# Patient Record
Sex: FEMALE | Race: WHITE | NOT HISPANIC OR LATINO | Employment: OTHER | URBAN - METROPOLITAN AREA
[De-identification: names, ages, dates, MRNs, and addresses within clinical notes are randomized per-mention and may not be internally consistent; named-entity substitution may affect disease eponyms.]

---

## 2017-07-17 ENCOUNTER — NURSE TRIAGE (OUTPATIENT)
Dept: ADMINISTRATIVE | Facility: CLINIC | Age: 67
End: 2017-07-17

## 2017-07-18 ENCOUNTER — OFFICE VISIT (OUTPATIENT)
Dept: INTERNAL MEDICINE | Facility: CLINIC | Age: 67
End: 2017-07-18
Payer: MEDICARE

## 2017-07-18 ENCOUNTER — LAB VISIT (OUTPATIENT)
Dept: LAB | Facility: HOSPITAL | Age: 67
End: 2017-07-18
Payer: MEDICARE

## 2017-07-18 VITALS
HEART RATE: 88 BPM | BODY MASS INDEX: 19.2 KG/M2 | SYSTOLIC BLOOD PRESSURE: 142 MMHG | OXYGEN SATURATION: 96 % | WEIGHT: 119.5 LBS | TEMPERATURE: 98 F | HEIGHT: 66 IN | DIASTOLIC BLOOD PRESSURE: 78 MMHG

## 2017-07-18 DIAGNOSIS — I10 ESSENTIAL HYPERTENSION: ICD-10-CM

## 2017-07-18 DIAGNOSIS — H61.23 BILATERAL IMPACTED CERUMEN: ICD-10-CM

## 2017-07-18 DIAGNOSIS — R42 VERTIGO: ICD-10-CM

## 2017-07-18 DIAGNOSIS — R42 VERTIGO: Primary | ICD-10-CM

## 2017-07-18 LAB
ALBUMIN SERPL BCP-MCNC: 4.3 G/DL
ALP SERPL-CCNC: 97 U/L
ALT SERPL W/O P-5'-P-CCNC: 17 U/L
ANION GAP SERPL CALC-SCNC: 7 MMOL/L
AST SERPL-CCNC: 23 U/L
BASOPHILS # BLD AUTO: 0.04 K/UL
BASOPHILS NFR BLD: 0.6 %
BILIRUB SERPL-MCNC: 0.4 MG/DL
BUN SERPL-MCNC: 13 MG/DL
CALCIUM SERPL-MCNC: 10.1 MG/DL
CHLORIDE SERPL-SCNC: 107 MMOL/L
CO2 SERPL-SCNC: 28 MMOL/L
CREAT SERPL-MCNC: 0.9 MG/DL
DIFFERENTIAL METHOD: NORMAL
EOSINOPHIL # BLD AUTO: 0 K/UL
EOSINOPHIL NFR BLD: 0.6 %
ERYTHROCYTE [DISTWIDTH] IN BLOOD BY AUTOMATED COUNT: 12.6 %
EST. GFR  (AFRICAN AMERICAN): >60 ML/MIN/1.73 M^2
EST. GFR  (NON AFRICAN AMERICAN): >60 ML/MIN/1.73 M^2
GLUCOSE SERPL-MCNC: 126 MG/DL
HCT VFR BLD AUTO: 41.4 %
HGB BLD-MCNC: 13.7 G/DL
LYMPHOCYTES # BLD AUTO: 1.3 K/UL
LYMPHOCYTES NFR BLD: 21.4 %
MCH RBC QN AUTO: 30 PG
MCHC RBC AUTO-ENTMCNC: 33.1 %
MCV RBC AUTO: 91 FL
MONOCYTES # BLD AUTO: 0.4 K/UL
MONOCYTES NFR BLD: 6.6 %
NEUTROPHILS # BLD AUTO: 4.4 K/UL
NEUTROPHILS NFR BLD: 70.6 %
PLATELET # BLD AUTO: 170 K/UL
PMV BLD AUTO: 9.9 FL
POTASSIUM SERPL-SCNC: 4.3 MMOL/L
PROT SERPL-MCNC: 7.5 G/DL
RBC # BLD AUTO: 4.57 M/UL
SODIUM SERPL-SCNC: 142 MMOL/L
WBC # BLD AUTO: 6.18 K/UL

## 2017-07-18 PROCEDURE — 36415 COLL VENOUS BLD VENIPUNCTURE: CPT

## 2017-07-18 PROCEDURE — 85025 COMPLETE CBC W/AUTO DIFF WBC: CPT

## 2017-07-18 PROCEDURE — 1126F AMNT PAIN NOTED NONE PRSNT: CPT | Mod: S$GLB,,, | Performed by: NURSE PRACTITIONER

## 2017-07-18 PROCEDURE — 99999 PR PBB SHADOW E&M-EST. PATIENT-LVL IV: CPT | Mod: PBBFAC,,, | Performed by: NURSE PRACTITIONER

## 2017-07-18 PROCEDURE — 80053 COMPREHEN METABOLIC PANEL: CPT

## 2017-07-18 PROCEDURE — 1159F MED LIST DOCD IN RCRD: CPT | Mod: S$GLB,,, | Performed by: NURSE PRACTITIONER

## 2017-07-18 PROCEDURE — 99203 OFFICE O/P NEW LOW 30 MIN: CPT | Mod: S$GLB,,, | Performed by: NURSE PRACTITIONER

## 2017-07-18 RX ORDER — LISINOPRIL 10 MG/1
10 TABLET ORAL DAILY
Qty: 90 TABLET | Refills: 0 | Status: SHIPPED | OUTPATIENT
Start: 2017-07-18 | End: 2017-07-25 | Stop reason: SDUPTHER

## 2017-07-18 RX ORDER — LORATADINE 10 MG/1
10 TABLET ORAL DAILY
COMMUNITY
End: 2017-10-16

## 2017-07-18 RX ORDER — MECLIZINE HCL 12.5 MG 12.5 MG/1
12.5 TABLET ORAL 3 TIMES DAILY PRN
Qty: 30 TABLET | Refills: 0 | Status: SHIPPED | OUTPATIENT
Start: 2017-07-18 | End: 2018-09-13

## 2017-07-18 NOTE — PROGRESS NOTES
Subjective:       Patient ID: Chelsea Child is a 67 y.o. female.    Chief Complaint: Dizziness    Dizziness:   Chronicity:  New  Onset:  In the past 7 days  Progression since onset:  Resolved  Severity:  Moderate  Duration:  5 minutes  Dizziness characteristics:  Spacial disorientation, sensation of movement and lightheaded/impending faint   Associated symptoms: tinnitus, light-headedness and palpitations.no hearing loss, no ear congestion, no ear pain, no fever, no headaches, no nausea, no vomiting, no aural fullness, no weakness, no visual disturbances, no syncope, no panic, no facial weakness, no slurred speech, no numbness in extremities and no chest pain.  Aggravated by:  Position changes  Treatments tried:  Body position changes  Improvements on treatment:  Significant    Review of Systems   Constitutional: Positive for activity change. Negative for fever and unexpected weight change.   HENT: Positive for rhinorrhea and tinnitus. Negative for ear pain, hearing loss and trouble swallowing.    Eyes: Negative for discharge and visual disturbance.   Respiratory: Negative for chest tightness and wheezing.    Cardiovascular: Positive for palpitations. Negative for chest pain and syncope.   Gastrointestinal: Negative for blood in stool, constipation, diarrhea, nausea and vomiting.   Endocrine: Negative for polydipsia and polyuria.   Genitourinary: Positive for difficulty urinating. Negative for dysuria, hematuria and menstrual problem.   Musculoskeletal: Positive for neck pain. Negative for arthralgias and joint swelling.   Skin: Negative for rash.   Neurological: Positive for dizziness and light-headedness. Negative for weakness and headaches.   Psychiatric/Behavioral: Negative for confusion and dysphoric mood.       Objective:      Physical Exam   Constitutional: She is oriented to person, place, and time. She appears well-developed and well-nourished. No distress.   HENT:   Head: Normocephalic and atraumatic.    bilat cerumen impaction.   Eyes: EOM are normal. No scleral icterus.   Mild lid lag left upper lid   Neck: Normal range of motion. Neck supple.   Cardiovascular: Normal rate, regular rhythm and normal heart sounds.    Pulmonary/Chest: Effort normal and breath sounds normal. No respiratory distress. She has no wheezes. She has no rales.   Lymphadenopathy:     She has no cervical adenopathy.   Neurological: She is oriented to person, place, and time.   Skin: Skin is warm and dry. She is not diaphoretic.   Psychiatric: She has a normal mood and affect. Her behavior is normal.   Nursing note and vitals reviewed.      Assessment:       1. Vertigo    2. Bilateral impacted cerumen    3. Essential hypertension        Plan:   1. Vertigo  - meclizine (ANTIVERT) 12.5 mg tablet; Take 1 tablet (12.5 mg total) by mouth 3 (three) times daily as needed.  Dispense: 30 tablet; Refill: 0  - Holter monitor - 48 hour; Future  - CBC auto differential; Future    2. Bilateral impacted cerumen  - Ambulatory consult to ENT    3. Essential hypertension  - Holter monitor - 48 hour; Future  - Comprehensive metabolic panel; Future  - CBC auto differential; Future  - lisinopril 10 MG tablet; Take 1 tablet (10 mg total) by mouth once daily.  Dispense: 90 tablet; Refill: 0      Pt has been given instructions populated from [employer] database and has verbalized understanding of the after visit summary and information contained wherein.    Follow up with a primary care provider. May go to ER for acute shortness of breath, lightheadedness, fever, or any other emergent complaints or changes in condition.

## 2017-07-23 ENCOUNTER — PATIENT MESSAGE (OUTPATIENT)
Dept: INTERNAL MEDICINE | Facility: CLINIC | Age: 67
End: 2017-07-23

## 2017-07-23 DIAGNOSIS — I10 ESSENTIAL HYPERTENSION: ICD-10-CM

## 2017-07-25 ENCOUNTER — PATIENT MESSAGE (OUTPATIENT)
Dept: INTERNAL MEDICINE | Facility: CLINIC | Age: 67
End: 2017-07-25

## 2017-07-25 RX ORDER — LISINOPRIL 5 MG/1
5 TABLET ORAL DAILY
Qty: 90 TABLET | Refills: 0 | Status: SHIPPED | OUTPATIENT
Start: 2017-07-25 | End: 2017-09-26

## 2017-07-27 ENCOUNTER — PATIENT MESSAGE (OUTPATIENT)
Dept: INTERNAL MEDICINE | Facility: CLINIC | Age: 67
End: 2017-07-27

## 2017-07-27 ENCOUNTER — CLINICAL SUPPORT (OUTPATIENT)
Dept: ELECTROPHYSIOLOGY | Facility: CLINIC | Age: 67
End: 2017-07-27
Payer: MEDICARE

## 2017-07-27 DIAGNOSIS — R42 VERTIGO: ICD-10-CM

## 2017-07-27 DIAGNOSIS — I10 ESSENTIAL HYPERTENSION: ICD-10-CM

## 2017-07-27 PROCEDURE — 93224 XTRNL ECG REC UP TO 48 HRS: CPT | Mod: S$GLB,,, | Performed by: INTERNAL MEDICINE

## 2017-07-31 ENCOUNTER — PATIENT MESSAGE (OUTPATIENT)
Dept: INTERNAL MEDICINE | Facility: CLINIC | Age: 67
End: 2017-07-31

## 2017-07-31 DIAGNOSIS — R73.9 ELEVATED BLOOD SUGAR: Primary | ICD-10-CM

## 2017-07-31 DIAGNOSIS — R79.9 ABNORMAL FINDING OF BLOOD CHEMISTRY: ICD-10-CM

## 2017-08-03 ENCOUNTER — LAB VISIT (OUTPATIENT)
Dept: LAB | Facility: HOSPITAL | Age: 67
End: 2017-08-03
Attending: NURSE PRACTITIONER
Payer: MEDICARE

## 2017-08-03 DIAGNOSIS — R79.9 ABNORMAL FINDING OF BLOOD CHEMISTRY: ICD-10-CM

## 2017-08-03 DIAGNOSIS — R73.9 ELEVATED BLOOD SUGAR: ICD-10-CM

## 2017-08-03 LAB
CHOLEST/HDLC SERPL: 2.7 {RATIO}
ESTIMATED AVG GLUCOSE: 114 MG/DL
HBA1C MFR BLD HPLC: 5.6 %
HDL/CHOLESTEROL RATIO: 37.4 %
HDLC SERPL-MCNC: 182 MG/DL
HDLC SERPL-MCNC: 68 MG/DL
LDLC SERPL CALC-MCNC: 105.2 MG/DL
NONHDLC SERPL-MCNC: 114 MG/DL
TRIGL SERPL-MCNC: 44 MG/DL

## 2017-08-03 PROCEDURE — 83036 HEMOGLOBIN GLYCOSYLATED A1C: CPT

## 2017-08-03 PROCEDURE — 80061 LIPID PANEL: CPT

## 2017-08-03 PROCEDURE — 36415 COLL VENOUS BLD VENIPUNCTURE: CPT

## 2017-08-18 ENCOUNTER — OFFICE VISIT (OUTPATIENT)
Dept: OTOLARYNGOLOGY | Facility: CLINIC | Age: 67
End: 2017-08-18
Payer: MEDICARE

## 2017-08-18 ENCOUNTER — CLINICAL SUPPORT (OUTPATIENT)
Dept: AUDIOLOGY | Facility: CLINIC | Age: 67
End: 2017-08-18
Payer: MEDICARE

## 2017-08-18 VITALS
HEART RATE: 80 BPM | SYSTOLIC BLOOD PRESSURE: 127 MMHG | DIASTOLIC BLOOD PRESSURE: 76 MMHG | WEIGHT: 119.06 LBS | BODY MASS INDEX: 19.21 KG/M2

## 2017-08-18 DIAGNOSIS — H90.3 SENSORINEURAL HEARING LOSS (SNHL) OF BOTH EARS: ICD-10-CM

## 2017-08-18 DIAGNOSIS — H90.3 SENSORINEURAL HEARING LOSS, BILATERAL: Primary | ICD-10-CM

## 2017-08-18 DIAGNOSIS — H61.23 BILATERAL IMPACTED CERUMEN: Primary | ICD-10-CM

## 2017-08-18 DIAGNOSIS — J34.3 NASAL TURBINATE HYPERTROPHY: ICD-10-CM

## 2017-08-18 PROCEDURE — 3008F BODY MASS INDEX DOCD: CPT | Mod: S$GLB,,, | Performed by: NURSE PRACTITIONER

## 2017-08-18 PROCEDURE — 99999 PR PBB SHADOW E&M-EST. PATIENT-LVL I: CPT | Mod: PBBFAC,,,

## 2017-08-18 PROCEDURE — 69210 REMOVE IMPACTED EAR WAX UNI: CPT | Mod: S$GLB,,, | Performed by: NURSE PRACTITIONER

## 2017-08-18 PROCEDURE — 92557 COMPREHENSIVE HEARING TEST: CPT | Mod: S$GLB,,, | Performed by: AUDIOLOGIST-HEARING AID FITTER

## 2017-08-18 PROCEDURE — 99999 PR PBB SHADOW E&M-EST. PATIENT-LVL III: CPT | Mod: PBBFAC,,, | Performed by: NURSE PRACTITIONER

## 2017-08-18 PROCEDURE — 1159F MED LIST DOCD IN RCRD: CPT | Mod: S$GLB,,, | Performed by: NURSE PRACTITIONER

## 2017-08-18 PROCEDURE — 1126F AMNT PAIN NOTED NONE PRSNT: CPT | Mod: S$GLB,,, | Performed by: NURSE PRACTITIONER

## 2017-08-18 PROCEDURE — 92567 TYMPANOMETRY: CPT | Mod: S$GLB,,, | Performed by: AUDIOLOGIST-HEARING AID FITTER

## 2017-08-18 PROCEDURE — 99203 OFFICE O/P NEW LOW 30 MIN: CPT | Mod: 25,S$GLB,, | Performed by: NURSE PRACTITIONER

## 2017-08-18 RX ORDER — AZELASTINE 1 MG/ML
1 SPRAY, METERED NASAL 2 TIMES DAILY
Qty: 30 ML | Refills: 2 | Status: SHIPPED | OUTPATIENT
Start: 2017-08-18 | End: 2017-12-19

## 2017-08-18 NOTE — PATIENT INSTRUCTIONS
Astelin daily (spray laterally).  Audiogram Reviewed: B SNHL.   Hearing conservation strongly recommended.  Trial of amplification bilaterally also recommended.  Re-check of hearing in 18-24 months or sooner if subjective change noted.  F/U with PCP as per schedule.  Discussed with patient multiple tinnitus management strategies including the use of maskers, instruments, background sound enrichment and other means. All questions answered and appropriate references given.  Encouraged yearly ear cleanings.  Referral to [facility] for tinnitus retraining.  RTC prn.

## 2017-08-18 NOTE — PROGRESS NOTES
Chelsea Child was seen in the clinic today for a hearing evaluation following cerumen removal. Ms. Child reported bilateral tinnitus.      Audiological testing revealed mild to moderate mid to high frequency sensorineural hearing loss in the right ear and a mild to moderately severe mid to high frequency sensorineural hearing loss in the left ear. A speech reception threshold was obtained at 5 dBHL in both ears. Speech discrimination was 92%, bilaterally.    Tympanometry revealed normal Type A tympanograms in both ears.    Recommendations:  1. Otologic evaluation  2. Annual hearing evaluation  3. Hearing Aid Consult

## 2017-08-18 NOTE — LETTER
August 19, 2017      Deanna Muller NP  [address]  [facility] [address]           [facility] - Otorhinolaryngology  [address]  [facility] [address]  Phone: [phone]  Fax: [phone]          Patient: Chelsea Cavazos   MR Number: [id number]   YOB: 1950   Date of Visit: 8/18/2017       Dear Deanna Muller:    Thank you for referring Chelsea Cavazos to me for evaluation. Attached you will find relevant portions of my assessment and plan of care.    If you have questions, please do not hesitate to call me. I look forward to following Chelsea Cavazos along with you.    Sincerely,    Kush Rosen NP    Enclosure  CC:  No Recipients    If you would like to receive this communication electronically, please contact [email or web address] or [phone] to request more information on [employer] Link access.    For providers and/or their staff who would like to refer a patient to [facility], please contact us through our one-stop-shop provider referral line, [facility], at [phone].    If you feel you have received this communication in error or would no longer like to receive these types of communications, please e-mail [email or web address]

## 2017-08-18 NOTE — PROGRESS NOTES
Subjective:       Patient ID: Chelsea Ford Child is a 67 y.o. female.    Chief Complaint: Tinnitus and Cerumen Impaction    Hearing Loss:   Chronicity:  Chronic  Onset:  More than 1 year ago  Progression since onset:  Gradually worsening  Frequency:  Constantly  Hearing loss characteristics:  Moderate, trouble hearing TV, difficult on telephone and worse background noise   Associated symptoms: tinnitus.  No dizziness, no vertigo, no ear congestion, no ear pain, no fever, no headaches, no buzzing, no rhinorrhea, no aural fullness, no fluctuance, no imbalance, no otalgia, no otorrhea, no facial weakness, no visual disturbances and not masked by noise.  Aggravated by:  Nothing  Treatments tried:  NothingNo stroke, TIA, or systemic emboli, no neurologic disease, no noise exposure, no dizziness, no ear surgery, no environmental allergies, no ear tubes, no ototoxic drugs, no ear infections and no recent URI.       Past Medical History: Patient has no past medical history on file.    Past Surgical History: Patient has no past surgical history on file.    Social History: Patient reports that she quit smoking about 33 years ago. She started smoking about 39 years ago. She has never used smokeless tobacco. She reports that she does not drink alcohol or use drugs.    Family History: family history includes COPD in her father; Hypertension in her father and mother; No Known Problems in her son.    Medications:   Current Outpatient Prescriptions   Medication Sig    azelastine (ASTELIN) 137 mcg (0.1 %) nasal spray 1 spray (137 mcg total) by Nasal route 2 (two) times daily.    docusate sodium (COLACE) 50 MG capsule Take by mouth 2 (two) times daily.    lisinopril (PRINIVIL,ZESTRIL) 5 MG tablet Take 1 tablet (5 mg total) by mouth once daily.    loratadine (CLARITIN) 10 mg tablet Take 10 mg by mouth once daily.    meclizine (ANTIVERT) 12.5 mg tablet Take 1 tablet (12.5 mg total) by mouth 3 (three) times daily as needed.     No  current facility-administered medications for this visit.        Allergies: Patient has No Known Allergies.    Review of Systems   Constitutional: Negative for activity change, appetite change, chills, diaphoresis, fatigue, fever and unexpected weight change.   HENT: Positive for hearing loss and tinnitus. Negative for congestion, dental problem, ear discharge, ear pain, facial swelling, nosebleeds, postnasal drip, rhinorrhea, sinus pressure, sneezing, sore throat, trouble swallowing and voice change.    Eyes: Negative for pain and visual disturbance.   Respiratory: Negative for cough, chest tightness, shortness of breath, wheezing and stridor.    Cardiovascular: Negative for chest pain.   Musculoskeletal: Negative for gait problem and neck pain.   Skin: Negative for color change and rash.   Allergic/Immunologic: Negative for environmental allergies.   Neurological: Negative for dizziness, vertigo, seizures, syncope, facial asymmetry, speech difficulty, weakness, light-headedness, numbness and headaches.   Psychiatric/Behavioral: Negative for agitation and confusion. The patient is not nervous/anxious.        Objective:       /76 (BP Location: Right arm, Patient Position: Sitting, BP Method: Small (Automatic))   Pulse 80   Wt 54 kg (119 lb 0.8 oz)   BMI 19.21 kg/m²     Physical Exam   Constitutional: She is oriented to person, place, and time. She appears well-developed and well-nourished.   HENT:   Head: Normocephalic and atraumatic. Not macrocephalic and not microcephalic. Head is without raccoon's eyes, without Varghese's sign, without abrasion, without contusion, without laceration, without right periorbital erythema and without left periorbital erythema. Hair is normal.   Right Ear: Tympanic membrane, external ear and ear canal normal. No lacerations. No drainage, swelling or tenderness. No foreign bodies. No mastoid tenderness. Tympanic membrane is not injected, not scarred, not perforated, not  erythematous, not retracted and not bulging. Tympanic membrane mobility is normal. No middle ear effusion. No hemotympanum. Decreased hearing is noted.   Left Ear: Tympanic membrane, external ear and ear canal normal. No lacerations. No drainage, swelling or tenderness. No foreign bodies. No mastoid tenderness. Tympanic membrane is not injected, not scarred, not perforated, not erythematous, not retracted and not bulging. Tympanic membrane mobility is normal.  No middle ear effusion. No hemotympanum. Decreased hearing is noted.   Nose: Nose normal. No mucosal edema, rhinorrhea, nose lacerations, sinus tenderness or nasal deformity.   Mouth/Throat: Uvula is midline.   PROCEDURE NOTE:  Ceruminosis is noted in both EACs.  Wax was removed by manual debridement and suctioning utilizing the assistance of binocular microscopy revealing EACs and TMs WNL. The patient tolerated this procedure without difficulty. The subjective decrease noted in hearing pre-cleaning was resolved post-cleaning.    No AOM or OE.  Facial Nerve Intact.   Eyes: Conjunctivae, EOM and lids are normal. Pupils are equal, round, and reactive to light.   Neck: Trachea normal and normal range of motion. Neck supple. No spinous process tenderness and no muscular tenderness present. No neck rigidity. No edema, no erythema and normal range of motion present. No thyroid mass and no thyromegaly present.   Pulmonary/Chest: Effort normal.   Abdominal: Soft.   Musculoskeletal: Normal range of motion.   Lymphadenopathy:        Head (right side): No submental, no submandibular, no tonsillar, no preauricular and no posterior auricular adenopathy present.        Head (left side): No submental, no submandibular, no tonsillar, no preauricular, no posterior auricular and no occipital adenopathy present.     She has no cervical adenopathy.   Neurological: She is alert and oriented to person, place, and time. No cranial nerve deficit or sensory deficit.   Skin: Skin is  warm and dry.   Psychiatric: She has a normal mood and affect. Her behavior is normal. Judgment and thought content normal.   Nursing note and vitals reviewed.      As a result of this patients history and examination findings, a comprehensive audiogram was ordered to determine the level of hearing/hearing loss.        Assessment:       1. Bilateral impacted cerumen    2. Sensorineural hearing loss (SNHL) of both ears    3. Nasal turbinate hypertrophy        Plan:       Astelin daily (spray laterally).  Audiogram Reviewed: B SNHL.   Hearing conservation strongly recommended.  Trial of amplification bilaterally also recommended.  Re-check of hearing in 18-24 months or sooner if subjective change noted.  F/U with PCP as per schedule.  Discussed with patient multiple tinnitus management strategies including the use of maskers, instruments, background sound enrichment and other means. All questions answered and appropriate references given.  Encouraged yearly ear cleanings.  Referral to [facility] for tinnitus retraining.  RTC prn.

## 2017-09-26 ENCOUNTER — OFFICE VISIT (OUTPATIENT)
Dept: INTERNAL MEDICINE | Facility: CLINIC | Age: 67
End: 2017-09-26
Payer: MEDICARE

## 2017-09-26 ENCOUNTER — DOCUMENTATION ONLY (OUTPATIENT)
Dept: INTERNAL MEDICINE | Facility: CLINIC | Age: 67
End: 2017-09-26

## 2017-09-26 ENCOUNTER — HOSPITAL ENCOUNTER (OUTPATIENT)
Dept: RADIOLOGY | Facility: HOSPITAL | Age: 67
Discharge: HOME | End: 2017-09-26
Attending: INTERNAL MEDICINE
Payer: MEDICARE

## 2017-09-26 ENCOUNTER — IMMUNIZATION (OUTPATIENT)
Dept: INTERNAL MEDICINE | Facility: CLINIC | Age: 67
End: 2017-09-26
Payer: MEDICARE

## 2017-09-26 VITALS
BODY MASS INDEX: 19.32 KG/M2 | SYSTOLIC BLOOD PRESSURE: 120 MMHG | TEMPERATURE: 98 F | WEIGHT: 120.19 LBS | RESPIRATION RATE: 16 BRPM | HEIGHT: 66 IN | HEART RATE: 66 BPM | DIASTOLIC BLOOD PRESSURE: 76 MMHG

## 2017-09-26 DIAGNOSIS — R00.2 PALPITATIONS: ICD-10-CM

## 2017-09-26 DIAGNOSIS — Z11.59 NEED FOR HEPATITIS C SCREENING TEST: ICD-10-CM

## 2017-09-26 DIAGNOSIS — Z12.31 ENCOUNTER FOR SCREENING MAMMOGRAM FOR BREAST CANCER: ICD-10-CM

## 2017-09-26 DIAGNOSIS — Z85.828 HISTORY OF BASAL CELL CANCER: ICD-10-CM

## 2017-09-26 DIAGNOSIS — Z23 NEED FOR PNEUMOCOCCAL VACCINATION: ICD-10-CM

## 2017-09-26 DIAGNOSIS — I10 BENIGN ESSENTIAL HYPERTENSION: Primary | ICD-10-CM

## 2017-09-26 DIAGNOSIS — Z12.11 ENCOUNTER FOR FIT (FECAL IMMUNOCHEMICAL TEST) SCREENING: ICD-10-CM

## 2017-09-26 DIAGNOSIS — Z78.0 POSTMENOPAUSAL ESTROGEN DEFICIENCY: ICD-10-CM

## 2017-09-26 DIAGNOSIS — R42 VERTIGO: ICD-10-CM

## 2017-09-26 PROCEDURE — 1159F MED LIST DOCD IN RCRD: CPT | Mod: S$GLB,,, | Performed by: INTERNAL MEDICINE

## 2017-09-26 PROCEDURE — 1125F AMNT PAIN NOTED PAIN PRSNT: CPT | Mod: S$GLB,,, | Performed by: INTERNAL MEDICINE

## 2017-09-26 PROCEDURE — G0008 ADMIN INFLUENZA VIRUS VAC: HCPCS | Mod: S$GLB,,, | Performed by: INTERNAL MEDICINE

## 2017-09-26 PROCEDURE — 99999 PR PBB SHADOW E&M-EST. PATIENT-LVL IV: CPT | Mod: PBBFAC,,, | Performed by: INTERNAL MEDICINE

## 2017-09-26 PROCEDURE — G0009 ADMIN PNEUMOCOCCAL VACCINE: HCPCS | Mod: S$GLB,,, | Performed by: INTERNAL MEDICINE

## 2017-09-26 PROCEDURE — 90662 IIV NO PRSV INCREASED AG IM: CPT | Mod: S$GLB,,, | Performed by: INTERNAL MEDICINE

## 2017-09-26 PROCEDURE — 77067 SCR MAMMO BI INCL CAD: CPT | Mod: 26,,, | Performed by: RADIOLOGY

## 2017-09-26 PROCEDURE — 77067 SCR MAMMO BI INCL CAD: CPT | Mod: TC

## 2017-09-26 PROCEDURE — 99215 OFFICE O/P EST HI 40 MIN: CPT | Mod: S$GLB,,, | Performed by: INTERNAL MEDICINE

## 2017-09-26 PROCEDURE — 90670 PCV13 VACCINE IM: CPT | Mod: S$GLB,,, | Performed by: INTERNAL MEDICINE

## 2017-09-26 PROCEDURE — 3008F BODY MASS INDEX DOCD: CPT | Mod: S$GLB,,, | Performed by: INTERNAL MEDICINE

## 2017-09-26 RX ORDER — METOPROLOL TARTRATE 25 MG/1
12.5 TABLET, FILM COATED ORAL 2 TIMES DAILY
Qty: 30 TABLET | Refills: 3 | Status: SHIPPED | OUTPATIENT
Start: 2017-09-26 | End: 2017-10-04 | Stop reason: SDUPTHER

## 2017-09-26 NOTE — PROGRESS NOTES
INTERNAL MEDICINE INITIAL VISIT NOTE      CHIEF COMPLAINT     Chief Complaint   Patient presents with    [facility]     HPI     Chelsea Ford Child is a 67 y.o. C female who presents to [facility].    HTN - on lisinopril 5mg daily (started in July). Lisinopril 10mg made her dizzy.  Checks her BP BID usually - 100s-130s/60s-70s    Had 2 eps of vertigo, severe enough to keep her in bed. Assoc this w/ doing overhead work. Last ep was in July after she was doing yard work. The next day had her hair done. Then the next day, had vertigo where she was in bed for a few days. Felt like room spinning, worse w/ head movement. Hasn't had to take meclizine.   Seen ENT 8/19/17 for tinnitus. Some hearing loss of B ears.     C/o 1-2x/week w/ rapid heart rate - sometimes last about 15-20min. Last one she remembered was w/ being in [employer] - checked her pulse and was fast; rechecked it at home and still elevated at 130.   48hr Holter 7/27/17 - 8 beats of nonsustained AT. She had reports of SOB, dizziness, lightheadedness, increased HR and those corresponded w/ NSR or ST.     Lives by herself. No assistance w/ ADLs. Renovated her kitchen. Lots of gardening. No issues.     Past Medical History:  Past Medical History:   Diagnosis Date    BCC (basal cell carcinoma)     Hypertension     Palpitations 9/26/2017    Vertigo 9/26/2017       Past Surgical History:  Past Surgical History:   Procedure Laterality Date    Mohs      BCC       Allergies:  Review of patient's allergies indicates:  No Known Allergies    meds reviewed    Family History:  Family History   Problem Relation Age of Onset    Hypertension Mother     Hypertension Father     COPD Father     Stroke Father 72    No Known Problems Son        Social History:  Social History   Substance Use Topics    Smoking status: Former Smoker     Start date: 1978     Quit date: 1984    Smokeless tobacco: Never Used    Alcohol use No       Review of Systems:  Review of Systems  "  Constitutional: Negative for activity change and unexpected weight change.   HENT: Positive for hearing loss (chronic. seen ENT) and rhinorrhea (on asteline). Negative for trouble swallowing.    Eyes: Negative for discharge and visual disturbance (wears glasses).   Respiratory: Negative for chest tightness and wheezing.    Cardiovascular: Positive for palpitations. Negative for chest pain.   Gastrointestinal: Negative for blood in stool, constipation, diarrhea and vomiting.   Endocrine: Negative for polydipsia and polyuria.   Genitourinary: Negative for dysuria, hematuria and menstrual problem.   Musculoskeletal: Positive for arthralgias and neck pain. Negative for joint swelling.   Neurological: Negative for weakness and headaches.   Psychiatric/Behavioral: Negative for confusion and dysphoric mood.    Comprehensive review of systems otherwise negative. See history/subjective section for more details.    Health Maintainence:   Td - need one.   Flu - annually. Need one today.   Prevnar - need one today.  Pneumovax - had one >5 yrs ago.   Zoster - doesn't think she's had this. Had shingles previously.   MMG - need one.   C-SCOPE - need one.  DEXA - need  Hep C screening - need    PHYSICAL EXAM     /76   Pulse 66   Temp 97.7 °F (36.5 °C) (Oral)   Resp 16   Ht 5' 6" (1.676 m)   Wt 54.5 kg (120 lb 3.2 oz)   BMI 19.40 kg/m²     GEN - A+OX4, NAD   HEENT - PERRL, EOMI, OP clear. MMM.   Neck - No thyromegaly or cervical LAD. No thyroid masses felt.  CV - RRR, no m/r   Chest - CTAB, no wheezing or rhonchi  Abd - S/NT/ND/+BS.   Ext - 2+BDP and radial pulses. No LE edema.   Neuro - PERRL, EOMI, no nystagmus, eyebrow raise, facial sensation, hearing, m of mastication, smile, palatal raise, shoulder shrug, tongue protrusion symmetric and intact. 5/5 BUE and BLE strength. Sensation to light touch intact throughout. 2+ DTRs. Normal gait.   MSK - No spinal tenderness to palpation. Normal gait.   Skin - No " rash.    LABS     Previous labs reviewed.    ASSESSMENT/PLAN     Chelsea Ford Child is a 67 y.o. female with  Chelsea was seen today for establish care.    Diagnoses and all orders for this visit:    Benign essential hypertension - change lisinopril to lopressor due to palpitations. Keep track of BP at home.   -     metoprolol tartrate (LOPRESSOR) 25 MG tablet; Take 0.5 tablets (12.5 mg total) by mouth 2 (two) times daily. Hold if HR<60  -     Basic metabolic panel; Future; Expected date: 09/26/2017    Vertigo - meclizine prn. Discussed pathology of vertigo.     Palpitations - trial of lopressor.   -     metoprolol tartrate (LOPRESSOR) 25 MG tablet; Take 0.5 tablets (12.5 mg total) by mouth 2 (two) times daily. Hold if HR<60  -     2D Echo w/ Color Flow Doppler; Future    History of basal cell cancer - sunscreen when outside.   -     Ambulatory Referral to Dermatology    Need for pneumococcal vaccination  -     (In Office Administered) Pneumococcal Conjugate Vaccine (13 Valent) (IM)    Encounter for screening mammogram for breast cancer  -     Mammo Digital Screening Bilat with CAD; Future; Expected date: 09/26/2017    Encounter for FIT (fecal immunochemical test) screening  -     Fecal Immunochemical Test (iFOBT); Future; Expected date: 09/26/2017    Postmenopausal estrogen deficiency  -     DXA Bone Density Spine And Hip; Future; Expected date: 09/26/2017    Need for hepatitis C screening test  -     Hepatitis C antibody; Future; Expected date: 09/26/2017    RTC in 3 months, sooner if needed and depending on labs.    Swetha Pyle MD  Department of Internal Medicine - [facility]  8:26 AM

## 2017-09-27 ENCOUNTER — TELEPHONE (OUTPATIENT)
Dept: INTERNAL MEDICINE | Facility: CLINIC | Age: 67
End: 2017-09-27

## 2017-09-27 ENCOUNTER — PATIENT MESSAGE (OUTPATIENT)
Dept: ADMINISTRATIVE | Facility: OTHER | Age: 67
End: 2017-09-27

## 2017-09-27 DIAGNOSIS — R00.2 PALPITATION: Primary | ICD-10-CM

## 2017-09-29 ENCOUNTER — HOSPITAL ENCOUNTER (OUTPATIENT)
Dept: RADIOLOGY | Facility: CLINIC | Age: 67
Discharge: HOME | End: 2017-09-29
Attending: INTERNAL MEDICINE
Payer: MEDICARE

## 2017-09-29 ENCOUNTER — HOSPITAL ENCOUNTER (OUTPATIENT)
Dept: CARDIOLOGY | Facility: CLINIC | Age: 67
Discharge: HOME | End: 2017-09-29
Payer: MEDICARE

## 2017-09-29 DIAGNOSIS — Z78.0 POSTMENOPAUSAL ESTROGEN DEFICIENCY: ICD-10-CM

## 2017-09-29 DIAGNOSIS — R00.2 PALPITATIONS: ICD-10-CM

## 2017-09-29 LAB
DIASTOLIC DYSFUNCTION: NO
ESTIMATED PA SYSTOLIC PRESSURE: 21.66
MITRAL VALVE MOBILITY: NORMAL
MITRAL VALVE REGURGITATION: NORMAL
RETIRED EF AND QEF - SEE NOTES: 60 (ref 55–65)
TRICUSPID VALVE REGURGITATION: NORMAL

## 2017-09-29 PROCEDURE — 93306 TTE W/DOPPLER COMPLETE: CPT | Mod: S$GLB,,, | Performed by: INTERNAL MEDICINE

## 2017-09-29 PROCEDURE — 77080 DXA BONE DENSITY AXIAL: CPT | Mod: TC

## 2017-09-29 PROCEDURE — 77080 DXA BONE DENSITY AXIAL: CPT | Mod: 26,,, | Performed by: INTERNAL MEDICINE

## 2017-10-04 ENCOUNTER — PATIENT MESSAGE (OUTPATIENT)
Dept: INTERNAL MEDICINE | Facility: CLINIC | Age: 67
End: 2017-10-04

## 2017-10-04 ENCOUNTER — PATIENT MESSAGE (OUTPATIENT)
Dept: ADMINISTRATIVE | Facility: OTHER | Age: 67
End: 2017-10-04

## 2017-10-04 ENCOUNTER — INITIAL CONSULT (OUTPATIENT)
Dept: DERMATOLOGY | Facility: CLINIC | Age: 67
End: 2017-10-04
Payer: MEDICARE

## 2017-10-04 DIAGNOSIS — L82.1 SEBORRHEIC KERATOSES: ICD-10-CM

## 2017-10-04 DIAGNOSIS — R00.2 PALPITATIONS: ICD-10-CM

## 2017-10-04 DIAGNOSIS — L73.8 SEBACEOUS HYPERPLASIA: ICD-10-CM

## 2017-10-04 DIAGNOSIS — D18.00 ANGIOMA: ICD-10-CM

## 2017-10-04 DIAGNOSIS — L57.0 ACTINIC KERATOSIS: ICD-10-CM

## 2017-10-04 DIAGNOSIS — L81.4 LENTIGINES: ICD-10-CM

## 2017-10-04 DIAGNOSIS — D22.9 MULTIPLE BENIGN NEVI: Primary | ICD-10-CM

## 2017-10-04 DIAGNOSIS — Z85.828 PERSONAL HISTORY OF MALIGNANT NEOPLASM OF SKIN: ICD-10-CM

## 2017-10-04 DIAGNOSIS — I10 BENIGN ESSENTIAL HYPERTENSION: ICD-10-CM

## 2017-10-04 PROCEDURE — 99203 OFFICE O/P NEW LOW 30 MIN: CPT | Mod: 25,S$GLB,, | Performed by: DERMATOLOGY

## 2017-10-04 PROCEDURE — 99999 PR PBB SHADOW E&M-EST. PATIENT-LVL II: CPT | Mod: PBBFAC,,, | Performed by: DERMATOLOGY

## 2017-10-04 PROCEDURE — 17000 DESTRUCT PREMALG LESION: CPT | Mod: S$GLB,,, | Performed by: DERMATOLOGY

## 2017-10-04 RX ORDER — METOPROLOL TARTRATE 25 MG/1
12.5 TABLET, FILM COATED ORAL 2 TIMES DAILY
Qty: 30 TABLET | Refills: 3 | Status: SHIPPED | OUTPATIENT
Start: 2017-10-04 | End: 2017-10-24 | Stop reason: ALTCHOICE

## 2017-10-04 NOTE — TELEPHONE ENCOUNTER
Please let her know that it hasn't been read yet but I looked at the bone density scan and it looks like she does have osteoporosis in the lumbar spine.     I recommend taking at least calcium 1200mg and Vitamin D of 1000 IU daily. In addition, I recommend light weight exercises. I would like to start her called bisphosphonates such as alendronate once a week. One of the more common side effects is gastritis - upset stomach. You have to avoid lying down for 30 minutes after taking the medicine. In addition, a rare but severe side effect is osteonecrosis of the jaw (wearing away of the jaw bone). In order to start this medicine, you have to get clearance from your dentist. Once this is done, please let the office know if you want to proceed with taking the medicine.

## 2017-10-04 NOTE — LETTER
October 5, 2017      Swetha Pyle MD  [address]  [facility] [address]           [facility]  [address]  [facility] [address]  Phone: [phone]  Fax: [phone]          Patient: Chelsea Ford Child   MR Number: [id number]   YOB: 1950   Date of Visit: 10/4/2017       Dear Dr. Swetha Pyle:    Thank you for referring Chelsea Cavazos to me for evaluation. Attached you will find relevant portions of my assessment and plan of care.    If you have questions, please do not hesitate to call me. I look forward to following Chelsea Cavazos along with you.    Sincerely,    Faiza Wilson MD    Enclosure  CC:  No Recipients    If you would like to receive this communication electronically, please contact [email or web address] or [phone] to request more information on [employer] Link access.    For providers and/or their staff who would like to refer a patient to [facility], please contact us through our one-stop-shop provider referral line, [facility], at [phone].    If you feel you have received this communication in error or would no longer like to receive these types of communications, please e-mail [email or web address]

## 2017-10-04 NOTE — PROGRESS NOTES
Subjective:       Patient ID:  Chelsea Ford Child is a 67 y.o. female who presents for   Chief Complaint   Patient presents with    Skin Check     ubse     HPI  68 yo F presents for skin check.  She has a rough spot above her R eyebrow that has been present for over a year, previous treatments include LN but it never totally resolved.  It is now thinner than it used to be.  Denies bleeding.  She would like to have UBSE  Derm Hx: BCC x 2 on R cheek and on nose  Past Medical History:   Diagnosis Date    BCC (basal cell carcinoma)     Hypertension     Palpitations 9/26/2017    Vertigo 9/26/2017     Review of Systems   Skin: Positive for daily sunscreen use and activity-related sunscreen use. Negative for recent sunburn.   Hematologic/Lymphatic: Does not bruise/bleed easily.        Objective:    Physical Exam   Constitutional: She appears well-developed and well-nourished. No distress.   Neurological: She is alert and oriented to person, place, and time. She is not disoriented.   Psychiatric: She has a normal mood and affect.   Skin:   Areas Examined (abnormalities noted in diagram):   Scalp / Hair Palpated and Inspected  Head / Face Inspection Performed  Neck Inspection Performed  Chest / Axilla Inspection Performed  Abdomen Inspection Performed  Back Inspection Performed  RUE Inspected  LUE Inspection Performed  Nails and Digits Inspection Performed                   Diagram Legend     Erythematous scaling macule/papule c/w actinic keratosis       Vascular papule c/w angioma      Pigmented verrucoid papule/plaque c/w seborrheic keratosis      Yellow umbilicated papule c/w sebaceous hyperplasia      Irregularly shaped tan macule c/w lentigo     1-2 mm smooth white papules consistent with Milia      Movable subcutaneous cyst with punctum c/w epidermal inclusion cyst      Subcutaneous movable cyst c/w pilar cyst      Firm pink to brown papule c/w dermatofibroma      Pedunculated fleshy papule(s) c/w skin tag(s)       Evenly pigmented macule c/w junctional nevus     Mildly variegated pigmented, slightly irregular-bordered macule c/w mildly atypical nevus      Flesh colored to evenly pigmented papule c/w intradermal nevus       Pink pearly papule/plaque c/w basal cell carcinoma      Erythematous hyperkeratotic cursted plaque c/w SCC      Surgical scar with no sign of skin cancer recurrence      Open and closed comedones      Inflammatory papules and pustules      Verrucoid papule consistent consistent with wart     Erythematous eczematous patches and plaques     Dystrophic onycholytic nail with subungual debris c/w onychomycosis     Umbilicated papule    Erythematous-base heme-crusted tan verrucoid plaque consistent with inflamed seborrheic keratosis     Erythematous Silvery Scaling Plaque c/w Psoriasis     See annotation      Assessment / Plan:        Multiple benign nevi  Reassurance that her nevi appear benign with regular and consistent pigment pattern on dermoscopy    Angioma  This is a benign vascular lesion. Reassurance given. No treatment required.     Lentigines  These are benign hyperpigmented sun induced lesions. Daily sun protection will reduce the number of new lesions.     Actinic keratosis  Cryosurgery Procedure Note    Verbal consent from the patient is obtained and the patient is aware of the precancerous quality and need for treatment of these lesions. Liquid nitrogen cryosurgery is applied to the 1 actinic keratosis, as detailed in the physical exam, to produce a freeze injury. The patient is aware that blisters may form and is instructed on wound care with gentle cleansing and use of vaseline ointment to keep moist until healed. The patient is supplied a handout on cryosurgery and is instructed to call if lesions do not completely resolve.    Seborrheic keratoses  These are benign inherited growths without a malignant potential. Reassurance given to patient. No treatment is necessary.     Sebaceous  hyperplasia  This is a common condition representing benign enlargement of the sebaceous lobule. It typically occurs in adulthood. Reassurance given to patient.     Personal history of malignant neoplasm of skin  Area(s) of previous NMSC evaluated with no signs of recurrence.  Upper body skin examination performed today including at least 6 points as noted in physical examination. No lesions suspicious for malignancy noted.             Return in about 1 year (around 10/4/2018).

## 2017-10-16 ENCOUNTER — PATIENT OUTREACH (OUTPATIENT)
Dept: OTHER | Facility: OTHER | Age: 67
End: 2017-10-16

## 2017-10-16 RX ORDER — ACETAMINOPHEN, DIPHENHYDRAMINE HCL, PHENYLEPHRINE HCL 325; 25; 5 MG/1; MG/1; MG/1
1 TABLET ORAL NIGHTLY
COMMUNITY
End: 2018-09-13

## 2017-10-16 NOTE — TELEPHONE ENCOUNTER
HTN Digital Medicine Program Medication Reconciliation Outreach    Called patient to introduce her into the HDMP. Mrs. Cavazos was pleasant and talkative, able to complete her enrollment without difficulty. Reviewed program details including blood pressure goals, technique for taking readings (timing, cuff placement, body positioning, iHealth milan), and what to do in case of emergency. Introduced patient to members of care team (health , clinician, and responsible provider). Verified patient's understanding of [facility] My[facility] milan use for contacting clinical team and to ensure that iHealth cuff readings continue to transmit by logging in once every 2 weeks. Confirmation text sent and patient received.    Screening Questionnaire Review:  1. Depression - not indicated  2. Sleep apnea - not indicated     Verified the following information with the patient:  1. Medication list  Current Outpatient Prescriptions on File Prior to Visit   Medication Sig Dispense Refill    azelastine (ASTELIN) 137 mcg (0.1 %) nasal spray 1 spray (137 mcg total) by Nasal route 2 (two) times daily. 30 mL 2    docusate sodium (COLACE) 50 MG capsule Take by mouth daily as needed.       meclizine (ANTIVERT) 12.5 mg tablet Take 1 tablet (12.5 mg total) by mouth 3 (three) times daily as needed. 30 tablet 0    metoprolol tartrate (LOPRESSOR) 25 MG tablet Take 0.5 tablets (12.5 mg total) by mouth 2 (two) times daily. Hold if HR<60 30 tablet 3    [DISCONTINUED] loratadine (CLARITIN) 10 mg tablet Take 10 mg by mouth once daily.       No current facility-administered medications on file prior to visit.        No Previous ADRs    2. Medication compliance: has been compliant with the medicaiton regimen    3. Medication Allergies:   Review of patient's allergies indicates:   Allergen Reactions    Adhesive Dermatitis       Explained that our goal is to get her BP consistently below 140/90mmHg. Patient denies having further questions, concerns. BP is  not at goal (see readings).       Last 5 Patient Entered Redings Current 30 Day Average: 133/68     Recent Readings 10/16/2017 10/16/2017 10/15/2017 10/15/2017 10/15/2017    Systolic BP (mmHg) 140 151 151 177 159    Diastolic BP (mmHg) 68 79 72 81 83    Pulse 67 60 59 56 60

## 2017-10-16 NOTE — LETTER
Rossi Queen PharmD  [address] [place] [address]     Dear Chelsea Cavazos,    Welcome to the [facility] Program!         My name is Rossi Queen PharmD and I am your dedicated Digital Medicine clinician.  As an expert in medication management, I will help ensure that the medications you are taking continue to provide you with the intended benefits.      I am Manjit Ortiz and I will be your health  for the duration of the program.  My  job is to help you identify lifestyle changes to improve your blood pressure control.  We will talk about nutrition, exercise, and other ways that you may be able to adjust your current habits to better your health. Together, we will work to improve your overall health and encourage you to meet your goals for a healthier lifestyle.    What we expect from YOU:    You will need to take blood pressure readings multiple times a week and no less than one reading per week.   It is important that you take your measurements at different times during the day, when possible.     What you should expect from your Digital Medicine Care Team:   We will provide you with education about high blood pressure, including lifestyle changes that could help you to control your blood pressure.   We will review your weekly readings and provide you with monthly blood pressure progress reports after you have been in the program for more than 30 days.   We will send monthly progress reports on your blood pressure control to your physician so they can follow along with your progress as well.    You will be able to reach me by phone at [phone] or through your [facility] account by clicking my name under Care Team on the right side of the home screen.    I look forward to working with you to achieve your blood pressure goals!    Sincerely,    Rossi Queen PharmD  Your personal clinician    Please visit  [email or web address] to learn more about high blood pressure and what you can do lower your blood pressure.                                                                                           Chelsea Ford Child  [address]  [place] [address]

## 2017-10-23 ENCOUNTER — PATIENT OUTREACH (OUTPATIENT)
Dept: OTHER | Facility: OTHER | Age: 67
End: 2017-10-23

## 2017-10-23 NOTE — PROGRESS NOTES
"Last 5 Patient Entered Redings Current 30 Day Average: 135/70     Recent Readings 10/23/2017 10/23/2017 10/22/2017 10/22/2017 10/22/2017    Systolic BP (mmHg) 117 143 128 141 139    Diastolic BP (mmHg) 71 81 68 77 74    Pulse 64 55 70 59 59          10/23-LVM. Introduction phone call.     Patient expresses concern for experiencing lightheadedness from her BP medication. Created task for pharmacist.     Hypertension Digital Medicine Program (HDMP): Health  Introduction    Introduced Ms. Chelsea Cavazos to HDMP. Discussed health  role and recommended lifestyle modifications.    Diet (i.e. Low sodium, food labels): Patient reports eating a piece of toast for breakfast. She states having a chicken salad with strawberries for lunch often. Patient reports having a piece of cheese with peanut butter when snacking. Patient reports eating a chicken-based meal for dinner. She states drinking a lot of water. Patient reports not drinking sodas but enjoys carbonated water -[place]. She states not adding salt to her foods. Patient reports not liking fast food and may eat out only a couple times a month. Patient requested DASH Diet information.  Exercise: Patient reports walking a mile or two in the mornings. She states getting around 4K steps per day. Patient reports increasing her activity as of a week ago. She expresses interest in increasing her activity by walking more.  Alcohol/Tobacco: Patient reports not drinking any alcohol and has been smoke free for more 30+ years.  Medication Adherence: has not been compliant with the medication regiment due to the following:  side effects Patient reports a little of lightheaded-ness. She states feeling "off." Created task for pharmacist.  Other goals:      Reviewed AHA recommendations:  Limit sodium intake to <2000mg/day  Perform 150 minutes of physical activity per week    Patient is aware of the importance of taking daily BP readings at various times of the " day  Patient aware that the health  can be used as a resource for lifestyle modifications to help reduce or maintain a healthy BP  Patient is aware of the importance of medication adherence.  [facility]. Patient should call 911 or [facility] on [facility] if one arises.

## 2017-10-24 ENCOUNTER — PATIENT OUTREACH (OUTPATIENT)
Dept: OTHER | Facility: OTHER | Age: 67
End: 2017-10-24

## 2017-10-24 DIAGNOSIS — I10 ESSENTIAL HYPERTENSION: Primary | ICD-10-CM

## 2017-10-24 RX ORDER — METOPROLOL SUCCINATE 25 MG/1
25 TABLET, EXTENDED RELEASE ORAL DAILY
Qty: 30 TABLET | Refills: 3 | Status: SHIPPED | OUTPATIENT
Start: 2017-10-24 | End: 2017-11-20 | Stop reason: ALTCHOICE

## 2017-10-24 NOTE — PROGRESS NOTES
Last 5 Patient Entered Redings Current 30 Day Average: 136/71     Recent Readings 10/24/2017 10/24/2017 10/24/2017 10/23/2017 10/23/2017    Systolic BP (mmHg) 130 130 139 129 127    Diastolic BP (mmHg) 76 76 76 69 70    Pulse 63 63 56 58 66        10/23- Patient requested salt free seasonings information. Sent information via [employer].

## 2017-10-24 NOTE — PROGRESS NOTES
"HPI:    Chelsea Child is 66 y/o female with h/o hypertension. Patient previously complained of lightheadedness and a "off" feeling that resolved at the end of last week. Concerned that episodes were related to new medication regimen. Upon further discussion patient reports experiencing "inner ear congestion" as well. Patient reports adherence to medication regimen and has not held doses at HR < 60 bpm. She denies any s/s of hyper/hypotension at this time.     Last 5 Patient Entered Readings Current 30 Day Average: 136/71     Recent Readings 10/24/2017 10/24/2017 10/23/2017 10/23/2017 10/23/2017    Systolic BP (mmHg) 130 139 129 127 123    Diastolic BP (mmHg) 76 76 69 70 65    Pulse 63 56 58 66 76          Assessment:  BP is at goal however, there are drastic fluctuations in readings. Beta blocker is appropriately controlling heart rate however, patient is still experiencing frequent elevated readings throughout the day as well as episodes of lightheadedness. Use of an extended release formulation may provided better blood pressure control and decrease episodes of lightheadedness. Discussed medication regimen and side effect profile with patient and informed her that episodes will decrease with continued use. Discussed the importance of taking BP at least 1 hour after medications, exercise and coffee.      Plan:  Start metoprolol succinate 25 mg once daily. Will continue to monitor and follow-up in 2 weeks to assess BP readings and medication regimen.      Current medication regimen:    Hypertension Medications             metoprolol succinate (TOPROL-XL) 25 MG 24 hr tablet Take 1 tablet (25 mg total) by mouth once daily.                "

## 2017-11-06 ENCOUNTER — PATIENT OUTREACH (OUTPATIENT)
Dept: OTHER | Facility: OTHER | Age: 67
End: 2017-11-06

## 2017-11-06 NOTE — PROGRESS NOTES
HPI:  Hypertension and medication management follow-up with Ms. Cavazos complete. Patient feels well and has no complaints regarding new medication regimen. She continues to walk daily and monitor blood pressure regularly. Notes a few elevated readings since switching formulations however, after retaking at a later time blood pressure was within normal range. She denies s/s of hyper/hypotension at this time and reports adherence to medication regimen.     Last 5 Patient Entered Readings Current 30 Day Average: 137/72     Recent Readings 11/6/2017 11/5/2017 11/5/2017 11/5/2017 11/4/2017    Systolic BP (mmHg) 118 139 125 152 118    Diastolic BP (mmHg) 69 83 64 78 68    Pulse 66 67 62 64 61        Assessment:  Per 30-day average patient is intermittently controlled however, readings are improving since switching formulation of metoprolol. Lightheadedness and fatigue have also resolved. Encouraged patient to continue with current regimen and lifestyle changes.     Plan:  Will continue to monitor and follow-up in six weeks sooner if needed to assess BP readings and medication regimen.     Current medication regimen:  Hypertension Medications             metoprolol succinate (TOPROL-XL) 25 MG 24 hr tablet Take 1 tablet (25 mg total) by mouth once daily.

## 2017-11-20 ENCOUNTER — PATIENT OUTREACH (OUTPATIENT)
Dept: OTHER | Facility: OTHER | Age: 67
End: 2017-11-20

## 2017-11-20 DIAGNOSIS — I10 ESSENTIAL HYPERTENSION: Primary | ICD-10-CM

## 2017-11-20 RX ORDER — CARVEDILOL 6.25 MG/1
6.25 TABLET ORAL 2 TIMES DAILY WITH MEALS
Qty: 60 TABLET | Refills: 2 | Status: SHIPPED | OUTPATIENT
Start: 2017-11-20 | End: 2017-12-04 | Stop reason: SDUPTHER

## 2017-11-20 NOTE — PROGRESS NOTES
"HPI:  Patient concerned with continued elevated readings since medication switch. Due to refill metoprolol succinate however, she would like to have optimal blood pressure control throughout the day. Patient reports walking daily and adherence to medication regimen. Patient has questions regarding "paper on blood pressure written by Sofya". Denies s/s of hyper/hypotension at this time.     Last 5 Patient Entered Readings Current 30 Day Average: 133/72     Recent Readings 11/19/2017 11/19/2017 11/19/2017 11/18/2017 11/18/2017    Systolic BP (mmHg) 141 151 143 122 116    Diastolic BP (mmHg) 73 79 75 70 65    Pulse 61 62 58 60 68        Assessment:  Discussed new guidelines with patient and it is feasible to set target at <130/80 mmHg. Per 30-day average blood pressure at goal however, readings fluctuate significantly (morning and evening elevations). Discussed medication regimen in detail and will initiate mixed acting beta-blocker to achieve optimal blood pressure control while maintaining heart rate. Patient verbalized understanding of new medication regimen.     Plan:  Stop metoprolol succinate and start carvedilol 6.25 mg twice daily. Patient instructed to start new regimen at least 24 hours after taking extended release metoprolol. Will continue to monitor and follow-up in a few weeks, sooner if needed, to assess BP readings and medication regimen.     Current medication regimen:  Hypertension Medications             carvedilol (COREG) 6.25 MG tablet Take 1 tablet (6.25 mg total) by mouth 2 (two) times daily with meals.              "

## 2017-11-22 ENCOUNTER — PATIENT OUTREACH (OUTPATIENT)
Dept: OTHER | Facility: OTHER | Age: 67
End: 2017-11-22

## 2017-11-22 NOTE — PROGRESS NOTES
Last 5 Patient Entered Readings Current 30 Day Average: 133/73     Recent Readings 11/21/2017 11/21/2017 11/21/2017 11/20/2017 11/20/2017    Systolic BP (mmHg) 124 138 146 144 147    Diastolic BP (mmHg) 70 64 89 78 76    Pulse 61 63 62 63 66        Hypertension Digital Medicine Program (HDMP): Health  Follow Up    Lifestyle Modifications:    1.Low sodium diet: yes Patient reports reading food labels prior to purchasing her food. She states not eating out. Patient states the items she cooks at home has little sodium and expresses those items taste good by themselves without salt. Patient demonstrates a willingness to stay focused on looking at the labels as evidenced by her positive responses with watching her diet.      2.Physical activity: yes Patient reports walking 5x a week for 20-30 minutes.    3.Hypotension/Hypertension symptoms: no Patient reports no abnormal signs or symptoms with new BP medication.   Frequency/Alleviating factors/Precipitating factors, etc.     4.Patient has been compliant with the medication regimen.     Follow up with MsLanie Chelsea Bustamanteagnes Child completed. No further questions or concerns. I will follow up in a few weeks to assess progress.

## 2017-12-01 ENCOUNTER — LAB VISIT (OUTPATIENT)
Dept: LAB | Facility: HOSPITAL | Age: 67
End: 2017-12-01
Attending: INTERNAL MEDICINE
Payer: MEDICARE

## 2017-12-01 DIAGNOSIS — Z12.11 ENCOUNTER FOR FIT (FECAL IMMUNOCHEMICAL TEST) SCREENING: ICD-10-CM

## 2017-12-04 ENCOUNTER — PATIENT OUTREACH (OUTPATIENT)
Dept: OTHER | Facility: OTHER | Age: 67
End: 2017-12-04

## 2017-12-04 DIAGNOSIS — I10 ESSENTIAL HYPERTENSION: ICD-10-CM

## 2017-12-04 PROCEDURE — 82274 ASSAY TEST FOR BLOOD FECAL: CPT

## 2017-12-04 RX ORDER — CARVEDILOL 6.25 MG/1
12.5 TABLET ORAL 2 TIMES DAILY WITH MEALS
Qty: 60 TABLET | Refills: 2
Start: 2017-12-04 | End: 2017-12-14 | Stop reason: ALTCHOICE

## 2017-12-04 NOTE — PROGRESS NOTES
HPI:  Ms. Cavazos is feeling great. She continues to walk daily and follow a low sodium diet. She reports adherence to medication regimen with no complaints. Experienced a headache on 1 or 2 occassions however, they were alleviated without medication and did not correlate to a specific blood pressure readings. She denies any further symptoms of hypertension.     Last 5 Patient Entered Readings Current 30 Day Average: 136/74     Recent Readings 12/4/2017 12/3/2017 12/3/2017 12/2/2017 12/2/2017    Systolic BP (mmHg) 154 150 143 144 138    Diastolic BP (mmHg) 81 77 78 81 74    Pulse 61 63 65 62 70        Assessment:  Per 30-day average blood pressure is slightly above goal <130/80 mmHg. Will optimize medication regimen.     Plan:  · Increase carvedilol to 12.5 mg twice daily. Patient will take two tablets twice daily until next f/u.   · Will continue to monitor and follow-up in a few weeks, sooner if needed to assess BP readings and medication regimen.     Current medication regimen:  Hypertension Medications             carvedilol (COREG) 6.25 MG tablet Take 2 tablets (12.5 mg total) by mouth 2 (two) times daily with meals.

## 2017-12-05 LAB — HEMOCCULT STL QL IA: NEGATIVE

## 2017-12-14 ENCOUNTER — PATIENT OUTREACH (OUTPATIENT)
Dept: OTHER | Facility: OTHER | Age: 67
End: 2017-12-14

## 2017-12-14 DIAGNOSIS — I10 ESSENTIAL HYPERTENSION: Primary | ICD-10-CM

## 2017-12-14 RX ORDER — CARVEDILOL 12.5 MG/1
12.5 TABLET ORAL 2 TIMES DAILY WITH MEALS
Qty: 60 TABLET | Refills: 3 | Status: SHIPPED | OUTPATIENT
Start: 2017-12-14 | End: 2018-04-20 | Stop reason: SDUPTHER

## 2017-12-14 NOTE — PROGRESS NOTES
HPI:  Ms. Child is feeling well. Patient reports one day of low readings and one day of elevated readings. Attributes elevated readings to running out of medication for two days. Patient denies s/s of hypo/hypertension at this time. Patient scheduled to see PCP in one week.      Last 5 Patient Entered Readings Current 30 Day Average: 130/72       Units 12/14/2017 12/13/2017 12/13/2017 12/13/2017 12/13/2017    Time - 10:55 AM  6:35 PM  6:33 PM  2:35 PM 10:37 AM    Systolic Blood Pressure - 145 100 130 162 151    Diastolic Blood Pressure - 79 68 71 82 84    Pulse bpm 59 72 74 61 55        Assessment:  Per 30-day average blood pressure is slightly above goal. Fluctuations in readings with no symptoms (DBP <60). Will hold off on optimizing regimen at this time.     Plan:  · Continue current regimen. Will consider low dose of ARB or ACEI or CCB if needed.   · Will continue to monitor and follow-up in a few weeks, sooner if needed to assess BP readings and medication regimen.     Current medication regimen:  Hypertension Medications             carvedilol (COREG) 12.5 MG tablet Take 1 tablet (12.5 mg total) by mouth 2 (two) times daily with meals.

## 2017-12-19 ENCOUNTER — OFFICE VISIT (OUTPATIENT)
Dept: INTERNAL MEDICINE | Facility: CLINIC | Age: 67
End: 2017-12-19
Payer: MEDICARE

## 2017-12-19 ENCOUNTER — PATIENT OUTREACH (OUTPATIENT)
Dept: OTHER | Facility: OTHER | Age: 67
End: 2017-12-19

## 2017-12-19 ENCOUNTER — TELEPHONE (OUTPATIENT)
Dept: INTERNAL MEDICINE | Facility: CLINIC | Age: 67
End: 2017-12-19

## 2017-12-19 VITALS
DIASTOLIC BLOOD PRESSURE: 60 MMHG | HEART RATE: 64 BPM | HEIGHT: 66 IN | WEIGHT: 121.31 LBS | SYSTOLIC BLOOD PRESSURE: 118 MMHG | BODY MASS INDEX: 19.5 KG/M2

## 2017-12-19 DIAGNOSIS — M81.0 OSTEOPOROSIS, UNSPECIFIED OSTEOPOROSIS TYPE, UNSPECIFIED PATHOLOGICAL FRACTURE PRESENCE: ICD-10-CM

## 2017-12-19 DIAGNOSIS — R06.02 SOB (SHORTNESS OF BREATH): ICD-10-CM

## 2017-12-19 DIAGNOSIS — Z85.828 HISTORY OF BASAL CELL CARCINOMA: ICD-10-CM

## 2017-12-19 DIAGNOSIS — Z87.891 FORMER SMOKER: ICD-10-CM

## 2017-12-19 DIAGNOSIS — G89.29 CHRONIC NECK PAIN: ICD-10-CM

## 2017-12-19 DIAGNOSIS — K59.09 CHRONIC CONSTIPATION: ICD-10-CM

## 2017-12-19 DIAGNOSIS — M54.2 CHRONIC NECK PAIN: ICD-10-CM

## 2017-12-19 DIAGNOSIS — I10 BENIGN ESSENTIAL HYPERTENSION: Primary | ICD-10-CM

## 2017-12-19 PROCEDURE — 99214 OFFICE O/P EST MOD 30 MIN: CPT | Mod: S$GLB,,, | Performed by: INTERNAL MEDICINE

## 2017-12-19 PROCEDURE — 99999 PR PBB SHADOW E&M-EST. PATIENT-LVL III: CPT | Mod: PBBFAC,,, | Performed by: INTERNAL MEDICINE

## 2017-12-19 NOTE — PROGRESS NOTES
"Subjective:       Patient ID: Chelsea Ford Child is a 67 y.o. female.    Chief Complaint: Neck Pain; Constipation; Hypertension; leg cramps; and Shortness of Breath    HPI  HTN - changed to lopressor initially 2/2 palpations. Part of digital HTN and eventually changed to coreg 12.5mg daily.  Lisinopril-->dizziness  Valves are normal on echo.  Reviewed digital HTN flowsheet. Variable BP. Trying to decrease process foods.    Constipation for the last 2 mo. Takes stool softener every night. Used miralax daily for the last week and helped. Drinks a lot of water and eats salad. Declines colonoscopy. Exercises regularly (walks almost every morning).    C/o neck pain (5 of 10). Still w/ some dizziness when she looks upwards. She's seen ENT before. Given astelin but pt reports "so vile" that she can't take it. Was given antivert previously. Hasn't taken anything for it. Doesn't feel like she needs to. No trauma.    Few times a mo, she occasionally w/ leg cramps. Only a few times a night in a mo. Not very bothersome. BMP WNL 9/2017.     Osteoporosis 9/26/17 DEXA.  Pt reports that she's read up on bisphosphonates. Declines bisphosphonates at this time.   Taking Calcium/vit D OTC BID. Starting exercise.     H/o BCC - seen Dr. Wilson 10/4/17.    Review of Systems   Constitutional: Negative for chills and fever.   HENT: Positive for rhinorrhea. Negative for congestion, postnasal drip and sore throat.    Respiratory: Positive for shortness of breath (every once in a while - "comes out of the blue"; not exertional but sometimes when walking upstairs; just feel like she has to take in a deep breath; feels she has yawn to get in a deep breath). Negative for cough and wheezing.    Cardiovascular: Negative for chest pain and palpitations.   Gastrointestinal: Positive for constipation. Negative for abdominal pain, diarrhea, nausea and vomiting.   Genitourinary: Negative for dysuria and frequency.   Musculoskeletal: Positive for neck " "pain.   Neurological: Negative for headaches.   Psychiatric/Behavioral: Negative for dysphoric mood. The patient is not nervous/anxious.          Objective:      Physical Exam    /60 (BP Location: Left arm, Patient Position: Sitting, BP Method: Medium (Manual))   Pulse 64   Ht 5' 6" (1.676 m)   Wt 55 kg (121 lb 4.8 oz)   BMI 19.58 kg/m²     GEN - A+OX4, NAD   HEENT - PERRL, EOMI, OP clear. MMM.   Neck - No thyromegaly or cervical LAD. No thyroid masses felt.  CV - RRR, no m/r   Chest - CTAB, no wheezing or rhonchi  Abd - S/NT/ND/+BS.   Ext - 2+BDP and radial pulses. No LE edema.  Neuro - 5/5 BUE and BLE strength.  MSK - soreness on palpation of the C spine and paraspinal muscles. FROM of neck.   Skin - No rash.    Labs reviewed.    Assessment/Plan     Chelsea was seen today for neck pain, constipation, hypertension, leg cramps and shortness of breath.    Diagnoses and all orders for this visit:    Benign essential hypertension - variable BP. Cont digital HTN. Will not change coreg 12.5mg bid at this point.     Chronic constipation - ok to use miralax daily as needed. Recommended colonoscopy. Pt will think about it.     Osteoporosis, unspecified osteoporosis type, unspecified pathological fracture presence - discussed bisphosphonates. Pt will get dental clearance. After, will let me know and will start fosamax.     History of basal cell carcinoma - recently saw Dr. Wilson. F/u in 10/2018.    Chronic neck pain - pt will think about PT.   -     Ambulatory consult to Physical Therapy    SOB (shortness of breath)  -     X-Ray Chest PA And Lateral; Future    Former smoker - 1 ppd x 10 yrs but over 30 yrs ago.   -     X-Ray Chest PA And Lateral; Future      Return in about 6 months (around 6/19/2018).      Swetha Pyle MD  Department of Internal Medicine - [facility]  7:21 AM  "

## 2017-12-19 NOTE — PROGRESS NOTES
Last 5 Patient Entered Readings Current 30 Day Average: 131/73       Units 12/19/2017 12/18/2017 12/17/2017 12/17/2017 12/16/2017    Time -  9:33 AM  9:51 AM  8:52 PM  4:39 PM  7:21 PM    Systolic Blood Pressure - 147 115 110 155 142    Diastolic Blood Pressure - 84 68 63 86 77    Pulse bpm 55 70 67 75 66        12/19-LVM. Follow up attempt. Will call again on 1/8.    Hypertension Digital Medicine Program (HDMP): Health  Follow Up    Lifestyle Modifications:    1.Low sodium diet: yes Patient reports eating salads frequently. Patient reports cheese is a weakness She states being aware of the high sodium in the cheeses. She states eating a lot of fruits. Patient reports making spaghetti frequently. Informed patient to be cautious of the sauce she uses. Patient is aware of the high sodium content in the evelia tomato sauce. Encouraged patient to monitor the food labels in all of her foods.    2.Physical activity: no Patient states she has not exercised as much as she needs. She states walking on New Years [place] but it was too cold. Patient reports doing Yoga at home. Patient reports she will walk more when the weather warms up. Patient expresses walking outside is the best form of physical activity for her. Encouraged patient to return to walking as soon as the weather warms up.    3.Hypotension/Hypertension symptoms: no Patient reports no abnormal signs or symptoms with BP medication.  Frequency/Alleviating factors/Precipitating factors, etc.     4.Patient has been compliant with the medication regimen.     Follow up with Ms. Chelsea Ford Child completed. No further questions or concerns. I will follow up in a few weeks to assess progress.

## 2017-12-26 ENCOUNTER — HOSPITAL ENCOUNTER (OUTPATIENT)
Dept: RADIOLOGY | Facility: HOSPITAL | Age: 67
Discharge: HOME | End: 2017-12-26
Attending: INTERNAL MEDICINE
Payer: MEDICARE

## 2017-12-26 DIAGNOSIS — Z87.891 FORMER SMOKER: ICD-10-CM

## 2017-12-26 DIAGNOSIS — R06.02 SOB (SHORTNESS OF BREATH): ICD-10-CM

## 2017-12-26 PROCEDURE — 71020 XR CHEST PA AND LATERAL: CPT | Mod: TC

## 2017-12-26 PROCEDURE — 71020 XR CHEST PA AND LATERAL: CPT | Mod: 26,,, | Performed by: RADIOLOGY

## 2018-01-22 ENCOUNTER — PATIENT OUTREACH (OUTPATIENT)
Dept: OTHER | Facility: OTHER | Age: 68
End: 2018-01-22

## 2018-01-22 DIAGNOSIS — I10 BENIGN ESSENTIAL HYPERTENSION: Primary | ICD-10-CM

## 2018-01-22 RX ORDER — LISINOPRIL 5 MG/1
5 TABLET ORAL DAILY
Qty: 30 TABLET | Refills: 0
Start: 2018-01-22 | End: 2018-02-02 | Stop reason: SDUPTHER

## 2018-01-22 NOTE — PROGRESS NOTES
HPI:  Called patient for follow-up. She is doing well. Admits that she is discouraged with her readings and concerned that she has not been able to have more stable controlled readings. She reports adherence to her medication regimen with no complaints. Patient denies s/s of hypertension.     Last 5 Patient Entered Readings                                      Current 30 Day Average: 142/77     Recent Readings 1/21/2018 1/20/2018 1/19/2018 1/18/2018 1/18/2018    SBP (mmHg) 161 162 164 142 159    DBP (mmHg) 85 85 86 74 84    Pulse 61 60 65 62 63        Assessment:  Per 30-day average blood pressure is not well controlled and has trended up. Will optimize medication regimen at this time.     Plan:  · Start lisinopril 5 mg once daily.  · Discussed hidden sodium. Information for tracking sent to patient via [employer].   · Will continue to monitor and follow-up in two week to assess BP readings and medication regimen.     Current medication regimen:  Hypertension Medications             carvedilol (COREG) 12.5 MG tablet Take 1 tablet (12.5 mg total) by mouth 2 (two) times daily with meals.    lisinopril (PRINIVIL,ZESTRIL) 5 MG tablet Take 1 tablet (5 mg total) by mouth once daily.

## 2018-01-26 ENCOUNTER — OFFICE VISIT (OUTPATIENT)
Dept: URGENT CARE | Facility: CLINIC | Age: 68
End: 2018-01-26
Payer: MEDICARE

## 2018-01-26 VITALS
HEIGHT: 66 IN | HEART RATE: 62 BPM | WEIGHT: 121 LBS | DIASTOLIC BLOOD PRESSURE: 67 MMHG | BODY MASS INDEX: 19.44 KG/M2 | RESPIRATION RATE: 20 BRPM | OXYGEN SATURATION: 100 % | TEMPERATURE: 97 F | SYSTOLIC BLOOD PRESSURE: 111 MMHG

## 2018-01-26 DIAGNOSIS — Z20.828 EXPOSURE TO THE FLU: ICD-10-CM

## 2018-01-26 DIAGNOSIS — J32.9 RHINOSINUSITIS: Primary | ICD-10-CM

## 2018-01-26 LAB
CTP QC/QA: YES
FLUAV AG NPH QL: NEGATIVE
FLUBV AG NPH QL: NEGATIVE

## 2018-01-26 PROCEDURE — 99214 OFFICE O/P EST MOD 30 MIN: CPT | Mod: S$GLB,,, | Performed by: PHYSICIAN ASSISTANT

## 2018-01-26 PROCEDURE — 87804 INFLUENZA ASSAY W/OPTIC: CPT | Mod: QW,S$GLB,, | Performed by: PHYSICIAN ASSISTANT

## 2018-01-26 RX ORDER — TRIPROLIDINE/PSEUDOEPHEDRINE 2.5MG-60MG
TABLET ORAL EVERY 6 HOURS PRN
COMMUNITY
End: 2018-09-13

## 2018-01-26 RX ORDER — LORATADINE 10 MG/1
10 TABLET ORAL DAILY
COMMUNITY
End: 2018-09-13

## 2018-01-26 NOTE — PROGRESS NOTES
"Subjective:       Patient ID: Chelsea Ford Child is a 68 y.o. female.    Vitals:  height is 5' 6" (1.676 m) and weight is 54.9 kg (121 lb). Her oral temperature is 97.3 °F (36.3 °C). Her blood pressure is 111/67 and her pulse is 62. Her respiration is 20 and oxygen saturation is 100%.     Chief Complaint: Cough    Cough   This is a new problem. The current episode started in the past 7 days (Tuesday). The problem has been gradually worsening. The problem occurs every few minutes. The cough is non-productive. Associated symptoms include nasal congestion and postnasal drip. Pertinent negatives include no chest pain, chills, ear pain, eye redness, fever, headaches, myalgias, sore throat, shortness of breath or wheezing. Associated symptoms comments: Sneezing and lightheaded . Treatments tried: benadryl. The treatment provided moderate relief.     Review of Systems   Constitution: Negative for chills, fever and malaise/fatigue.   HENT: Positive for postnasal drip. Negative for congestion, ear pain, hoarse voice and sore throat.    Eyes: Negative for discharge and redness.   Cardiovascular: Negative for chest pain, dyspnea on exertion and leg swelling.   Respiratory: Positive for cough. Negative for shortness of breath, sputum production and wheezing.    Musculoskeletal: Negative for myalgias.   Gastrointestinal: Negative for abdominal pain and nausea.   Neurological: Negative for headaches.       Objective:      Physical Exam   Constitutional: She is oriented to person, place, and time. She appears well-developed and well-nourished.   HENT:   Head: Normocephalic and atraumatic.   Right Ear: Hearing, tympanic membrane, external ear and ear canal normal.   Left Ear: Hearing, tympanic membrane, external ear and ear canal normal.   Nose: Mucosal edema present. Right sinus exhibits no maxillary sinus tenderness and no frontal sinus tenderness. Left sinus exhibits maxillary sinus tenderness. Left sinus exhibits no frontal " sinus tenderness.   Mouth/Throat: Uvula is midline and oropharynx is clear and moist.   Eyes: Conjunctivae are normal.   Neck: Normal range of motion. Neck supple. No thyromegaly present.   Cardiovascular: Normal rate and regular rhythm.  Exam reveals no gallop and no friction rub.    No murmur heard.  Pulmonary/Chest: Effort normal and breath sounds normal. She has no wheezes. She has no rales.   Musculoskeletal: Normal range of motion.   Lymphadenopathy:     She has no cervical adenopathy.   Neurological: She is alert and oriented to person, place, and time.   Skin: Skin is warm and dry. No rash noted. No erythema.   Psychiatric: She has a normal mood and affect. Her behavior is normal. Judgment and thought content normal.   Nursing note and vitals reviewed.      Assessment:       1. Rhinosinusitis    2. Exposure to the flu        Plan:         Rhinosinusitis    Exposure to the flu  -     POCT Influenza A/B      Chelsea was seen today for cough.    Diagnoses and all orders for this visit:    Rhinosinusitis    Exposure to the flu  -     POCT Influenza A/B      Patient Instructions   - Rest.    - Drink plenty of fluids.    - Tylenol or Ibuprofen as directed as needed for fever/pain.    - Take over-the-counter claritin, zyrtec, allegra, or xyzal as directed.  - Use over the counter Flonase as directed for sinus congestion and postnasal drip.  - use nasal saline prior to Flonase.  - Take over the counter Benadryl as directed as needed for itching/swelling.   - Antibiotics are not needed at this time.  - Usual course of cold symptom is 10-14 days, but longer if patient is a smoker.   - Use salt water gargle for sore throat.   - Follow up with your PCP or specialty clinic as directed in the next 1-2 weeks if not improved or as needed.  You can call [phone] to schedule an appointment with the appropriate provider.    - Go to the ED if your symptoms worsen.    Sinusitis (No Antibiotics)    The sinuses are air-filled  spaces within the bones of the face. They connect to the inside of the nose. Sinusitis is an inflammation of the tissue lining the sinus cavity. Sinus inflammation can occur during a cold. It can also be due to allergies to pollens and other particles in the air. It can cause symptoms such as sinus congestion, headache, sore throat, facial swelling and fullness. It may also cause a low-grade fever. No infection is present, and no antibiotic treatment is needed.  Home care  · Drink plenty of water, hot tea, and other liquids. This may help thin mucus. It also may promote sinus drainage.  · Heat may help soothe painful areas of the face. Use a towel soaked in hot water. Or,  the shower and direct the hot spray onto your face. Using a vaporizer along with a menthol rub at night may also help.   · An expectorant containing guaifenesin may help thin the mucus and promote drainage from the sinuses.  · Over-the-counter decongestants may be used unless a similar medicine was prescribed. Nasal sprays work the fastest. Use one that contains phenylephrine or oxymetazoline. First blow the nose gently. Then use the spray. Do not use these medicines more often than directed on the label or symptoms may get worse. You may also use tablets containing pseudoephedrine. Avoid products that combine ingredients, because side effects may be increased. Read labels. You can also ask the pharmacist for help. (NOTE: Persons with high blood pressure should not use decongestants. They can raise blood pressure.)  · Over-the-counter antihistamines may help if allergies contributed to your sinusitis.    · Use acetaminophen or ibuprofen to control pain, unless another pain medicine was prescribed. (If you have chronic liver or kidney disease or ever had a stomach ulcer, talk with your doctor before using these medicines. Aspirin should never be used in anyone under 18 years of age who is ill with a fever. It may cause severe liver  damage.)  · Use nasal rinses or irrigation as instructed by your health care provider.  · Don't smoke. This can worsen symptoms.  Follow-up care  Follow up with your healthcare provider or our staff if you are not improving within the next week.  When to seek medical advice  Call your healthcare provider if any of these occur:  · Green or yellow discharge from the nose or into the throat  · Facial pain or headache becoming more severe  · Stiff neck  · Unusual drowsiness or confusion  · Swelling of the forehead or eyelids  · Vision problems, including blurred or double vision  · Fever of 100.4ºF (38ºC) or higher, or as directed by your healthcare provider  · Seizure  · Breathing problems  · Symptoms not resolving within 10 days  Date Last Reviewed: 4/13/2015 © 2000-2017 [employer]. [address] [place] [address], [place] [address]. All rights reserved. This information is not intended as a substitute for professional medical care. Always follow your healthcare professional's instructions.        Viral Upper Respiratory Illness (Adult)  You have a viral upper respiratory illness (URI), which is another term for the common cold. This illness is contagious during the first few days. It is spread through the air by coughing and sneezing. It may also be spread by direct contact (touching the sick person and then touching your own eyes, nose, or mouth). Frequent handwashing will decrease risk of spread. Most viral illnesses go away within 7 to 10 days with rest and simple home remedies. Sometimes the illness may last for several weeks. Antibiotics will not kill a virus, and they are generally not prescribed for this condition.    Home care  · If symptoms are severe, rest at home for the first 2 to 3 days. When you resume activity, don't let yourself get too tired.  · Avoid being exposed to cigarette smoke (yours or others).  · You may use acetaminophen or ibuprofen to control pain and fever, unless another  medicine was prescribed. (Note: If you have chronic liver or kidney disease, have ever had a stomach ulcer or gastrointestinal bleeding, or are taking blood-thinning medicines, talk with your healthcare provider before using these medicines.) Aspirin should never be given to anyone under 18 years of age who is ill with a viral infection or fever. It may cause severe liver or brain damage.  · Your appetite may be poor, so a light diet is fine. Avoid dehydration by drinking 6 to 8 glasses of fluids per day (water, soft drinks, juices, tea, or soup). Extra fluids will help loosen secretions in the nose and lungs.  · Over-the-counter cold medicines will not shorten the length of time youre sick, but they may be helpful for the following symptoms: cough, sore throat, and nasal and sinus congestion. (Note: Do not use decongestants if you have high blood pressure.)  Follow-up care  Follow up with your healthcare provider, or as advised.  When to seek medical advice  Call your healthcare provider right away if any of these occur:  · Cough with lots of colored sputum (mucus)  · Severe headache; face, neck, or ear pain  · Difficulty swallowing due to throat pain  · Fever of 100.4°F (38°C)  Call 911, or get immediate medical care  Call emergency services right away if any of these occur:  · Chest pain, shortness of breath, wheezing, or difficulty breathing  · Coughing up blood  · Inability to swallow due to throat pain  Date Last Reviewed: 9/13/2015  © 4688-4874 [employer]. [address] [place] [address], [place] [address]. All rights reserved. This information is not intended as a substitute for professional medical care. Always follow your healthcare professional's instructions.

## 2018-01-26 NOTE — PATIENT INSTRUCTIONS
- Rest.    - Drink plenty of fluids.    - Tylenol or Ibuprofen as directed as needed for fever/pain.    - Take over-the-counter claritin, zyrtec, allegra, or xyzal as directed.  - Use over the counter Flonase as directed for sinus congestion and postnasal drip.  - use nasal saline prior to Flonase.  - Take over the counter Benadryl as directed as needed for itching/swelling.   - Antibiotics are not needed at this time.  - Usual course of cold symptom is 10-14 days, but longer if patient is a smoker.   - Use salt water gargle for sore throat.   - Follow up with your PCP or specialty clinic as directed in the next 1-2 weeks if not improved or as needed.  You can call [phone] to schedule an appointment with the appropriate provider.    - Go to the ED if your symptoms worsen.    Sinusitis (No Antibiotics)    The sinuses are air-filled spaces within the bones of the face. They connect to the inside of the nose. Sinusitis is an inflammation of the tissue lining the sinus cavity. Sinus inflammation can occur during a cold. It can also be due to allergies to pollens and other particles in the air. It can cause symptoms such as sinus congestion, headache, sore throat, facial swelling and fullness. It may also cause a low-grade fever. No infection is present, and no antibiotic treatment is needed.  Home care  · Drink plenty of water, hot tea, and other liquids. This may help thin mucus. It also may promote sinus drainage.  · Heat may help soothe painful areas of the face. Use a towel soaked in hot water. Or,  the shower and direct the hot spray onto your face. Using a vaporizer along with a menthol rub at night may also help.   · An expectorant containing guaifenesin may help thin the mucus and promote drainage from the sinuses.  · Over-the-counter decongestants may be used unless a similar medicine was prescribed. Nasal sprays work the fastest. Use one that contains phenylephrine or oxymetazoline. First blow  the nose gently. Then use the spray. Do not use these medicines more often than directed on the label or symptoms may get worse. You may also use tablets containing pseudoephedrine. Avoid products that combine ingredients, because side effects may be increased. Read labels. You can also ask the pharmacist for help. (NOTE: Persons with high blood pressure should not use decongestants. They can raise blood pressure.)  · Over-the-counter antihistamines may help if allergies contributed to your sinusitis.    · Use acetaminophen or ibuprofen to control pain, unless another pain medicine was prescribed. (If you have chronic liver or kidney disease or ever had a stomach ulcer, talk with your doctor before using these medicines. Aspirin should never be used in anyone under 18 years of age who is ill with a fever. It may cause severe liver damage.)  · Use nasal rinses or irrigation as instructed by your health care provider.  · Don't smoke. This can worsen symptoms.  Follow-up care  Follow up with your healthcare provider or our staff if you are not improving within the next week.  When to seek medical advice  Call your healthcare provider if any of these occur:  · Green or yellow discharge from the nose or into the throat  · Facial pain or headache becoming more severe  · Stiff neck  · Unusual drowsiness or confusion  · Swelling of the forehead or eyelids  · Vision problems, including blurred or double vision  · Fever of 100.4ºF (38ºC) or higher, or as directed by your healthcare provider  · Seizure  · Breathing problems  · Symptoms not resolving within 10 days  Date Last Reviewed: 4/13/2015  © 7466-9304 [employer] [address] [place] [address], [place] [address]. All rights reserved. This information is not intended as a substitute for professional medical care. Always follow your healthcare professional's instructions.        Viral Upper Respiratory Illness (Adult)  You have a viral upper respiratory illness  (URI), which is another term for the common cold. This illness is contagious during the first few days. It is spread through the air by coughing and sneezing. It may also be spread by direct contact (touching the sick person and then touching your own eyes, nose, or mouth). Frequent handwashing will decrease risk of spread. Most viral illnesses go away within 7 to 10 days with rest and simple home remedies. Sometimes the illness may last for several weeks. Antibiotics will not kill a virus, and they are generally not prescribed for this condition.    Home care  · If symptoms are severe, rest at home for the first 2 to 3 days. When you resume activity, don't let yourself get too tired.  · Avoid being exposed to cigarette smoke (yours or others).  · You may use acetaminophen or ibuprofen to control pain and fever, unless another medicine was prescribed. (Note: If you have chronic liver or kidney disease, have ever had a stomach ulcer or gastrointestinal bleeding, or are taking blood-thinning medicines, talk with your healthcare provider before using these medicines.) Aspirin should never be given to anyone under 18 years of age who is ill with a viral infection or fever. It may cause severe liver or brain damage.  · Your appetite may be poor, so a light diet is fine. Avoid dehydration by drinking 6 to 8 glasses of fluids per day (water, soft drinks, juices, tea, or soup). Extra fluids will help loosen secretions in the nose and lungs.  · Over-the-counter cold medicines will not shorten the length of time youre sick, but they may be helpful for the following symptoms: cough, sore throat, and nasal and sinus congestion. (Note: Do not use decongestants if you have high blood pressure.)  Follow-up care  Follow up with your healthcare provider, or as advised.  When to seek medical advice  Call your healthcare provider right away if any of these occur:  · Cough with lots of colored sputum (mucus)  · Severe headache; face,  neck, or ear pain  · Difficulty swallowing due to throat pain  · Fever of 100.4°F (38°C)  Call 911, or get immediate medical care  Call emergency services right away if any of these occur:  · Chest pain, shortness of breath, wheezing, or difficulty breathing  · Coughing up blood  · Inability to swallow due to throat pain  Date Last Reviewed: 9/13/2015  © 2801-4315 [employer]. [address] [place] [address], [place] [address]. All rights reserved. This information is not intended as a substitute for professional medical care. Always follow your healthcare professional's instructions.

## 2018-01-30 ENCOUNTER — OFFICE VISIT (OUTPATIENT)
Dept: URGENT CARE | Facility: CLINIC | Age: 68
End: 2018-01-30
Payer: MEDICARE

## 2018-01-30 ENCOUNTER — PATIENT OUTREACH (OUTPATIENT)
Dept: OTHER | Facility: OTHER | Age: 68
End: 2018-01-30

## 2018-01-30 VITALS
WEIGHT: 121 LBS | TEMPERATURE: 97 F | BODY MASS INDEX: 19.44 KG/M2 | SYSTOLIC BLOOD PRESSURE: 165 MMHG | DIASTOLIC BLOOD PRESSURE: 90 MMHG | OXYGEN SATURATION: 98 % | HEIGHT: 66 IN | HEART RATE: 64 BPM | RESPIRATION RATE: 18 BRPM

## 2018-01-30 DIAGNOSIS — R09.81 NASAL CONGESTION: ICD-10-CM

## 2018-01-30 DIAGNOSIS — J01.90 ACUTE SINUSITIS, RECURRENCE NOT SPECIFIED, UNSPECIFIED LOCATION: Primary | ICD-10-CM

## 2018-01-30 LAB
CTP QC/QA: YES
FLUAV AG NPH QL: NEGATIVE
FLUBV AG NPH QL: NEGATIVE

## 2018-01-30 PROCEDURE — 99214 OFFICE O/P EST MOD 30 MIN: CPT | Mod: S$GLB,,, | Performed by: NURSE PRACTITIONER

## 2018-01-30 PROCEDURE — 87804 INFLUENZA ASSAY W/OPTIC: CPT | Mod: 59,QW,S$GLB, | Performed by: NURSE PRACTITIONER

## 2018-01-30 RX ORDER — AMOXICILLIN AND CLAVULANATE POTASSIUM 875; 125 MG/1; MG/1
1 TABLET, FILM COATED ORAL 2 TIMES DAILY
Qty: 20 TABLET | Refills: 0 | Status: SHIPPED | OUTPATIENT
Start: 2018-01-30 | End: 2018-02-09

## 2018-01-30 RX ORDER — BENZONATATE 200 MG/1
200 CAPSULE ORAL 3 TIMES DAILY PRN
Qty: 30 CAPSULE | Refills: 0 | Status: SHIPPED | OUTPATIENT
Start: 2018-01-30 | End: 2018-02-09

## 2018-01-30 NOTE — PROGRESS NOTES
"Subjective:       Patient ID: Chelsea Ford Child is a 68 y.o. female.    Vitals:  height is 5' 6" (1.676 m) and weight is 54.9 kg (121 lb). Her oral temperature is 97.4 °F (36.3 °C). Her blood pressure is 165/90 (abnormal) and her pulse is 64. Her respiration is 18 and oxygen saturation is 98%.     Chief Complaint: Sinus Problem (1 week)    This is a 68 y.o. female with Past Medical History:  No date: BCC (basal cell carcinoma)  No date: Hypertension  9/26/2017: Palpitations  9/26/2017: Vertigo   who presents today with a chief complaint of sinus problems for 1 week.        Sinus Problem   This is a new problem. The current episode started 1 to 4 weeks ago (1 week). The problem has been gradually improving since onset. There has been no fever. Her pain is at a severity of 6/10. The pain is moderate. Associated symptoms include congestion, coughing, headaches, sinus pressure and sneezing. Pertinent negatives include no chills, ear pain, hoarse voice, shortness of breath or sore throat. Treatments tried: aleve allegra. The treatment provided mild relief.     Review of Systems   Constitution: Negative for chills, fever and malaise/fatigue.   HENT: Positive for congestion, sinus pressure and sneezing. Negative for ear pain, hoarse voice and sore throat.    Eyes: Negative for discharge and redness.   Cardiovascular: Negative for chest pain, dyspnea on exertion and leg swelling.   Respiratory: Positive for cough. Negative for shortness of breath, sputum production and wheezing.    Musculoskeletal: Negative for myalgias.   Gastrointestinal: Negative for abdominal pain and nausea.   Neurological: Positive for headaches.       Objective:      Physical Exam   Constitutional: She is oriented to person, place, and time. She appears well-developed and well-nourished. She is cooperative.  Non-toxic appearance. She does not have a sickly appearance. She does not appear ill. No distress.   HENT:   Head: Normocephalic and " atraumatic.   Right Ear: Hearing, tympanic membrane, external ear and ear canal normal.   Left Ear: Hearing, tympanic membrane, external ear and ear canal normal.   Nose: Mucosal edema and rhinorrhea (purulent) present. No nasal deformity. No epistaxis. Right sinus exhibits maxillary sinus tenderness. Right sinus exhibits no frontal sinus tenderness. Left sinus exhibits maxillary sinus tenderness. Left sinus exhibits no frontal sinus tenderness.   Mouth/Throat: Uvula is midline and mucous membranes are normal. No trismus in the jaw. Normal dentition. No uvula swelling. Posterior oropharyngeal edema present. No oropharyngeal exudate or posterior oropharyngeal erythema. Tonsils are 1+ on the right. Tonsils are 1+ on the left. No tonsillar exudate.   Eyes: Conjunctivae and lids are normal. No scleral icterus.   Sclera clear bilat   Neck: Trachea normal, full passive range of motion without pain and phonation normal. Neck supple.   Cardiovascular: Normal rate, regular rhythm, normal heart sounds and normal pulses.    Pulmonary/Chest: Effort normal and breath sounds normal. No respiratory distress.   Abdominal: Soft. Normal appearance and bowel sounds are normal. She exhibits no distension. There is no tenderness.   Musculoskeletal: Normal range of motion. She exhibits no edema or deformity.   Lymphadenopathy:     She has no cervical adenopathy.   Neurological: She is alert and oriented to person, place, and time. She exhibits normal muscle tone. Coordination normal.   Skin: Skin is warm, dry and intact. She is not diaphoretic. No pallor.   Psychiatric: She has a normal mood and affect. Her speech is normal and behavior is normal. Judgment and thought content normal. Cognition and memory are normal.   Nursing note and vitals reviewed.      Results for orders placed or performed in visit on 01/30/18   POCT Influenza A/B   Result Value Ref Range    Rapid Influenza A Ag Negative Negative    Rapid Influenza B Ag Negative  Negative     Acceptable Yes      Assessment:       1. Acute sinusitis, recurrence not specified, unspecified location    2. Nasal congestion        Plan:         Acute sinusitis, recurrence not specified, unspecified location  -     amoxicillin-clavulanate 875-125mg (AUGMENTIN) 875-125 mg per tablet; Take 1 tablet by mouth 2 (two) times daily.  Dispense: 20 tablet; Refill: 0  -     benzonatate (TESSALON) 200 MG capsule; Take 1 capsule (200 mg total) by mouth 3 (three) times daily as needed for Cough.  Dispense: 30 capsule; Refill: 0    Nasal congestion  -     POCT Influenza A/B      Patient Instructions   Please drink plenty of fluids.  Please get plenty of rest.  Please return here or go to the Emergency Department for any concerns or worsening of condition.  If you were given wait & see antibiotics, please wait 3-5 days before taking them, and only take them if your symptoms have worsened or not improved.  If you do begin taking the antibiotics, please take them to completion.  Consider adding over-the-counter PROBIOTICS to decrease some side-effects associated with antibiotics. When a person takes antibiotics, both the harmful bacteria and the beneficial bacteria are killed. A reduction of beneficial bacteria can lead to digestive problems, such as diarrhea, yeast infections and urinary tract infections.  If you were prescribed antibiotics, please take them to completion.  If you were prescribed a narcotic medication, do not drive or operate heavy equipment or machinery while taking these medications.  If you do not have Hypertension or any history of palpitations, it is ok to take over the counter Sudafed or Mucinex D or Allegra-D or Claritin-D or Zyrtec-D.  If you do take one of the above, it is ok to combine that with plain over the counter Mucinex or Allegra or Claritin or Zyrtec.  If for example you are taking Zyrtec -D, you can combine that with Mucinex, but not Mucinex-D.  If you are taking  Mucinex-D, you can combine that with plain Allegra or Claritin or Zyrtec.   If you do have Hypertension or palpitations, it is safe to take Coricidin HBP for relief of sinus symptoms.  We recommend you take over the counter Flonase (Fluticasone) or another nasally inhaled steroid unless you are already taking one.  Nasal irrigation with a saline spray or Netti Pot like device per their directions is also recommended.  If not allergic, please take over the counter Tylenol (Acetaminophen) and/or Motrin (Ibuprofen) as directed for control of pain and/or fever.  Please follow up with your primary care doctor or specialist as needed.    If you  smoke, please stop smoking.  Sinusitis (Antibiotic Treatment)    The sinuses are air-filled spaces within the bones of the face. They connect to the inside of the nose. Sinusitis is an inflammation of the tissue lining the sinus cavity. Sinus inflammation can occur during a cold. It can also be due to allergies to pollens and other particles in the air. Sinusitis can cause symptoms of sinus congestion and fullness. A sinus infection causes fever, headache and facial pain. There is often green or yellow drainage from the nose or into the back of the throat (post-nasal drip). You have been given antibiotics to treat this condition.  Home care:  · Take the full course of antibiotics as instructed. Do not stop taking them, even if you feel better.  · Drink plenty of water, hot tea, and other liquids. This may help thin mucus. It also may promote sinus drainage.  · Heat may help soothe painful areas of the face. Use a towel soaked in hot water. Or,  the shower and direct the hot spray onto your face. Using a vaporizer along with a menthol rub at night may also help.   · An expectorant containing guaifenesin may help thin the mucus and promote drainage from the sinuses.  · Over-the-counter decongestants may be used unless a similar medicine was prescribed. Nasal sprays work the  fastest. Use one that contains phenylephrine or oxymetazoline. First blow the nose gently. Then use the spray. Do not use these medicines more often than directed on the label or symptoms may get worse. You may also use tablets containing pseudoephedrine. Avoid products that combine ingredients, because side effects may be increased. Read labels. You can also ask the pharmacist for help. (NOTE: Persons with high blood pressure should not use decongestants. They can raise blood pressure.)  · Over-the-counter antihistamines may help if allergies contributed to your sinusitis.    · Do not use nasal rinses or irrigation during an acute sinus infection, unless told to by your health care provider. Rinsing may spread the infection to other sinuses.  · Use acetaminophen or ibuprofen to control pain, unless another pain medicine was prescribed. (If you have chronic liver or kidney disease or ever had a stomach ulcer, talk with your doctor before using these medicines. Aspirin should never be used in anyone under 18 years of age who is ill with a fever. It may cause severe liver damage.)  · Don't smoke. This can worsen symptoms.  Follow-up care  Follow up with your healthcare provider or our staff if you are not improving within the next week.  When to seek medical advice  Call your healthcare provider if any of these occur:  · Facial pain or headache becoming more severe  · Stiff neck  · Unusual drowsiness or confusion  · Swelling of the forehead or eyelids  · Vision problems, including blurred or double vision  · Fever of 100.4ºF (38ºC) or higher, or as directed by your healthcare provider  · Seizure  · Breathing problems  · Symptoms not resolving within 10 days  Date Last Reviewed: 4/13/2015 © 2000-2017 [employer]. [address] [place] [address], [place] [address]. All rights reserved. This information is not intended as a substitute for professional medical care. Always follow your healthcare professional's  instructions.

## 2018-01-30 NOTE — PROGRESS NOTES
"Last 5 Patient Entered Readings                                      Current 30 Day Average: 141/75     Recent Readings 1/29/2018 1/29/2018 1/29/2018 1/29/2018 1/28/2018    SBP (mmHg) 153 156 156 156 125    DBP (mmHg) 73 86 86 86 64    Pulse 63 55 55 55 64        1/30-LVM. Follow up attempt. Will call again on 2/12. Will complete task upon next successful encounter.    Patient reports having sinusitis therefore her activity level has been minimal.     Patient expressed her gratitude towards  as evidenced by her stating "thank you for checking on me and encouraging me to be more mindful of reading food labels."    Hypertension Digital Medicine Program (HDMP): Health  Follow Up    Lifestyle Modifications:    1.Low sodium diet: no Patient reports consuming hidden sodium foods-cereals and bread. She states not knowing about hidden sodium in items she thought were low sodium or reduced sodium. She states she will make changes by watching the food labels more closely. Patient reports enjoying eating oatmeal and shredded wheat cereals. Patient states she will begin using Mrs. [employer] soon. Patient reports using rotisserie chickens for soup but is aware of the amount of sodium in the chicken. Informed patient to find low-sodium, protein alternatives or cook from scratch in order to monitor salt more closely.    2.Physical activity: no Patient reports achieving minimal activity due to her recent sinus infection. Patient states she will return to walking when the weather warms up and has recovered.    3.Hypotension/Hypertension symptoms: no Patient reports no abnormal signs or symptoms with BP medication.  Frequency/Alleviating factors/Precipitating factors, etc.     4.Patient has been compliant with the medication regimen.     Follow up with Ms. Chelsea Ford Child completed. No further questions or concerns. I will follow up in a few weeks to assess progress.     "

## 2018-01-30 NOTE — PATIENT INSTRUCTIONS
Please drink plenty of fluids.  Please get plenty of rest.  Please return here or go to the Emergency Department for any concerns or worsening of condition.  If you were given wait & see antibiotics, please wait 3-5 days before taking them, and only take them if your symptoms have worsened or not improved.  If you do begin taking the antibiotics, please take them to completion.  Consider adding over-the-counter PROBIOTICS to decrease some side-effects associated with antibiotics. When a person takes antibiotics, both the harmful bacteria and the beneficial bacteria are killed. A reduction of beneficial bacteria can lead to digestive problems, such as diarrhea, yeast infections and urinary tract infections.  If you were prescribed antibiotics, please take them to completion.  If you were prescribed a narcotic medication, do not drive or operate heavy equipment or machinery while taking these medications.  If you do not have Hypertension or any history of palpitations, it is ok to take over the counter Sudafed or Mucinex D or Allegra-D or Claritin-D or Zyrtec-D.  If you do take one of the above, it is ok to combine that with plain over the counter Mucinex or Allegra or Claritin or Zyrtec.  If for example you are taking Zyrtec -D, you can combine that with Mucinex, but not Mucinex-D.  If you are taking Mucinex-D, you can combine that with plain Allegra or Claritin or Zyrtec.   If you do have Hypertension or palpitations, it is safe to take Coricidin HBP for relief of sinus symptoms.  We recommend you take over the counter Flonase (Fluticasone) or another nasally inhaled steroid unless you are already taking one.  Nasal irrigation with a saline spray or Netti Pot like device per their directions is also recommended.  If not allergic, please take over the counter Tylenol (Acetaminophen) and/or Motrin (Ibuprofen) as directed for control of pain and/or fever.  Please follow up with your primary care doctor or specialist as  needed.    If you  smoke, please stop smoking.  Sinusitis (Antibiotic Treatment)    The sinuses are air-filled spaces within the bones of the face. They connect to the inside of the nose. Sinusitis is an inflammation of the tissue lining the sinus cavity. Sinus inflammation can occur during a cold. It can also be due to allergies to pollens and other particles in the air. Sinusitis can cause symptoms of sinus congestion and fullness. A sinus infection causes fever, headache and facial pain. There is often green or yellow drainage from the nose or into the back of the throat (post-nasal drip). You have been given antibiotics to treat this condition.  Home care:  · Take the full course of antibiotics as instructed. Do not stop taking them, even if you feel better.  · Drink plenty of water, hot tea, and other liquids. This may help thin mucus. It also may promote sinus drainage.  · Heat may help soothe painful areas of the face. Use a towel soaked in hot water. Or,  the shower and direct the hot spray onto your face. Using a vaporizer along with a menthol rub at night may also help.   · An expectorant containing guaifenesin may help thin the mucus and promote drainage from the sinuses.  · Over-the-counter decongestants may be used unless a similar medicine was prescribed. Nasal sprays work the fastest. Use one that contains phenylephrine or oxymetazoline. First blow the nose gently. Then use the spray. Do not use these medicines more often than directed on the label or symptoms may get worse. You may also use tablets containing pseudoephedrine. Avoid products that combine ingredients, because side effects may be increased. Read labels. You can also ask the pharmacist for help. (NOTE: Persons with high blood pressure should not use decongestants. They can raise blood pressure.)  · Over-the-counter antihistamines may help if allergies contributed to your sinusitis.    · Do not use nasal rinses or irrigation  during an acute sinus infection, unless told to by your health care provider. Rinsing may spread the infection to other sinuses.  · Use acetaminophen or ibuprofen to control pain, unless another pain medicine was prescribed. (If you have chronic liver or kidney disease or ever had a stomach ulcer, talk with your doctor before using these medicines. Aspirin should never be used in anyone under 18 years of age who is ill with a fever. It may cause severe liver damage.)  · Don't smoke. This can worsen symptoms.  Follow-up care  Follow up with your healthcare provider or our staff if you are not improving within the next week.  When to seek medical advice  Call your healthcare provider if any of these occur:  · Facial pain or headache becoming more severe  · Stiff neck  · Unusual drowsiness or confusion  · Swelling of the forehead or eyelids  · Vision problems, including blurred or double vision  · Fever of 100.4ºF (38ºC) or higher, or as directed by your healthcare provider  · Seizure  · Breathing problems  · Symptoms not resolving within 10 days  Date Last Reviewed: 4/13/2015  © 5331-0605 [employer]. [address] [place] [address], [place] [address]. All rights reserved. This information is not intended as a substitute for professional medical care. Always follow your healthcare professional's instructions.

## 2018-01-30 NOTE — PROGRESS NOTES
HPI:  Patient concerned with elevated readings. Being treated for a sinus infection. Patient denies s/s of hypertension. Tolerating lisinopril with no complaints.     Last 5 Patient Entered Readings                                      Current 30 Day Average: 142/76     Recent Readings 1/30/2018 1/30/2018 1/30/2018 1/30/2018 1/29/2018    SBP (mmHg) 161 150 179 178 153    DBP (mmHg) 83 80 87 95 73    Pulse 64 61 61 55 63        Assessment:  Per 30-day average blood pressure is not well controlled. Previously trending down after medication adjustment however, readings elevated for the last two days secondary to acute illness.     Plan:  · Continue current regimen.  · Discussed OTC medications that are appropriate for patients with hypertension.   · Will continue to monitor and follow-up in a few weeks sooner if needed to assess BP readings and medication regimen.     Current medication regimen:  Hypertension Medications             carvedilol (COREG) 12.5 MG tablet Take 1 tablet (12.5 mg total) by mouth 2 (two) times daily with meals.    lisinopril (PRINIVIL,ZESTRIL) 5 MG tablet Take 1 tablet (5 mg total) by mouth once daily.

## 2018-02-02 ENCOUNTER — PATIENT OUTREACH (OUTPATIENT)
Dept: OTHER | Facility: OTHER | Age: 68
End: 2018-02-02

## 2018-02-02 DIAGNOSIS — I10 BENIGN ESSENTIAL HYPERTENSION: Primary | ICD-10-CM

## 2018-02-02 RX ORDER — LISINOPRIL 5 MG/1
5 TABLET ORAL DAILY
Qty: 30 TABLET | Refills: 3 | Status: SHIPPED | OUTPATIENT
Start: 2018-02-02 | End: 2018-04-23 | Stop reason: SDUPTHER

## 2018-02-02 NOTE — PROGRESS NOTES
HPI:  Patient called with question regarding about current antibiotic. Notes upset stomach and diarrhea with Augmentin.     Last 5 Patient Entered Readings                                      Current 30 Day Average: 142/75     Recent Readings 2/2/2018 2/1/2018 2/1/2018 1/31/2018 1/30/2018    SBP (mmHg) 142 119 145 112 161    DBP (mmHg) 75 67 68 67 83    Pulse 58 55 57 61 64        Assessment:  Per 30-day average blood pressure is not well controlled. Readings improving.     Plan:  · Discussed taking with food. Addition of yogurt or probiotic to restore normal mickie.   · Will continue to monitor and follow-up in a few weeks sooner if needed to assess BP readings and medication regimen.     Current medication regimen:  Hypertension Medications             carvedilol (COREG) 12.5 MG tablet Take 1 tablet (12.5 mg total) by mouth 2 (two) times daily with meals.    lisinopril (PRINIVIL,ZESTRIL) 5 MG tablet Take 1 tablet (5 mg total) by mouth once daily.

## 2018-02-20 ENCOUNTER — PATIENT OUTREACH (OUTPATIENT)
Dept: OTHER | Facility: OTHER | Age: 68
End: 2018-02-20

## 2018-02-20 NOTE — PROGRESS NOTES
HPI:  Called patient for follow-up.     Last 5 Patient Entered Readings                                      Current 30 Day Average: 127/69     Recent Readings 2/20/2018 2/19/2018 2/18/2018 2/17/2018 2/16/2018    SBP (mmHg) 143 129 111 122 123    DBP (mmHg) 75 73 61 67 68    Pulse 62 55 67 55 59        Assessment:  Per newly released 2017 ACC/ AHA HTN guidelines  (goal of BP < 130/80), current 30-day average is well controlled.    Plan:  LVM, requested patient call back at her convenience if she has any questions or concerns, and to continue to take at least weekly BP readings.   Will continue to monitor. WCB in 4 to 6 weeks, sooner if BP begins to trend up or down.     Current medication regimen:  Hypertension Medications             carvedilol (COREG) 12.5 MG tablet Take 1 tablet (12.5 mg total) by mouth 2 (two) times daily with meals.    lisinopril (PRINIVIL,ZESTRIL) 5 MG tablet Take 1 tablet (5 mg total) by mouth once daily.

## 2018-02-22 ENCOUNTER — PATIENT OUTREACH (OUTPATIENT)
Dept: OTHER | Facility: OTHER | Age: 68
End: 2018-02-22

## 2018-02-22 NOTE — PROGRESS NOTES
Last 5 Patient Entered Readings                                      Current 30 Day Average: 124/69     Recent Readings 2/21/2018 2/20/2018 2/19/2018 2/18/2018 2/17/2018    SBP (mmHg) 105 143 129 111 122    DBP (mmHg) 64 75 73 61 67    Pulse 58 62 55 67 55          Hypertension Digital Medicine Program (HDMP): Health  Follow Up    Lifestyle Modifications:    1.Low sodium diet: yes Patient reports baking chicken. She states consuming salads with minimal dressing. Patient reports eating at least 1 salad/day. Patient reports using [employer] seasoning for flavor.     2.Physical activity: yes Patient reports walking 2- 2 1/2 miles/day. She states trying to walk everyday in the morning.     3.Hypotension/Hypertension symptoms: no Patient reports no abnormal signs or symptoms with BP medication.   Frequency/Alleviating factors/Precipitating factors, etc.     4.Patient has been compliant with the medication regimen.     Follow up with Mrs. Chelsea Ford Child completed. No further questions or concerns. I will follow up in a few weeks to assess progress.

## 2018-03-22 ENCOUNTER — PATIENT OUTREACH (OUTPATIENT)
Dept: OTHER | Facility: OTHER | Age: 68
End: 2018-03-22

## 2018-03-22 NOTE — PROGRESS NOTES
Last 5 Patient Entered Readings                                      Current 30 Day Average: 125/71     Recent Readings 3/21/2018 3/21/2018 3/20/2018 3/19/2018 3/18/2018    SBP (mmHg) 115 149 116 134 142    DBP (mmHg) 64 81 68 78 74    Pulse 65 60 63 60 59        3/22-LVM. Follow up attempt. Will call again on 3/28  3/28-LVM. Follow up attempt #2. Will call again on 4/11.     Patient expressed her gratitude of the program and states appreciating our services.    Hypertension Digital Medicine Program (HDMP): Health  Follow Up    Lifestyle Modifications:    1.Low sodium diet: yes Patient reports consuming salads often. She states cooking foods for her friend who has Celiac disease. Patient reports increasing her fruits and vegetable intake. Encouraged patient to continue low-sodium diet.    2.Physical activity: yes Patient reports walking at least 30-45 minutes 4-5x week. Encouraged patient to maintain the activity level.     3.Hypotension/Hypertension symptoms: no Patient reports no abnormal signs or symptoms with BP medication.  Frequency/Alleviating factors/Precipitating factors, etc.     4.Patient has been compliant with the medication regimen.     Follow up with Ms. Chelsea Ford Child completed. No further questions or concerns. I will follow up in a few weeks to assess progress.

## 2018-03-26 ENCOUNTER — PATIENT MESSAGE (OUTPATIENT)
Dept: ADMINISTRATIVE | Facility: OTHER | Age: 68
End: 2018-03-26

## 2018-04-03 ENCOUNTER — PATIENT OUTREACH (OUTPATIENT)
Dept: OTHER | Facility: OTHER | Age: 68
End: 2018-04-03

## 2018-04-03 NOTE — PROGRESS NOTES
HPI:  Called patient for follow-up. Overall she is doing well. Notes improvement in readings since starting lisinopril. She endorses adherence to regimen (6:30 AM, 12 PM and 7:30 PM) with no complaints. Patient continues tow alk daily as well as monitor her sodium intake. Notes elevated readings in the evenings on occasion however, she denies s/s of hypertension.     Last 5 Patient Entered Readings                                      Current 30 Day Average: 129/72     Recent Readings 4/2/2018 3/31/2018 3/30/2018 3/29/2018 3/26/2018    SBP (mmHg) 138 122 134 121 107    DBP (mmHg) 72 62 72 69 62    Pulse 56 59 57 60 68        Assessment:  Per 30-day average blood pressure is well controlled. There are a few elevations that occur late afternoon/early evening ~1 to 2 hours prior to evening dose of carvedilol. Will monitor closely.    Plan:  · Continue current regimen.   · Will continue to monitor and follow-up in 4 weeks, sooner if needed to assess BP readings and medication regimen.     Current medication regimen:  Hypertension Medications             carvedilol (COREG) 12.5 MG tablet Take 1 tablet (12.5 mg total) by mouth 2 (two) times daily with meals.    lisinopril (PRINIVIL,ZESTRIL) 5 MG tablet Take 1 tablet (5 mg total) by mouth once daily.

## 2018-04-20 DIAGNOSIS — I10 ESSENTIAL HYPERTENSION: ICD-10-CM

## 2018-04-23 DIAGNOSIS — I10 BENIGN ESSENTIAL HYPERTENSION: ICD-10-CM

## 2018-04-23 RX ORDER — CARVEDILOL 12.5 MG/1
12.5 TABLET ORAL 2 TIMES DAILY WITH MEALS
Qty: 180 TABLET | Refills: 3 | Status: SHIPPED | OUTPATIENT
Start: 2018-04-23 | End: 2019-04-05 | Stop reason: SDUPTHER

## 2018-04-23 RX ORDER — LISINOPRIL 5 MG/1
5 TABLET ORAL DAILY
Qty: 90 TABLET | Refills: 3 | Status: SHIPPED | OUTPATIENT
Start: 2018-04-23 | End: 2019-01-30 | Stop reason: ALTCHOICE

## 2018-05-01 ENCOUNTER — PATIENT OUTREACH (OUTPATIENT)
Dept: OTHER | Facility: OTHER | Age: 68
End: 2018-05-01

## 2018-05-01 NOTE — PROGRESS NOTES
HPI:  Called patient for follow-up.    Last 5 Patient Entered Readings                                      Current 30 Day Average: 128/72     Recent Readings 4/29/2018 4/28/2018 4/28/2018 4/26/2018 4/25/2018    SBP (mmHg) 121 137 111 128 131    DBP (mmHg) 69 75 68 74 75    Pulse 64 55 59 56 57        Assessment:  Per newly released 2017 ACC/ AHA HTN guidelines  (goal of BP < 130/80), current 30-day average is well controlled.    Plan:  LVM, requested patient call back at her convenience if she has any questions or concerns, and to continue to take at least weekly BP readings.   Will continue to monitor. WCB in 2 months, sooner if BP begins to trend up or down.       Current Medications:  Hypertension Medications             carvedilol (COREG) 12.5 MG tablet Take 1 tablet (12.5 mg total) by mouth 2 (two) times daily with meals.    lisinopril (PRINIVIL,ZESTRIL) 5 MG tablet Take 1 tablet (5 mg total) by mouth once daily.

## 2018-05-07 ENCOUNTER — PATIENT OUTREACH (OUTPATIENT)
Dept: OTHER | Facility: OTHER | Age: 68
End: 2018-05-07

## 2018-05-07 NOTE — PROGRESS NOTES
Last 5 Patient Entered Readings                                      Current 30 Day Average: 126/72     Recent Readings 5/6/2018 5/5/2018 5/2/2018 5/2/2018 4/29/2018    SBP (mmHg) 113 131 131 137 121    DBP (mmHg) 66 75 75 74 69    Pulse 66 52 58 57 64        Digital Medicine: Health  Follow Up    Left voicemail to follow up with Mrs. Chelsea Ford Child.  Current BP average 126/72 mmHg is at goal, [130/80]. BP is controlled.

## 2018-06-11 NOTE — PROGRESS NOTES
Last 5 Patient Entered Readings                                      Current 30 Day Average: 127/72     Recent Readings 6/10/2018 6/9/2018 6/7/2018 6/7/2018 6/6/2018    SBP (mmHg) 138 134 110 108 109    DBP (mmHg) 83 70 69 69 62    Pulse 55 58 67 59 58          Digital Medicine: Health  Follow Up    Left voicemail to follow up with Mrs. Chelsea Ford Child.  Current BP average 127/72 mmHg is at goal, [130/80].

## 2018-06-25 NOTE — PROGRESS NOTES
Last 5 Patient Entered Readings                                      Current 30 Day Average: 129/74     Recent Readings 6/25/2018 6/20/2018 6/18/2018 6/17/2018 6/16/2018    SBP (mmHg) 125 124 149 140 113    DBP (mmHg) 74 72 81 76 69    Pulse 60 63 58 65 62          Digital Medicine: Health  Follow Up    Left voicemail to follow up with Mrs. Chelsea Ford Child.  Current BP average 129/74 mmHg is at goal, [130/80].

## 2018-07-09 NOTE — PROGRESS NOTES
Last 5 Patient Entered Readings                                      Current 30 Day Average: 131/74     Recent Readings 7/8/2018 7/7/2018 7/6/2018 7/5/2018 7/4/2018    SBP (mmHg) 133 135 131 113 119    DBP (mmHg) 75 73 71 66 62    Pulse 62 52 60 61 61        Digital Medicine: Health  Follow Up    Left voicemail to follow up with Mrs. Chelsea Ford Child.  Current BP average 131/74 mmHg is at goal, [130/80].

## 2018-07-12 NOTE — PROGRESS NOTES
Last 5 Patient Entered Readings                                      Current 30 Day Average: 128/73     Recent Readings 7/12/2018 7/11/2018 7/9/2018 7/8/2018 7/7/2018    SBP (mmHg) 116 122 125 133 135    DBP (mmHg) 71 73 72 75 73    Pulse 65 58 65 62 52          7/12-Returned patient VM left on HC phone on 7/12. LVM.

## 2018-07-24 NOTE — PROGRESS NOTES
Per 30-day average blood pressure is well controlled. Will continue monitoring and follow-up in a few weeks, sooner if needed.    Last 5 Patient Entered Readings                                      Current 30 Day Average: 123/70     Recent Readings 7/22/2018 7/22/2018 7/20/2018 7/19/2018 7/19/2018    SBP (mmHg) 115 111 127 138 130    DBP (mmHg) 66 60 69 75 71    Pulse 56 66 57 55 58

## 2018-07-30 NOTE — PROGRESS NOTES
Last 5 Patient Entered Readings                                      Current 30 Day Average: 123/69     Recent Readings 7/29/2018 7/29/2018 7/27/2018 7/26/2018 7/25/2018    SBP (mmHg) 121 119 137 137 120    DBP (mmHg) 76 77 77 75 64    Pulse 65 65 61 50 64          Digital Medicine: Health  Follow Up    Left voicemail to follow up with Mrs. Chelsea Ford Child.  Current BP average 123/69 mmHg is at goal, [130/80]. BP is well-controlled.

## 2018-07-30 NOTE — PROGRESS NOTES
"Last 5 Patient Entered Readings                                      Current 30 Day Average: 123/69     Recent Readings 7/29/2018 7/29/2018 7/27/2018 7/26/2018 7/25/2018    SBP (mmHg) 121 119 137 137 120    DBP (mmHg) 76 77 77 75 64    Pulse 65 65 61 50 64        Patient attributes readings in 130s to working outside and may not be allowing sufficient time to relax and cool off. Encouraged patient to relax at least 10-20 minutes prior to taking BP readings.     Digital Medicine: Health  Follow Up    Lifestyle Modifications:    1.Dietary Modifications (Sodium intake <2,000mg/day, food labels, dining out): Patient reports maintaining the same diet since previous successful encounter. Patient reports still cooking at home. Patient reports using low-sodium canned foods. Patient reports consuming rotisserie chicken often. Patient reports taking skin off and washing it in hopes to wash off excess sodium or seasoning. Encouraged patient to search for rotisserie chickens with food labels and purchase lower-sodium ones. Informed patient [employer] may have "naked" - no seasonings added- rotisserie chickens. She states she will look into this.      2.Physical Activity: Patient reports gardening and "moving things around" outside frequently. She states not walking when outside temperature is over 85 degrees. Encouraged patient to continue working outside and stay mindful of heat while drinking plenty of water.      3.Medication Therapy: Patient has been compliant with the medication regimen.    4.Patient has the following medication side effects/concerns:   (Frequency/Alleviating factors/Precipitating factors, etc.)     Follow up with Mrs. Chelsea Ford Child completed. No further questions or concerns. Will continue follow up to achieve health goals.  "

## 2018-08-02 ENCOUNTER — OFFICE VISIT (OUTPATIENT)
Dept: INTERNAL MEDICINE | Facility: CLINIC | Age: 68
End: 2018-08-02
Payer: MEDICARE

## 2018-08-02 VITALS
DIASTOLIC BLOOD PRESSURE: 63 MMHG | WEIGHT: 121 LBS | OXYGEN SATURATION: 98 % | BODY MASS INDEX: 19.44 KG/M2 | HEART RATE: 54 BPM | HEIGHT: 66 IN | SYSTOLIC BLOOD PRESSURE: 129 MMHG

## 2018-08-02 DIAGNOSIS — Z85.828 HISTORY OF BASAL CELL CANCER: ICD-10-CM

## 2018-08-02 DIAGNOSIS — M81.0 AGE-RELATED OSTEOPOROSIS WITHOUT CURRENT PATHOLOGICAL FRACTURE: ICD-10-CM

## 2018-08-02 DIAGNOSIS — I10 BENIGN ESSENTIAL HYPERTENSION: Primary | ICD-10-CM

## 2018-08-02 DIAGNOSIS — Z00.00 HEALTH CARE MAINTENANCE: ICD-10-CM

## 2018-08-02 DIAGNOSIS — Z13.6 SCREENING FOR CARDIOVASCULAR CONDITION: ICD-10-CM

## 2018-08-02 PROCEDURE — 3078F DIAST BP <80 MM HG: CPT | Mod: CPTII,S$GLB,, | Performed by: INTERNAL MEDICINE

## 2018-08-02 PROCEDURE — 99214 OFFICE O/P EST MOD 30 MIN: CPT | Mod: S$GLB,,, | Performed by: INTERNAL MEDICINE

## 2018-08-02 PROCEDURE — 3074F SYST BP LT 130 MM HG: CPT | Mod: CPTII,S$GLB,, | Performed by: INTERNAL MEDICINE

## 2018-08-02 PROCEDURE — 99999 PR PBB SHADOW E&M-EST. PATIENT-LVL IV: CPT | Mod: PBBFAC,,, | Performed by: INTERNAL MEDICINE

## 2018-08-02 NOTE — PROGRESS NOTES
"Subjective:       Patient ID: Chelsea Ford Child is a 68 y.o. female.    Chief Complaint: Annual Exam (yearly check up.) and Rash (red fine bumps on back of neck, bumps have been there for about a month now. pt currently uses Hydrocortisone cream & body lotion to help relieve.)    HPI   Chelsea Ford Child is a 68 y.o. female here for a yearly preventative healthcare visit.   Pt of Dr. Pyle. Reports she would like to switch to me (no complaints, "just because.")    Rash x 1 month on back of neck.   Little bumps on back of neck.   Itches intermittently.   She has a metal allergy and wears beads. Wonders if there was metal clasp?    Intermittent constipation she attributes to dehydration.   Increasing water, fiber.     HTN  HTN - changed to lopressor initially 2/2 palpations. Part of digital HTN and eventually changed to coreg 12.5mg daily.  Carvedilol 12.5mg bid and lisinopril 5mg daily  Valves are normal on echo.  Reviewed digital HTN flowsheet. Variable BP.  At goal.      Osteoporosis 9/26/17 DEXA.  Discussed. Pt reports that she's read up on bisphosphonates. Declines bisphosphonates at this time.   Taking Calcium/vit D OTC BID and doing weight bearing exercise.     Saw ent and earwax removed abuot a year ago.   Was rx astelin nose spray but didn't tolerate.   Tinnitus which is manageable.     8/1/2018 7/31/2018 7/29/2018 7/29/2018 7/27/2018   SBP (mmHg) 139 121 121 119 137   DBP (mmHg) 73 73 76 77 77   Pulse 62 59 65 65 61     Last pap was 1985.   Review of Systems   Constitutional: Negative for fever.   HENT: Negative.    Eyes: Negative.    Respiratory: Negative for shortness of breath.    Cardiovascular: Negative for chest pain and leg swelling.   Gastrointestinal: Negative for abdominal pain, diarrhea, nausea and vomiting.   Genitourinary: Negative.    Musculoskeletal: Negative for arthralgias.   Skin: Negative for rash.   Psychiatric/Behavioral: Negative.        Objective:   /63 (BP Location: Right arm, " "Patient Position: Sitting, BP Method: Small (Manual))   Pulse (!) 54   Ht 5' 6" (1.676 m)   Wt 54.9 kg (121 lb)   SpO2 98%   BMI 19.53 kg/m²      Physical Exam   Constitutional: She is oriented to person, place, and time. She appears well-developed and well-nourished.   HENT:   Head: Normocephalic and atraumatic.   Eyes: Conjunctivae and EOM are normal. Pupils are equal, round, and reactive to light.   Neck: Neck supple. No thyromegaly present.   Cardiovascular: Normal rate, regular rhythm and normal heart sounds.    No murmur heard.  Pulmonary/Chest: Effort normal and breath sounds normal. No respiratory distress. She has no wheezes.   Abdominal: Soft. Bowel sounds are normal. She exhibits no distension. There is no tenderness.   Musculoskeletal: Normal range of motion.   Neurological: She is alert and oriented to person, place, and time.   Skin: Skin is warm and dry. No rash noted.   1mm and 3mm fleshy papule on base on neck   Psychiatric: She has a normal mood and affect. Judgment and thought content normal.   Vitals reviewed.      Assessment:       1. Benign essential hypertension    2. Age-related osteoporosis without current pathological fracture    3. Screening for cardiovascular condition    4. History of basal cell cancer    5. Health care maintenance        Plan:       Chelsea was seen today for annual exam and rash.    Diagnoses and all orders for this visit:    Benign essential hypertension  -     CBC auto differential; Future  -     Comprehensive metabolic panel; Future  -     Lipid panel; Future  -     TSH; Future  -     Vitamin D; Future    Age-related osteoporosis without current pathological fracture  -     Vitamin D; Future   Declines bisphosphonates    Screening for cardiovascular condition  -     Lipid panel; Future   Reports has been having omelets for breakfast, willing to switch to egg whites or alternate with cereal if trending up    History of basal cell cancer  I think the papules on " her neck may be early skin tags but given hx of BCC want them checked by dermatology  -     Ambulatory Referral to Dermatology    Health care maintenance  -     Ambulatory referral to Obstetrics / Gynecology          Interested in shingrx when available

## 2018-08-09 ENCOUNTER — LAB VISIT (OUTPATIENT)
Dept: LAB | Facility: HOSPITAL | Age: 68
End: 2018-08-09
Attending: INTERNAL MEDICINE
Payer: MEDICARE

## 2018-08-09 DIAGNOSIS — Z13.6 SCREENING FOR CARDIOVASCULAR CONDITION: ICD-10-CM

## 2018-08-09 DIAGNOSIS — I10 BENIGN ESSENTIAL HYPERTENSION: ICD-10-CM

## 2018-08-09 DIAGNOSIS — M81.0 AGE-RELATED OSTEOPOROSIS WITHOUT CURRENT PATHOLOGICAL FRACTURE: ICD-10-CM

## 2018-08-09 LAB
25(OH)D3+25(OH)D2 SERPL-MCNC: 48 NG/ML
ALBUMIN SERPL BCP-MCNC: 4 G/DL
ALP SERPL-CCNC: 60 U/L
ALT SERPL W/O P-5'-P-CCNC: 17 U/L
ANION GAP SERPL CALC-SCNC: 7 MMOL/L
AST SERPL-CCNC: 19 U/L
BASOPHILS # BLD AUTO: 0.02 K/UL
BASOPHILS NFR BLD: 0.5 %
BILIRUB SERPL-MCNC: 0.4 MG/DL
BUN SERPL-MCNC: 16 MG/DL
CALCIUM SERPL-MCNC: 9.8 MG/DL
CHLORIDE SERPL-SCNC: 105 MMOL/L
CHOLEST SERPL-MCNC: 212 MG/DL
CHOLEST/HDLC SERPL: 3.1 {RATIO}
CO2 SERPL-SCNC: 27 MMOL/L
CREAT SERPL-MCNC: 0.9 MG/DL
DIFFERENTIAL METHOD: ABNORMAL
EOSINOPHIL # BLD AUTO: 0.1 K/UL
EOSINOPHIL NFR BLD: 1.5 %
ERYTHROCYTE [DISTWIDTH] IN BLOOD BY AUTOMATED COUNT: 12.7 %
EST. GFR  (AFRICAN AMERICAN): >60 ML/MIN/1.73 M^2
EST. GFR  (NON AFRICAN AMERICAN): >60 ML/MIN/1.73 M^2
GLUCOSE SERPL-MCNC: 91 MG/DL
HCT VFR BLD AUTO: 36.8 %
HDLC SERPL-MCNC: 69 MG/DL
HDLC SERPL: 32.5 %
HGB BLD-MCNC: 12.2 G/DL
LDLC SERPL CALC-MCNC: 131.2 MG/DL
LYMPHOCYTES # BLD AUTO: 1.6 K/UL
LYMPHOCYTES NFR BLD: 40.3 %
MCH RBC QN AUTO: 30.7 PG
MCHC RBC AUTO-ENTMCNC: 33.2 G/DL
MCV RBC AUTO: 93 FL
MONOCYTES # BLD AUTO: 0.4 K/UL
MONOCYTES NFR BLD: 10.8 %
NEUTROPHILS # BLD AUTO: 1.9 K/UL
NEUTROPHILS NFR BLD: 46.9 %
NONHDLC SERPL-MCNC: 143 MG/DL
PLATELET # BLD AUTO: 161 K/UL
PMV BLD AUTO: 10.3 FL
POTASSIUM SERPL-SCNC: 4.2 MMOL/L
PROT SERPL-MCNC: 6.9 G/DL
RBC # BLD AUTO: 3.97 M/UL
SODIUM SERPL-SCNC: 139 MMOL/L
T4 FREE SERPL-MCNC: 0.91 NG/DL
TRIGL SERPL-MCNC: 59 MG/DL
TSH SERPL DL<=0.005 MIU/L-ACNC: 4.62 UIU/ML
WBC # BLD AUTO: 3.97 K/UL

## 2018-08-09 PROCEDURE — 84439 ASSAY OF FREE THYROXINE: CPT

## 2018-08-09 PROCEDURE — 84443 ASSAY THYROID STIM HORMONE: CPT

## 2018-08-09 PROCEDURE — 80061 LIPID PANEL: CPT

## 2018-08-09 PROCEDURE — 36415 COLL VENOUS BLD VENIPUNCTURE: CPT

## 2018-08-09 PROCEDURE — 82306 VITAMIN D 25 HYDROXY: CPT

## 2018-08-09 PROCEDURE — 80053 COMPREHEN METABOLIC PANEL: CPT

## 2018-08-09 PROCEDURE — 85025 COMPLETE CBC W/AUTO DIFF WBC: CPT

## 2018-08-16 ENCOUNTER — INITIAL CONSULT (OUTPATIENT)
Dept: DERMATOLOGY | Facility: CLINIC | Age: 68
End: 2018-08-16
Payer: MEDICARE

## 2018-08-16 VITALS — BODY MASS INDEX: 1.94 KG/M2 | WEIGHT: 12 LBS

## 2018-08-16 DIAGNOSIS — D18.00 ANGIOMA: ICD-10-CM

## 2018-08-16 DIAGNOSIS — L81.4 LENTIGINES: ICD-10-CM

## 2018-08-16 DIAGNOSIS — D22.9 NEVUS OF MULTIPLE SITES: Primary | ICD-10-CM

## 2018-08-16 DIAGNOSIS — L82.1 SEBORRHEIC KERATOSES: ICD-10-CM

## 2018-08-16 DIAGNOSIS — L57.0 ACTINIC KERATOSIS: ICD-10-CM

## 2018-08-16 DIAGNOSIS — Z85.828 HISTORY OF SKIN CANCER: ICD-10-CM

## 2018-08-16 PROCEDURE — 99213 OFFICE O/P EST LOW 20 MIN: CPT | Mod: 25,S$GLB,, | Performed by: DERMATOLOGY

## 2018-08-16 PROCEDURE — 17000 DESTRUCT PREMALG LESION: CPT | Mod: S$GLB,,, | Performed by: DERMATOLOGY

## 2018-08-16 PROCEDURE — 99999 PR PBB SHADOW E&M-EST. PATIENT-LVL II: CPT | Mod: PBBFAC,,, | Performed by: DERMATOLOGY

## 2018-08-16 NOTE — PROGRESS NOTES
Subjective:       Patient ID:  Chelsea Ford Child is a 68 y.o. female who presents for   Chief Complaint   Patient presents with    Skin Check     UBSE    Spot     back     History of Present Illness: The patient presents with chief complaint of spots.  Location: right upper back  Duration: months  Signs/Symptoms: itch    Prior treatments: none  This is a high risk patient here to check for the development of new lesions.            Review of Systems   Constitutional: Negative for fever.   Skin: Negative for itching and rash.   Hematologic/Lymphatic: Does not bruise/bleed easily.        Objective:    Physical Exam   Constitutional: She appears well-developed and well-nourished. No distress.   Neurological: She is alert and oriented to person, place, and time. She is not disoriented.   Psychiatric: She has a normal mood and affect.   Skin:   Areas Examined (abnormalities noted in diagram):   Head / Face Inspection Performed  Neck Inspection Performed  Chest / Axilla Inspection Performed  Abdomen Inspection Performed  Back Inspection Performed  RUE Inspected  LUE Inspection Performed                   Diagram Legend     Erythematous scaling macule/papule c/w actinic keratosis       Vascular papule c/w angioma      Pigmented verrucoid papule/plaque c/w seborrheic keratosis      Yellow umbilicated papule c/w sebaceous hyperplasia      Irregularly shaped tan macule c/w lentigo     1-2 mm smooth white papules consistent with Milia      Movable subcutaneous cyst with punctum c/w epidermal inclusion cyst      Subcutaneous movable cyst c/w pilar cyst      Firm pink to brown papule c/w dermatofibroma      Pedunculated fleshy papule(s) c/w skin tag(s)      Evenly pigmented macule c/w junctional nevus     Mildly variegated pigmented, slightly irregular-bordered macule c/w mildly atypical nevus      Flesh colored to evenly pigmented papule c/w intradermal nevus       Pink pearly papule/plaque c/w basal cell carcinoma       "Erythematous hyperkeratotic cursted plaque c/w SCC      Surgical scar with no sign of skin cancer recurrence      Open and closed comedones      Inflammatory papules and pustules      Verrucoid papule consistent consistent with wart     Erythematous eczematous patches and plaques     Dystrophic onycholytic nail with subungual debris c/w onychomycosis     Umbilicated papule    Erythematous-base heme-crusted tan verrucoid plaque consistent with inflamed seborrheic keratosis     Erythematous Silvery Scaling Plaque c/w Psoriasis     See annotation      Assessment / Plan:        Nevus of multiple sites  The "ABCD" rules to observe pigmented lesions were reviewed.      Angiomas  reassurance      Lentigines  The "ABCD" rules to observe pigmented lesions were reviewed.      History of skin cancer  Comments:  bcc right cheek and left nose no recurrences    Seborrheic keratoses  reassurance      Actinic keratosis   Cryosurgery Procedure Note    Verbal consent from the patient is obtained and the patient is aware of the precancerous quality and need for treatment of these lesions. Liquid nitrogen cryosurgery is applied to the 1 actinic keratoses, as detailed in the physical exam, to produce a freeze injury.               Follow-up in about 1 year (around 8/16/2019).  "

## 2018-08-16 NOTE — LETTER
August 16, 2018      Elva Tripathi MD  [address]  [facility] [address]           [place] [facility]  [address]  [place] [address]  Phone: [phone]  Fax: [phone]          Patient: Chelsea Cavazos   MR Number: [id number]   YOB: 1950   Date of Visit: 8/16/2018       Dear Dr. Elva Tripathi:    Thank you for referring Chelsea Cavazos to me for evaluation. Attached you will find relevant portions of my assessment and plan of care.    If you have questions, please do not hesitate to call me. I look forward to following Chelsea Cavazos along with you.    Sincerely,    Cony Waters MD    Enclosure  CC:  No Recipients    If you would like to receive this communication electronically, please contact [email or web address] or [phone] to request more information on [employer] Link access.    For providers and/or their staff who would like to refer a patient to [facility], please contact us through our one-stop-shop provider referral line, [facility], at [phone].    If you feel you have received this communication in error or would no longer like to receive these types of communications, please e-mail [email or web address]

## 2018-08-27 ENCOUNTER — PATIENT OUTREACH (OUTPATIENT)
Dept: OTHER | Facility: OTHER | Age: 68
End: 2018-08-27

## 2018-08-27 NOTE — PROGRESS NOTES
Last 5 Patient Entered Readings                                      Current 30 Day Average: 124/70     Recent Readings 8/24/2018 8/21/2018 8/20/2018 8/19/2018 8/16/2018    SBP (mmHg) 123 141 137 135 132    DBP (mmHg) 69 77 77 76 69    Pulse 58 57 57 58 59          Digital Medicine: Health  Follow Up    Lifestyle Modifications:    1.Dietary Modifications (Sodium intake <2,000mg/day, food labels, dining out): Patient reports limiting the amount of prepared/fast food. She states trying to eat as much fresh fruit when she is able. Patient reports having friends who have a fresh diet and states finding kristi in eating healthy with them. Encouraged patient to continue her fresh food approach. Congratulated patient on adhering to her low-sodium approach.      2.Physical Activity: She expresses not walking as much as she would like but reports achieving exercise by completing yard work frequently. She expresses the importance to stay active in order to maintain a healthy weight and not become sedentary.  Congratulated and encouraged patient to continue her active lifestyle.     3.Medication Therapy: Patient has been compliant with the medication regimen.    4.Patient has the following medication side effects/concerns:   (Frequency/Alleviating factors/Precipitating factors, etc.)     Follow up with Mrs. Chelsea oFrd Child completed. No further questions or concerns. Will continue follow up to achieve health goals.

## 2018-09-13 ENCOUNTER — OFFICE VISIT (OUTPATIENT)
Dept: OBSTETRICS AND GYNECOLOGY | Facility: CLINIC | Age: 68
End: 2018-09-13
Payer: MEDICARE

## 2018-09-13 ENCOUNTER — TELEPHONE (OUTPATIENT)
Dept: OBSTETRICS AND GYNECOLOGY | Facility: CLINIC | Age: 68
End: 2018-09-13

## 2018-09-13 VITALS
SYSTOLIC BLOOD PRESSURE: 124 MMHG | BODY MASS INDEX: 20.22 KG/M2 | DIASTOLIC BLOOD PRESSURE: 62 MMHG | WEIGHT: 125.81 LBS | HEIGHT: 66 IN

## 2018-09-13 DIAGNOSIS — N89.5 VAGINAL STENOSIS: ICD-10-CM

## 2018-09-13 DIAGNOSIS — N95.2 VAGINAL ATROPHY: ICD-10-CM

## 2018-09-13 DIAGNOSIS — Z78.0 POSTMENOPAUSE: ICD-10-CM

## 2018-09-13 DIAGNOSIS — Z12.39 BREAST CANCER SCREENING: ICD-10-CM

## 2018-09-13 DIAGNOSIS — Z01.419 WELL WOMAN EXAM WITH ROUTINE GYNECOLOGICAL EXAM: Primary | ICD-10-CM

## 2018-09-13 PROCEDURE — 88175 CYTOPATH C/V AUTO FLUID REDO: CPT

## 2018-09-13 PROCEDURE — 3078F DIAST BP <80 MM HG: CPT | Mod: CPTII,,, | Performed by: NURSE PRACTITIONER

## 2018-09-13 PROCEDURE — 3074F SYST BP LT 130 MM HG: CPT | Mod: CPTII,,, | Performed by: NURSE PRACTITIONER

## 2018-09-13 PROCEDURE — 99999 PR PBB SHADOW E&M-EST. PATIENT-LVL III: CPT | Mod: PBBFAC,,, | Performed by: NURSE PRACTITIONER

## 2018-09-13 PROCEDURE — 99213 OFFICE O/P EST LOW 20 MIN: CPT | Mod: PBBFAC | Performed by: NURSE PRACTITIONER

## 2018-09-13 PROCEDURE — 99387 INIT PM E/M NEW PAT 65+ YRS: CPT | Mod: S$PBB,,, | Performed by: NURSE PRACTITIONER

## 2018-09-13 NOTE — LETTER
September 13, 2018      Elva Tripathi MD  [address]  [facility] [address]           [facility] 5th Floor  [address]  [facility] [address]  Phone: [phone]          Patient: Chelsea Ford Child   MR Number: [id number]   YOB: 1950   Date of Visit: 9/13/2018       Dear Dr. Elva Tripathi:    Thank you for referring Chelsea Cavazos to me for evaluation. Attached you will find relevant portions of my assessment and plan of care.    If you have questions, please do not hesitate to call me. I look forward to following Chelsea Cavazos along with you.    Sincerely,    ROSALVA Khoury, POWER    Enclosure  CC:  No Recipients    If you would like to receive this communication electronically, please contact [email or web address] or [phone] to request more information on [employer] Link access.    For providers and/or their staff who would like to refer a patient to [facility], please contact us through our one-stop-shop provider referral line, [facility], at [phone].    If you feel you have received this communication in error or would no longer like to receive these types of communications, please e-mail [email or web address]

## 2018-09-13 NOTE — PROGRESS NOTES
HISTORY OF PRESENT ILLNESS:    Chelsea Ford Child is a 68 y.o. female , presents for a routine exam and has no gyn complaints.    -Here to establish care.  -Reports last gyn exam ? 30 years ago.    Past Medical History:   Diagnosis Date    BCC (basal cell carcinoma)     Hypertension     Osteoporosis     Palpitations 2017    Vertigo 2017       Past Surgical History:   Procedure Laterality Date     SECTION      Mohs      BCC        MEDICATIONS AND ALLERGIES:    Current Outpatient Medications:     carvedilol (COREG) 12.5 MG tablet, Take 1 tablet (12.5 mg total) by mouth 2 (two) times daily with meals., Disp: 180 tablet, Rfl: 3    lisinopril (PRINIVIL,ZESTRIL) 5 MG tablet, Take 1 tablet (5 mg total) by mouth once daily., Disp: 90 tablet, Rfl: 3    Review of patient's allergies indicates:   Allergen Reactions    Adhesive Dermatitis    Nickel sutures [surgical stainless steel]      Rash with nickel       Family History   Problem Relation Age of Onset    Hypertension Mother     Hypertension Father     COPD Father     Stroke Father 72    No Known Problems Son     Breast cancer Neg Hx     Colon cancer Neg Hx     Ovarian cancer Neg Hx        Social History     Socioeconomic History    Marital status: Single     Spouse name: Not on file    Number of children: Not on file    Years of education: Not on file    Highest education level: Not on file   Social Needs    Financial resource strain: Not on file    Food insecurity - worry: Not on file    Food insecurity - inability: Not on file    Transportation needs - medical: Not on file    Transportation needs - non-medical: Not on file   Occupational History    Occupation: RN - retired     Comment: worker's comp   Tobacco Use    Smoking status: Former Smoker     Start date:      Last attempt to quit:      Years since quittin.7    Smokeless tobacco: Never Used   Substance and Sexual Activity    Alcohol use: No     "Drug use: No    Sexual activity: No     Birth control/protection: Post-menopausal   Other Topics Concern    Are you pregnant or think you may be? Not Asked    Breast-feeding Not Asked   Social History Narrative    Not on file       OB HISTORY: Number of C/S:1    COMPREHENSIVE GYN HISTORY:  PAP History:  Denies abnormal Paps. UNKNOWN DATE OF LAST PAP "NEG".  Infection History: Denies STDs. Denies PID.  Benign History: Denies uterine fibroids. Denies ovarian cysts. Denies endometriosis.  Denies other conditions.  Cancer History: Denies cervical cancer. Denies uterine cancer or hyperplasia. Denies ovarian cancer. Denies vulvar cancer or pre-cancer. Denies vaginal cancer or pre-cancer. Denies breast cancer. Denies colon cancer.  Sexual Activity History: Denies currently being sexually active  Menstrual History: Denies menses. Pt is  not on HRT.     ROS:  GENERAL: No weight changes. No swelling. No fatigue. No fever.  CARDIOVASCULAR: No chest pain. No shortness of breath. No leg cramps.   NEUROLOGICAL: No headaches. No vision changes.  BREASTS: No pain. No lumps. No discharge.  ABDOMEN: No pain. No nausea. No vomiting. No diarrhea. + CHRONIC CONSTIPATION.  REPRODUCTIVE: No abnormal bleeding.   VULVA: No pain. No lesions. No itching.  VAGINA: No relaxation. No itching. No odor. No discharge. No lesions.  URINARY: No incontinence. No nocturia. No frequency. No dysuria.    /62   Ht 5' 6" (1.676 m)   Wt 57.1 kg (125 lb 12.8 oz)   BMI 20.30 kg/m²     PE:  APPEARANCE: Well nourished, well developed, in no acute distress.  AFFECT: WNL, alert and oriented x 3.  SKIN: No hirsutism or acne.  NECK: Neck symmetric without masses or thyromegaly.  NODES: No inguinal, cervical, axillary or femoral lymph node enlargement.  CHEST: Good respiratory effort.   ABDOMEN: Soft. No tenderness or masses.   BREASTS: Symmetrical, no skin changes or visible lesions. No palpable masses, nipple discharge bilaterally.  PELVIC: ATROPHIC " EXTERNAL FEMALE GENITALIA without lesions. Normal hair distribution. Adequate perineal body, normal urethral meatus. VAGINA DRY / ATROPHIC / STENOTIC without lesions or discharge. MENOPAUSE SPECULUM USED. CERVIX STENOTIC without lesions, discharge or tenderness. No significant cystocele or rectocele. Bimanual exam shows uterus to be normal size, regular, mobile and nontender. Adnexa without masses or tenderness.  RECTAL: Rectovaginal exam confirms above with normal sphincter tone, no masses.  EXTREMITIES: No edema.    DIAGNOSIS:  1. Well woman exam with routine gynecological exam    2. Postmenopause    3. Vaginal atrophy    4. Vaginal stenosis    5. Breast cancer screening        PLAN:    Orders Placed This Encounter    Mammo Digital Screening Bilat with Tomosynthesis_CAD    Liquid-based pap smear, screening   Up to date on colonoscopy and BMD    COUNSELING:  The patient was counseled today on:  -osteoporosis prevention, calcium supplementation, regular weight bearing exercise;  -A.C.S. Pap and pelvic exam guidelines (pap every 3 years, no pap after age 65) and recommendations for yearly mammogram;  -to see her PCP for other health maintenance.    FOLLOW-UP annually with Dr Simpson.

## 2018-09-24 ENCOUNTER — PATIENT OUTREACH (OUTPATIENT)
Dept: OTHER | Facility: OTHER | Age: 68
End: 2018-09-24

## 2018-09-24 NOTE — PROGRESS NOTES
Last 5 Patient Entered Readings                                      Current 30 Day Average: 131/73     Recent Readings 9/23/2018 9/23/2018 9/21/2018 9/20/2018 9/19/2018    SBP (mmHg) 144 148 111 136 130    DBP (mmHg) 79 76 63 73 71    Pulse 60 60 54 58 59            Digital Medicine: Health  Follow Up    Left voicemail to follow up with Mrs. Chelsea Ford Child.  Current BP average 131/73 mmHg is not at goal, [130/80].

## 2018-09-27 ENCOUNTER — PATIENT OUTREACH (OUTPATIENT)
Dept: OTHER | Facility: OTHER | Age: 68
End: 2018-09-27

## 2018-09-27 NOTE — PROGRESS NOTES
HPI:  Called patient for follow-up. Concerned with elevated readings that started last month. Changes include weight gain, knee pain and use of naproxen daily. Patient has not been walking in the morning however, she has been getting at 5000 steps daily at work and during [employer] functions. Patient plans to resume morning walks.     Last 5 Patient Entered Readings                                      Current 30 Day Average: 131/72     Recent Readings 9/28/2018 9/26/2018 9/26/2018 9/26/2018 9/26/2018    SBP (mmHg) 155 110 110 101 149    DBP (mmHg) 83 65 65 58 78    Pulse 57 60 60 61 57        Patient denies s/s of hypotension (lightheadedness, dizziness, nausea, fatigue) associated with low readings. Instructed patient to inform me if this occurs, patient confirms understanding.    Patient denies s/s of hypertension (SOB, CP, severe headaches, changes in vision) associated with high readings. Instructed patient to go to the ED if BP >180/110 and accompanied by hypertensive s/s, patient confirms understanding.    Assessment:  Per 30-day average blood pressure is slightly above goal.     Plan:  Continue current regimen.  Recommended recommended switching to Tylenol as NSAIDs increase blood pressure and reduce the effectiveness of ACEI's.  Patient agreed with plan and will discontinue naproxen immediately.   Patients health , Manjit Ortiz, will be following up every 3-4 weeks.   I will continue to monitor regularly and will follow-up in 4 weeks, sooner if blood pressure begins to trend upward or downward.       Current medication regimen:  Hypertension Medications             carvedilol (COREG) 12.5 MG tablet Take 1 tablet (12.5 mg total) by mouth 2 (two) times daily with meals.    lisinopril (PRINIVIL,ZESTRIL) 5 MG tablet Take 1 tablet (5 mg total) by mouth once daily.          Patient has my contact information and knows to call with any concerns or clinical changes.

## 2018-09-28 ENCOUNTER — PATIENT MESSAGE (OUTPATIENT)
Dept: ADMINISTRATIVE | Facility: OTHER | Age: 68
End: 2018-09-28

## 2018-10-01 NOTE — PROGRESS NOTES
"Last 5 Patient Entered Readings                                      Current 30 Day Average: 128/72     Recent Readings 9/30/2018 9/30/2018 9/29/2018 9/28/2018 9/28/2018    SBP (mmHg) 132 140 132 110 152    DBP (mmHg) 72 79 77 70 87    Pulse 59 54 57 62 56        Encounter also consisted of patient mentioning her BP spikes. She reports speaking to clinical pharmacist about taking Aleve and its effect with Lisinopril. She states no longer taking Alieve as of last Friday.     Digital Medicine: Health  Follow Up    Lifestyle Modifications:    1.Dietary Modifications (Sodium intake <2,000mg/day, food labels, dining out): Patient reports monitoring her salt consumption. Patient reports rarely eating prepared meals at restaurants. She states tasting the amount of salt in prepared meals. Patient reports "not eating out very much." She states making most meals at home and "is not a fast food person."  Encouraged patient to continue low-sodium approach.     2.Physical Activity: Patient reports maintaining the same activity since previous encounter. Patient reports injuring her knee a few weeks ago but feels better now. She states staying busy with [employer] services and is keeping track of steps. She reports walking 5-6 steps "easily." She reports gardening often. Encouraged patient to continue increasing activity level as comfortably as she can.     3.Medication Therapy: Patient has been compliant with the medication regimen.    4.Patient has the following medication side effects/concerns:   (Frequency/Alleviating factors/Precipitating factors, etc.)     Follow up with Ms. Chelsea Ford Child completed. No further questions or concerns. Will continue to follow up to achieve health goals.  "

## 2018-10-11 ENCOUNTER — HOSPITAL ENCOUNTER (OUTPATIENT)
Dept: RADIOLOGY | Facility: HOSPITAL | Age: 68
Discharge: HOME | End: 2018-10-11
Attending: NURSE PRACTITIONER
Payer: MEDICARE

## 2018-10-11 DIAGNOSIS — Z12.39 BREAST CANCER SCREENING: ICD-10-CM

## 2018-10-11 PROCEDURE — 77063 BREAST TOMOSYNTHESIS BI: CPT | Mod: TC

## 2018-10-11 PROCEDURE — 77067 SCR MAMMO BI INCL CAD: CPT | Mod: 26,,, | Performed by: RADIOLOGY

## 2018-10-11 PROCEDURE — 77063 BREAST TOMOSYNTHESIS BI: CPT | Mod: 26,,, | Performed by: RADIOLOGY

## 2018-10-11 PROCEDURE — 77067 SCR MAMMO BI INCL CAD: CPT | Mod: TC

## 2018-10-29 ENCOUNTER — PATIENT OUTREACH (OUTPATIENT)
Dept: OTHER | Facility: OTHER | Age: 68
End: 2018-10-29

## 2018-10-29 NOTE — PROGRESS NOTES
Last 5 Patient Entered Readings                                      Current 30 Day Average: 121/71     Recent Readings 10/27/2018 10/23/2018 10/22/2018 10/19/2018 10/19/2018    SBP (mmHg) 110 142 146 110 127    DBP (mmHg) 74 85 84 67 72    Pulse 73 55 64 56 58        Digital Medicine: Health  Follow Up    Left voicemail to follow up with Ms. Chelsea Ford Child.  Current BP average 121/71 mmHg is at goal, [130/80]. BP is well-controlled.

## 2018-10-30 NOTE — PROGRESS NOTES
Last 5 Patient Entered Readings                                      Current 30 Day Average: 120/71     Recent Readings 10/27/2018 10/23/2018 10/22/2018 10/19/2018 10/19/2018    SBP (mmHg) 110 142 146 110 127    DBP (mmHg) 74 85 84 67 72    Pulse 73 55 64 56 58        10/30/18: Chart reviewed. Per 30-day average blood pressure is at goal.improved since last encounter. I will continue monitoring and follow-up in 12 weeks, sooner if needed.

## 2018-12-17 ENCOUNTER — PATIENT OUTREACH (OUTPATIENT)
Dept: OTHER | Facility: OTHER | Age: 68
End: 2018-12-17

## 2018-12-17 NOTE — PROGRESS NOTES
Last 5 Patient Entered Readings                                      Current 30 Day Average: 128/74     Recent Readings 12/15/2018 12/13/2018 12/10/2018 12/7/2018 12/6/2018    SBP (mmHg) 119 127 140 106 112    DBP (mmHg) 71 72 71 63 64    Pulse 60 56 53 54 56        Digital Medicine: Health  Follow Up    Left voicemail to follow up with Ms. Chelsea Ford Child.  Current BP average 128/74 mmHg is at goal, <130/80 mmHg.  Explained health  change. Will follow up in 2 weeks.

## 2018-12-31 NOTE — PROGRESS NOTES
"Last 5 Patient Entered Readings                                      Current 30 Day Average: 130/74     Recent Readings 12/30/2018 12/30/2018 12/26/2018 12/25/2018 12/23/2018    SBP (mmHg) 121 153 144 127 140    DBP (mmHg) 69 85 78 73 75    Pulse 64 60 57 56 57        Digital Medicine: Health  Follow Up    Lifestyle Modifications:    1.Dietary Modifications (Sodium intake <2,000mg/day, food labels, dining out): Patient denies changes her to diet.     2.Physical Activity: Deferred    3.Medication Therapy: Patient has been compliant with the medication regimen.    4.Patient has the following medication side effects/concerns: None  (Frequency/Alleviating factors/Precipitating factors, etc.)     Follow up with Ms. Chelsea Cavazos completed. Ms. Cavazos is doing okay. Patient attributes spikes in her BP to "being very cold". She states that she has been layering her clothes, drinking warm beverages, and running the heater in order to stay warm (even on warmer days). Encouraged patient to continue to submit frequent BP readings. No further questions or concerns. Will continue to follow up to achieve health goals.              "

## 2019-01-03 ENCOUNTER — PATIENT OUTREACH (OUTPATIENT)
Dept: OTHER | Facility: OTHER | Age: 69
End: 2019-01-03

## 2019-01-03 NOTE — PROGRESS NOTES
HPI:  Called patient for follow-up. Per request via voicemail. Concerned with fluctuations in blood pressure. Notices that reading are higher when she is cold. Patient states that she has been very cold for months. Advised patient to discuss with PCP as she has been uncomfortable.     Last 5 Patient Entered Readings                                      Current 30 Day Average: 127/72     Recent Readings 12/31/2018 12/30/2018 12/30/2018 12/26/2018 12/25/2018    SBP (mmHg) 119 121 153 144 127    DBP (mmHg) 64 69 85 78 73    Pulse 58 64 60 57 56        Patient denies s/s of hypotension (lightheadedness, dizziness, nausea, fatigue) associated with low readings. Instructed patient to inform me if this occurs, patient confirms understanding.    Patient denies s/s of hypertension (SOB, CP, severe headaches, changes in vision) associated with high readings. Instructed patient to go to the ED if BP >180/110 and accompanied by hypertensive s/s, patient confirms understanding.    Assessment:  Patient's current 30-day average is at goal of <130/80 mmHg based on ACC/AHA HTN guidelines. Reasonably well controlled overall with an occasional elevation as discussed with patient.     Plan:  Continue current regimen.   Patients health , Nery Bird, will be following up every 3-4 weeks.   I will continue to monitor regularly and will follow-up in 12 weeks, sooner if blood pressure begins to trend upward or downward.     Current medication regimen:  Hypertension Medications             carvedilol (COREG) 12.5 MG tablet Take 1 tablet (12.5 mg total) by mouth 2 (two) times daily with meals.    lisinopril (PRINIVIL,ZESTRIL) 5 MG tablet Take 1 tablet (5 mg total) by mouth once daily.        Patient has my contact information and knows to call with any concerns or clinical changes.

## 2019-01-24 ENCOUNTER — PATIENT MESSAGE (OUTPATIENT)
Dept: ADMINISTRATIVE | Facility: OTHER | Age: 69
End: 2019-01-24

## 2019-01-25 ENCOUNTER — PATIENT MESSAGE (OUTPATIENT)
Dept: ADMINISTRATIVE | Facility: OTHER | Age: 69
End: 2019-01-25

## 2019-01-30 ENCOUNTER — PATIENT OUTREACH (OUTPATIENT)
Dept: OTHER | Facility: OTHER | Age: 69
End: 2019-01-30

## 2019-01-30 DIAGNOSIS — I10 BENIGN ESSENTIAL HYPERTENSION: Primary | ICD-10-CM

## 2019-01-30 RX ORDER — OLMESARTAN MEDOXOMIL 5 MG/1
5 TABLET ORAL DAILY
Qty: 30 TABLET | Refills: 3 | Status: SHIPPED | OUTPATIENT
Start: 2019-01-30 | End: 2019-02-28

## 2019-01-30 NOTE — PROGRESS NOTES
HPI:  Called patient for follow-up. She is adherent to medication regimen with no complaints. Concerned with upward trend in readings however, denies any changes in diet. Patient has not charged blood pressure cuff in several months. Recommend charging cuff every 2 to 3 weeks overnight. Patient will charge cuff today and continue frequent monitoring.     Last 5 Patient Entered Readings                                      Current 30 Day Average: 132/73     Recent Readings 1/30/2019 1/30/2019 1/29/2019 1/29/2019 1/29/2019    SBP (mmHg) 146 164 150 145 141    DBP (mmHg) 78 94 79 79 80    Pulse 63 60 64 69 68        Patient denies s/s of hypotension (lightheadedness, dizziness, nausea, fatigue) associated with low readings. Instructed patient to inform me if this occurs, patient confirms understanding.    Patient denies s/s of hypertension (SOB, CP, severe headaches, changes in vision) associated with high readings. Instructed patient to go to the ED if BP >180/110 and accompanied by hypertensive s/s, patient confirms understanding.    Assessment:  Patient's current 30-day average is slightly above goal of <130/80 mmHg based on ACC/AHA HTN guidelines. Change ACEI to ARB due to possible increased risk of lung cancer.     Plan:  Stop lisinopril and start olmesartan 5 mg once daily.   Patients health , Nery Bird, will be following up every 3-4 weeks.   I will continue to monitor regularly and will follow-up in 3 weeks, sooner if blood pressure begins to trend upward or downward.     Current medication regimen:  Hypertension Medications             carvedilol (COREG) 12.5 MG tablet Take 1 tablet (12.5 mg total) by mouth 2 (two) times daily with meals.    olmesartan (BENICAR) 5 MG Tab Take 1 tablet (5 mg total) by mouth once daily.        Patient has my contact information and knows to call with any concerns or clinical changes.

## 2019-02-12 ENCOUNTER — PATIENT OUTREACH (OUTPATIENT)
Dept: OTHER | Facility: OTHER | Age: 69
End: 2019-02-12

## 2019-02-12 NOTE — PROGRESS NOTES
"Last 5 Patient Entered Readings                                      Current 30 Day Average: 126/71     Recent Readings 2/11/2019 2/10/2019 2/9/2019 2/9/2019 2/9/2019    SBP (mmHg) 113 122 96 82 138    DBP (mmHg) 65 68 51 53 79    Pulse 66 60 56 56 59        Digital Medicine: Health  Follow Up    Lifestyle Modifications:    1.Dietary Modifications (Sodium intake <2,000mg/day, food labels, dining out): Ms. Cavazos had questions today about benefits of higher PH in water, and whether or not she should be eating the yolk in the egg. Patient reports using one egg and mixing with egg beaters. She also adds a little margarine to "make them fluffy". Reviewed benefits of fresh eggs, and encouraged patient to try adding milk vs. margarine to eggs. She does not add salt. Will send information on alkaline vs. acidic water.    2.Physical Activity: Patient reports that she is walking most days of the week.     3.Medication Therapy: Patient has been compliant with the medication regimen.    4.Patient has the following medication side effects/concerns: None  (Frequency/Alleviating factors/Precipitating factors, etc.)     Follow up with Ms. Chelsea Cavazos completed. Ms. Cavazos is doing okay. She is pleased with her BP, especially after charging her digital cuff. Encouraged patient to charge her cuff at least one time per week. No further questions or concerns. Will continue to follow up to achieve health goals.      "

## 2019-02-20 ENCOUNTER — PATIENT OUTREACH (OUTPATIENT)
Dept: OTHER | Facility: OTHER | Age: 69
End: 2019-02-20

## 2019-02-20 NOTE — PROGRESS NOTES
"HPI:  Called Ms. Cavazos for hypertension follow-up. Tolerating switch to olmesartan with no complaints and is pleased with readings overall. Attributes higher readings yesterday to being "cold" and states that blood pressure improved when she "warmed up" and was more comfortable. Patient has no additional questions or concerns at this time.     Last 5 Patient Entered Readings                                      Current 30 Day Average: 124/70     Recent Readings 2/19/2019 2/19/2019 2/19/2019 2/17/2019 2/17/2019    SBP (mmHg) 128 146 148 102 115    DBP (mmHg) 69 77 74 62 67    Pulse 62 67 57 66 68        Patient denies s/s of hypotension (lightheadedness, dizziness, nausea, fatigue) associated with low readings. Instructed patient to inform me if this occurs, patient confirms understanding.    Patient denies s/s of hypertension (SOB, CP, severe headaches, changes in vision) associated with high readings. Instructed patient to go to the ED if BP >180/110 and accompanied by hypertensive s/s, patient confirms understanding.    Assessment:  Patient's current 30-day average is at goal of <130/80 mmHg.     Plan:  Continue current regimen.  Patients health , Nery Bird, will be following up every 3-4 weeks.   I will continue to monitor regularly and will follow-up in 4 to 6 weeks, sooner if blood pressure begins to trend upward or downward.     Current medication regimen:  Hypertension Medications             carvedilol (COREG) 12.5 MG tablet Take 1 tablet (12.5 mg total) by mouth 2 (two) times daily with meals.    olmesartan (BENICAR) 5 MG Tab Take 1 tablet (5 mg total) by mouth once daily.        Patient has my contact information and knows to call with any concerns or clinical changes.      "

## 2019-02-28 ENCOUNTER — PATIENT OUTREACH (OUTPATIENT)
Dept: OTHER | Facility: OTHER | Age: 69
End: 2019-02-28

## 2019-02-28 DIAGNOSIS — I10 BENIGN ESSENTIAL HYPERTENSION: ICD-10-CM

## 2019-02-28 RX ORDER — OLMESARTAN MEDOXOMIL 5 MG/1
TABLET ORAL
Qty: 30 TABLET | Refills: 3
Start: 2019-02-28 | End: 2019-06-06 | Stop reason: SDUPTHER

## 2019-02-28 NOTE — PROGRESS NOTES
HPI:  Called Ms. Chelsea Cavazos for hypertension follow-up. Patient reports adherence  to medication regimen as prescribed. Patient denies experiencing s/s of hypotension with low readings however, she is concerned that readings continue to drop. Confirmed that device has been charged and that patient is using correct technique.     Last 5 Patient Entered Readings                                      Current 30 Day Average: 117/67     Recent Readings 2/28/2019 2/27/2019 2/27/2019 2/27/2019 2/25/2019    SBP (mmHg) 124 83 85 126 109    DBP (mmHg) 73 54 51 69 65    Pulse 52 59 58 57 66        Patient denies s/s of hypertension (SOB, CP, severe headaches, changes in vision) associated with high readings. Instructed patient to go to the ED if BP >180/110 and accompanied by hypertensive s/s, patient confirms understanding.    Assessment:  Patient's current 30-day average is at goal of <130/80 mmHg.       Plan:  Reduce olmesartan to 2.5 mg daily and continue carvedilol as prescribed.   Patients health , Nery Bird, will be following up every 3-4 weeks.   I will continue to monitor regularly and will follow-up in 1 to 2 weeks, sooner if blood pressure begins to trend upward or downward.     Current medication regimen:  Hypertension Medications             carvedilol (COREG) 12.5 MG tablet Take 1 tablet (12.5 mg total) by mouth 2 (two) times daily with meals.    olmesartan (BENICAR) 5 MG Tab Take one-half tablet (2.5 mg) once daily.        Patient has my contact information and knows to call with any concerns or clinical changes.

## 2019-03-14 ENCOUNTER — PATIENT OUTREACH (OUTPATIENT)
Dept: OTHER | Facility: OTHER | Age: 69
End: 2019-03-14

## 2019-03-14 NOTE — PROGRESS NOTES
HPI:  Ms. Chelsea Cavazos returned call for hypertension follow-up. Tolerating lower dose of olmesartan with no complaints.     Last 5 Patient Entered Readings                                      Current 30 Day Average: 116/65     Recent Readings 3/29/2019 3/27/2019 3/27/2019 3/25/2019 3/24/2019    SBP (mmHg) 128 130 121 144 132    DBP (mmHg) 70 86 69 77 77    Pulse 59 63 65 58 59        Patient denies s/s of hypotension (lightheadedness, dizziness, nausea, fatigue) associated with low readings. Instructed patient to inform me if this occurs, patient confirms understanding.    Patient denies s/s of hypertension (SOB, CP, severe headaches, changes in vision) associated with high readings. Instructed patient to go to the ED if BP >180/110 and accompanied by hypertensive s/s, patient confirms understanding.    Assessment:  Patient's current 30-day average is at goal of <130/80 mmHg.    Plan:  Continue current regimen.  Patients health , Nery Bird, will be following up every 3-4 weeks.   I will continue to monitor regularly and will follow-up in 12 to 24 weeks, sooner if blood pressure begins to trend upward or downward.     Current medication regimen:  Hypertension Medications             carvedilol (COREG) 12.5 MG tablet Take 1 tablet (12.5 mg total) by mouth 2 (two) times daily with meals.    olmesartan (BENICAR) 5 MG Tab Take one-half tablet (2.5 mg) once daily.        Patient has my contact information and knows to call with any concerns or clinical changes.

## 2019-03-30 ENCOUNTER — PATIENT MESSAGE (OUTPATIENT)
Dept: ADMINISTRATIVE | Facility: OTHER | Age: 69
End: 2019-03-30

## 2019-03-31 ENCOUNTER — NURSE TRIAGE (OUTPATIENT)
Dept: ADMINISTRATIVE | Facility: CLINIC | Age: 69
End: 2019-03-31

## 2019-03-31 NOTE — TELEPHONE ENCOUNTER
Reason for Disposition   MORE THAN A DOUBLE DOSE of a prescription or over-the-counter (OTC) drug    Protocols used: ST MEDICATION QUESTION CALL-A-AH    Patient took her 2nd dose of coreg within 5 hours of the first dose. Her bp is 117/70 HR 60 no symptoms. She is due to take olmesartan now. She was told to take the olmesartan later tonight and to call poison control now to see if she needs to take any more precautions. She verbalized understanding.

## 2019-04-01 DIAGNOSIS — I10 BENIGN ESSENTIAL HYPERTENSION: ICD-10-CM

## 2019-04-01 RX ORDER — LISINOPRIL 5 MG/1
5 TABLET ORAL DAILY
Qty: 90 TABLET | Refills: 0 | Status: SHIPPED | OUTPATIENT
Start: 2019-04-01 | End: 2019-04-02

## 2019-04-04 ENCOUNTER — PATIENT OUTREACH (OUTPATIENT)
Dept: OTHER | Facility: OTHER | Age: 69
End: 2019-04-04

## 2019-04-04 NOTE — PROGRESS NOTES
Last 5 Patient Entered Readings                                      Current 30 Day Average: 119/66     Recent Readings 4/3/2019 4/1/2019 4/1/2019 3/31/2019 3/31/2019    SBP (mmHg) 116 120 125 125 129    DBP (mmHg) 70 63 66 74 72    Pulse 60 65 66 65 64        Digital Medicine: Health  Follow Up    Lifestyle Modifications:    1.Dietary Modifications (Sodium intake <2,000mg/day, food labels, dining out): Patient reports that she is really watching her salt intake. She is also trying to eat more fish.     2.Physical Activity: Ms. Cavazos is trying to increase walking when she can. She plans to start lifting small free weights.     3.Medication Therapy: Patient has been compliant with the medication regimen.    4.Patient has the following medication side effects/concerns: None  (Frequency/Alleviating factors/Precipitating factors, etc.)     Follow up with Ms. Chelsea Ford Child completed. Ms. Cavazos is doing well, and is pleased with her BP overall. She thanks me for calling, and has no questions or concerns currently. Will continue to follow up to achieve health goals.

## 2019-04-05 DIAGNOSIS — I10 ESSENTIAL HYPERTENSION: ICD-10-CM

## 2019-04-05 RX ORDER — CARVEDILOL 12.5 MG/1
12.5 TABLET ORAL 2 TIMES DAILY WITH MEALS
Qty: 180 TABLET | Refills: 3 | Status: SHIPPED | OUTPATIENT
Start: 2019-04-05 | End: 2020-03-19

## 2019-04-16 ENCOUNTER — OFFICE VISIT (OUTPATIENT)
Dept: URGENT CARE | Facility: CLINIC | Age: 69
End: 2019-04-16
Payer: MEDICARE

## 2019-04-16 VITALS
BODY MASS INDEX: 20.09 KG/M2 | TEMPERATURE: 98 F | DIASTOLIC BLOOD PRESSURE: 72 MMHG | RESPIRATION RATE: 18 BRPM | HEART RATE: 91 BPM | OXYGEN SATURATION: 97 % | WEIGHT: 125 LBS | SYSTOLIC BLOOD PRESSURE: 141 MMHG | HEIGHT: 66 IN

## 2019-04-16 DIAGNOSIS — H10.12 ALLERGIC CONJUNCTIVITIS OF LEFT EYE: Primary | ICD-10-CM

## 2019-04-16 PROCEDURE — 99214 OFFICE O/P EST MOD 30 MIN: CPT | Mod: S$GLB,,, | Performed by: NURSE PRACTITIONER

## 2019-04-16 PROCEDURE — 3078F DIAST BP <80 MM HG: CPT | Mod: CPTII,S$GLB,, | Performed by: NURSE PRACTITIONER

## 2019-04-16 PROCEDURE — 99214 PR OFFICE/OUTPT VISIT, EST, LEVL IV, 30-39 MIN: ICD-10-PCS | Mod: S$GLB,,, | Performed by: NURSE PRACTITIONER

## 2019-04-16 PROCEDURE — 3077F SYST BP >= 140 MM HG: CPT | Mod: CPTII,S$GLB,, | Performed by: NURSE PRACTITIONER

## 2019-04-16 PROCEDURE — 3077F PR MOST RECENT SYSTOLIC BLOOD PRESSURE >= 140 MM HG: ICD-10-PCS | Mod: CPTII,S$GLB,, | Performed by: NURSE PRACTITIONER

## 2019-04-16 PROCEDURE — 1101F PT FALLS ASSESS-DOCD LE1/YR: CPT | Mod: CPTII,S$GLB,, | Performed by: NURSE PRACTITIONER

## 2019-04-16 PROCEDURE — 1101F PR PT FALLS ASSESS DOC 0-1 FALLS W/OUT INJ PAST YR: ICD-10-PCS | Mod: CPTII,S$GLB,, | Performed by: NURSE PRACTITIONER

## 2019-04-16 PROCEDURE — 3078F PR MOST RECENT DIASTOLIC BLOOD PRESSURE < 80 MM HG: ICD-10-PCS | Mod: CPTII,S$GLB,, | Performed by: NURSE PRACTITIONER

## 2019-04-16 RX ORDER — AZELASTINE HYDROCHLORIDE 0.5 MG/ML
1 SOLUTION/ DROPS OPHTHALMIC 2 TIMES DAILY
Qty: 4 ML | Refills: 0 | Status: SHIPPED | OUTPATIENT
Start: 2019-04-16 | End: 2019-05-29

## 2019-04-16 NOTE — PATIENT INSTRUCTIONS
Conjunctivitis, Allergic    Conjunctivitis is an irritation of a thin membrane in the eye. This membrane is called the conjunctiva. It covers the white of the eye and the inside of the eyelid. The condition is often known as pink eye or red eye because the eye looks pink or red. The eye can also be swollen. A thick fluid may leak from the eyelid. The eye may itch and burn, and feel gritty or scratchy.  Allergic conjunctivitis is caused by an allergen. Allergens are substances that cause the body to react with certain symptoms. Allergens that cause eye irritation include things such as house dust or pollen in the air. This can occur seasonally, most often in the spring.  Home care  · Eye drops may be prescribed to reduce itching and redness. Use these as directed. Otherwise, over-the-counter decongestant eye drops may be used.  · Apply a cool compress (towel soaked in cool water) to the affected eye 3 to 4 times a day to reduce swelling and itching.  · It is common to have mucus drainage during the night, causing the eyelids to become crusted by morning. Use a warm, wet cloth to wipe this away. You may also use saline irrigating solution or artificial tears to rinse away mucus in the eye. Do not patch the eye.  · You may use acetaminophen or ibuprofen to control pain, unless another medicine was prescribed. (Note: If you have chronic liver or kidney disease, or if you have ever had a stomach ulcer or gastrointestinal bleeding, talk with your healthcare provider before using these medicines.)  · Do not wear contact lenses until your eyes have healed and all symptoms are gone.  Follow-up care  Follow up with your healthcare provider, or as advised.  When to seek medical advice  Call your healthcare provider right away if any of these occur:  · Increased eyelid swelling  · New or worsening drainage from the eye  · Increasing redness around the eye  · Facial swelling  Date Last Reviewed: 6/14/2015  © 9017-0622 [employer]  [employer]. [address] [place] [address], [place] [address]. All rights reserved. This information is not intended as a substitute for professional medical care. Always follow your healthcare professional's instructions.      -Claritin or Zyrtec during the day.  -Eye drops to the affected left eye until symptoms improve.  Please follow up with your Primary care provider within 2-5 days if your signs and symptoms have not resolved or worsen.     If your condition worsens or fails to improve we recommend that you receive another evaluation at the emergency room immediately or contact your primary medical clinic to discuss your concerns.   You must understand that you have received an Urgent Care treatment only and that you may be released before all of your medical problems are known or treated. You, the patient, will arrange for follow up care as instructed.

## 2019-04-16 NOTE — PROGRESS NOTES
"Subjective:       Patient ID: Chelsea Ford Child is a 69 y.o. female.    Vitals:  height is 5' 6" (1.676 m) and weight is 56.7 kg (125 lb). Her temperature is 98.1 °F (36.7 °C). Her blood pressure is 141/72 (abnormal) and her pulse is 91. Her respiration is 18 and oxygen saturation is 97%.     Chief Complaint: Eye Pain (left eye irritation, swelling x 1 day )    Patient presents to clinic today with complaints of worsening left eye irritation the last 2 days.  The patient want to come in and get checked to make sure she does not have conjunctivitis.  Denies any drainage from the eye.  Does complain of mild allergies and sinuses.  Patient is not taking any over-the-counter medications.  Denies any changes in the vision she is wearing glasses in the clinic today.    Eye Pain    The left eye is affected. This is a new problem. The current episode started yesterday. The problem occurs constantly. The problem has been unchanged. There was no injury mechanism. The pain is at a severity of 0/10. There is known exposure to pink eye. She does not wear contacts. Associated symptoms include eye redness. Pertinent negatives include no blurred vision, eye discharge, double vision, fever, itching, nausea, photophobia or vomiting. Associated symptoms comments: Swelling  . She has tried eye drops for the symptoms. The treatment provided no relief.       Constitution: Negative for chills and fever.   HENT: Negative for congestion and sinus pain.    Eyes: Positive for eye pain and eye redness. Negative for eye trauma, foreign body in eye, eye discharge, eye itching, photophobia, vision loss, double vision, blurred vision and eyelid swelling.   Gastrointestinal: Negative for nausea and vomiting.   Genitourinary: Negative for history of kidney stones.   Skin: Negative for rash.   Allergic/Immunologic: Negative for seasonal allergies and itching.   Neurological: Negative for headaches.       Objective:      Physical Exam "   Constitutional: She is oriented to person, place, and time. She appears well-developed and well-nourished.   HENT:   Head: Normocephalic and atraumatic.   Right Ear: External ear normal.   Left Ear: External ear normal.   Nose: Nose normal.   Mouth/Throat: Oropharynx is clear and moist.   Eyes: Pupils are equal, round, and reactive to light. EOM and lids are normal. Left conjunctiva is injected.   Minimal injection is noted to the left eye.  There is no drainage noted.    Neck: Trachea normal, full passive range of motion without pain and phonation normal. Neck supple.   Musculoskeletal: Normal range of motion.   Neurological: She is alert and oriented to person, place, and time.   Skin: Skin is warm, dry and intact.   Psychiatric: She has a normal mood and affect. Her speech is normal and behavior is normal. Judgment and thought content normal. Cognition and memory are normal.   Nursing note and vitals reviewed.      Assessment:       1. Allergic conjunctivitis of left eye        Plan:         Allergic conjunctivitis of left eye  -     azelastine (OPTIVAR) 0.05 % ophthalmic solution; Place 1 drop into the left eye 2 (two) times daily.  Dispense: 4 mL; Refill: 0      Patient Instructions     Conjunctivitis, Allergic    Conjunctivitis is an irritation of a thin membrane in the eye. This membrane is called the conjunctiva. It covers the white of the eye and the inside of the eyelid. The condition is often known as pink eye or red eye because the eye looks pink or red. The eye can also be swollen. A thick fluid may leak from the eyelid. The eye may itch and burn, and feel gritty or scratchy.  Allergic conjunctivitis is caused by an allergen. Allergens are substances that cause the body to react with certain symptoms. Allergens that cause eye irritation include things such as house dust or pollen in the air. This can occur seasonally, most often in the spring.  Home care  · Eye drops may be prescribed to reduce  itching and redness. Use these as directed. Otherwise, over-the-counter decongestant eye drops may be used.  · Apply a cool compress (towel soaked in cool water) to the affected eye 3 to 4 times a day to reduce swelling and itching.  · It is common to have mucus drainage during the night, causing the eyelids to become crusted by morning. Use a warm, wet cloth to wipe this away. You may also use saline irrigating solution or artificial tears to rinse away mucus in the eye. Do not patch the eye.  · You may use acetaminophen or ibuprofen to control pain, unless another medicine was prescribed. (Note: If you have chronic liver or kidney disease, or if you have ever had a stomach ulcer or gastrointestinal bleeding, talk with your healthcare provider before using these medicines.)  · Do not wear contact lenses until your eyes have healed and all symptoms are gone.  Follow-up care  Follow up with your healthcare provider, or as advised.  When to seek medical advice  Call your healthcare provider right away if any of these occur:  · Increased eyelid swelling  · New or worsening drainage from the eye  · Increasing redness around the eye  · Facial swelling  Date Last Reviewed: 6/14/2015  © 0008-4692 [employer]. [address] [place] [address], [place] [address]. All rights reserved. This information is not intended as a substitute for professional medical care. Always follow your healthcare professional's instructions.      -Claritin or Zyrtec during the day.  -Eye drops to the affected left eye until symptoms improve.  Please follow up with your Primary care provider within 2-5 days if your signs and symptoms have not resolved or worsen.     If your condition worsens or fails to improve we recommend that you receive another evaluation at the emergency room immediately or contact your primary medical clinic to discuss your concerns.   You must understand that you have received an Urgent Care treatment only and that you  may be released before all of your medical problems are known or treated. You, the patient, will arrange for follow up care as instructed.

## 2019-05-07 DIAGNOSIS — Z12.11 COLON CANCER SCREENING: ICD-10-CM

## 2019-05-16 ENCOUNTER — PATIENT OUTREACH (OUTPATIENT)
Dept: OTHER | Facility: OTHER | Age: 69
End: 2019-05-16

## 2019-05-16 NOTE — PROGRESS NOTES
Last 5 Patient Entered Readings                                      Current 30 Day Average: 124/69     Recent Readings 5/14/2019 5/11/2019 5/6/2019 5/4/2019 5/1/2019    SBP (mmHg) 118 125 105 123 111    DBP (mmHg) 59 69 46 67 70    Pulse 61 64 59 55 65        Digital Medicine: Health  Follow Up    Lifestyle Modifications:    1.Dietary Modifications (Sodium intake <2,000mg/day, food labels, dining out): Patient is following a diet low in sodium. She did admit to one meal of crawfish, and was pleased to see that her BP did not spike and she did not notice any swelling. Today, Ms. Cavazos asked about benefits of pink Himalayan salt. Explained that this salt may contain more minerals and thus has additional health benefits. However, salt is salt, and all salt no matter its kind should be used in moderation. She thanks me for the information.     2.Physical Activity: Patient reports that she has been very active.     3.Medication Therapy: Patient has been compliant with the medication regimen.    4.Patient has the following medication side effects/concerns: None  (Frequency/Alleviating factors/Precipitating factors, etc.)     Follow up with Ms. Chelsea Cavazos completed. Ms. Cavazos is feeling well and is pleased with her BP overall. Encouragement provided to patient for maintaining healthy behaviors and BP at goal. She thanks me for the encouragement. No further questions or concerns. Will continue to follow up to achieve health goals.

## 2019-05-20 ENCOUNTER — TELEPHONE (OUTPATIENT)
Dept: ADMINISTRATIVE | Facility: HOSPITAL | Age: 69
End: 2019-05-20

## 2019-05-20 ENCOUNTER — PATIENT OUTREACH (OUTPATIENT)
Dept: ADMINISTRATIVE | Facility: HOSPITAL | Age: 69
End: 2019-05-20

## 2019-05-20 DIAGNOSIS — Z00.00 ANNUAL PHYSICAL EXAM: Primary | ICD-10-CM

## 2019-05-20 DIAGNOSIS — Z00.00 ANNUAL PHYSICAL EXAM: ICD-10-CM

## 2019-05-20 DIAGNOSIS — I10 BENIGN ESSENTIAL HYPERTENSION: ICD-10-CM

## 2019-05-20 DIAGNOSIS — E55.9 VITAMIN D DEFICIENCY: ICD-10-CM

## 2019-05-20 DIAGNOSIS — I10 BENIGN ESSENTIAL HYPERTENSION: Primary | ICD-10-CM

## 2019-05-20 NOTE — PROGRESS NOTES
VM reminder left of PCP visit due 5-29-19 and of need to call our office to schedule her Fasting Lab appointment and to  her Fit Kit the day of her PCP visit.

## 2019-05-22 ENCOUNTER — PATIENT OUTREACH (OUTPATIENT)
Dept: ADMINISTRATIVE | Facility: HOSPITAL | Age: 69
End: 2019-05-22

## 2019-05-22 NOTE — PROGRESS NOTES
5/22/19    Left , 2ND attempt to reach patient to schedule her Annual Fasting Lab appointment. Labs are in Epic.

## 2019-05-24 ENCOUNTER — LAB VISIT (OUTPATIENT)
Dept: LAB | Facility: HOSPITAL | Age: 69
End: 2019-05-24
Attending: INTERNAL MEDICINE
Payer: MEDICARE

## 2019-05-24 DIAGNOSIS — E55.9 VITAMIN D DEFICIENCY: ICD-10-CM

## 2019-05-24 DIAGNOSIS — I10 BENIGN ESSENTIAL HYPERTENSION: ICD-10-CM

## 2019-05-24 DIAGNOSIS — Z00.00 ANNUAL PHYSICAL EXAM: ICD-10-CM

## 2019-05-24 LAB
25(OH)D3+25(OH)D2 SERPL-MCNC: 44 NG/ML (ref 30–96)
ALBUMIN SERPL BCP-MCNC: 4.3 G/DL (ref 3.5–5.2)
ALP SERPL-CCNC: 75 U/L (ref 55–135)
ALT SERPL W/O P-5'-P-CCNC: 17 U/L (ref 10–44)
ANION GAP SERPL CALC-SCNC: 6 MMOL/L (ref 8–16)
AST SERPL-CCNC: 21 U/L (ref 10–40)
BASOPHILS # BLD AUTO: 0.02 K/UL (ref 0–0.2)
BASOPHILS NFR BLD: 0.5 % (ref 0–1.9)
BILIRUB SERPL-MCNC: 0.6 MG/DL (ref 0.1–1)
BUN SERPL-MCNC: 13 MG/DL (ref 8–23)
CALCIUM SERPL-MCNC: 9.9 MG/DL (ref 8.7–10.5)
CHLORIDE SERPL-SCNC: 104 MMOL/L (ref 95–110)
CHOLEST SERPL-MCNC: 191 MG/DL (ref 120–199)
CHOLEST/HDLC SERPL: 2.7 {RATIO} (ref 2–5)
CO2 SERPL-SCNC: 28 MMOL/L (ref 23–29)
CREAT SERPL-MCNC: 1 MG/DL (ref 0.5–1.4)
DIFFERENTIAL METHOD: ABNORMAL
EOSINOPHIL # BLD AUTO: 0.1 K/UL (ref 0–0.5)
EOSINOPHIL NFR BLD: 1.7 % (ref 0–8)
ERYTHROCYTE [DISTWIDTH] IN BLOOD BY AUTOMATED COUNT: 12.8 % (ref 11.5–14.5)
EST. GFR  (AFRICAN AMERICAN): >60 ML/MIN/1.73 M^2
EST. GFR  (NON AFRICAN AMERICAN): 57 ML/MIN/1.73 M^2
GLUCOSE SERPL-MCNC: 99 MG/DL (ref 70–110)
HCT VFR BLD AUTO: 37.7 % (ref 37–48.5)
HDLC SERPL-MCNC: 71 MG/DL (ref 40–75)
HDLC SERPL: 37.2 % (ref 20–50)
HGB BLD-MCNC: 12.5 G/DL (ref 12–16)
LDLC SERPL CALC-MCNC: 108.8 MG/DL (ref 63–159)
LYMPHOCYTES # BLD AUTO: 1.8 K/UL (ref 1–4.8)
LYMPHOCYTES NFR BLD: 42.4 % (ref 18–48)
MCH RBC QN AUTO: 31.2 PG (ref 27–31)
MCHC RBC AUTO-ENTMCNC: 33.2 G/DL (ref 32–36)
MCV RBC AUTO: 94 FL (ref 82–98)
MONOCYTES # BLD AUTO: 0.4 K/UL (ref 0.3–1)
MONOCYTES NFR BLD: 10.1 % (ref 4–15)
NEUTROPHILS # BLD AUTO: 1.9 K/UL (ref 1.8–7.7)
NEUTROPHILS NFR BLD: 45.1 % (ref 38–73)
NONHDLC SERPL-MCNC: 120 MG/DL
PLATELET # BLD AUTO: 142 K/UL (ref 150–350)
PMV BLD AUTO: 10.5 FL (ref 9.2–12.9)
POTASSIUM SERPL-SCNC: 4.2 MMOL/L (ref 3.5–5.1)
PROT SERPL-MCNC: 7 G/DL (ref 6–8.4)
RBC # BLD AUTO: 4.01 M/UL (ref 4–5.4)
SODIUM SERPL-SCNC: 138 MMOL/L (ref 136–145)
TRIGL SERPL-MCNC: 56 MG/DL (ref 30–150)
TSH SERPL DL<=0.005 MIU/L-ACNC: 3.52 UIU/ML (ref 0.4–4)
WBC # BLD AUTO: 4.15 K/UL (ref 3.9–12.7)

## 2019-05-24 PROCEDURE — 85025 COMPLETE CBC W/AUTO DIFF WBC: CPT | Mod: HCNC

## 2019-05-24 PROCEDURE — 80061 LIPID PANEL: CPT | Mod: HCNC

## 2019-05-24 PROCEDURE — 80053 COMPREHEN METABOLIC PANEL: CPT | Mod: HCNC

## 2019-05-24 PROCEDURE — 82306 VITAMIN D 25 HYDROXY: CPT | Mod: HCNC

## 2019-05-24 PROCEDURE — 36415 COLL VENOUS BLD VENIPUNCTURE: CPT | Mod: HCNC

## 2019-05-24 PROCEDURE — 84443 ASSAY THYROID STIM HORMONE: CPT | Mod: HCNC

## 2019-05-26 ENCOUNTER — LAB VISIT (OUTPATIENT)
Dept: LAB | Facility: HOSPITAL | Age: 69
End: 2019-05-26
Attending: INTERNAL MEDICINE
Payer: MEDICARE

## 2019-05-26 DIAGNOSIS — Z12.11 COLON CANCER SCREENING: ICD-10-CM

## 2019-05-26 DIAGNOSIS — I10 BENIGN ESSENTIAL HYPERTENSION: ICD-10-CM

## 2019-05-26 PROCEDURE — 82274 ASSAY TEST FOR BLOOD FECAL: CPT | Mod: HCNC

## 2019-05-27 RX ORDER — OLMESARTAN MEDOXOMIL 5 MG/1
TABLET ORAL
Qty: 90 TABLET | Refills: 3 | Status: SHIPPED | OUTPATIENT
Start: 2019-05-27 | End: 2019-05-29

## 2019-05-29 ENCOUNTER — OFFICE VISIT (OUTPATIENT)
Dept: INTERNAL MEDICINE | Facility: CLINIC | Age: 69
End: 2019-05-29
Payer: MEDICARE

## 2019-05-29 VITALS
DIASTOLIC BLOOD PRESSURE: 64 MMHG | WEIGHT: 119 LBS | HEIGHT: 66 IN | HEART RATE: 68 BPM | BODY MASS INDEX: 19.13 KG/M2 | SYSTOLIC BLOOD PRESSURE: 112 MMHG | OXYGEN SATURATION: 95 %

## 2019-05-29 DIAGNOSIS — M79.671 BILATERAL FOOT PAIN: ICD-10-CM

## 2019-05-29 DIAGNOSIS — Z00.00 HEALTH CARE MAINTENANCE: ICD-10-CM

## 2019-05-29 DIAGNOSIS — M79.672 BILATERAL FOOT PAIN: ICD-10-CM

## 2019-05-29 DIAGNOSIS — I10 BENIGN ESSENTIAL HYPERTENSION: ICD-10-CM

## 2019-05-29 DIAGNOSIS — G56.03 BILATERAL CARPAL TUNNEL SYNDROME: Primary | ICD-10-CM

## 2019-05-29 DIAGNOSIS — M21.612 BUNION OF GREAT TOE OF LEFT FOOT: ICD-10-CM

## 2019-05-29 PROCEDURE — 99999 PR PBB SHADOW E&M-EST. PATIENT-LVL IV: ICD-10-PCS | Mod: PBBFAC,HCNC,, | Performed by: INTERNAL MEDICINE

## 2019-05-29 PROCEDURE — 99214 PR OFFICE/OUTPT VISIT, EST, LEVL IV, 30-39 MIN: ICD-10-PCS | Mod: HCNC,S$GLB,, | Performed by: INTERNAL MEDICINE

## 2019-05-29 PROCEDURE — 99999 PR PBB SHADOW E&M-EST. PATIENT-LVL IV: CPT | Mod: PBBFAC,HCNC,, | Performed by: INTERNAL MEDICINE

## 2019-05-29 PROCEDURE — 1101F PT FALLS ASSESS-DOCD LE1/YR: CPT | Mod: HCNC,CPTII,S$GLB, | Performed by: INTERNAL MEDICINE

## 2019-05-29 PROCEDURE — 3078F DIAST BP <80 MM HG: CPT | Mod: HCNC,CPTII,S$GLB, | Performed by: INTERNAL MEDICINE

## 2019-05-29 PROCEDURE — 99214 OFFICE O/P EST MOD 30 MIN: CPT | Mod: HCNC,S$GLB,, | Performed by: INTERNAL MEDICINE

## 2019-05-29 PROCEDURE — 3078F PR MOST RECENT DIASTOLIC BLOOD PRESSURE < 80 MM HG: ICD-10-PCS | Mod: HCNC,CPTII,S$GLB, | Performed by: INTERNAL MEDICINE

## 2019-05-29 PROCEDURE — 3074F SYST BP LT 130 MM HG: CPT | Mod: HCNC,CPTII,S$GLB, | Performed by: INTERNAL MEDICINE

## 2019-05-29 PROCEDURE — 1101F PR PT FALLS ASSESS DOC 0-1 FALLS W/OUT INJ PAST YR: ICD-10-PCS | Mod: HCNC,CPTII,S$GLB, | Performed by: INTERNAL MEDICINE

## 2019-05-29 PROCEDURE — 3074F PR MOST RECENT SYSTOLIC BLOOD PRESSURE < 130 MM HG: ICD-10-PCS | Mod: HCNC,CPTII,S$GLB, | Performed by: INTERNAL MEDICINE

## 2019-05-29 PROCEDURE — 99499 RISK ADDL DX/OHS AUDIT: ICD-10-PCS | Mod: HCNC,S$GLB,, | Performed by: INTERNAL MEDICINE

## 2019-05-29 PROCEDURE — 99499 UNLISTED E&M SERVICE: CPT | Mod: HCNC,S$GLB,, | Performed by: INTERNAL MEDICINE

## 2019-05-29 NOTE — PATIENT INSTRUCTIONS
Please purchase a carpal tunnel brace over the counter and wear this nightly.        Naproxen (aleve) nightly for a week then as needed.      Carpal Tunnel Syndrome    Carpal tunnel syndrome is a painful condition of the wrist and arm. It is caused by pressure on the median nerve.  The median nerve is one of the nerves that give feeling and movement to the hand. It passes through a tunnel in the wrist called the carpal tunnel. This tunnel is made up of bones and ligaments. Narrowing of this tunnel or swelling of the tissues inside the tunnel puts pressure on the median nerve. This causes numbness, pins and needles, or electric shooting pains in your hand and forearm. Often the pain is worse at night and may wake you when you are asleep.  Carpal tunnel syndrome may occur during pregnancy and with use of birth control pills. It is more common in workers who must often bend their wrists. It is also common in people who work with power tools that cause strong vibrations.  Home care  · Rest the painful wrist. Avoid repeated bending of the wrist back and forth. This puts pressure on the median nerve. Avoid using power tools with strong vibrations.  · If you were given a splint, wear it at night while you sleep. You may also wear it during the day for comfort.  · Move your fingers and wrists often to avoid stiffness.  · Elevate your arms on pillows when you lie down.  · Try using the unaffected hand more.  · Try not to hold your wrists in a bent, downward position.  · Sometimes changes in the work place may ease symptoms. If you type most of the day, it may help to change the position of your keyboard or add a wrist support. Your wrist should be in a neutral position and not bent back when typing.  · You may use over-the-counter pain medicine to treat pain and inflammation, unless another medicine was prescribed. Anti-inflammatory pain medicines, such as ibuprofen or naproxen may be more effective than acetaminophen, which  treats pain, but not inflammation. If you have chronic liver or kidney disease or ever had a stomach ulcer or GI bleeding, talk with your doctor before using these medicines.  · Opioid pain medicine will only give temporary relief and does not treat the problem. If pain continues, you may need a shot of a steroid drug into your wrist.  · If the above methods fail, you may need surgery. This will open the carpal tunnel and release the pressure on the trapped nerve.  Follow-up care  Follow up with your healthcare provider, or as advised, if the pain doesnt begin to improve within the next week.  If X-rays were taken, you will be notified of any new findings that may affect your care.  When to seek medical advice  Call your healthcare provider right away if any of these occur:  · Pain not improving with the above treatment  · Fingers or hand become cold, blue, numb, or tingly  · Your whole arm becomes swollen or weak  Date Last Reviewed: 11/23/2015  © 3917-1524 [employer]. [address] [place] [address]. All rights reserved. This information is not intended as a substitute for professional medical care. Always follow your healthcare professional's instructions.        Carpal Tunnel Syndrome Prevention Tips  Some repetitive hand activities put you at higher risk for carpal tunnel syndrome (CTS). But you can reduce your risk. Learn how to change the way you use your hands. Below are tips for at home and on the job. Be sure to also follow the hand and wrist safety policies at your workplace.      Keep your wrist in a neutral (straight) position when exercising.      Keep your wrist in neutral  Keep a neutral (straight) wrist position as often as you can. Dont use your wrist in a bent (flexed) position for long periods of time. This includes extended or twisted positions.  Watch your   Dont just use your thumb and index finger to grasp or lift. This can put stress on your wrist. When you  can, use your whole hand and all its fingers to grasp an object.  Minimize repetition  Dont move your arms or hands or hold an object in the same way for long periods of time. Even simple, light tasks can cause injury this way. Instead, alternate tasks or switch hands.  Rest your hands  Give your hands a break from time to time with a rest. Even a few minutes once an hour can help.  Reduce speed and force  Slow down the speed in which you do a forceful, repetitive motion. This gives your wrist time to recover from the effort. Use power tools to help reduce the force.  Strengthen the muscles  Weak muscles may lead to a poor wrist or arm position. Exercises will make your hand and arm muscles stronger. This can help you keep a better position.  Date Last Reviewed: 9/11/2015  © 5986-6261 [employer]. [address] [place] [address]. All rights reserved. This information is not intended as a substitute for professional medical care. Always follow your healthcare professional's instructions.        Understanding Carpal Tunnel Syndrome    The carpal tunnel is a narrow space inside the wrist. It is ringed by bone and a band of tough tissue called the transverse carpal ligament. A major nerve called the median nerve runs from the forearm into the hand through the carpal tunnel. Tendons also run through the carpal tunnel.  With carpal tunnel syndrome, the tendons or nearby tissues within the carpal tunnel may swell or thicken. Or the transverse carpal ligament may harden and shorten. This narrows the space in the carpal tunnel and puts pressure on the median nerve. This pressure leads to tingling and numbness of the hand and wrist. In time, the condition can make even simple tasks hard to do.  What causes carpal tunnel syndrome?  Doctors arent entirely clear why the condition occurs. Certain things may make a person more likely to have it. These include:  · Being female  · Being pregnant  · Being  overweight  · Having diabetes or rheumatoid arthritis  Symptoms of carpal tunnel syndrome  Symptoms often come and go. At first, symptoms may occur mainly at night. Later, they may be noticed during the day as well. They may get worse with activities such as driving, reading, typing, or holding a phone. Symptoms can include:  · Tingling and numbness in the hand or wrist  · Sharp pain that shoots up the arm or down to the fingers  · Hand stiffness or cramping, especially in the morning  · Trouble making a fist  · Hand weakness and clumsiness  Treatment for carpal tunnel syndrome  Certain treatments help reduce the pressure on the median nerve and relieve symptoms. Choices for treatment may include one or more of the following:  · Wrist splint. This involves wearing a special brace on the wrist and hand. The splint holds the wrist straight, in a neutral position. This helps keep the carpal tunnel as open as possible.  · Cortisone shots. Cortisone is a medicine that helps reduce swelling. It is injected directly into the wrist. It helps shrink tissues inside the carpal tunnel. This relieves symptoms for a time.  · Pain medicines. You may take over-the-counter or prescription medicines to help reduce swelling and relieve symptoms.  · Surgery. If the condition doesnt respond to other treatments and doesnt go away on its own, you may need surgery. During surgery, the surgeon cuts the transverse carpal ligament to relieve pressure on the median nerve.     When to call your healthcare provider  Call your healthcare provider right away if you have any of these:  · Fever of 100.4°F (38°C) or higher, or as directed  · Symptoms that dont get better, or get worse  · New symptoms   Date Last Reviewed: 3/10/2016  © 1644-7854 [employer]. [address] [place] [address]. All rights reserved. This information is not intended as a substitute for professional medical care. Always follow your healthcare  professional's instructions.

## 2019-05-29 NOTE — PROGRESS NOTES
Patient, Chelsea Cavazos (MRN #[id number]), presented with a recent Platelet count less than 150 K/uL consistent with the definition of thrombocytopenia (ICD10 - D69.6).    Platelets   Date Value Ref Range Status   05/24/2019 142 (L) 150 - 350 K/uL Final     The patient's thrombocytopenia was monitored, evaluated, addressed and/or treated. This addendum to the medical record is made on 05/29/2019.

## 2019-05-29 NOTE — PROGRESS NOTES
"Subjective:       Patient ID: Chelsea Ford Child is a 69 y.o. female.    Chief Complaint: Annual Exam (general check up. patient is requesting a referral to Optometry for an eye exam.); Cerumen Impaction (chronic problem, patient reports of L ear wax buildup. ); and Numbness (patient has it every night to some degree, numbness in ANDREZ hands more so the R.)    HPI   68 yo F here for follow up of:    HTN    6 mo of worsening wrist sx:  Right handed  Night time hand pain over 1st thru 3rd fingers. Not on computer. Does yard work.  Not using brace.  Both hands. Right worse.     Chronic ear wax obstruction.   Doesn't like smell of debrox and doesn't seem to work well at home.     Bunions bilateral great toes but luis left one bothers her. Bilateral foot pain, she thinks arthritis. Wears supportive shoes. Would like the opinion of a podiatrist. Wants to avoid bunion surgery if possible.   Interested in custom inserts if helpful.       Review of Systems   Constitutional: Negative for activity change and unexpected weight change.   HENT: Positive for hearing loss and rhinorrhea. Negative for trouble swallowing.    Eyes: Positive for visual disturbance. Negative for discharge.   Respiratory: Negative for chest tightness and wheezing.    Cardiovascular: Negative for chest pain and palpitations.   Gastrointestinal: Positive for constipation. Negative for blood in stool, diarrhea and vomiting.   Endocrine: Negative for polydipsia and polyuria.   Genitourinary: Negative for difficulty urinating, dysuria, hematuria and menstrual problem.   Musculoskeletal: Positive for arthralgias and neck pain. Negative for joint swelling.   Neurological: Negative for weakness and headaches.   Psychiatric/Behavioral: Negative for confusion and dysphoric mood.       Objective:   /64 (BP Location: Left arm, Patient Position: Sitting, BP Method: Medium (Manual))   Pulse 68   Ht 5' 6" (1.676 m)   Wt 54 kg (119 lb)   SpO2 95%   BMI 19.21 " kg/m²      Physical Exam   Constitutional: She is oriented to person, place, and time. She appears well-developed and well-nourished. No distress.   HENT:   Head: Normocephalic and atraumatic.   Cardiovascular: Normal rate and regular rhythm.   Pulmonary/Chest: Effort normal. No respiratory distress. She has no wheezes. She has no rales.   Neurological: She is alert and oriented to person, place, and time.   Skin: Skin is warm and dry. She is not diaphoretic.   Psychiatric: She has a normal mood and affect. Her behavior is normal.       Assessment:       1. Bilateral carpal tunnel syndrome    2. Health care maintenance    3. Bunion of great toe of left foot    4. Bilateral foot pain        Plan:       Chelsea was seen today for annual exam, cerumen impaction and numbness.    Diagnoses and all orders for this visit:    Bilateral carpal tunnel syndrome  Wrist brace nightly  Ergonomics discussed  Naproxen x 1 week    Health care maintenance  -     Ambulatory referral to Optometry    Bunion of great toe of left foot  Bilateral foot pain  -     Ambulatory consult to Podiatry    HTN  At goal    30  minutes were spent with patient with over half of this time spent in coordination of care and/or counseling.           p23 and tdap at pharmacy (later date)

## 2019-05-31 LAB — HEMOCCULT STL QL IA: NEGATIVE

## 2019-06-05 DIAGNOSIS — I10 BENIGN ESSENTIAL HYPERTENSION: ICD-10-CM

## 2019-06-06 ENCOUNTER — OFFICE VISIT (OUTPATIENT)
Dept: OPTOMETRY | Facility: CLINIC | Age: 69
End: 2019-06-06
Payer: COMMERCIAL

## 2019-06-06 DIAGNOSIS — H52.03 HYPEROPIA WITH PRESBYOPIA OF BOTH EYES: ICD-10-CM

## 2019-06-06 DIAGNOSIS — H53.002 AMBLYOPIA OF LEFT EYE: ICD-10-CM

## 2019-06-06 DIAGNOSIS — H52.4 HYPEROPIA WITH PRESBYOPIA OF BOTH EYES: ICD-10-CM

## 2019-06-06 DIAGNOSIS — Z01.00 ROUTINE EYE EXAM: Primary | ICD-10-CM

## 2019-06-06 DIAGNOSIS — H25.13 NUCLEAR SCLEROSIS OF BOTH EYES: ICD-10-CM

## 2019-06-06 DIAGNOSIS — I10 BENIGN ESSENTIAL HYPERTENSION: ICD-10-CM

## 2019-06-06 PROCEDURE — 99999 PR PBB SHADOW E&M-EST. PATIENT-LVL II: ICD-10-PCS | Mod: PBBFAC,,, | Performed by: OPTOMETRIST

## 2019-06-06 PROCEDURE — 99999 PR PBB SHADOW E&M-EST. PATIENT-LVL II: CPT | Mod: PBBFAC,,, | Performed by: OPTOMETRIST

## 2019-06-06 PROCEDURE — 92004 COMPRE OPH EXAM NEW PT 1/>: CPT | Mod: S$GLB,,, | Performed by: OPTOMETRIST

## 2019-06-06 PROCEDURE — 92015 PR REFRACTION: ICD-10-PCS | Mod: S$GLB,,, | Performed by: OPTOMETRIST

## 2019-06-06 PROCEDURE — 92015 DETERMINE REFRACTIVE STATE: CPT | Mod: S$GLB,,, | Performed by: OPTOMETRIST

## 2019-06-06 PROCEDURE — 92004 PR EYE EXAM, NEW PATIENT,COMPREHESV: ICD-10-PCS | Mod: S$GLB,,, | Performed by: OPTOMETRIST

## 2019-06-06 RX ORDER — OLMESARTAN MEDOXOMIL 5 MG/1
5 TABLET ORAL DAILY
Qty: 90 TABLET | Refills: 3 | Status: SHIPPED | OUTPATIENT
Start: 2019-06-06 | End: 2020-05-22

## 2019-06-06 NOTE — LETTER
June 6, 2019      Elva Tripathi MD  [address]  [facility] [address]           [facility]  [address]  [facility] [address]  Phone: [phone]  Fax: [phone]          Patient: Chelsea Cavazos   MR Number: [id number]   YOB: 1950   Date of Visit: 6/6/2019       Dear Dr. Elva Tripathi:    Thank you for referring Chelsea Cavazos to me for evaluation. Attached you will find relevant portions of my assessment and plan of care.    If you have questions, please do not hesitate to call me. I look forward to following Chelsea Cavazos along with you.    Sincerely,    Ayana Cottrell, OD    Enclosure  CC:  No Recipients    If you would like to receive this communication electronically, please contact [email or web address] or [phone] to request more information on [facility] access.    For providers and/or their staff who would like to refer a patient to [facility], please contact us through our one-stop-shop provider referral line, [facility], at [phone].    If you feel you have received this communication in error or would no longer like to receive these types of communications, please e-mail [email or web address]

## 2019-06-06 NOTE — PROGRESS NOTES
HPI     Last eye exam was approximately 10 years ago.  Patient states she may need glasses for distance vision. Currently using   +2.50-+2.75 OTC readers for small print. Was seen in Urgent Care 4/16/19   for pink eye and was prescribed Optivar-only uses it occasionally. OU are   dry and itchy-very sensitive to makeup.  Patient denies diplopia, headaches, flashes/floaters, and pain.    Optivar prn OU  AT's prn OU      Last edited by Theresa Weller on 6/6/2019  8:00 AM. (History)            Assessment /Plan     For exam results, see Encounter Report.    Routine eye exam    Hyperopia with presbyopia of both eyes    Amblyopia of left eye    Benign essential hypertension    Nuclear sclerosis of both eyes          1-2.  No rx given.  Continue with OTC readers.    3.  Longstanding-history of extropia OS with surgery at age 9.    4.  No retinopathy--monitor yearly.  BP control.  Eye health normal OU.  5.  Educated on cataracts and affects on vision.  Early-monitor.

## 2019-07-02 ENCOUNTER — OFFICE VISIT (OUTPATIENT)
Dept: OBSTETRICS AND GYNECOLOGY | Facility: CLINIC | Age: 69
End: 2019-07-02
Payer: MEDICARE

## 2019-07-02 VITALS
WEIGHT: 119.94 LBS | SYSTOLIC BLOOD PRESSURE: 107 MMHG | BODY MASS INDEX: 19.27 KG/M2 | HEIGHT: 66 IN | DIASTOLIC BLOOD PRESSURE: 61 MMHG

## 2019-07-02 DIAGNOSIS — R33.9 INCOMPLETE BLADDER EMPTYING: ICD-10-CM

## 2019-07-02 DIAGNOSIS — R39.11 URINARY HESITANCY: Primary | ICD-10-CM

## 2019-07-02 LAB
BILIRUB UR QL STRIP: NEGATIVE
CLARITY UR REFRACT.AUTO: CLEAR
COLOR UR AUTO: YELLOW
GLUCOSE UR QL STRIP: NEGATIVE
HGB UR QL STRIP: NEGATIVE
KETONES UR QL STRIP: NEGATIVE
LEUKOCYTE ESTERASE UR QL STRIP: NEGATIVE
NITRITE UR QL STRIP: NEGATIVE
PH UR STRIP: 6 [PH] (ref 5–8)
PROT UR QL STRIP: NEGATIVE
SP GR UR STRIP: 1 (ref 1–1.03)
URN SPEC COLLECT METH UR: NORMAL

## 2019-07-02 PROCEDURE — 99999 PR PBB SHADOW E&M-EST. PATIENT-LVL III: CPT | Mod: PBBFAC,HCNC,, | Performed by: NURSE PRACTITIONER

## 2019-07-02 PROCEDURE — 3074F PR MOST RECENT SYSTOLIC BLOOD PRESSURE < 130 MM HG: ICD-10-PCS | Mod: HCNC,CPTII,S$GLB, | Performed by: NURSE PRACTITIONER

## 2019-07-02 PROCEDURE — 3078F PR MOST RECENT DIASTOLIC BLOOD PRESSURE < 80 MM HG: ICD-10-PCS | Mod: HCNC,CPTII,S$GLB, | Performed by: NURSE PRACTITIONER

## 2019-07-02 PROCEDURE — 81003 URINALYSIS AUTO W/O SCOPE: CPT | Mod: HCNC

## 2019-07-02 PROCEDURE — 1101F PR PT FALLS ASSESS DOC 0-1 FALLS W/OUT INJ PAST YR: ICD-10-PCS | Mod: HCNC,CPTII,S$GLB, | Performed by: NURSE PRACTITIONER

## 2019-07-02 PROCEDURE — 1101F PT FALLS ASSESS-DOCD LE1/YR: CPT | Mod: HCNC,CPTII,S$GLB, | Performed by: NURSE PRACTITIONER

## 2019-07-02 PROCEDURE — 99213 PR OFFICE/OUTPT VISIT, EST, LEVL III, 20-29 MIN: ICD-10-PCS | Mod: HCNC,S$GLB,, | Performed by: NURSE PRACTITIONER

## 2019-07-02 PROCEDURE — 99213 OFFICE O/P EST LOW 20 MIN: CPT | Mod: HCNC,S$GLB,, | Performed by: NURSE PRACTITIONER

## 2019-07-02 PROCEDURE — 99999 PR PBB SHADOW E&M-EST. PATIENT-LVL III: ICD-10-PCS | Mod: PBBFAC,HCNC,, | Performed by: NURSE PRACTITIONER

## 2019-07-02 PROCEDURE — 87086 URINE CULTURE/COLONY COUNT: CPT | Mod: HCNC

## 2019-07-02 PROCEDURE — 3078F DIAST BP <80 MM HG: CPT | Mod: HCNC,CPTII,S$GLB, | Performed by: NURSE PRACTITIONER

## 2019-07-02 PROCEDURE — 3074F SYST BP LT 130 MM HG: CPT | Mod: HCNC,CPTII,S$GLB, | Performed by: NURSE PRACTITIONER

## 2019-07-02 RX ORDER — ESTRADIOL 0.1 MG/G
CREAM VAGINAL
Qty: 42.5 G | Refills: 11 | Status: SHIPPED | OUTPATIENT
Start: 2019-07-02 | End: 2020-05-13 | Stop reason: SDUPTHER

## 2019-07-02 NOTE — PROGRESS NOTES
"HISTORY OF PRESENT ILLNESS:    Chelsea Ford Child is a 69 y.o. female , presents with c/o of urinary hesitancy for about 6 months.  -Urinary hesitancy "may be from incomplete bladder emptying" and denies associated fever, pelvic, vaginal or back pain, vaginal discharge, odor, itching, dysuria, incontinence, frequency, hematuria.   -Does have urgency and nocturia.    Past Medical History:   Diagnosis Date    BCC (basal cell carcinoma)     Exotropia of left eye     Hypertension     Osteoporosis     Palpitations 2017    Vertigo 2017       Past Surgical History:   Procedure Laterality Date     SECTION      Mohs      BCC    STRABISMUS SURGERY Left         MEDICATIONS AND ALLERGIES:      Current Outpatient Medications:     CALCIUM ORAL, Take by mouth., Disp: , Rfl:     carvedilol (COREG) 12.5 MG tablet, TAKE 1 TABLET (12.5 MG TOTAL) BY MOUTH 2 (TWO) TIMES DAILY WITH MEALS., Disp: 180 tablet, Rfl: 3    olmesartan (BENICAR) 5 MG Tab, Take 1 tablet (5 mg total) by mouth once daily., Disp: 90 tablet, Rfl: 3    estradiol (ESTRACE) 0.01 % (0.1 mg/gram) vaginal cream, Rub dime sized amount of cream to the urethra nightly, Disp: 42.5 g, Rfl: 11    Review of patient's allergies indicates:   Allergen Reactions    Adhesive Dermatitis    Nickel sutures [surgical stainless steel]      Rash with nickel       OB HISTORY: Number of C/S:1     COMPREHENSIVE GYN HISTORY:  PAP History:  Denies abnormal Paps. LAST PAP - NORMAL.  Infection History: Denies STDs. Denies PID.  Benign History: Denies uterine fibroids. Denies ovarian cysts. Denies endometriosis. Denies other conditions.  Cancer History: Denies cervical cancer. Denies uterine cancer or hyperplasia. Denies ovarian cancer. Denies vulvar cancer or pre-cancer. Denies vaginal cancer or pre-cancer. Denies breast cancer. Denies colon cancer.  Sexual Activity History: Denies currently being sexually active  Menstrual History: Denies menses. Pt " "is  not on HRT.     ROS:  GENERAL: No fever or chills.  ABDOMEN: No pain. No nausea. No vomiting. No diarrhea. No constipation.  REPRODUCTIVE: No abnormal bleeding.   VULVA: No pain. No lesions. No itching.  VAGINA: No relaxation. No itching. No odor. No discharge. No lesions.  URINARY: No incontinence. No nocturia. No frequency. No dysuria.    /61 (BP Location: Right arm, Patient Position: Sitting, BP Method: Small (Automatic))   Ht 5' 6" (1.676 m)   Wt 54.4 kg (119 lb 14.9 oz)   BMI 19.36 kg/m²     PE:  APPEARANCE: Well nourished, well developed, in no acute distress.  AFFECT: WNL, alert and oriented x 3.   ABDOMEN: Soft. No tenderness or masses. NO CVA TENDERNESS.  PELVIC: ATROPHIC EXTERNAL FEMALE GENITALIA without lesions. Normal hair distribution. Adequate perineal body, normal urethral meatus. VAGINA DRY / ATROPHIC / STENOTIC without lesions or discharge. CERVIX STENOTIC without lesions, discharge or tenderness. No significant cystocele or rectocele. Bimanual exam shows uterus to be normal size, regular, mobile and nontender. Adnexa without masses or tenderness.    DIAGNOSIS:  1. Urinary hesitancy    2. Incomplete bladder emptying        PLAN:    Orders Placed This Encounter    Urine culture    Urinalysis    Ambulatory consult to Urology    estradiol (ESTRACE) 0.01 % (0.1 mg/gram) vaginal cream       COUNSELING:  The patient was counseled today on:  -UTI prevention including   a. perineal hygiene, wipe front to back,  b. drink water prior to intercourse and urinate afterwards and avoid certain positions which could increase likelyhood of UTIs,  c. establish regular bladder habits,   d. avoid vulvovaginal irritants,   e. increase fluids and avoid caffeine and alcohol;  -signs of pylenephritis and to go to the ED if develops fever, chills, n/v, back pain, worsening dyuria, hematuria;   -Estrace cream use (apply dime size am't on urethra H.S.);  -option to see Urologist - which pt declined for " now.    FOLLOW-UP with me pending test results.

## 2019-07-03 LAB — BACTERIA UR CULT: NORMAL

## 2019-07-08 ENCOUNTER — PATIENT OUTREACH (OUTPATIENT)
Dept: OTHER | Facility: OTHER | Age: 69
End: 2019-07-08

## 2019-07-08 NOTE — PROGRESS NOTES
"Last 5 Patient Entered Readings                                      Current 30 Day Average: 126/69     Recent Readings 7/6/2019 7/5/2019 7/1/2019 6/27/2019 6/26/2019    SBP (mmHg) 122 120 97 124 104    DBP (mmHg) 70 68 55 71 53    Pulse 55 54 62 59 65        Digital Medicine: Health  Follow Up    Lifestyle Modifications:    1.Dietary Modifications (Sodium intake <2,000mg/day, food labels, dining out): Ms. Cavazos continues to follow a diet low in sodium. She states that her appetite has "fallen off" because of the weather, and she has even lost a few pounds. She is eating a lot of salads.     2.Physical Activity: Patient reports that she has been walking at night time to avoid the heat.     3.Medication Therapy: Patient has been compliant with the medication regimen.    4.Patient has the following medication side effects/concerns: None  (Frequency/Alleviating factors/Precipitating factors, etc.)     Follow up with Ms. Chelsea Ford Child completed. Ms. Cavazos is feeling well. No further questions or concerns. Will continue to follow up to achieve health goals.      "

## 2019-08-05 ENCOUNTER — PATIENT MESSAGE (OUTPATIENT)
Dept: INTERNAL MEDICINE | Facility: CLINIC | Age: 69
End: 2019-08-05

## 2019-08-06 ENCOUNTER — PATIENT MESSAGE (OUTPATIENT)
Dept: INTERNAL MEDICINE | Facility: CLINIC | Age: 69
End: 2019-08-06

## 2019-08-14 ENCOUNTER — PATIENT MESSAGE (OUTPATIENT)
Dept: INTERNAL MEDICINE | Facility: CLINIC | Age: 69
End: 2019-08-14

## 2019-09-05 ENCOUNTER — PATIENT OUTREACH (OUTPATIENT)
Dept: OTHER | Facility: OTHER | Age: 69
End: 2019-09-05

## 2019-09-05 NOTE — PROGRESS NOTES
Last 5 Patient Entered Readings                                      Current 30 Day Average: 114/66     Recent Readings 9/2/2019 8/31/2019 8/29/2019 8/25/2019 8/20/2019    SBP (mmHg) 118 126 111 110 108    DBP (mmHg) 71 69 58 65 60    Pulse 62 62 60 60 72        Digital Medicine: Health  Follow Up    Left voicemail to follow up with Ms. Chelsea Ford Child.  Current BP average 114/66 mmHg is at goal, <130/80 mmHg.  Will follow up in 3 weeks.

## 2019-09-22 ENCOUNTER — PATIENT MESSAGE (OUTPATIENT)
Dept: ADMINISTRATIVE | Facility: OTHER | Age: 69
End: 2019-09-22

## 2019-09-30 NOTE — PROGRESS NOTES
CHIEF COMPLAINT:    Mrs. Cavazos is a 69 y.o. female presenting for a consultation at the request of Erna Khoury NP. Patient presents with hesitancy.    PRESENTING ILLNESS:    Chelsea Ford Child is a 69 y.o. female who states that she has hesitancy about half the time she urinates.  Has been ongoing for the past 6-8 months.  Frequency x 8, nocturia x 0-1.  Nocturia occurs about 4 of 7 nights of the week.  No sensation of incomplete bladder emptying.  No dysuria, hematuria, fevers, chills.  No pain with urinating, no history of recurrent UTI.  No trauma/back pain.  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      , C section, uterus in place, not sexually active (no partner), bowels takes Miralax 3-4 times a week to maintain soft bowel movements.      REVIEW OF SYSTEMS:    Review of Systems   Constitutional: Negative.    HENT: Negative.    Eyes: Negative.    Respiratory: Negative.    Cardiovascular: Negative.    Gastrointestinal: Positive for constipation.   Genitourinary: Positive for urgency.        Hesitancy   Musculoskeletal: Positive for back pain.   Skin: Negative.    Neurological: Negative.    Endo/Heme/Allergies: Negative.    Psychiatric/Behavioral: Negative.        PATIENT HISTORY:    Past Medical History:   Diagnosis Date    BCC (basal cell carcinoma)     Exotropia of left eye     Hypertension     Osteoporosis     Palpitations 2017    Vertigo 2017       Past Surgical History:   Procedure Laterality Date     SECTION      Mohs      BCC    STRABISMUS SURGERY Left        Family History   Problem Relation Age of Onset    Hypertension Mother     Hypertension Father     COPD Father     Stroke Father 72     Socioeconomic History    Marital status: Single   Occupational History    Occupation: RN - retired     Comment: worker's comp   Social Needs    Financial resource strain: Not hard at all    Food insecurity:      Worry: Never true     Inability: Never true    Transportation needs:     Medical: No     Non-medical: No   Tobacco Use    Smoking status: Former Smoker     Start date:      Last attempt to quit:      Years since quittin.7    Smokeless tobacco: Never Used   Substance and Sexual Activity    Alcohol use: No     Frequency: Never     Binge frequency: Never    Drug use: No    Sexual activity: Never     Birth control/protection: Post-menopausal   Lifestyle    Physical activity:     Days per week: 5 days     Minutes per session: 80 min    Stress: Not at all   Relationships    Social connections:     Talks on phone: More than three times a week     Gets together: More than three times a week     Attends Yazidi service: Not on file     Active member of club or organization: Yes     Attends meetings of clubs or organizations: More than 4 times per year     Relationship status: Never        Allergies:  Adhesive and Nickel sutures [surgical stainless steel]    Medications:  Outpatient Encounter Medications as of 10/2/2019   Medication Sig Dispense Refill    CALCIUM ORAL Take by mouth.      carvedilol (COREG) 12.5 MG tablet TAKE 1 TABLET (12.5 MG TOTAL) BY MOUTH 2 (TWO) TIMES DAILY WITH MEALS. 180 tablet 3    estradiol (ESTRACE) 0.01 % (0.1 mg/gram) vaginal cream Rub dime sized amount of cream to the urethra nightly 42.5 g 11    olmesartan (BENICAR) 5 MG Tab Take 1 tablet (5 mg total) by mouth once daily. 90 tablet 3     No facility-administered encounter medications on file as of 10/2/2019.          PHYSICAL EXAMINATION:    The patient generally appears in good health, is appropriately interactive, and is in no apparent distress.    Skin: No lesions.    Mental: Cooperative with normal affect.    Neuro: Grossly intact.    HEENT: Normal. No evidence of lymphadenopathy.    Chest:  normal inspiratory effort.    Abdomen:  Soft, non-tender. No masses or organomegaly. Bladder is not palpable. No  evidence of flank discomfort. No evidence of inguinal hernia.    Extremities: No clubbing, cyanosis, or edema    Normal external female genitalia  Urethral meatus is normal  Urethra and bladder are nontender to bimanual exam  Well supported anteriorly and posteriorly   Uterus and cervix are normal  No adnexal masses  PVR by catheterization was 180 ml  Pelvic tone 4/5 strength.      LABS:    Lab Results   Component Value Date    BUN 13 05/24/2019    CREATININE 1.0 05/24/2019     UA 1.010, pH 7, tr leuk, otherwise, negative    IMPRESSION:    Encounter Diagnoses   Name Primary?    Urinary hesitancy Yes       PLAN:    1.  The catheterized specimen was sent for culture  2.  SUDS/cysto    Copy to:  Erna Khoury NP

## 2019-10-01 ENCOUNTER — PATIENT OUTREACH (OUTPATIENT)
Dept: ADMINISTRATIVE | Facility: OTHER | Age: 69
End: 2019-10-01

## 2019-10-02 ENCOUNTER — OFFICE VISIT (OUTPATIENT)
Dept: UROLOGY | Facility: CLINIC | Age: 69
End: 2019-10-02
Payer: MEDICARE

## 2019-10-02 VITALS
HEART RATE: 56 BPM | HEIGHT: 66 IN | DIASTOLIC BLOOD PRESSURE: 51 MMHG | WEIGHT: 118 LBS | BODY MASS INDEX: 18.96 KG/M2 | SYSTOLIC BLOOD PRESSURE: 102 MMHG

## 2019-10-02 DIAGNOSIS — R33.9 INCOMPLETE BLADDER EMPTYING: ICD-10-CM

## 2019-10-02 DIAGNOSIS — R39.11 URINARY HESITANCY: Primary | ICD-10-CM

## 2019-10-02 PROCEDURE — 1101F PT FALLS ASSESS-DOCD LE1/YR: CPT | Mod: HCNC,CPTII,S$GLB, | Performed by: UROLOGY

## 2019-10-02 PROCEDURE — 1101F PR PT FALLS ASSESS DOC 0-1 FALLS W/OUT INJ PAST YR: ICD-10-PCS | Mod: HCNC,CPTII,S$GLB, | Performed by: UROLOGY

## 2019-10-02 PROCEDURE — 81002 PR URINALYSIS NONAUTO W/O SCOPE: ICD-10-PCS | Mod: HCNC,S$GLB,, | Performed by: UROLOGY

## 2019-10-02 PROCEDURE — 3074F PR MOST RECENT SYSTOLIC BLOOD PRESSURE < 130 MM HG: ICD-10-PCS | Mod: HCNC,CPTII,S$GLB, | Performed by: UROLOGY

## 2019-10-02 PROCEDURE — 99205 PR OFFICE/OUTPT VISIT, NEW, LEVL V, 60-74 MIN: ICD-10-PCS | Mod: HCNC,25,S$GLB, | Performed by: UROLOGY

## 2019-10-02 PROCEDURE — 3078F PR MOST RECENT DIASTOLIC BLOOD PRESSURE < 80 MM HG: ICD-10-PCS | Mod: HCNC,CPTII,S$GLB, | Performed by: UROLOGY

## 2019-10-02 PROCEDURE — 99999 PR PBB SHADOW E&M-EST. PATIENT-LVL III: CPT | Mod: PBBFAC,HCNC,, | Performed by: UROLOGY

## 2019-10-02 PROCEDURE — 99205 OFFICE O/P NEW HI 60 MIN: CPT | Mod: HCNC,25,S$GLB, | Performed by: UROLOGY

## 2019-10-02 PROCEDURE — 51701 PR INSERTION OF NON-INDWELLING BLADDER CATHETERIZATION FOR RESIDUAL UR: ICD-10-PCS | Mod: HCNC,S$GLB,, | Performed by: UROLOGY

## 2019-10-02 PROCEDURE — 81002 URINALYSIS NONAUTO W/O SCOPE: CPT | Mod: HCNC,S$GLB,, | Performed by: UROLOGY

## 2019-10-02 PROCEDURE — 51701 INSERT BLADDER CATHETER: CPT | Mod: HCNC,S$GLB,, | Performed by: UROLOGY

## 2019-10-02 PROCEDURE — 3078F DIAST BP <80 MM HG: CPT | Mod: HCNC,CPTII,S$GLB, | Performed by: UROLOGY

## 2019-10-02 PROCEDURE — 3074F SYST BP LT 130 MM HG: CPT | Mod: HCNC,CPTII,S$GLB, | Performed by: UROLOGY

## 2019-10-02 PROCEDURE — 99999 PR PBB SHADOW E&M-EST. PATIENT-LVL III: ICD-10-PCS | Mod: PBBFAC,HCNC,, | Performed by: UROLOGY

## 2019-10-02 PROCEDURE — 87086 URINE CULTURE/COLONY COUNT: CPT | Mod: HCNC

## 2019-10-02 RX ORDER — AMOXICILLIN 250 MG/1
500 CAPSULE ORAL ONCE
Status: CANCELLED | OUTPATIENT
Start: 2019-10-02 | End: 2019-10-02

## 2019-10-02 RX ORDER — LIDOCAINE HYDROCHLORIDE 20 MG/ML
JELLY TOPICAL ONCE
Status: CANCELLED | OUTPATIENT
Start: 2019-10-02 | End: 2019-10-02

## 2019-10-02 NOTE — PATIENT INSTRUCTIONS
Urodynamics Studies     The bladder holds urine until it leaves the body through the urethra.     Urodynamics studies are a series of tests that give your doctor a close look at the working of your bladder and urethra. The tests can help your doctor learn about any problems storing urine or voiding (eliminating) urine from your body.  Understanding the lower urinary tract  The lower part of the urinary tract has several parts.  · The bladder stores urine until youre ready to release it.  · The urethra is the tube that carries urine from the bladder out of the body.  · The sphincter is made up of muscles around the opening of the bladder. The sphincter muscles tighten to hold urine in the bladder. They relax to let urine flow. Signals from the brain tell the sphincter when to tighten and relax. These signals also tell the bladder when to contract to let urine flow out of the body.  Why you need a urodynamics study  This test may be ordered if you:  · Are incontinent (leak urine)  · Have a bladder that does not empty all the way.  · Have symptoms such as the need to urinate often or a constant strong need to urinate  · Have intermittent or weak urine stream  · Have persistent urinary tract infections  Preparing for the study  · Tell your doctor about any medicine youre taking. Ask if you should stop them before the study.  · Keep a diary of your bathroom habits. Do this for a few days before the study. This diary can be a helpful part of the evaluation.  · Ask if you need to arrive for the study with a full bladder.  Date Last Reviewed: 1/1/2017  © 3026-2331 [employer]. [address] [place] [address], [place] [address]. All rights reserved. This information is not intended as a substitute for professional medical care. Always follow your healthcare professional's instructions.          Urodynamic Studies     The equipment used for the study varies depending upon the facility and what tests are done.      Urodynamic studies may be done in your doctors office, a clinic, or a hospital. The studies may take up to an hour or more. This depends on which tests your doctor does. The tests are generally painless. You wont need sedating medicine.  Tests that may be done  Uroflowmetry. This measures the amount and speed of urine you void from your bladder. You urinate into a funnel. Its attached to a computer that records your urine flow over time. The amount of urine left in your bladder after you void may also be measured right after this test.  Cystometry. This test evaluates how much your bladder can hold. It also measures how strong your bladder muscle is and how well the signals work that tell you when your bladder is full. Your healthcare provider fills your bladder with sterile water or saline solution, through a catheter. Your doctor will instruct you to report any sensations you feel. Mention if theyre similar to symptoms youve felt at home. Your doctor may ask you to cough, stand and walk, or bear down during this test.  Electromyogram. This helps evaluate the muscle contractions that control urination, such as sphincter muscle contractions. Your healthcare provider may place electrode patches or wires near your rectum or urethra to make the recording. He or she may ask you to try to tighten or relax your sphincter muscles during this test.  Pressure flow study. This test measures your detrusor, urethral, and abdominal pressures. Detrusor is the muscle surrounding the bladder walls that relaxes to allow your bladder to fill, and and contracts to squeeze out urine. A pressure flow study is often done after cystometry. Youre asked to urinate while a probe in your urethra measures pressures.  Video cystourethrography. This takes video pictures of urine flow through your urinary tract. It can help identify blockages or other problems. The bladder is filled with an X-ray contrast fluid. Then X-ray video  pictures are taken as the fluid is urinated out. Ultrasound imaging may also be combined with routine urodynamic studies.  Ambulatory urodynamics. This test can be used to evaluate you while doing usual activities.  Getting your results  After the study, youll get dressed and return to the consultation room. Test results may be ready soon after the study is finished. Or, you may return to your doctors office in a few days for your results. Your doctor can talk with you about the study report and your options.   Date Last Reviewed: 1/1/2017 © 2000-2017 [employer]. [address] [place] [address]. All rights reserved. This information is not intended as a substitute for professional medical care. Always follow your healthcare professional's instructions.          Cystoscopy    Cystoscopy is a procedure that lets your doctor look directly inside your urethra and bladder. It can be used to:  · Help diagnose a problem with your urethra, bladder, or kidneys.  · Take a sample (biopsy) of bladder or urethral tissue.  · Treat certain problems (such as removing kidney stones).  · Place a stent to bypass an obstruction.  · Take special X-rays of the kidneys.  Based on the findings, your doctor may recommend other tests or treatments.  What is a cystoscope?  A cystoscope is a telescope-like instrument that contains lenses and fiberoptics (small glass wires that make bright light). The cystoscope may be straight and rigid, or flexible to bend around curves in the urethra. The doctor may look directly into the cystoscope, or project the image onto a monitor.  Getting ready  · Ask your doctor if you should stop taking any medicines before the procedure.  · Ask whether you should avoid eating or drinking anything after midnight before the procedure.  · Follow any other instructions your doctor gives you.  Tell your doctor before the exam if you:  · Take any medicines, such as aspirin or blood  thinners  · Have allergies to any medicines  · Are pregnant   The procedure  Cystoscopy is done in the doctors office, surgery center, or hospital. The doctor and a nurse are present during the procedure. It takes only a few minutes, longer if a biopsy, X-ray, or treatment needs to be done.  During the procedure:  · You lie on an exam table on your back, knees bent and legs apart. You are covered with a drape.  · Your urethra and the area around it are washed. Anesthetic jelly may be applied to numb the urethra. Other pain medicine is usually not needed. In some cases, you may be offered a mild sedative to help you relax. If a more extensive procedure is to be done, such as a biopsy or kidney stone removal, general anesthesia may be needed.  · The cystoscope is inserted. A sterile fluid is put into the bladder to expand it. You may feel pressure from this fluid.  · When the procedure is done, the cystoscope is removed.  After the procedure  If you had a sedative, general anesthesia, or spinal anesthesia, you must have someone drive you home. Once youre home:  · Drink plenty of fluids.  · You may have burning or light bleeding when you urinate--this is normal.  · Medicines may be prescribed to ease any discomfort or prevent infection. Take these as directed.  · Call your doctor if you have heavy bleeding or blood clots, burning that lasts more than a day, a fever over 100°F  (38° C), or trouble urinating.  Date Last Reviewed: 1/1/2017  © 6178-2977 [employer]. [address] [place] [address], [place] [address]. All rights reserved. This information is not intended as a substitute for professional medical care. Always follow your healthcare professional's instructions.

## 2019-10-02 NOTE — LETTER
October 2, 2019      ROSALVA Khoury, POWER  [address]  [facility] [address]           [facility] Urology 4th Floor  [address]  [facility] [address]  Phone: [phone]          Patient: Chelsea Cavzaos   MR Number: [id number]   YOB: 1950   Date of Visit: 10/2/2019       Dear ROSALVA Khoury:    Thank you for referring Chelsea Cavazos to me for evaluation. Attached you will find relevant portions of my assessment and plan of care.    If you have questions, please do not hesitate to call me. I look forward to following Chelsea Cavazos along with you.    Sincerely,    Jaquelin Espino MD    Enclosure  CC:  No Recipients    If you would like to receive this communication electronically, please contact [email or web address] or [phone] to request more information on [employer] access.    For providers and/or their staff who would like to refer a patient to [facility], please contact us through our one-stop-shop provider referral line, [facility], at [phone].    If you feel you have received this communication in error or would no longer like to receive these types of communications, please e-mail [email or web address]

## 2019-10-03 LAB — BACTERIA UR CULT: NO GROWTH

## 2019-10-04 ENCOUNTER — PATIENT MESSAGE (OUTPATIENT)
Dept: INTERNAL MEDICINE | Facility: CLINIC | Age: 69
End: 2019-10-04

## 2019-10-04 ENCOUNTER — TELEPHONE (OUTPATIENT)
Dept: INTERNAL MEDICINE | Facility: CLINIC | Age: 69
End: 2019-10-04

## 2019-10-04 DIAGNOSIS — Z12.31 BREAST CANCER SCREENING BY MAMMOGRAM: Primary | ICD-10-CM

## 2019-10-09 ENCOUNTER — PROCEDURE VISIT (OUTPATIENT)
Dept: UROLOGY | Facility: CLINIC | Age: 69
End: 2019-10-09
Payer: MEDICARE

## 2019-10-09 VITALS
WEIGHT: 120 LBS | HEART RATE: 51 BPM | DIASTOLIC BLOOD PRESSURE: 73 MMHG | BODY MASS INDEX: 19.29 KG/M2 | TEMPERATURE: 97 F | HEIGHT: 66 IN | SYSTOLIC BLOOD PRESSURE: 170 MMHG

## 2019-10-09 DIAGNOSIS — R33.9 INCOMPLETE BLADDER EMPTYING: ICD-10-CM

## 2019-10-09 DIAGNOSIS — R39.11 URINARY HESITANCY: ICD-10-CM

## 2019-10-09 PROCEDURE — 51741 ELECTRO-UROFLOWMETRY FIRST: CPT | Mod: 26,HCNC,51,S$GLB | Performed by: UROLOGY

## 2019-10-09 PROCEDURE — 51784 PR ANAL/URINARY MUSCLE STUDY: ICD-10-PCS | Mod: 26,HCNC,51,S$GLB | Performed by: UROLOGY

## 2019-10-09 PROCEDURE — 51797 INTRAABDOMINAL PRESSURE TEST: CPT | Mod: 26,HCNC,S$GLB, | Performed by: UROLOGY

## 2019-10-09 PROCEDURE — 51741 PR UROFLOWMETRY, COMPLEX: ICD-10-PCS | Mod: 26,HCNC,51,S$GLB | Performed by: UROLOGY

## 2019-10-09 PROCEDURE — 51784 ANAL/URINARY MUSCLE STUDY: CPT | Mod: 26,HCNC,51,S$GLB | Performed by: UROLOGY

## 2019-10-09 PROCEDURE — 52000 CYSTOURETHROSCOPY: CPT | Mod: HCNC,59,S$GLB, | Performed by: UROLOGY

## 2019-10-09 PROCEDURE — 51797 PR VOIDING PRESS STUDY INTRA-ABDOMINAL VOID: ICD-10-PCS | Mod: 26,HCNC,S$GLB, | Performed by: UROLOGY

## 2019-10-09 PROCEDURE — 51728 CYSTOMETROGRAM W/VP: CPT | Mod: 26,HCNC,S$GLB, | Performed by: UROLOGY

## 2019-10-09 PROCEDURE — 52000 PR CYSTOURETHROSCOPY: ICD-10-PCS | Mod: HCNC,59,S$GLB, | Performed by: UROLOGY

## 2019-10-09 PROCEDURE — 51728 PR COMPLEX CYSTOMETROGRAM VOIDING PRESSURE STUDIES: ICD-10-PCS | Mod: 26,HCNC,S$GLB, | Performed by: UROLOGY

## 2019-10-09 RX ORDER — AMOXICILLIN 250 MG/1
500 CAPSULE ORAL ONCE
Status: COMPLETED | OUTPATIENT
Start: 2019-10-09 | End: 2019-10-09

## 2019-10-09 RX ORDER — LIDOCAINE HYDROCHLORIDE 20 MG/ML
JELLY TOPICAL ONCE
Status: COMPLETED | OUTPATIENT
Start: 2019-10-09 | End: 2019-10-09

## 2019-10-09 RX ADMIN — AMOXICILLIN 500 MG: 250 CAPSULE ORAL at 08:10

## 2019-10-09 RX ADMIN — LIDOCAINE HYDROCHLORIDE: 20 JELLY TOPICAL at 08:10

## 2019-10-09 NOTE — PATIENT INSTRUCTIONS
SIMPLE URODYNAMIC STUDY (SUDS) & CYSTOSCOPY  UROLOGY CLINIC DISCHARGE INSTRUCTIONS    You have had a procedure that will require time to properly heal. Follow the instructions you have been given on how to care for yourself once you are home. Below is additional information to help in your recovery.    ACTIVITY  · There are no restrictions in activity. Start doing again the things you did before the procedure.  · You may experience a slight burning sensation. You may notice a small amount of blood in your urine. This will clear up within a day. Call the clinic if this continues beyond 48 hours.    DIET  · Continue your normal diet. You may eat the same foods you ate before your procedure.  · Drink plenty of fluids during the first 24-48 hours following your procedure.    MEDICATIONS  · Resume all other previous medications from your prescribing physician.  · Continue any pre=procedure antibiotics until they are all gone.    SIGNS AND SYMPTOMS TO REPORT TO THE DOCTOR  · Chills or fever greater than 101° F within 24 hours of procedure.  · Changes in urination, such as increased bleeding, foul smell, cloudy urine, or painful urination.  · Call your doctor with any questions or concerns.    For any emergency situation, call [phone] immediately or go to your nearest emergency room.    [facility]  [phone]    _                                                                                                                                                                                             If any problems after hours or weekends, you may call [phone] and ask for the urology resident on call.

## 2019-10-11 NOTE — PROCEDURES
Procedures     Urodynamic Report    Indication:  Urinary hesitancy, incomplete bladder emptying    Patient was taken to the Urodynamic Suite with a comfortably full bladder and asked to perform a free uroflow.  Next, the patient was prepped and the urinary residual was drained with a 14 Fr catheter.  A 7 Fr dual lumen catheter was placed to measure intravesical pressures.  A 10 Fr balloon manometer was placed into the rectum for abdominal pressure measurements.  Patch EMG electrodes were placed on the perineum.  The patient was connected to the [employer] Urodynamic machine, using a multichannel technique, the data were interpreted.  The bladder was filled with sterile water at room temperature at a rate of 30 ml/min.  Patient is filled to urgency.  Filling is performed with the patient in the seated position.  Abdominal leak point pressures are checked at 1st desire, then serially at 50cc increments first with Valsalva then with coughing.  The patient was then asked to sit and void for a pressure flow study.    The following are the results of the study:  1.  Uroflow       Q max:  54.6 ml/sec       Voided volume:  228.5 ml       Pattern of the curve:  Intermittent with the peak towards the end and the lucia above baseline    2.  PVR:  30 ml    3.  CMG       Sensation:         First Desire:  70.2 ml         Normal Desire:  191.1 ml         Strong Desire:  283.9 ml         Urgency:  402.8 ml       Capacity:  674 ml       Abnormal Contractions:  none       Compliance:  Normal     4.  Abdominal Leak Point Pressure:  No stress incontinence    5.  EMG:  Normal guarding reflex,  It took her 16 minutes to void and she had intermittent spikes of EMG activity throughout this time.  Increased during voiding (dysfunctional voider)    6.  Voiding phase        Q max:  62 ml/sec       P det at Q max:  23.8 cm H2O       Pattern of the curve:  Intermittent with the lucia above the baseline.  The peak is flipped peaks towards the end  of the void, rather than the beginning.         Voided volume:  624 ml       PVR:  50 ml    7.  Analysis:  Normal sensation, increased capacity, dysfunctional voider and emptied relatively well    8.  Recommendations:       A.  Recommended physical therapy.  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        B.  Discussed the pathology        C.  Follow up in 6 months      CYSTOSCOPY REPORT    Pre Procedure Diagnosis:  Urinary hesitancy    Post Procedure Diagnosis:  Normal lower urinary tract    Anesthesia: 10 cc 2% lidocaine jelly applied per urethra.    14 FR Flexible Olympus cystoscope used.    FINDINGS:  Dome, anterior, posterior, lateral walls and bladder base free of urothelial abnormalities. Right and left ureteral orifices in the normal postion and configuration, both effluxed clear urine.  Bladder neck and urethra were normal.    Specimen:  none    The patient was taken to the cystoscopy suite and placed in dorsal lithotomy position.  The genitalia was prepped and draped  in the usual sterile fashion.  Two percent lidocaine jelly was inserted in the urethra.  After sufficent time had passed to allow good local anesthesia, the cystoscope was inserted in the urethra and passed into the bladder visualizing the urethra along its entire course.  The dome, anterior, posterior and lateral walls were examined systematically.  The ureteral orifices were in their usual position and configuration.  The cystoscope was turned upon itself 180 degrees to visualize the bladder neck.  The cystoscope was then brought to the level of the bladder neck, the water was turned on and the urethra was visualized.  The cystoscope was removed and the patient was instructed to urinate prior to leaving the office.     Post procedure medication:  Amoxicillin 500 mg x 1     ASSESSMENT/PLAN:  69 year old woman status post flexible cystoscopy.  1. Push fluids for 24 hours.  2. May see blood in the urine, this should  gradually improve over the next 2-3 days.  3. The patient was instructed to return to the office or go to the emergency should fever, chills, cloudy urine, or inability to urinate develop.  4. Follow up in 6 months.

## 2019-10-16 NOTE — PROGRESS NOTES
Digital Medicine: Health  Follow-Up    The history is provided by the patient.     Follow Up  Follow-up reason(s): reading review and goal follow-up      Readings are missing. Patient attributes missing readings to having a busy schedule. She is trying to submit at least one reading per week.    Ms. Child is feeling well. Her 30 day BP average 116/65 mmHg is at goal, <130/80 mmHg. She has no new health goals to establish currently, and no questions for improving dietary habits or physical activity.      INTERVENTION(S)  encouragement/support      There are no preventive care reminders to display for this patient.    Last 5 Patient Entered Readings                                      Current 30 Day Average: 116/65     Recent Readings 10/14/2019 10/6/2019 10/1/2019 9/28/2019 9/25/2019    SBP (mmHg) 131 116 132 124 104    DBP (mmHg) 68 69 72 65 59    Pulse 56 68 62 56 65

## 2019-10-21 ENCOUNTER — HOSPITAL ENCOUNTER (OUTPATIENT)
Dept: RADIOLOGY | Facility: HOSPITAL | Age: 69
Discharge: HOME | End: 2019-10-21
Attending: INTERNAL MEDICINE
Payer: MEDICARE

## 2019-10-21 VITALS — BODY MASS INDEX: 19.27 KG/M2 | HEIGHT: 66 IN | WEIGHT: 119.94 LBS

## 2019-10-21 DIAGNOSIS — Z12.31 BREAST CANCER SCREENING BY MAMMOGRAM: ICD-10-CM

## 2019-10-21 PROCEDURE — 77063 BREAST TOMOSYNTHESIS BI: CPT | Mod: 26,HCNC,, | Performed by: RADIOLOGY

## 2019-10-21 PROCEDURE — 77067 MAMMO DIGITAL SCREENING BILAT WITH TOMOSYNTHESIS_CAD: ICD-10-PCS | Mod: 26,HCNC,, | Performed by: RADIOLOGY

## 2019-10-21 PROCEDURE — 77067 SCR MAMMO BI INCL CAD: CPT | Mod: 26,HCNC,, | Performed by: RADIOLOGY

## 2019-10-21 PROCEDURE — 77067 SCR MAMMO BI INCL CAD: CPT | Mod: TC,HCNC

## 2019-10-21 PROCEDURE — 77063 MAMMO DIGITAL SCREENING BILAT WITH TOMOSYNTHESIS_CAD: ICD-10-PCS | Mod: 26,HCNC,, | Performed by: RADIOLOGY

## 2019-11-11 ENCOUNTER — PATIENT MESSAGE (OUTPATIENT)
Dept: INTERNAL MEDICINE | Facility: CLINIC | Age: 69
End: 2019-11-11

## 2019-11-11 DIAGNOSIS — M81.0 AGE-RELATED OSTEOPOROSIS WITHOUT CURRENT PATHOLOGICAL FRACTURE: Primary | ICD-10-CM

## 2019-11-22 ENCOUNTER — HOSPITAL ENCOUNTER (OUTPATIENT)
Dept: RADIOLOGY | Facility: CLINIC | Age: 69
Discharge: HOME | End: 2019-11-22
Attending: INTERNAL MEDICINE
Payer: MEDICARE

## 2019-11-22 DIAGNOSIS — M81.0 AGE-RELATED OSTEOPOROSIS WITHOUT CURRENT PATHOLOGICAL FRACTURE: ICD-10-CM

## 2019-11-22 PROCEDURE — 77080 DXA BONE DENSITY AXIAL: CPT | Mod: TC,HCNC

## 2019-11-22 PROCEDURE — 77080 DXA BONE DENSITY AXIAL: CPT | Mod: 26,HCNC,, | Performed by: INTERNAL MEDICINE

## 2019-11-22 PROCEDURE — 77080 DEXA BONE DENSITY SPINE HIP: ICD-10-PCS | Mod: 26,HCNC,, | Performed by: INTERNAL MEDICINE

## 2019-11-26 ENCOUNTER — PATIENT OUTREACH (OUTPATIENT)
Dept: OTHER | Facility: OTHER | Age: 69
End: 2019-11-26

## 2019-11-26 NOTE — PROGRESS NOTES
Digital Medicine: Clinician Follow-Up    Called patient for digital medicine follow-up. Overall, she is doing well. Notices that blood pressure is elevated every so often however, she feels that these readings are emotionally driven. States that her medication is working well and she has no questions or concerns. Blood pressure yesterday was 94/56 mmHg and patient denies s/s of hypotension.     The history is provided by the patient. No  was used.     Follow Up  Follow-up reason(s): reading review              Current 30-day average is at goal of <130/80 mmHg.  Continue current medication regimen.  Continue monitoring and follow-up in 3-6 months, sooner if patient's blood pressure begins to trend upward or downward.       There are no preventive care reminders to display for this patient.    Last 5 Patient Entered Readings                                      Current 30 Day Average: 119/68     Recent Readings 11/25/2019 11/21/2019 11/18/2019 11/10/2019 11/8/2019    SBP (mmHg) 94 153 115 115 133    DBP (mmHg) 56 84 65 65 72    Pulse 63 66 63 65 54             Hypertension Medications             carvedilol (COREG) 12.5 MG tablet TAKE 1 TABLET (12.5 MG TOTAL) BY MOUTH 2 (TWO) TIMES DAILY WITH MEALS.    olmesartan (BENICAR) 5 MG Tab Take 1 tablet (5 mg total) by mouth once daily.                 Screenings

## 2019-12-18 ENCOUNTER — PATIENT MESSAGE (OUTPATIENT)
Dept: ADMINISTRATIVE | Facility: OTHER | Age: 69
End: 2019-12-18

## 2019-12-24 ENCOUNTER — OFFICE VISIT (OUTPATIENT)
Dept: URGENT CARE | Facility: CLINIC | Age: 69
End: 2019-12-24
Payer: MEDICARE

## 2019-12-24 VITALS
WEIGHT: 119 LBS | BODY MASS INDEX: 19.21 KG/M2 | HEART RATE: 60 BPM | DIASTOLIC BLOOD PRESSURE: 63 MMHG | TEMPERATURE: 98 F | RESPIRATION RATE: 18 BRPM | SYSTOLIC BLOOD PRESSURE: 137 MMHG | OXYGEN SATURATION: 97 %

## 2019-12-24 DIAGNOSIS — J06.9 UPPER RESPIRATORY TRACT INFECTION, UNSPECIFIED TYPE: Primary | ICD-10-CM

## 2019-12-24 PROCEDURE — 99214 PR OFFICE/OUTPT VISIT, EST, LEVL IV, 30-39 MIN: ICD-10-PCS | Mod: S$GLB,,, | Performed by: PHYSICIAN ASSISTANT

## 2019-12-24 PROCEDURE — 99214 OFFICE O/P EST MOD 30 MIN: CPT | Mod: S$GLB,,, | Performed by: PHYSICIAN ASSISTANT

## 2019-12-24 RX ORDER — PREDNISONE 20 MG/1
40 TABLET ORAL DAILY
Qty: 8 TABLET | Refills: 0 | Status: SHIPPED | OUTPATIENT
Start: 2019-12-24 | End: 2019-12-28

## 2019-12-24 NOTE — PROGRESS NOTES
Subjective:       Patient ID: Chelsea Ford Child is a 69 y.o. female.    Vitals:  weight is 54 kg (119 lb). Her temperature is 97.9 °F (36.6 °C). Her blood pressure is 137/63 and her pulse is 60. Her respiration is 18 and oxygen saturation is 97%.     Chief Complaint: Cough and Sinus Problem    Patient presents with a cough, nasal head congestion and fatigued since friday    Cough   This is a new problem. The current episode started yesterday. The problem has been unchanged. The problem occurs constantly. The cough is productive of sputum. Associated symptoms include chills, headaches, nasal congestion and postnasal drip. Pertinent negatives include no ear pain, eye redness, fever, hemoptysis, myalgias, rash, sore throat, shortness of breath or wheezing. Nothing aggravates the symptoms. She has tried nothing for the symptoms. The treatment provided no relief.       Constitution: Positive for chills. Negative for sweating, fatigue and fever.   HENT: Positive for postnasal drip. Negative for ear pain, congestion, sinus pain, sinus pressure, sore throat and voice change.    Neck: Negative for painful lymph nodes.   Eyes: Negative for eye redness.   Respiratory: Positive for cough. Negative for chest tightness, sputum production, bloody sputum, COPD, shortness of breath, stridor, wheezing and asthma.    Gastrointestinal: Negative for nausea and vomiting.   Musculoskeletal: Negative for muscle ache.   Skin: Negative for rash.   Allergic/Immunologic: Negative for seasonal allergies and asthma.   Neurological: Positive for headaches.   Hematologic/Lymphatic: Negative for swollen lymph nodes.       Objective:      Physical Exam   Constitutional: She is oriented to person, place, and time. She appears well-developed and well-nourished. She is cooperative.  Non-toxic appearance. She does not appear ill. No distress.   HENT:   Head: Normocephalic and atraumatic.   Right Ear: Hearing, tympanic membrane, external ear and ear  canal normal.   Left Ear: Hearing, tympanic membrane, external ear and ear canal normal.   Nose: Mucosal edema present. No rhinorrhea. Right sinus exhibits no maxillary sinus tenderness and no frontal sinus tenderness. Left sinus exhibits no maxillary sinus tenderness and no frontal sinus tenderness.   Mouth/Throat: Oropharynx is clear and moist and mucous membranes are normal. No oropharyngeal exudate, posterior oropharyngeal edema or posterior oropharyngeal erythema.   Hoarse voice   Eyes: Conjunctivae, EOM and lids are normal. Right eye exhibits no discharge. Left eye exhibits no discharge. No scleral icterus.   Neck: Trachea normal, normal range of motion, full passive range of motion without pain and phonation normal. Neck supple.   Cardiovascular: Normal rate, regular rhythm and normal heart sounds. Exam reveals no gallop.   No murmur heard.  Pulmonary/Chest: Effort normal and breath sounds normal. No respiratory distress. She has no decreased breath sounds. She has no wheezes. She has no rhonchi. She has no rales.   Musculoskeletal: She exhibits no edema or deformity.   Lymphadenopathy:        Head (right side): No submandibular and no tonsillar adenopathy present.        Head (left side): No submandibular and no tonsillar adenopathy present.   Neurological: She is alert and oriented to person, place, and time. Coordination normal.   Skin: Skin is warm, dry, intact, not diaphoretic and not pale.   Psychiatric: She has a normal mood and affect. Her speech is normal and behavior is normal. Judgment and thought content normal. Cognition and memory are normal.   Nursing note and vitals reviewed.        Assessment:       1. Upper respiratory tract infection, unspecified type        Plan:         Upper respiratory tract infection, unspecified type  -     predniSONE (DELTASONE) 20 MG tablet; Take 2 tablets (40 mg total) by mouth once daily. for 4 days  Dispense: 8 tablet; Refill: 0      Patient Instructions    -Continue flonase and use coridicin over the counter for symptom relief.  -Take tylenol/motrin as needed for pain/fever.  -Rest and stay hydrated.    Please follow up with your primary care provider within 2-5 days if your signs and symptoms have not resolved or worsen.     If your condition worsens or fails to improve we recommend that you receive another evaluation at the emergency room immediately or contact your primary medical clinic to discuss your concerns.   You must understand that you have received an Urgent Care treatment only and that you may be released before all of your medical problems are known or treated. You, the patient, will arrange for follow up care as instructed.         Viral Upper Respiratory Illness (Adult)  You have a viral upper respiratory illness (URI), which is another term for the common cold. This illness is contagious during the first few days. It is spread through the air by coughing and sneezing. It may also be spread by direct contact (touching the sick person and then touching your own eyes, nose, or mouth). Frequent handwashing will decrease risk of spread. Most viral illnesses go away within 7 to 10 days with rest and simple home remedies. Sometimes the illness may last for several weeks. Antibiotics will not kill a virus, and they are generally not prescribed for this condition.    Home care  · If symptoms are severe, rest at home for the first 2 to 3 days. When you resume activity, don't let yourself get too tired.  · Avoid being exposed to cigarette smoke (yours or others).  · You may use acetaminophen or ibuprofen to control pain and fever, unless another medicine was prescribed. (Note: If you have chronic liver or kidney disease, have ever had a stomach ulcer or gastrointestinal bleeding, or are taking blood-thinning medicines, talk with your healthcare provider before using these medicines.) Aspirin should never be given to anyone under 18 years of age who is ill  with a viral infection or fever. It may cause severe liver or brain damage.  · Your appetite may be poor, so a light diet is fine. Avoid dehydration by drinking 6 to 8 glasses of fluids per day (water, soft drinks, juices, tea, or soup). Extra fluids will help loosen secretions in the nose and lungs.  · Over-the-counter cold medicines will not shorten the length of time youre sick, but they may be helpful for the following symptoms: cough, sore throat, and nasal and sinus congestion. (Note: Do not use decongestants if you have high blood pressure.)  Follow-up care  Follow up with your healthcare provider, or as advised.  When to seek medical advice  Call your healthcare provider right away if any of these occur:  · Cough with lots of colored sputum (mucus)  · Severe headache; face, neck, or ear pain  · Difficulty swallowing due to throat pain  · Fever of 100.4°F (38°C)  Call 911, or get immediate medical care  Call emergency services right away if any of these occur:  · Chest pain, shortness of breath, wheezing, or difficulty breathing  · Coughing up blood  · Inability to swallow due to throat pain  Date Last Reviewed: 9/13/2015  © 7303-6507 [employer]. [address] [place] [address], [place] [address]. All rights reserved. This information is not intended as a substitute for professional medical care. Always follow your healthcare professional's instructions.

## 2019-12-24 NOTE — PATIENT INSTRUCTIONS
-Continue flonase and use coridicin over the counter for symptom relief.  -Take tylenol/motrin as needed for pain/fever.  -Rest and stay hydrated.    Please follow up with your primary care provider within 2-5 days if your signs and symptoms have not resolved or worsen.     If your condition worsens or fails to improve we recommend that you receive another evaluation at the emergency room immediately or contact your primary medical clinic to discuss your concerns.   You must understand that you have received an Urgent Care treatment only and that you may be released before all of your medical problems are known or treated. You, the patient, will arrange for follow up care as instructed.         Viral Upper Respiratory Illness (Adult)  You have a viral upper respiratory illness (URI), which is another term for the common cold. This illness is contagious during the first few days. It is spread through the air by coughing and sneezing. It may also be spread by direct contact (touching the sick person and then touching your own eyes, nose, or mouth). Frequent handwashing will decrease risk of spread. Most viral illnesses go away within 7 to 10 days with rest and simple home remedies. Sometimes the illness may last for several weeks. Antibiotics will not kill a virus, and they are generally not prescribed for this condition.    Home care  · If symptoms are severe, rest at home for the first 2 to 3 days. When you resume activity, don't let yourself get too tired.  · Avoid being exposed to cigarette smoke (yours or others).  · You may use acetaminophen or ibuprofen to control pain and fever, unless another medicine was prescribed. (Note: If you have chronic liver or kidney disease, have ever had a stomach ulcer or gastrointestinal bleeding, or are taking blood-thinning medicines, talk with your healthcare provider before using these medicines.) Aspirin should never be given to anyone under 18 years of age who is ill with  a viral infection or fever. It may cause severe liver or brain damage.  · Your appetite may be poor, so a light diet is fine. Avoid dehydration by drinking 6 to 8 glasses of fluids per day (water, soft drinks, juices, tea, or soup). Extra fluids will help loosen secretions in the nose and lungs.  · Over-the-counter cold medicines will not shorten the length of time youre sick, but they may be helpful for the following symptoms: cough, sore throat, and nasal and sinus congestion. (Note: Do not use decongestants if you have high blood pressure.)  Follow-up care  Follow up with your healthcare provider, or as advised.  When to seek medical advice  Call your healthcare provider right away if any of these occur:  · Cough with lots of colored sputum (mucus)  · Severe headache; face, neck, or ear pain  · Difficulty swallowing due to throat pain  · Fever of 100.4°F (38°C)  Call 911, or get immediate medical care  Call emergency services right away if any of these occur:  · Chest pain, shortness of breath, wheezing, or difficulty breathing  · Coughing up blood  · Inability to swallow due to throat pain  Date Last Reviewed: 9/13/2015  © 5512-3990 [employer]. [address] [place] [address], [place] [address]. All rights reserved. This information is not intended as a substitute for professional medical care. Always follow your healthcare professional's instructions.

## 2019-12-27 ENCOUNTER — OFFICE VISIT (OUTPATIENT)
Dept: URGENT CARE | Facility: CLINIC | Age: 69
End: 2019-12-27
Payer: MEDICARE

## 2019-12-27 VITALS
RESPIRATION RATE: 16 BRPM | BODY MASS INDEX: 19.13 KG/M2 | OXYGEN SATURATION: 100 % | HEART RATE: 62 BPM | SYSTOLIC BLOOD PRESSURE: 116 MMHG | WEIGHT: 119 LBS | HEIGHT: 66 IN | TEMPERATURE: 98 F | DIASTOLIC BLOOD PRESSURE: 62 MMHG

## 2019-12-27 DIAGNOSIS — J32.9 SINUSITIS, UNSPECIFIED CHRONICITY, UNSPECIFIED LOCATION: Primary | ICD-10-CM

## 2019-12-27 PROCEDURE — 99214 PR OFFICE/OUTPT VISIT, EST, LEVL IV, 30-39 MIN: ICD-10-PCS | Mod: S$GLB,,, | Performed by: FAMILY MEDICINE

## 2019-12-27 PROCEDURE — 99214 OFFICE O/P EST MOD 30 MIN: CPT | Mod: S$GLB,,, | Performed by: FAMILY MEDICINE

## 2019-12-27 RX ORDER — CEFDINIR 300 MG/1
300 CAPSULE ORAL 2 TIMES DAILY
Qty: 14 CAPSULE | Refills: 0 | Status: SHIPPED | OUTPATIENT
Start: 2019-12-27 | End: 2020-01-02

## 2019-12-27 NOTE — PROGRESS NOTES
"Subjective:       Patient ID: Chelsea Ford Child is a 69 y.o. female.    Vitals:  height is 5' 6" (1.676 m) and weight is 54 kg (119 lb). Her temperature is 97.6 °F (36.4 °C). Her blood pressure is 116/62 and her pulse is 62. Her respiration is 16 and oxygen saturation is 100%.     Chief Complaint: URI    69-year-old female with 1 week history congestion and sinus drainage.  For the past 5 days it has been green consistently throughout the day.  Seen 3 days ago and given prednisone 40 mg daily for 4 days, last dose this morning.  Also on Flonase.  No fever.          URI    This is a new problem. The current episode started 1 to 4 weeks ago. The problem has been unchanged. Associated symptoms include congestion, coughing, headaches, rhinorrhea and sinus pain. Pertinent negatives include no ear pain, nausea, rash, sore throat, vomiting or wheezing. She has tried acetaminophen for the symptoms. The treatment provided no relief.       Constitution: Negative for chills, sweating, fatigue and fever.   HENT: Positive for congestion, sinus pain and sinus pressure. Negative for ear pain, sore throat and voice change.    Neck: Negative for painful lymph nodes.   Eyes: Negative for eye redness.   Respiratory: Positive for cough and sputum production. Negative for chest tightness, bloody sputum, COPD, shortness of breath, stridor, wheezing and asthma.    Gastrointestinal: Negative for nausea and vomiting.   Musculoskeletal: Negative for muscle ache.   Skin: Negative for rash.   Allergic/Immunologic: Negative for seasonal allergies and asthma.   Neurological: Positive for headaches.   Hematologic/Lymphatic: Negative for swollen lymph nodes.       Objective:      Physical Exam   Constitutional: She is oriented to person, place, and time. She appears well-developed and well-nourished. She is cooperative.  Non-toxic appearance. She does not have a sickly appearance. She does not appear ill. No distress.   HENT:   Head: " Normocephalic and atraumatic.   Right Ear: Hearing, tympanic membrane, external ear and ear canal normal.   Left Ear: Hearing, tympanic membrane, external ear and ear canal normal.   Nose: Nose normal. No mucosal edema, rhinorrhea or nasal deformity. No epistaxis. Right sinus exhibits no maxillary sinus tenderness and no frontal sinus tenderness. Left sinus exhibits no maxillary sinus tenderness and no frontal sinus tenderness.   Mouth/Throat: Uvula is midline, oropharynx is clear and moist and mucous membranes are normal. No trismus in the jaw. Normal dentition. No uvula swelling. No oropharyngeal exudate, posterior oropharyngeal edema or posterior oropharyngeal erythema.   Pharynx with cobblestoning.  Sinuses nontender.   Eyes: Pupils are equal, round, and reactive to light. Conjunctivae and lids are normal. No scleral icterus.   Neck: Trachea normal, normal range of motion, full passive range of motion without pain and phonation normal. Neck supple. No neck rigidity. No edema and no erythema present.   Cardiovascular: Normal rate, regular rhythm, normal heart sounds, intact distal pulses and normal pulses.   Pulmonary/Chest: Effort normal and breath sounds normal. No stridor. No respiratory distress. She has no decreased breath sounds. She has no wheezes. She has no rhonchi. She has no rales.   Abdominal: Normal appearance.   Musculoskeletal: Normal range of motion. She exhibits no edema or deformity.   Lymphadenopathy:     She has no cervical adenopathy.   Neurological: She is alert and oriented to person, place, and time. She exhibits normal muscle tone. Coordination normal.   Skin: Skin is warm, dry, intact, not diaphoretic and not pale.   Psychiatric: She has a normal mood and affect. Her speech is normal and behavior is normal. Judgment and thought content normal. Cognition and memory are normal.   Nursing note and vitals reviewed.        Assessment:       1. Sinusitis, unspecified chronicity, unspecified  location        Plan:         Sinusitis, unspecified chronicity, unspecified location  -     cefdinir (OMNICEF) 300 MG capsule; Take 1 capsule (300 mg total) by mouth 2 (two) times daily. for 7 days  Dispense: 14 capsule; Refill: 0    Make sure that you follow up with your primary care doctor in the next 2-5 days if needed .  Return to the Urgent Care if signs or symptoms change and certainly if you have worsening symptoms go to the nearest emergency department for further evaluation.

## 2019-12-27 NOTE — PATIENT INSTRUCTIONS
Sinusitis (Antibiotic Treatment)    The sinuses are air-filled spaces within the bones of the face. They connect to the inside of the nose. Sinusitis is an inflammation of the tissue lining the sinus cavity. Sinus inflammation can occur during a cold. It can also be due to allergies to pollens and other particles in the air. Sinusitis can cause symptoms of sinus congestion and fullness. A sinus infection causes fever, headache and facial pain. There is often green or yellow drainage from the nose or into the back of the throat (post-nasal drip). You have been given antibiotics to treat this condition.  Home care:  · Take the full course of antibiotics as instructed. Do not stop taking them, even if you feel better.  · Drink plenty of water, hot tea, and other liquids. This may help thin mucus. It also may promote sinus drainage.  · Heat may help soothe painful areas of the face. Use a towel soaked in hot water. Or,  the shower and direct the hot spray onto your face. Using a vaporizer along with a menthol rub at night may also help.   · An expectorant containing guaifenesin may help thin the mucus and promote drainage from the sinuses.  · Over-the-counter decongestants may be used unless a similar medicine was prescribed. Nasal sprays work the fastest. Use one that contains phenylephrine or oxymetazoline. First blow the nose gently. Then use the spray. Do not use these medicines more often than directed on the label or symptoms may get worse. You may also use tablets containing pseudoephedrine. Avoid products that combine ingredients, because side effects may be increased. Read labels. You can also ask the pharmacist for help. (NOTE: Persons with high blood pressure should not use decongestants. They can raise blood pressure.)  · Over-the-counter antihistamines may help if allergies contributed to your sinusitis.    · Do not use nasal rinses or irrigation during an acute sinus infection, unless told to by  your health care provider. Rinsing may spread the infection to other sinuses.  · Use acetaminophen or ibuprofen to control pain, unless another pain medicine was prescribed. (If you have chronic liver or kidney disease or ever had a stomach ulcer, talk with your doctor before using these medicines. Aspirin should never be used in anyone under 18 years of age who is ill with a fever. It may cause severe liver damage.)  · Don't smoke. This can worsen symptoms.  Follow-up care  Follow up with your healthcare provider or our staff if you are not improving within the next week.  When to seek medical advice  Call your healthcare provider if any of these occur:  · Facial pain or headache becoming more severe  · Stiff neck  · Unusual drowsiness or confusion  · Swelling of the forehead or eyelids  · Vision problems, including blurred or double vision  · Fever of 100.4ºF (38ºC) or higher, or as directed by your healthcare provider  · Seizure  · Breathing problems  · Symptoms not resolving within 10 days  Date Last Reviewed: 4/13/2015  © 9082-8281 [employer]. [address] [place] [address]. All rights reserved. This information is not intended as a substitute for professional medical care. Always follow your healthcare professional's instructions.        Make sure that you follow up with your primary care doctor in the next 2-5 days if needed .  Return to the Urgent Care if signs or symptoms change and certainly if you have worsening symptoms go to the nearest emergency department for further evaluation.

## 2019-12-30 ENCOUNTER — PATIENT MESSAGE (OUTPATIENT)
Dept: INTERNAL MEDICINE | Facility: CLINIC | Age: 69
End: 2019-12-30

## 2020-01-02 ENCOUNTER — OFFICE VISIT (OUTPATIENT)
Dept: INTERNAL MEDICINE | Facility: CLINIC | Age: 70
End: 2020-01-02
Payer: MEDICARE

## 2020-01-02 VITALS
HEART RATE: 65 BPM | SYSTOLIC BLOOD PRESSURE: 108 MMHG | BODY MASS INDEX: 18.85 KG/M2 | HEIGHT: 66 IN | OXYGEN SATURATION: 99 % | WEIGHT: 117.31 LBS | DIASTOLIC BLOOD PRESSURE: 72 MMHG

## 2020-01-02 DIAGNOSIS — I10 BENIGN ESSENTIAL HYPERTENSION: ICD-10-CM

## 2020-01-02 DIAGNOSIS — T36.95XA ANTIBIOTIC-ASSOCIATED DIARRHEA: Primary | ICD-10-CM

## 2020-01-02 DIAGNOSIS — M81.0 OSTEOPOROSIS, UNSPECIFIED OSTEOPOROSIS TYPE, UNSPECIFIED PATHOLOGICAL FRACTURE PRESENCE: ICD-10-CM

## 2020-01-02 DIAGNOSIS — K52.1 ANTIBIOTIC-ASSOCIATED DIARRHEA: Primary | ICD-10-CM

## 2020-01-02 PROCEDURE — 3078F PR MOST RECENT DIASTOLIC BLOOD PRESSURE < 80 MM HG: ICD-10-PCS | Mod: HCNC,CPTII,S$GLB, | Performed by: INTERNAL MEDICINE

## 2020-01-02 PROCEDURE — 1159F PR MEDICATION LIST DOCUMENTED IN MEDICAL RECORD: ICD-10-PCS | Mod: HCNC,S$GLB,, | Performed by: INTERNAL MEDICINE

## 2020-01-02 PROCEDURE — 3074F SYST BP LT 130 MM HG: CPT | Mod: HCNC,CPTII,S$GLB, | Performed by: INTERNAL MEDICINE

## 2020-01-02 PROCEDURE — 1101F PT FALLS ASSESS-DOCD LE1/YR: CPT | Mod: HCNC,CPTII,S$GLB, | Performed by: INTERNAL MEDICINE

## 2020-01-02 PROCEDURE — 3074F PR MOST RECENT SYSTOLIC BLOOD PRESSURE < 130 MM HG: ICD-10-PCS | Mod: HCNC,CPTII,S$GLB, | Performed by: INTERNAL MEDICINE

## 2020-01-02 PROCEDURE — 1125F AMNT PAIN NOTED PAIN PRSNT: CPT | Mod: HCNC,S$GLB,, | Performed by: INTERNAL MEDICINE

## 2020-01-02 PROCEDURE — 1125F PR PAIN SEVERITY QUANTIFIED, PAIN PRESENT: ICD-10-PCS | Mod: HCNC,S$GLB,, | Performed by: INTERNAL MEDICINE

## 2020-01-02 PROCEDURE — 1101F PR PT FALLS ASSESS DOC 0-1 FALLS W/OUT INJ PAST YR: ICD-10-PCS | Mod: HCNC,CPTII,S$GLB, | Performed by: INTERNAL MEDICINE

## 2020-01-02 PROCEDURE — 3078F DIAST BP <80 MM HG: CPT | Mod: HCNC,CPTII,S$GLB, | Performed by: INTERNAL MEDICINE

## 2020-01-02 PROCEDURE — 99214 OFFICE O/P EST MOD 30 MIN: CPT | Mod: HCNC,S$GLB,, | Performed by: INTERNAL MEDICINE

## 2020-01-02 PROCEDURE — 1159F MED LIST DOCD IN RCRD: CPT | Mod: HCNC,S$GLB,, | Performed by: INTERNAL MEDICINE

## 2020-01-02 PROCEDURE — 99999 PR PBB SHADOW E&M-EST. PATIENT-LVL III: ICD-10-PCS | Mod: PBBFAC,HCNC,, | Performed by: INTERNAL MEDICINE

## 2020-01-02 PROCEDURE — 99214 PR OFFICE/OUTPT VISIT, EST, LEVL IV, 30-39 MIN: ICD-10-PCS | Mod: HCNC,S$GLB,, | Performed by: INTERNAL MEDICINE

## 2020-01-02 PROCEDURE — 99999 PR PBB SHADOW E&M-EST. PATIENT-LVL III: CPT | Mod: PBBFAC,HCNC,, | Performed by: INTERNAL MEDICINE

## 2020-01-02 NOTE — PROGRESS NOTES
Subjective:     Chief Complaint  Chief Complaint   Patient presents with    Diarrhea     x5 days    Abdominal Cramping     x5 days     Back Pain     x5 days        HPI  Chelsea Ford Child is a 69 y.o. female with medical diagnoses as listed in the medical history and problem list that presents for above compalints.      Patient started Omnicef for URI recently and started experiencing symptoms of diarrhea/abdominal discomfort/bloating  Stopped taking antibiotic after 3 days of symptoms  She also hadd taken a prednisone course without any problems  No fever//chills/sweats  Some back pain as well  URI symptoms improved on Flonase       Patient Care Team:  Elva Tripathi MD as PCP - General (Internal Medicine)  Rossi Queen PharmD as Hypertension Digital Medicine Clinician (Pharmacist)  Nery Bird as Digital Medicine Health   Elva Tripathi MD as Hypertension Digital Medicine Responsible Provider (Internal Medicine)  Nicolasa Keys LPN as Care Coordinator (Internal Medicine)  Ryann Guzman Managed Medicare as Hypertension Digital Medicine Contract  Nelson Velazquez as Digital Medicine Health       PAST MEDICAL HISTORY:  Past Medical History:   Diagnosis Date    BCC (basal cell carcinoma)     Exotropia of left eye     Hypertension     Osteoporosis     Palpitations 2017    Vertigo 2017       PAST SURGICAL HISTORY:  Past Surgical History:   Procedure Laterality Date     SECTION      Mohs      BCC    STRABISMUS SURGERY Left        SOCIAL HISTORY:  Social History     Socioeconomic History    Marital status: Single     Spouse name: Not on file    Number of children: Not on file    Years of education: Not on file    Highest education level: Not on file   Occupational History    Occupation: RN - retired     Comment: worker's comp   Social Needs    Financial resource strain: Not hard at all    Food insecurity:     Worry: Never true     Inability: Never true     Transportation needs:     Medical: No     Non-medical: No   Tobacco Use    Smoking status: Former Smoker     Start date:      Last attempt to quit:      Years since quittin.0    Smokeless tobacco: Never Used   Substance and Sexual Activity    Alcohol use: No     Frequency: Never     Binge frequency: Never    Drug use: No    Sexual activity: Never     Birth control/protection: Post-menopausal   Lifestyle    Physical activity:     Days per week: 5 days     Minutes per session: 80 min    Stress: Not at all   Relationships    Social connections:     Talks on phone: More than three times a week     Gets together: More than three times a week     Attends [employer] service: Not on file     Active member of club or organization: Yes     Attends meetings of clubs or organizations: More than 4 times per year     Relationship status: Never    Other Topics Concern    Are you pregnant or think you may be? Not Asked    Breast-feeding Not Asked   Social History Narrative    Not on file       FAMILY HISTORY:  Family History   Problem Relation Age of Onset    Hypertension Mother     Hypertension Father     COPD Father     Stroke Father 72    No Known Problems Son     Breast cancer Neg Hx     Colon cancer Neg Hx     Ovarian cancer Neg Hx     Cataracts Neg Hx     Glaucoma Neg Hx     Macular degeneration Neg Hx        ALLERGIES AND MEDICATIONS: updated and reviewed.  Review of patient's allergies indicates:   Allergen Reactions    Omnicef [cefdinir] Diarrhea    Adhesive Dermatitis    Nickel sutures [surgical stainless steel]      Rash with nickel     Current Outpatient Medications   Medication Sig Dispense Refill    CALCIUM ORAL Take by mouth.      carvedilol (COREG) 12.5 MG tablet TAKE 1 TABLET (12.5 MG TOTAL) BY MOUTH 2 (TWO) TIMES DAILY WITH MEALS. 180 tablet 3    estradiol (ESTRACE) 0.01 % (0.1 mg/gram) vaginal cream Rub dime sized amount of cream to the urethra nightly 42.5 g 11     "olmesartan (BENICAR) 5 MG Tab Take 1 tablet (5 mg total) by mouth once daily. 90 tablet 3    FLUZONE HIGH-DOSE 2019-20, PF, 180 mcg/0.5 mL Syrg        No current facility-administered medications for this visit.          ROS:  Review of Systems   Constitutional: Negative for chills, diaphoresis and fever.   Gastrointestinal: Positive for abdominal pain.   Musculoskeletal: Positive for back pain.       Objective:       Physical Exam  Vitals:    01/02/20 1753   BP: 108/72   BP Location: Left arm   Patient Position: Sitting   BP Method: Medium (Manual)   Pulse: 65   SpO2: 99%   Weight: 53.2 kg (117 lb 4.6 oz)   Height: 5' 6" (1.676 m)    Body mass index is 18.93 kg/m².  Weight: 53.2 kg (117 lb 4.6 oz)   Height: 5' 6" (167.6 cm)   Physical Exam   Constitutional: She appears well-developed. No distress.   HENT:   Head: Normocephalic and atraumatic.   Eyes: EOM are normal.   Cardiovascular: Normal rate.   Pulmonary/Chest: Effort normal and breath sounds normal.   Abdominal: She exhibits no distension and no mass. There is no tenderness. There is no guarding.   Musculoskeletal: She exhibits no edema or deformity.   Neurological: She is alert. No cranial nerve deficit.   Skin: Skin is warm and dry.   Psychiatric: She has a normal mood and affect. Her behavior is normal.   Vitals reviewed.          Assessment:     1. Antibiotic-associated diarrhea    2. Benign essential hypertension    3. Osteoporosis, unspecified osteoporosis type, unspecified pathological fracture presence      Plan:     Chelsea was seen today for diarrhea, abdominal cramping and back pain.    Diagnoses and all orders for this visit:    Antibiotic-associated diarrhea  Patient has discontinued Omnicef  Recommend high fiber diet, resume regular diet as tolerated    Benign essential hypertension   BP controlled presently - reviewed anti-hypertensive regimen - continue current therapy    Osteoporosis, unspecified osteoporosis type, unspecified pathological " fracture presence  Patient with recent DEXA Nov 2019  Contemplating bisphosphonate therapy - to discuss with PCP  Continue calcium/vitamin D supplementation        Health Maintenance       Date Due Completion Date    TETANUS VACCINE 01/06/1967 ---    Shingles Vaccine (1 of 2) 01/06/1999 ---    Pneumococcal Vaccine (65+ High/Highest Risk) (2 of 2 - PPSV23) 11/21/2017 9/26/2017    Fecal Occult Blood Test (FOBT)/[employer] 05/26/2020 5/26/2019    Override on 12/5/2017: Done    Mammogram 10/21/2021 10/21/2019    DEXA SCAN 11/22/2021 11/22/2019    Lipid Panel 05/24/2024 5/24/2019            Health Maintenance reviewed and addressed as per orders    No follow-ups on file.    The patient expressed understanding and no barriers to adherence were identified.     1. The patient indicates understanding of these issues and agrees with the plan. Brief care plan is updated and reviewed with the patient as applicable.     2. The patient is given an After Visit Summary that lists all medications with directions, allergies, orders placed during this encounter and follow-up instructions.     3. I have reviewed the patient's medical information including past medical, family, and social history sections including the medications and allergies.     4. We discussed the patient's current medications. I reconciled the patient's medication list and prepared and supplied needed refills.       Jhon Knutson MD  Internal Medicine-Pediatrics

## 2020-01-03 NOTE — PATIENT INSTRUCTIONS

## 2020-02-10 ENCOUNTER — PATIENT MESSAGE (OUTPATIENT)
Dept: INTERNAL MEDICINE | Facility: CLINIC | Age: 70
End: 2020-02-10

## 2020-02-11 ENCOUNTER — PATIENT MESSAGE (OUTPATIENT)
Dept: INTERNAL MEDICINE | Facility: CLINIC | Age: 70
End: 2020-02-11

## 2020-02-11 DIAGNOSIS — H81.10 BENIGN PAROXYSMAL POSITIONAL VERTIGO, UNSPECIFIED LATERALITY: Primary | ICD-10-CM

## 2020-02-11 NOTE — TELEPHONE ENCOUNTER
Spoke to patient. The PT referral was faxed to number provided in portal message. Patient stated that the office did receive the referral but patient did not see a choice in list of specialities?    Please advise.

## 2020-02-21 ENCOUNTER — CLINICAL SUPPORT (OUTPATIENT)
Dept: REHABILITATION | Facility: HOSPITAL | Age: 70
End: 2020-02-21
Payer: MEDICARE

## 2020-02-21 DIAGNOSIS — H81.10 BENIGN PAROXYSMAL POSITIONAL VERTIGO, UNSPECIFIED LATERALITY: ICD-10-CM

## 2020-02-21 DIAGNOSIS — H81.11 BPPV (BENIGN PAROXYSMAL POSITIONAL VERTIGO), RIGHT: ICD-10-CM

## 2020-02-21 PROCEDURE — 97161 PT EVAL LOW COMPLEX 20 MIN: CPT | Mod: HCNC,PO

## 2020-02-21 PROCEDURE — 95992 CANALITH REPOSITIONING PROC: CPT | Mod: HCNC,PO

## 2020-02-21 NOTE — PLAN OF CARE
[facility]  Physical Therapy Neurological Rehabilitation Initial Evaluation    Name: Chelsea Ford Child  Clinic Number: [id number]    Therapy Diagnosis:   Encounter Diagnoses   Name Primary?    Benign paroxysmal positional vertigo, unspecified laterality     BPPV (benign paroxysmal positional vertigo), right      Physician: Elva Tripathi MD    Physician Orders: PT Eval and Treat   Medical Diagnosis from Referral: H81.10 (ICD-10-CM) - Benign paroxysmal positional vertigo, unspecified laterality  Evaluation Date: 2020  Authorization Period Expiration: 20 to 02/10/21  Plan of Care Expiration: 20  Visit # / Visits authorized:  (further authorization needed)    Time In: 14:45  Time Out: 15:30  Total Billable Time: 45 minutes    Precautions: Standard, h/o cancer, osteoporosis of lumbar spine and hip regions    Subjective   Date of onset: Dizziness started about 3 weeks ago    History of current condition - Chelsea reports: that she had an episode of vertigo ~6-7 years ago which resolved on its own after ~4-5 days. She is not sure if it was BPPV or something else. Her current vertigo symptoms are most noticeable when turning her head to the right when lying down on her back. Vertigo is described as a spinning sensation that lasts for 10-15 seconds. She does notice some dizziness with vertical head movements. She endorses that her hearing has decreased over the past few years but no significant recent decline. Endorses h/o degenerative arthritis in cervical spine.      Medical History:   Past Medical History:   Diagnosis Date    BCC (basal cell carcinoma)     Exotropia of left eye     Hypertension     Osteoporosis     Palpitations 2017    Vertigo 2017       Surgical History:   Chelsea Ford Child  has a past surgical history that includes Mohs;  section; and Strabismus surgery (Left).    Medications:   Chelsea has a current medication list  "which includes the following prescription(s): calcium, carvedilol, estradiol, fluzone high-dose 2019-20 (pf), and olmesartan.    Allergies:   Review of patient's allergies indicates:   Allergen Reactions    Omnicef [cefdinir] Diarrhea    Adhesive Dermatitis    Nickel sutures [surgical stainless steel]      Rash with nickel      Imagin19 DXA Bone Scan: There is a 21.8% risk of a major osteoporotic fracture and a 8.9% risk of hip fracture in the next 10 years (FRAX). 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  Prior Level of Function: (I) with functional mobility/ADL, driving, cooking, cleaning, running errands  Current Level of Function: same as above but more cautious with mobility    History of Current Symptoms   Triggers: rolling to right side in supine, vertical head movements   Alleviating Factors: let's symptoms pass   Description of symptoms: spinning sensation   Onset: ~3 weeks ago   Frequency: variable, when she makes certain movements   Duration: ~10 -15 sec   Positional changes: lying down onto right side, rolling onto right side in bed   Limitations due to symptoms: more cautious and aware with rapid head movements    History of migraines: yes prior to menopause; last recent migraine was 15 years ago    Pain:  Frequent discomfort on both sides of her neck; h/o neck pain    Pts goals: "To be able to manage my vertigo symptoms and to try to avoid flare ups."    Objective   - Follows commands: 100% of time   - Speech: no deficits     BP: well controlled with medication    Mental status: alert, oriented to person, place, and time  Appearance: Casually dressed  Behavior:  cooperative  Attention Span and Concentration:  " Normal    Dominant hand:  right     Posture Alignment in sitting and standing:   Head: forward head   Scapulae: slightly rounded shoulders   Trunk: slouched posture   Pelvis: NT   Legs: neutral     Sensation: Light Touch: occasional numbness and tingling in both arms and hands in the night          Proprioception: Intact, Kinesthesia NT         Visual/Oculomotor: endorses increased difficulty with reading small print recently; h/o L lazy eye  Tracking/Smooth Pursuits:Impaired: mild jerkiness in all planes when tracking pen, no dizziness, no visual slippage  Saccades: Impaired: decreased speed, no dizziness, no visual slippage  Acuity:wears readers  R/L discrimination: Intact  Visual field: Intact  Convergence: WFL, 8 cm  VOR 1: Impaired: visual slippage in both directions, min dizziness  VCR: NT    Coordination: not formally tested this date    ROM:     CERVICAL SPINE  Flexion 61 degrees (80-90 deg), min dizziness  Extension 58 degrees (70-80 deg), min dizziness  L side bend 30 degrees, R side bend 34 degrees (20-45 degrees)  L rotation 60 degrees, R rotation 62 degrees (70-90 degrees)  Are concurrent symptoms present with any of these movements: see above    Modified VAS (Vertebral Artery Screen), in sitting (rotation, then extension):  R: (-)  L: (-)        RANGE OF MOTION--LOWER EXTREMITIES  (R) LE Hip: WFL   Knee: WFL   Ankle: WFL    (L) LE: Hip: WFL   Knee: WFL   Ankle: WFL    Strength: manual muscle test grades below   Upper Extremity Strength- not formally tested this date  Lower Extremity Strength- not formally tested this date    Abdominal Strength: at least 3/5    SOL SENSORY TESTING: not tested this date due to CRM performed as priority   Functional Gait Assessment: not tested this date due to CRM performed as priority    Gait Assessment:  - AD used: none  - Assistance: independent  - Distance: community distances    GAIT DEVIATIONS:  Chelsea displays the following deviations with ambulation: no  "significant deviations noted    Endurance Deficit: mild    POSITIONAL CANAL TESTING  Looking for nystagmus (slow phase followed by quick phase to the affected side for BPPV)    Jonh Hallpike (posterior / CL anterior)   Right : (+) vertigo, (+) rotary nystagmus lasting for ~10-15 sec for 1st trial; (-) vertigo, (-) nystagmus for second trial   Left: (-) vertigo, (-) nystagmus  Horizontal Canals   Right: (-) vertigo, (-) nystagmus   Left: NT  Treatment Performed: Epley maneuver performed to treat R PSCC BPPV x 1 trial; see below    CMS Impairment/Limitation/Restriction for FOTO Survey- patient did not perform       TREATMENT   Treatment Time In: 15:15  Treatment Time Out: 15:25  Total Treatment time separate from Evaluation: 10 minutes    Patient participated in canalith repositioning maneuvers to address: right posterior semi-circular canal BPPV for 10 minutes. The following activities were included:    Epley Maneuver to treat right PSCC BPPV x 1 trial    (+) vertigo, (+) nystagmus lasting ~10-15 sec in position 1, position held for additional 30 sec; (-) vertigo, (-) nystagmus in position 2, position held for additional 30 sec; (-) vertigo, (-) nystagmus in position 3, position held for additional 30 sec; 5 head shakes "no", (-) vertigo, (-) nystagmus, position held for additional 30 sec; (-) vertigo, (-) nystagmus in position 4, position held for additional 30 sec    1 min seated rest break    Home Exercises and Patient Education Provided    Education provided:   - POC, scheduling  -Patient instructed to avoid sleeping on her right side and to avoid extreme neck extension for the next 48 hours to prevent crystal re-migration; patient verbalized good understanding.     Written Home Exercises Provided: none provided this date.     Assessment   Chelsea is a 70 y.o. female referred to outpatient Physical Therapy with a medical diagnosis of Benign paroxysmal positional vertigo, unspecified laterality. Pt presents with " chief complaints of vertigo that has persisted over the past weeks that is mainly triggered with lying on her right side. During oculomotor testing, impairment noted with smooth pursuits, saccades, VOR 1, and convergence; however, patient only endorses dizziness with VOR 1. Oculomotor deficits may likely be attributed to PMH of migraines, but will continue to monitor throughout follow up sessions. During cervical AROM screen, some limitation noted with flexion, extension, and bilateral rotation; cervical restrictions likely attributed to PMH of cervical region degenerative arthritis. VAS test (-) bilaterally. During canalith repositioning testing (CRTs), patient (-) for both vertigo and nystagmus for L John Hallpike. Patient (+) for both vertigo and rotary nystagmus during right Onida Hallpike test. Therefore, Epley maneuver performed to treat R PSCC BPPV. During re-test of R Onida Hallpike post Epley maneuver, patient (-) for both vertigo and nystagmus. Pt also (-) for both vertigo and nystagmus during right side lying horizontal test. PT to reassess CRTs next session and treat accordingly. PT to also perform further vestibular testing and treatment if indicated.     Pt prognosis is Excellent.   Pt will benefit from skilled outpatient Physical Therapy to address the deficits stated above and in the chart below, provide pt/family education, and to maximize pt's level of independence.     Plan of care discussed with patient: Yes  Pt's spiritual, cultural and educational needs considered and patient is agreeable to the plan of care and goals as stated below:     Anticipated Barriers for therapy: co-morbidities, work schedule    Medical Necessity is demonstrated by the following  History  Co-morbidities and personal factors that may impact the plan of care Co-morbidities:   Hypertension, osteoporosis, palpitations, h/o basal cell carcinoma    Personal Factors:   no deficits     moderate   Examination  Body Structures and  Functions, activity limitations and participation restrictions that may impact the plan of care Body Regions:   head    Body Systems:    vestibular function    Participation Restrictions:   Work schedule    Activity limitations:   Learning and applying knowledge  no deficits    General Tasks and Commands  no deficits    Communication  no deficits    Mobility  no deficits    Self care  no deficits    Domestic Life  no deficits    Interactions/Relationships  no deficits    Life Areas  no deficits    Community and Social Life  community life  recreation and leisure         low   Clinical Presentation stable and uncomplicated low   Decision Making/ Complexity Score: low     Goals:  Short Term Goals = Long Term Goals: 6 weeks   1. Patient to be (I) with established HEP to manage vestibular deficits.   2. Patient to be (-) for both vertigo and nystagmus during both John Lazo pike tests for resolution of BPPV episode.   3. Patient to be (I) for both vertigo and nystagmus during both horizontal side lying tests for resolution of BPPV episode.  4. Patient to endorse at least 90% improvement in concordant symptoms of vertigo for improved activity participation.       Plan   Plan of care Certification: 2/21/2020 to 04/03/20.    Outpatient Physical Therapy 2 times weekly for 6 weeks to include the following interventions: Gait Training, Manual Therapy, Moist Heat/ Ice, Neuromuscular Re-ed, Orthotic Management and Training, Patient Education, Self Care, Therapeutic Activites, Therapeutic Exercise and Canalith Repositioning Maneuvers.     Annamaria Ferrer, PT

## 2020-02-27 ENCOUNTER — CLINICAL SUPPORT (OUTPATIENT)
Dept: REHABILITATION | Facility: HOSPITAL | Age: 70
End: 2020-02-27
Payer: MEDICARE

## 2020-02-27 DIAGNOSIS — H81.11 BPPV (BENIGN PAROXYSMAL POSITIONAL VERTIGO), RIGHT: ICD-10-CM

## 2020-02-27 PROCEDURE — 97112 NEUROMUSCULAR REEDUCATION: CPT | Mod: HCNC,PO,59

## 2020-02-27 PROCEDURE — 95992 CANALITH REPOSITIONING PROC: CPT | Mod: HCNC,PO

## 2020-02-27 NOTE — PROGRESS NOTES
"  Physical Therapy Daily Treatment Note     Name: Chelsea Ford Child  Clinic Number: [id number]    Therapy Diagnosis:   Encounter Diagnosis   Name Primary?    BPPV (benign paroxysmal positional vertigo), right      Physician: Elva Tripathi MD    Visit Date: 2/27/2020    Physician Orders: PT Eval and Treat   Medical Diagnosis from Referral: H81.10 (ICD-10-CM) - Benign paroxysmal positional vertigo, unspecified laterality  Evaluation Date: 2/21/2020  Authorization Period Expiration: 02/27/20 to 08/27/20  Plan of Care Expiration: 04/03/20  Visit # / Visits authorized: 01/ 20 (2 total visits)     Time In: 15:30  Time Out: 16:15  Total Billable Time: 45 minutes     Precautions: Standard, h/o cancer, osteoporosis of lumbar spine and hip regions    Subjective     Pt reports: that she has not had the spinning sensation of dizziness since last therapy session. She does notice some dizziness, described as a "woozy" sensation mainly when moving from supine sit and with quick movements.   She was compliant with instructions provided.   Response to previous treatment: improved vertigo with bed mobility  Functional change: ongoing    Pain: 0/10  Location: NA    Objective     Chelsea received canalith repositioning maneuvers to address R PSCC BPPV for 15 minutes including:    POSITIONAL CANAL TESTING  Looking for nystagmus (slow phase followed by quick phase to the affected side for BPPV)     John [place]pike (posterior / CL anterior)              Right : (-) vertigo, (-) rotary nystagmus for 1st trial; (-) vertigo, (-) nystagmus for second trial              Left: (-) vertigo, (-) nystagmus  Horizontal Canals              Right: (-) vertigo, (-) nystagmus              Left: (-) vertigo, (-) nystagmus  Treatment Performed: Epley maneuver performed to treat R PSCC BPPV x 1 trial    Epley Maneuver to further assess for right PSCC BPPV x 1 trial     (-) vertigo, (-) nystagmus in position 1, position held for additional 30 sec; (-) " "vertigo, (-) nystagmus in position 2, position held for additional 30 sec; (-) vertigo, (-) nystagmus in position 3, position held for additional 30 sec; 5 head shakes "no", (-) vertigo, (-) nystagmus, position held for additional 30 sec; (-) vertigo, (-) nystagmus in position 4, position held for additional 30 sec     2 min seated rest break; lightheaded feeling post maneuver    Chelsea participated in neuromuscular re-education activities to improve: vestibular function for 30 minutes. The following activities were included:    Visual/Oculomotor: endorses increased difficulty with reading small print recently; h/o L lazy eye  Tracking/Smooth Pursuits:WFL: occ slight jerkiness when tracking pen, no dizziness, no visual slippage  Saccades: WFL, no dizziness, no visual slippage  Convergence: WFL, 8 cm  VOR 1: Impaired: visual slippage in both directions, min dizziness    SOL SENSORY TESTING:  (P= Pass, F= Fail; note any sway; hold each position for 30")  Condition 1: (firm surface/feet together/eyes open) P  Condition 2: (firm surface/feet together/eyes closed) P, increased sensation of sway  Condition 3: (firm surface/feet in tandem/eyes open) P  Condition 4: (firm surface/feet in tandem/eyes closed) F, 11 sec   Condition 5: (soft surface/feet together/eyes open) P  Condition 6: (soft surface/feet together/eyes closed) P, increased sway  Condition 7: (Fakuda step test), measure distance varied from center starting position P, 25 deg bias to right    Functional Gait Assessment:   1. Gait on level surface =  3  2. Change in Gait Speed = 3  3. Gait with horizontal head turns  = 2  4. Gait with vertical head turns = 3  5. Gait with pivot turns = 3  6. Step over obstacle = 3  7. Gait with Narrow JEFF = 2  8. Gait with eyes closed = 3  9. Ambulating Backwards = 3  10. Steps = 3    Score 28/30     Home Exercises Provided and Patient Education Provided     Education provided:   - POC, scheduling    Written Home Exercises " Provided: yes. Ambulation in hallway with R<>L head turns.   Exercises were reviewed and Chelsea was able to demonstrate them prior to the end of the session.  Chelsea demonstrated good  understanding of the education provided.     See EMR under Patient Instructions for exercises provided 2/27/2020.    Assessment   Chelsea tolerated first follow up session well this afternoon with reports of improved vertigo since evaluation. She does continue to endorse brief episodes of dizziness with quick changes in position and quick movements. Canalith repositioning testing reassessed first to ensure resolution of R PSCC BPPV. Patient (-) for both vertigo and nystagmus in both [place] L[place] pike test positions and both side lying horizontal test positions. Epley maneuver performed to further assess for presence of right sided BPPV. Patient (-) for both vertigo and nystagmus throughout. Therefore, oculomotor performance reassessed and further vestibular testing performed. Patient demonstrates improvement with smooth pursuits and saccades compared to evaluation.VOR 1 remains impaired with min dizziness triggered. Patient demonstrates fairly good performance on GST, but difficulty noticed with all vision eliminated conditions. Fukuda step test score WFL with 25 deg bias to right side which is on higher end of functional range. Patient also demonstrates good performance on FGA with most difficulty and dizziness noted with ambulation with horizontal head turns. Now that R PSCC BPPV episode is resolved, patient's remaining vestibular deficits appear most consistent with a medical diagnosis of incompletely compensated right perihperal vestibulopathy. Patient to benefit from continued PT intervention to further address remaining vestibular deficits. Established goals updated.     Chelsea is progressing well towards her goals.   Pt prognosis is Excellent.     Pt will continue to benefit from skilled outpatient physical therapy to address the  deficits listed in the problem list box on initial evaluation, provide pt/family education and to maximize pt's level of independence in the home and community environment.     Pt's spiritual, cultural and educational needs considered and pt agreeable to plan of care and goals.     Anticipated Barriers for therapy: co-morbidities, work schedule    Goals:  Short Term Goals = Long Term Goals: 6 weeks   1. Patient to be (I) with established HEP to manage vestibular deficits. Ongoing  2. Patient to be (-) for both vertigo and nystagmus during both [place] Lazo pike tests for resolution of BPPV episode. MET 02/27/20  3. Patient to be (I) for both vertigo and nystagmus during both horizontal side lying tests for resolution of BPPV episode. MET 02/27/20  4. Patient to endorse at least 90% improvement in concordant symptoms of vertigo for improved activity participation. Ongoing   5. Updated 02/27/20: Patient to perform VOR 1 at 90 bpm WFL for improved gaze stabilization. Ongoing   6. Updated 02/27/20: Patient to improve GST condition 4 to at least 20 sec for improved balance in low vision environments. Ongoing    Plan     Progress VOR 1 as tolerated. Progress movement habituation as tolerated. Address vision eliminated balance.     Annamaria Ferrer, PT

## 2020-03-04 ENCOUNTER — PATIENT MESSAGE (OUTPATIENT)
Dept: INTERNAL MEDICINE | Facility: CLINIC | Age: 70
End: 2020-03-04

## 2020-03-04 ENCOUNTER — CLINICAL SUPPORT (OUTPATIENT)
Dept: REHABILITATION | Facility: HOSPITAL | Age: 70
End: 2020-03-04
Payer: MEDICARE

## 2020-03-04 DIAGNOSIS — H81.11 BPPV (BENIGN PAROXYSMAL POSITIONAL VERTIGO), RIGHT: ICD-10-CM

## 2020-03-04 PROCEDURE — 97112 NEUROMUSCULAR REEDUCATION: CPT | Mod: HCNC,PO

## 2020-03-04 NOTE — PROGRESS NOTES
"  Physical Therapy Daily Treatment Note     Name: Chelsea Ford Child  Clinic Number: [id number]    Therapy Diagnosis:   Encounter Diagnosis   Name Primary?    BPPV (benign paroxysmal positional vertigo), right      Physician: Elva Tripathi MD    Visit Date: 3/4/2020    Physician Orders: PT Eval and Treat   Medical Diagnosis from Referral: H81.10 (ICD-10-CM) - Benign paroxysmal positional vertigo, unspecified laterality  Evaluation Date: 2/21/2020  Authorization Period Expiration: 02/27/20 to 08/27/20  Plan of Care Expiration: 04/03/20  Visit # / Visits authorized: 02/ 20 (3 total visits)     Time In: 08:00  Time Out: 08:45  Total Billable Time: 45 minutes     Precautions: Standard, h/o cancer, osteoporosis of lumbar spine and hip regions    Subjective     Pt reports: that she still experiences some dizziness when moving from flat supine position to sitting. She has been compliant with her HEP but still notices that she is a little unsteady. Overall, she feels that she is improving.   She was compliant with HEP provided.   Response to previous treatment: improved vertigo with bed mobility  Functional change: ongoing    Pain: 0/10  Location: NA    Objective     Chelsea participated in neuromuscular re-education activities to improve: vestibular function for 45 minutes. The following activities were included:    Seated Oculomotor Exercises:    VOR 1: horizontal, post it target arm's length away, shoulder width apart  1 x 30" at 80 bpm, slight visual slippage, no dizziness  2 x 30" at 90 bpm, min visual slippage, no dizziness    Balance / Habituation:    X 4 laps x 100 feet forward ambulation in closed hallway with R<>L head turns, min sway, no dizziness, SBA    X 4 laps tandem ambulation in // bars, occ touchdown support, SBA    Foam pad:  3 x 30" static stance with narrow JEFF, eyes closed, CGA to SBA, no UE support  3 x 30" static stance with narrow JEFF, eyes open + R<>L head turns, CGA to SBA, no UE support  3 " "x 30" static stance with narrow JEFF, eyes open + up <> down head turns, CGA to SBA, no UE support  2 x 30" static stance with narrow JEFF, eyes closed + up <> down head turns, CGA, no UE support    2 x 30" each LE in front, tandem stance with eyes closed, CGA to occ Min A, no UE support    X 10 reps, supine <> sit with eyes open, slight/min brief dizziness upon sitting up; dizziness improved throughout  X 10 reps, supine <> sit with eyes closed, min dizziness upon sitting up; dizziness improved throughout    Sitting EOM:  X 1 rep, patient retrieves 6 medium cones from ground on her right side with her left hand> returns to upright > turns to left to place cones on mat; slight dizziness; patient then returns cones to ground, no significant dizziness  X 1 rep, patient retrieves 6 medium cones from ground on her left side with her right hand> returns to upright > turns to right to place cones on mat; slight dizziness; patient then returns cones to ground, no significant dizziness    X 3 laps, weaving through 6 large cones, patient bends down to touch each cone with UE as she passes it, brief slight dizziness, SBA    X 5 laps, figure 8 ambulation, no dizziness, SBA    Home Exercises Provided and Patient Education Provided     Education provided:   - POC, updated HEP    Written Home Exercises Provided: yes.  Continue current HEP. Added on 03/04/20:  VOR 1 at 90 bpm, supine <> sit with eyes closed x 10 reps  Ambulation in hallway with R<>L head turns.   Exercises were reviewed and Chelsea was able to demonstrate them prior to the end of the session.  Chelsea demonstrated good  understanding of the education provided.     See EMR under Patient Instructions for exercises provided 2/27/2020.    Assessment   Chelsea tolerated therapy session well this morning with initial reports of improved symptoms since prior visit. Since she still endorses brief dizziness with supine > sit motion, therapy session focused on movement " habituation activities to reduce this response. Vision eliminated supine <> sit activity appeared most provocative of remaining concordant symptoms, but dizziness improved throughout activity. HEP updated to include VOR 1 and supine <> sit. Continue POC to further address remaining vestibular deficits.     Chelsea is progressing well towards her goals.   Pt prognosis is Excellent.     Pt will continue to benefit from skilled outpatient physical therapy to address the deficits listed in the problem list box on initial evaluation, provide pt/family education and to maximize pt's level of independence in the home and community environment.     Pt's spiritual, cultural and educational needs considered and pt agreeable to plan of care and goals.     Anticipated Barriers for therapy: co-morbidities, work schedule    Goals:  Short Term Goals = Long Term Goals: 6 weeks   1. Patient to be (I) with established HEP to manage vestibular deficits. Progressing  2. Patient to be (-) for both vertigo and nystagmus during both [place] pike tests for resolution of BPPV episode. MET 02/27/20  3. Patient to be (I) for both vertigo and nystagmus during both horizontal side lying tests for resolution of BPPV episode. MET 02/27/20  4. Patient to endorse at least 90% improvement in concordant symptoms of vertigo for improved activity participation. Ongoing   5. Updated 02/27/20: Patient to perform VOR 1 at 90 bpm WFL for improved gaze stabilization. Progressing   6. Updated 02/27/20: Patient to improve GST condition 4 to at least 20 sec for improved balance in low vision environments. Ongoing    Plan     Progress VOR 1 as tolerated. Progress movement habituation as tolerated. Address vision eliminated balance.     Annamaria Ferrer, PT

## 2020-03-05 ENCOUNTER — DOCUMENTATION ONLY (OUTPATIENT)
Dept: REHABILITATION | Facility: HOSPITAL | Age: 70
End: 2020-03-05

## 2020-03-05 NOTE — PROGRESS NOTES
PT/PTA met face to face to discuss pt's treatment plan and progress towards established goals.  Continue with current PT POC with focus on VOR, habituation, and vision eliminated balance.  Patient will be seen by physical therapist at least every sixth treatment or 30 days, whichever occurs first.    Tamara Summers, PTA  03/05/2020

## 2020-03-06 ENCOUNTER — CLINICAL SUPPORT (OUTPATIENT)
Dept: REHABILITATION | Facility: HOSPITAL | Age: 70
End: 2020-03-06
Payer: MEDICARE

## 2020-03-06 DIAGNOSIS — H81.11 BPPV (BENIGN PAROXYSMAL POSITIONAL VERTIGO), RIGHT: ICD-10-CM

## 2020-03-06 PROCEDURE — 97112 NEUROMUSCULAR REEDUCATION: CPT | Mod: HCNC,PO

## 2020-03-06 NOTE — PROGRESS NOTES
"  Physical Therapy Daily Treatment Note     Name: Chelsea Ford Child  Clinic Number: [id number]    Therapy Diagnosis:   Encounter Diagnosis   Name Primary?    BPPV (benign paroxysmal positional vertigo), right      Physician: Elva Tripathi MD    Visit Date: 3/6/2020    Physician Orders: PT Eval and Treat   Medical Diagnosis from Referral: H81.10 (ICD-10-CM) - Benign paroxysmal positional vertigo, unspecified laterality  Evaluation Date: 2/21/2020  Authorization Period Expiration: 02/27/20 to 08/27/20  Plan of Care Expiration: 04/03/20  Visit # / Visits authorized: 03/ 20 (4 total visits)     Time In: 1015  Time Out: 1100  Total Billable Time: 45 minutes     Precautions: Standard, h/o cancer, osteoporosis of lumbar spine and hip regions    Subjective     Pt reports: Overall, she feels that she is improving.   She was compliant with HEP provided.   Response to previous treatment: improved vertigo with bed mobility  Functional change: Pt stated she was able to complete activities like getting bowl out of low cabinet the other day with no symptoms elicited.    Pain: 0/10  Location: NA    Objective     Chelsea participated in neuromuscular re-education activities to improve: vestibular function for 45 minutes. The following activities were included:    Seated Oculomotor Exercises:    VOR 1: horizontal, post it target arm's length away  1 x 30" at 80 bpm, no visual slippage, no dizziness  2 x 30" at 90 bpm, slight visual slippage noted, min eye fatigue, no dizziness    Balance / Habituation:    X 4 laps x 100 feet forward ambulation in closed hallway with R<>L head turns, min sway with one stop to regain balance; no dizziness, SBA    X 4 laps tandem ambulation in // bars, occ touchdown support, SBA    Foam pad:  3 x 30" static stance with narrow JEFF, eyes closed, CGA to SBA, no UE support  3 x 30" static stance with narrow JEFF, eyes open + R<>L head turns, CGA to SBA, no UE support  3 x 30" static stance with narrow " "JEFF, eyes open + up <> down head turns, CGA to SBA, no UE support  2 x 30" static stance with narrow JEFF, eyes closed + up <> down head turns, CGA, 1 grasping of bars episode each trial    2 x 30" each LE in front, tandem stance with eyes closed, CGA to occ Min A, no UE support    X 10 reps, supine <> sit with eyes open, slight/min brief dizziness upon sitting up; dizziness improved throughout  X 10 reps, supine <> sit with eyes closed, slight dizziness upon sitting up first 2 times; dizziness improved throughout    Sitting EOM:  X 2 rep, patient retrieves 6 medium cones from ground on her right side with her left hand> returns to upright > turns to left to place cones on mat; slight dizziness; patient then returns cones to ground, no significant dizziness  X 2 rep, patient retrieves 6 medium cones from ground on her left side with her right hand> returns to upright > turns to right to place cones on mat; slight dizziness; patient then returns cones to ground, no significant dizziness    X 3 laps, weaving through 6 large cones, patient bends down to touch each cone with UE as she passes it, no dizziness, SBA    X 5 laps, figure 8 ambulation, no dizziness, SBA    Home Exercises Provided and Patient Education Provided     Education provided:   - POC, updated HEP    Written Home Exercises Provided: yes.  Continue current HEP. Added on 03/04/20:  VOR 1 at 90 bpm, supine <> sit with eyes closed x 10 reps  Ambulation in hallway with R<>L head turns.   Exercises were reviewed and Chelsea was able to demonstrate them prior to the end of the session.  Chelsea demonstrated good  understanding of the education provided.     See EMR under Patient Instructions for exercises provided 2/27/2020.    Assessment   Chelsea tolerated therapy session well this morning with initial reports of improved symptoms since prior visit.  Pt feels that 80 BPM was very slow during VOR training( no visual symptoms or dizziness present with this " carlos alberto) and has been practicing at 90 BPM at home. Pt could likely manage progressing to 100 BPM in the next treatment session. Pt tolerated all treatment well today with only 1 minor report of symptoms; vision eliminated supine <> sit activity appeared most provocative of remaining concordant symptoms with just slight dizziness while sitting up the first 2 times( then she reports it disappeared).  Continue POC to further address remaining vestibular deficits.     hCelsea is progressing well towards her goals.   Pt prognosis is Excellent.     Pt will continue to benefit from skilled outpatient physical therapy to address the deficits listed in the problem list box on initial evaluation, provide pt/family education and to maximize pt's level of independence in the home and community environment.     Pt's spiritual, cultural and educational needs considered and pt agreeable to plan of care and goals.     Anticipated Barriers for therapy: co-morbidities, work schedule    Goals:  Short Term Goals = Long Term Goals: 6 weeks   1. Patient to be (I) with established HEP to manage vestibular deficits. Progressing  2. Patient to be (-) for both vertigo and nystagmus during both [place] Lazo pike tests for resolution of BPPV episode. MET 02/27/20  3. Patient to be (I) for both vertigo and nystagmus during both horizontal side lying tests for resolution of BPPV episode. MET 02/27/20  4. Patient to endorse at least 90% improvement in concordant symptoms of vertigo for improved activity participation. Ongoing   5. Updated 02/27/20: Patient to perform VOR 1 at 90 bpm WFL for improved gaze stabilization. Progressing   6. Updated 02/27/20: Patient to improve GST condition 4 to at least 20 sec for improved balance in low vision environments. Ongoing    Plan     Progress VOR 1 as tolerated. Progress movement habituation as tolerated. Address vision eliminated balance.     Antony Greenberg, SPT    I certify that I was present in the room  directing the student in service delivery and guiding them using my skilled judgment. As the co-signing therapist, I have reviewed the student's documentation and am responsible for the treatment, assessment and plan.    Ricardo Norris, PT  3/6/2020

## 2020-03-09 ENCOUNTER — DOCUMENTATION ONLY (OUTPATIENT)
Dept: REHABILITATION | Facility: HOSPITAL | Age: 70
End: 2020-03-09

## 2020-03-09 ENCOUNTER — CLINICAL SUPPORT (OUTPATIENT)
Dept: REHABILITATION | Facility: HOSPITAL | Age: 70
End: 2020-03-09
Payer: MEDICARE

## 2020-03-09 DIAGNOSIS — H81.11 BPPV (BENIGN PAROXYSMAL POSITIONAL VERTIGO), RIGHT: ICD-10-CM

## 2020-03-09 PROCEDURE — 97112 NEUROMUSCULAR REEDUCATION: CPT | Mod: HCNC,PO,CQ

## 2020-03-09 NOTE — PROGRESS NOTES
Face to face meeting completed with Annamaria Ferrer PT regarding current status and progress of   Chelsea Ford Child .  Romulo Howard, PTA

## 2020-03-09 NOTE — PROGRESS NOTES
"  Physical Therapy Daily Treatment Note     Name: Chelsea Ford Child  Clinic Number: [id number]    Therapy Diagnosis:   Encounter Diagnosis   Name Primary?    BPPV (benign paroxysmal positional vertigo), right      Physician: Elva Tripathi MD    Visit Date: 3/9/2020    Physician Orders: PT Eval and Treat   Medical Diagnosis from Referral: H81.10 (ICD-10-CM) - Benign paroxysmal positional vertigo, unspecified laterality  Evaluation Date: 2/21/2020  Authorization Period Expiration: 02/27/20 to 08/27/20  Plan of Care Expiration: 04/03/20  Visit # / Visits authorized: 04/ 20 (5 total visits)     Time In: 0800  Time Out: 0845  Total Billable Time: 45 minutes     Precautions: Standard, h/o cancer, osteoporosis of lumbar spine and hip regions    Subjective     Pt reports: Overall, she feels that she is improving; also states that she has some dizziness in the morning when getting out of bed; continues to feel unsteady when walking and turning her head.   She was compliant with HEP provided.   Response to previous treatment: improved vertigo with bed mobility  Functional change: Pt stated she was able to complete activities like getting bowl out of low cabinet the other day with no symptoms elicited.    Pain: 0/10  Location: NA    Objective     Chelsea participated in neuromuscular re-education activities to improve: vestibular function for 45 minutes. The following activities were included:    Seated Oculomotor Exercises:    VOR 1: horizontal, post it target arm's length away  1 x 30" at 80 bpm, no visual slippage, no dizziness  1 x 30" at 90 bpm, no visual slippage noted, no eye fatigue, no dizziness  2 x 30" at 100 bpm, no visual slippage noted, eye fatigue, no dizziness    Balance / Habituation:    X 4 laps x 100 feet forward ambulation in closed hallway with R<>L head turns, min sway with some lightheadedness; no dizziness, SBA    X 4 laps tandem ambulation along ballet bar, no UE support, SBA    Foam pad:  3 x " "30" static stance with narrow JEFF, eyes closed, CGA to SBA, no UE support  3 x 30" static stance with narrow JEFF, eyes open + R<>L head turns, CGA to SBA, no UE support, slight lightheadedness noted  2 x 30" static stance with narrow JEFF, eyes open + up <> down head turns, CGA to SBA, no UE support, no lightheadedness noted  2 x 30" static stance with narrow JEFF, eyes closed + up <> down head turns, CGA, no UE support  2 x 30" static stance with narrow JEFF, eyes closed + R<>L head turns, CGA, 3 grasped bar 3x duirng the first trials and 1x during the second trial    2 x 30" each LE in front, tandem stance with eyes closed, CGA, no UE support    X 10 reps, supine <> sit with eyes open, slight/min brief dizziness upon sitting up; dizziness only with first 2 reps  X 10 reps, supine <> sit with eyes closed, slight dizziness upon sitting up first 2 times; dizziness only with first 2 reps    Sitting EOM:  X 1 rep, patient retrieves 6 medium cones from ground on her right side with her left hand> returns to upright > turns to left to place cones on mat; slight dizziness; patient then returns cones to ground, no significant dizziness  X 1 rep, patient retrieves 6 medium cones from ground on her left side with her right hand> returns to upright > turns to right to place cones on mat; no significant dizziness; patient then returns cones to ground, no significant dizziness  X 1 rep, patient retrieves 6 medium cones from ground on her right side with her left hand> returns to upright > turns to left to place cones on mat; slight  Lightheadedness but no dizziness   X 1 rep, patient retrieves 6 medium cones from ground on her left side with her right hand> returns to upright > turns to right to place cones on mat; slight  Lightheadedness but no dizziness    5 laps, walking around 4 cones set in a square pattern ~5 feet apart, with R<>L head turns, slight lightheaded, SBA    X 1 rep, pt retrieves 6 medium cones from a ~3.5 ft " shelf and moves them to a shelf over head (~6.5 ft from ground), slight lightheadedness        Home Exercises Provided and Patient Education Provided     Education provided:   - POC, updated HEP    Written Home Exercises Provided: yes.  Continue current HEP. Added on 03/04/20:  VOR 1 at 90 bpm, supine <> sit with eyes closed x 10 reps  Ambulation in hallway with R<>L head turns.   Exercises were reviewed and Chelsea was able to demonstrate them prior to the end of the session.  Chelsea demonstrated good  understanding of the education provided.     See EMR under Patient Instructions for exercises provided 2/27/2020.    Assessment   Pt demonstrated significant improvements in all aspects of therapy as pt reported decreased dizziness when performing VOR1, balance and habituation exercises when compared with the last tx visit. It was noted that the bpm was increased with VOR1 and pt still reported no dizziness or eye fatigue. It was also noted that pt demonstrated increased stability and required less assistance with balance and habituation activities. Continue POC to further address remaining vestibular deficits.       Chelsea is progressing well towards her goals.   Pt prognosis is Excellent.     Pt will continue to benefit from skilled outpatient physical therapy to address the deficits listed in the problem list box on initial evaluation, provide pt/family education and to maximize pt's level of independence in the home and community environment.     Pt's spiritual, cultural and educational needs considered and pt agreeable to plan of care and goals.     Anticipated Barriers for therapy: co-morbidities, work schedule    Goals:  Short Term Goals = Long Term Goals: 6 weeks   1. Patient to be (I) with established HEP to manage vestibular deficits. Progressing  2. Patient to be (-) for both vertigo and nystagmus during both [place] Lazo pike tests for resolution of BPPV episode. MET 02/27/20  3. Patient to be (I) for both  vertigo and nystagmus during both horizontal side lying tests for resolution of BPPV episode. MET 02/27/20  4. Patient to endorse at least 90% improvement in concordant symptoms of vertigo for improved activity participation. Ongoing   5. Updated 02/27/20: Patient to perform VOR 1 at 90 bpm WFL for improved gaze stabilization. Progressing   6. Updated 02/27/20: Patient to improve GST condition 4 to at least 20 sec for improved balance in low vision environments. Ongoing    Plan     Progress VOR 1 as tolerated. Progress movement habituation as tolerated. Address vision eliminated balance.     Robert Stiles, PTA  3/9/2020

## 2020-03-10 ENCOUNTER — PATIENT MESSAGE (OUTPATIENT)
Dept: REHABILITATION | Facility: HOSPITAL | Age: 70
End: 2020-03-10

## 2020-03-11 ENCOUNTER — DOCUMENTATION ONLY (OUTPATIENT)
Dept: REHABILITATION | Facility: HOSPITAL | Age: 70
End: 2020-03-11

## 2020-03-12 NOTE — PROGRESS NOTES
PT/PTA met face to face to discuss pt's treatment plan and progress towards established goals. Continue with current PT POC, including: vision eliminated balance, VOR1 and habituation. Pt will be seen by physical therapist at least every 6th treatment day or every 30 days, whichever occurs first.      Robert Stiles, PTA  3/09/20    Face to face performed on 03/09/20.  Annamaria Ferrer, PT

## 2020-03-13 ENCOUNTER — CLINICAL SUPPORT (OUTPATIENT)
Dept: REHABILITATION | Facility: HOSPITAL | Age: 70
End: 2020-03-13
Payer: MEDICARE

## 2020-03-13 DIAGNOSIS — H81.11 BPPV (BENIGN PAROXYSMAL POSITIONAL VERTIGO), RIGHT: ICD-10-CM

## 2020-03-13 PROCEDURE — 97112 NEUROMUSCULAR REEDUCATION: CPT | Mod: HCNC,PO

## 2020-03-13 NOTE — PROGRESS NOTES
"  Physical Therapy Daily Treatment Note     Name: Chelsea Ford Child  Clinic Number: [id number]    Therapy Diagnosis:   Encounter Diagnosis   Name Primary?    BPPV (benign paroxysmal positional vertigo), right      Physician: Elva Tripathi MD    Visit Date: 3/13/2020    Physician Orders: PT Eval and Treat   Medical Diagnosis from Referral: H81.10 (ICD-10-CM) - Benign paroxysmal positional vertigo, unspecified laterality  Evaluation Date: 2/21/2020  Authorization Period Expiration: 02/27/20 to 08/27/20  Plan of Care Expiration: 04/03/20  Visit # / Visits authorized: 05/ 20 (6 total visits)     Time In: 08:45  Time Out: 09:27  Total Billable Time: 42 minutes     Precautions: Standard, h/o cancer, osteoporosis of lumbar spine and hip regions    Subjective     Pt reports: On Wednesday, she had brief episodes of dizziness, that lasted a few seconds when she was looking up when dusting. She still notices some dizziness when moving form lying down to sitting up.     She was compliant with HEP provided.   Response to previous treatment: improved vertigo with bed mobility  Functional change: Pt stated she was able to complete activities like getting bowl out of low cabinet the other day with no symptoms elicited.    Pain: 0/10  Location: NA    Objective     Chelsea participated in neuromuscular re-education activities to improve: vestibular function for 42 minutes. The following activities were included:    Seated Oculomotor Exercises:    VOR 1: horizontal, post it target arm's length away  3 x 30" at 110 bpm, no visual slippage noted, eye fatigue, no dizziness    Balance / Habituation:    X 4 laps x 100 feet forward ambulation in closed hallway with R<>L head turns, slight sway; no dizziness, SBA    X 4 laps tandem ambulation along ballet bar, no UE support, SBA    Foam pad:  3 x 30" static stance with narrow JEFF, eyes closed, SBA, no UE support  2 x 30" static stance with narrow JEFF, eyes open + R<>L head turns, SBA, " "no UE support, very slight brief dizziness  2 x 30" static stance with narrow JEFF, eyes open + up <> down head turns,  SBA, no UE support, no dizziness noted  2 x 30" static stance with narrow JEFF, eyes closed + up <> down head turns, SBA to occ CGA, no UE support  2 x 30" static stance with narrow JEFF, eyes closed + R<>L head turns, SBA to occ CGA, no UE support, no dizziness    2 x 30" each LE in front, tandem stance with eyes closed, CGA, no UE support    2 X 10 reps, supine <> sit with eyes closed, slight dizziness upon sitting up first 2 trials but improves throughout activity    Sitting EOM:  X 1 rep, patient retrieves 6 medium cones from ground on her right side with her left hand> returns to upright > turns to left to place cones on mat; slight dizziness; patient then returns cones to ground, no significant dizziness  X 1 rep, patient retrieves 6 medium cones from ground on her left side with her right hand> returns to upright > turns to right to place cones on mat; no significant dizziness; patient then returns cones to ground, no significant dizziness     X 1 rep, pt retrieves 6 medium cones from ground and moves them to a shelf over head (~6.5 ft from ground); she then returns cones to ground; no dizziness throughout    X 4 laps x 100 feet forward ambulation in closed hallway with vertical self ball toss <> catch with eyes tracking ball throughout, SBA, no dizziness    Home Exercises Provided and Patient Education Provided     Education provided:   - POC    Written Home Exercises Provided: yes.  Continue current HEP. Added on 03/04/20:  VOR 1 at 90 bpm, supine <> sit with eyes closed x 10 reps  Ambulation in hallway with R<>L head turns.   Exercises were reviewed and Chelsea was able to demonstrate them prior to the end of the session.  Chelsea demonstrated good  understanding of the education provided.     See EMR under Patient Instructions for exercises provided 2/27/2020.    Assessment   Pt tolerated " therapy session well this morning with only slight brief episodes of dizziness noted with vision eliminated supine <> sit. However, dizziness improved throughout each trial. She continues to demonstrate improved steadiness with vision eliminated balance on foam pad. Able to progress speed of VOR 1 without difficulty. Continue POC to progress as tolerated.     Chelsea is progressing well towards her goals.   Pt prognosis is Excellent.     Pt will continue to benefit from skilled outpatient physical therapy to address the deficits listed in the problem list box on initial evaluation, provide pt/family education and to maximize pt's level of independence in the home and community environment.     Pt's spiritual, cultural and educational needs considered and pt agreeable to plan of care and goals.     Anticipated Barriers for therapy: co-morbidities, work schedule    Goals:  Short Term Goals = Long Term Goals: 6 weeks   1. Patient to be (I) with established HEP to manage vestibular deficits. Progressing  2. Patient to be (-) for both vertigo and nystagmus during both [place] Lazo pike tests for resolution of BPPV episode. MET 02/27/20  3. Patient to be (I) for both vertigo and nystagmus during both horizontal side lying tests for resolution of BPPV episode. MET 02/27/20  4. Patient to endorse at least 90% improvement in concordant symptoms of vertigo for improved activity participation. Ongoing   5. Updated 02/27/20: Patient to perform VOR 1 at 90 bpm WFL for improved gaze stabilization. Progressing   6. Updated 02/27/20: Patient to improve GST condition 4 to at least 20 sec for improved balance in low vision environments. Ongoing    Plan     Progress VOR 1 as tolerated. Progress movement habituation as tolerated. Progress vision eliminated balance to BOSU.     Annamaria Ferrer, PT  3/13/2020

## 2020-03-14 ENCOUNTER — PATIENT MESSAGE (OUTPATIENT)
Dept: INTERNAL MEDICINE | Facility: CLINIC | Age: 70
End: 2020-03-14

## 2020-03-16 ENCOUNTER — PATIENT OUTREACH (OUTPATIENT)
Dept: OTHER | Facility: OTHER | Age: 70
End: 2020-03-16

## 2020-03-16 NOTE — PROGRESS NOTES
Digital Medicine: Health  Follow-Up    The history is provided by the patient.     Follow Up  Follow-up reason(s): goal follow-up    Ms. Cavazos is doing okay. She is pleased with her BP overall, and has no questions or concerns at this time. She is taking extra precautions for COVID-19. Encouraged patient to call as questions or concerns arise. She thanked me for calling today.       INTERVENTION(S)  encouragement/support, denied further coaching and denied questions    PLAN  continue monitoring      There are no preventive care reminders to display for this patient.    Last 5 Patient Entered Readings                                      Current 30 Day Average: 111/62     Recent Readings 3/14/2020 3/10/2020 3/10/2020 3/9/2020 3/4/2020    SBP (mmHg) 127 91 111 108 107    DBP (mmHg) 67 54 61 58 67    Pulse 55 59 62 63 65            Diet Screening   No change to diet.      Physical Activity Screening   No change to exercise routine.

## 2020-03-17 ENCOUNTER — TELEPHONE (OUTPATIENT)
Dept: REHABILITATION | Facility: HOSPITAL | Age: 70
End: 2020-03-17

## 2020-03-17 ENCOUNTER — PATIENT MESSAGE (OUTPATIENT)
Dept: REHABILITATION | Facility: HOSPITAL | Age: 70
End: 2020-03-17

## 2020-03-17 NOTE — TELEPHONE ENCOUNTER
Patient messaged PT this AM inquiring about cancelling today's appointment scheduled for 12:30 due to patient experiencing lower back pain since gardening on Sunday. PT called patient to discuss lower back pain. Patient reports that she has been icing her back and taking anti-inflammatory medication which has helped but that her back is still feeling achy this morning. She would like to cancel her appointment today to allow her back to rest but is planning to attend her appointment on Thursday pending improvement in pain. PT instructed patient to continue to ice her back and to perform light stretching for pain relief. Patient verbalized good understanding.    Annamaria Ferrer, PT

## 2020-03-19 ENCOUNTER — PATIENT MESSAGE (OUTPATIENT)
Dept: REHABILITATION | Facility: HOSPITAL | Age: 70
End: 2020-03-19

## 2020-03-19 ENCOUNTER — DOCUMENTATION ONLY (OUTPATIENT)
Dept: REHABILITATION | Facility: HOSPITAL | Age: 70
End: 2020-03-19

## 2020-03-19 DIAGNOSIS — I10 ESSENTIAL HYPERTENSION: ICD-10-CM

## 2020-03-19 RX ORDER — CARVEDILOL 12.5 MG/1
12.5 TABLET ORAL 2 TIMES DAILY WITH MEALS
Qty: 180 TABLET | Refills: 3 | Status: SHIPPED | OUTPATIENT
Start: 2020-03-19 | End: 2020-04-27 | Stop reason: SDUPTHER

## 2020-03-19 NOTE — PROGRESS NOTES
Postponed Appointments     Patient: Chelsea Ford Child  Date: 3/19/2020  Diagnosis: No diagnosis found.  MRN: [id number]     Spoke with patient due to therapy following updates regarding COVID-19 closely and taking every precaution to ensure the safety of our patients, staff and community.  In an abundance of caution and in an effort to help reduce risk and limit community spread, we have decided to temporarily postpone appointments for patients who may be at increased risk to attend in-person therapy or where therapy is not critically needed at this time. Plan of care and home exercise program were reviewed and patient has what they need to continue therapy at home. All patient questions were answered. Also stated to patient that we are exploring virtual methods of providing care and will be in touch over the next few weeks. Patient verbalized understanding to all.     Robert Stiles, PTA  3/19/2020

## 2020-03-20 ENCOUNTER — PATIENT MESSAGE (OUTPATIENT)
Dept: INTERNAL MEDICINE | Facility: CLINIC | Age: 70
End: 2020-03-20

## 2020-03-24 ENCOUNTER — TELEPHONE (OUTPATIENT)
Dept: REHABILITATION | Facility: HOSPITAL | Age: 70
End: 2020-03-24

## 2020-03-24 ENCOUNTER — DOCUMENTATION ONLY (OUTPATIENT)
Dept: REHABILITATION | Facility: HOSPITAL | Age: 70
End: 2020-03-24

## 2020-03-24 DIAGNOSIS — H81.11 BPPV (BENIGN PAROXYSMAL POSITIONAL VERTIGO), RIGHT: ICD-10-CM

## 2020-03-24 NOTE — TELEPHONE ENCOUNTER
Postponed Appointments    Patient: Chelsea Ford Child  Date: 3/24/2020  Diagnosis:   1. BPPV (benign paroxysmal positional vertigo), right       MRN: [id number]    Spoke with patient concerning [facility] following updates regarding COVID-19 closely and taking every precaution to ensure the safety of our patients, staff and community.  In an abundance of caution and in an effort to help reduce risk and limit community spread, we have decided to temporarily postpone all appointments for patients who may be at increased risk to attend in-person therapy or where therapy is not critically needed at this time. Plan of care and home exercise program were reviewed and patient has what they need to continue therapy at home. All patient questions were answered. Also stated to patient that we are exploring virtual methods of providing care. Patient declined virtual visits at this time, stating that weekly phone calls to touch base about HEP would suffice at this time. Patient verbalized understanding to all.      Annamaria Ferrer, PT  3/24/2020

## 2020-03-31 ENCOUNTER — TELEPHONE (OUTPATIENT)
Dept: REHABILITATION | Facility: HOSPITAL | Age: 70
End: 2020-03-31

## 2020-03-31 DIAGNOSIS — H81.11 BPPV (BENIGN PAROXYSMAL POSITIONAL VERTIGO), RIGHT: ICD-10-CM

## 2020-03-31 NOTE — TELEPHONE ENCOUNTER
Therapy Postponed / COVID-19 pandemic   Patient Check-In Courtesy Call    Courtesy call to check in with patient today to ensure they are doing their home exercise program and to answer any questions. Chelsea Ford Child stated that she has been compliant with her home exercises and does not have any questions at this time. No other exercise recommendations given today.     Annamaria Ferrer, PT  03/31/2020

## 2020-04-08 ENCOUNTER — DOCUMENTATION ONLY (OUTPATIENT)
Dept: REHABILITATION | Facility: HOSPITAL | Age: 70
End: 2020-04-08

## 2020-04-08 NOTE — PROGRESS NOTES
Postponed Appointments     Patient: Chelsea Ford Child  Date: 4/8/2020  Diagnosis: No diagnosis found.       Courtesy call to check patient status. Pt stated that she was having some minor neck pain lately. Pt further stated that she noticed increased instability and decreased balanced when ever she experiences the neck pain. Pt was issued neck stretches (levator and upper trap) to be incorporated to her current HEP.    RN: [id number]      Robert Stiles, PTA  4/8/2020

## 2020-04-13 ENCOUNTER — DOCUMENTATION ONLY (OUTPATIENT)
Dept: REHABILITATION | Facility: HOSPITAL | Age: 70
End: 2020-04-13

## 2020-04-13 NOTE — PROGRESS NOTES
Postponed Appointments     Patient: Chelsea Ford Child  Date: 4/13/2020  Diagnosis: No diagnosis found.  MRN: [id number]     Spoke with patient due to therapy following updates regarding COVID-19 closely and taking every precaution to ensure the safety of our patients, staff and community.  In an abundance of caution and in an effort to help reduce risk and limit community spread, we have decided to temporarily postpone appointments for patients who may be at increased risk to attend in-person therapy or where therapy is not critically needed at this time.     Pt started performing upper trap and levator scap  stretches since they were added to her HEP last week. Pt notes decreased neck tightness. Pt was encouraged to continue performing neck stretches on a regular basis.     Robert Stiles, PTA  4/13/2020

## 2020-04-21 ENCOUNTER — TELEPHONE (OUTPATIENT)
Dept: REHABILITATION | Facility: HOSPITAL | Age: 70
End: 2020-04-21

## 2020-04-21 DIAGNOSIS — H81.11 BPPV (BENIGN PAROXYSMAL POSITIONAL VERTIGO), RIGHT: ICD-10-CM

## 2020-04-21 NOTE — TELEPHONE ENCOUNTER
Therapy Postponed / COVID-19 pandemic   Patient Check-In Courtesy Call    Courtesy call to check in with patient today to ensure they are doing their home exercise program and to answer any questions. Chelsea Ford Child stated that she is doing well with no new concerns at this time.   No new exercise recommendations given today.       Annamaria Ferrer, PT  04/21/2020

## 2020-04-27 ENCOUNTER — PATIENT MESSAGE (OUTPATIENT)
Dept: INTERNAL MEDICINE | Facility: CLINIC | Age: 70
End: 2020-04-27

## 2020-04-27 ENCOUNTER — PATIENT MESSAGE (OUTPATIENT)
Dept: REHABILITATION | Facility: HOSPITAL | Age: 70
End: 2020-04-27

## 2020-04-27 ENCOUNTER — PATIENT MESSAGE (OUTPATIENT)
Dept: ADMINISTRATIVE | Facility: OTHER | Age: 70
End: 2020-04-27

## 2020-04-27 DIAGNOSIS — I10 ESSENTIAL HYPERTENSION: ICD-10-CM

## 2020-04-27 RX ORDER — CARVEDILOL 12.5 MG/1
6.25 TABLET ORAL 2 TIMES DAILY WITH MEALS
Qty: 180 TABLET | Refills: 3 | COMMUNITY
Start: 2020-04-27 | End: 2020-05-13 | Stop reason: SDUPTHER

## 2020-04-28 NOTE — PROGRESS NOTES
Patient sent [employer] message regarding dizziness while I was out of the office. Dr. Tripathi addressed concerns and reduced carvedilol dose due to lower pulse. Messaged patient to regarding additional questions or concerns. Will evaluate readings on new dose and follow up in 1-2 weeks as scheduled.

## 2020-04-29 ENCOUNTER — PATIENT MESSAGE (OUTPATIENT)
Dept: REHABILITATION | Facility: HOSPITAL | Age: 70
End: 2020-04-29

## 2020-05-04 ENCOUNTER — CLINICAL SUPPORT (OUTPATIENT)
Dept: REHABILITATION | Facility: HOSPITAL | Age: 70
End: 2020-05-04
Payer: MEDICARE

## 2020-05-04 DIAGNOSIS — H81.11 BPPV (BENIGN PAROXYSMAL POSITIONAL VERTIGO), RIGHT: ICD-10-CM

## 2020-05-04 PROCEDURE — 97112 NEUROMUSCULAR REEDUCATION: CPT | Mod: HCNC,PO

## 2020-05-04 PROCEDURE — 95992 CANALITH REPOSITIONING PROC: CPT | Mod: HCNC,PO

## 2020-05-04 NOTE — PLAN OF CARE
Physical Therapy Daily Treatment Note     Name: Chelsea Ford Child  Clinic Number: [id number]    Therapy Diagnosis:   Encounter Diagnosis   Name Primary?    BPPV (benign paroxysmal positional vertigo), right      Physician: Elva Tripathi MD    Visit Date: 5/4/2020    Physician Orders: PT Eval and Treat   Medical Diagnosis from Referral: H81.10 (ICD-10-CM) - Benign paroxysmal positional vertigo, unspecified laterality  Evaluation Date: 2/21/2020  Authorization Period Expiration: 02/27/20 to 08/27/20  Plan of Care Expiration: 05/04/20 to 06/29/20  Visit # / Visits authorized: 06/ 20 (7 total visits)     Time In: 15:30  Time Out: 16:20  Total Billable Time: 50 minutes     Precautions: Standard, h/o cancer, osteoporosis of lumbar spine and hip regions    Subjective     Pt reports: that ~ 1 week ago, she started noticing dizziness when rolling onto her right side. Dizziness has persisted into this week and she continues to experience a spinning sensation when rolling onto her right side. Denies any recent URI's but does endorse recent allergies with associated nasal sinuses. She is getting her annual physical on 05/13/20. Her blood pressure medication was recently reduced but she put herself back on her usual dosage.      She was compliant with HEP provided.   Response to previous treatment: improved vertigo with bed mobility  Functional change: Pt stated she was able to complete activities like getting bowl out of low cabinet the other day with no symptoms elicited.    Pain: 0/10  Location: NA    Objective     Chelsea participated in neuromuscular re-education activities to improve: vestibular function for 30 minutes. The following activities were included:    ROM:    CERVICAL SPINE  Flexion 66 degrees (80-90 deg), min dizziness  Extension 47 degrees (70-80 deg), min dizziness  L side bend 30 degrees, R side bend 33 degrees (20-45 degrees)  L rotation 70 degrees, R rotation 72 degrees (70-90 degrees)  Are  "concurrent symptoms present with any of these movements: see above    Visual/Oculomotor: endorses increased difficulty with reading small print recently; h/o L lazy eye  Tracking/Smooth Pursuits:Impaired: mild jerkiness in all planes when tracking pen, no dizziness, no visual slippage  Saccades: WFL  Convergence: WFL, 8 cm  VOR 1: Impaired: no visual slippage, min dizziness    Modified VAS (Vertebral Artery Screen), in sitting (rotation, then extension):  R: (-)  L: (-)      Chelsea participated in canalith repositioning procedures to address right PSCC BPPV: vestibular function for 20 minutes. The following activities were included:    POSITIONAL CANAL TESTING  Looking for nystagmus (slow phase followed by quick phase to the affected side for BPPV)     [place] Hallpike (posterior / CL anterior)              Right : (+) vertigo, (+) rotary nystagmus lasting for ~8-10 for 1st trial; (-) vertigo, (-) nystagmus for second trial              Left: (-) vertigo, (-) nystagmus  Horizontal Canals              Right: (-) vertigo, (-) nystagmus              Left: NT  Treatment Performed: Epley maneuver performed to treat R PSCC BPPV x 2 trials; see below    Epley maneuver to treat right PSCC  Trial 1: (+) vertigo, (+) nystagmus lasting for ~8-10 sec in position 1, position held for additional 30 sec; (-) vertigo (-) nystagmus in position 2, position held for 30 sec; (-) vertigo (-) nystagmus in position 3, position held for 30 sec; 5 head shakes "no", (-) nystagmus, (+) brief vertigo initially with head shakes, position held for 30 additional seconds;  (-) vertigo (-) nystagmus in position 4, position held for 30 sec    3 min seated rest break    Trial 2: (-) vertigo, (-) nystagmus in position 1, position held for additional 30 sec; (-) vertigo (-) nystagmus in position 2, position held for 30 sec; (-) vertigo (-) nystagmus in position 3, position held for 30 sec; 5 head shakes "no", (-) nystagmus, (-) vertigo iposition held for " 30 additional seconds;  (-) vertigo (-) nystagmus in position 4, position held for 30 sec    3 min seated rest break    Home Exercises Provided and Patient Education Provided     Education provided:   - POC  - PT educated patient to try to avoid sleeping on her right side and to avoid prolonged cervical extension (especially at her hair appointment tomorrow) over the next 24-48 hours to prevent migration of crystals, especially since patient just had recurrent episode. PT also educated patient to discuss with MD at appointment next week, the potential benefit of taking Vitamin D3 to help reduce BPPV episode recurrence. Patient verbalized good understanding.      Written Home Exercises Provided: yes. Hold current HEP while undergoing BPPV treatment.   Ambulation in hallway with R<>L head turns.   Exercises were reviewed and Chelsea was able to demonstrate them prior to the end of the session.  Chelsea demonstrated good  understanding of the education provided.     See EMR under Patient Instructions for exercises provided 2/27/2020.    Assessment   Patient was reassessed this afternoon due to reports last week that she was experiencing dizziness again, mainly when turning onto her right side. Since she has a history of right sided BPPV, patient agreeable to in person reassessment for further evaluation and treatment of recurrent positional vertigo symptoms. During oculomotor testing, jerkiness noted with smooth pursuits in all planes, but no dizziness throughout. Patient does endorse dizziness with VOR 1, but no visual slippage noted. VAS test (-) bilaterally. During cervical AROM testing, limitations still noted with extension, but this is likely attributed to prior history of cervical spine deficits. During canalith repositioning testing, patient (-) for both vertigo and nystagmus during L [place] Hallpike test. Patient (+) for both rotary nystagmus and vertigo for R [place] Hallpike test. Therefore, Epley maneuver performed  to treat R PSCC BPPV. During re-test of R [place] Hallpike test, patient (-) for both vertigo and nystagmus, but second Epley maneuver performed per patient request. R HSCC (-) for both vertigo and nystagmus. Patient educated on positional precautions over the next 48 hours due to recent recurrence of BPPV. Also recommend that patient discuss potential benefit of Vitamin D3 supplement with MD at next appointment to help prevent further recurrent episodes. PT to extend POC x 8 weeks to further address remaining vestibular deficits.     Chelsea is progressing well towards her goals.   Pt prognosis is Excellent.     Pt will continue to benefit from skilled outpatient physical therapy to address the deficits listed in the problem list box on initial evaluation, provide pt/family education and to maximize pt's level of independence in the home and community environment.     Pt's spiritual, cultural and educational needs considered and pt agreeable to plan of care and goals.     Anticipated Barriers for therapy: co-morbidities, work schedule    Goals:  Short Term Goals = Long Term Goals: 8 weeks from 05/04/20  1. Patient to be (I) with established HEP to manage vestibular deficits. Ongoing  2. Patient to be (-) for both vertigo and nystagmus during both [place] Lazo pike tests for resolution of BPPV episode. Ongoing  3. Patient to be (I) for both vertigo and nystagmus during both horizontal side lying tests for resolution of BPPV episode. Ongoing  4. Patient to endorse at least 90% improvement in concordant symptoms of vertigo for improved activity participation. Ongoing   5. Updated 02/27/20: Patient to perform VOR 1 at 90 bpm WFL for improved gaze stabilization. Ongoing  6. Updated 02/27/20: Patient to improve GST condition 4 to at least 20 sec for improved balance in low vision environments. Ongoing    Plan     PT to extend POC x 8 weeks.     Continue outpatient physical therapy 2x weekly under current established Plan of Care,  05/04/20 to 06/29/20, with treatment to include: pt education, HEP, therapeutic exercises, neuromuscular re-education/balance exercises, therapeutic activities, joint mobilizations, canalith repositioning procedures, and modalities PRN, to work towards established goals. Pt may be seen by PTA to carry out plan of care.     Reassess R PSCC  BPPV and treat accordingly. If BPPV episode resolved, reassess GST and FGA to determine further vestibular deficits.     Annamaria Ferrer, PT  5/4/2020

## 2020-05-06 ENCOUNTER — CLINICAL SUPPORT (OUTPATIENT)
Dept: REHABILITATION | Facility: HOSPITAL | Age: 70
End: 2020-05-06
Payer: MEDICARE

## 2020-05-06 DIAGNOSIS — H81.11 BPPV (BENIGN PAROXYSMAL POSITIONAL VERTIGO), RIGHT: ICD-10-CM

## 2020-05-06 PROCEDURE — 95992 CANALITH REPOSITIONING PROC: CPT | Mod: HCNC,GP,PO

## 2020-05-06 PROCEDURE — 97112 NEUROMUSCULAR REEDUCATION: CPT | Mod: HCNC,PO

## 2020-05-06 NOTE — PROGRESS NOTES
"  Physical Therapy Daily Treatment Note     Name: Chelsea Ford Child  Clinic Number: [id number]    Therapy Diagnosis:   Encounter Diagnosis   Name Primary?    BPPV (benign paroxysmal positional vertigo), right      Physician: Elva Tripathi MD    Visit Date: 5/6/2020    Physician Orders: PT Eval and Treat   Medical Diagnosis from Referral: H81.10 (ICD-10-CM) - Benign paroxysmal positional vertigo, unspecified laterality  Evaluation Date: 2/21/2020  Authorization Period Expiration: 02/27/20 to 08/27/20  Plan of Care Expiration: 05/04/20 to 06/29/20  Visit # / Visits authorized: 07/ 20 (8 total visits)     Time In: 12:33  Time Out: 13:15  Total Billable Time: 42 minutes     Precautions: Standard, h/o cancer, osteoporosis of lumbar spine and hip regions    Subjective     Pt reports: that she is feeling much better today. She has not had any dizziness since last session.     She was compliant with HEP provided.   Response to previous treatment: improved vertigo with bed mobility  Functional change: Pt stated she was able to complete activities like getting bowl out of low cabinet the other day with no symptoms elicited.    Pain: 0/10  Location: NA    Objective     Chelsea participated in neuromuscular re-education activities to improve: vestibular function for 35 minutes. The following activities were included:    Seated Oculomotor Exercises:  VOR 1: horizontal with post it target at 110 bpm, 3 x 30", no visual slippage, no dizziness    SOL SENSORY TESTING:  (P= Pass, F= Fail; note any sway; hold each position for 30")  Condition 1: (firm surface/feet together/eyes open) P  Condition 2: (firm surface/feet together/eyes closed) P, increased sway  Condition 3: (firm surface/feet in tandem/eyes open) P  Condition 4: (firm surface/feet in tandem/eyes closed) F, 20 sec   Condition 5: (soft surface/feet together/eyes open) P  Condition 6: (soft surface/feet together/eyes closed) P, increased sway  Condition 7: ([place] " "step test), measure distance varied from center starting position P, 20 deg bias to right     Functional Gait Assessment:   1. Gait on level surface =  3  2. Change in Gait Speed = 3  3. Gait with horizontal head turns  = 2  4. Gait with vertical head turns = 3  5. Gait with pivot turns = 3  6. Step over obstacle = 3  7. Gait with Narrow JEFF = 2  8. Gait with eyes closed = 3  9. Ambulating Backwards = 3  10. Steps = 3     Score 28/30     Foam pad: narrow JEFF, no UE support  3 x 30" static stance with eyes closed, CGA  2 x 30" static stance with R<>L head turns, CGA  2 x 30" static stance with up <> down head turns, CGA    X 4 laps x 100 feet forward ambulation in closed hallway with R<>L head turns, slight sway, no dizziness, SBA    Chelsea participated in canalith repositioning procedures to address right PSCC BPPV: vestibular function for 10 minutes. The following activities were included:    POSITIONAL CANAL TESTING  Looking for nystagmus (slow phase followed by quick phase to the affected side for BPPV)     [place] Hallpike (posterior / CL anterior)              Right :  (-) vertigo, (-) nystagmus              Left: (-) vertigo, (-) nystagmus  Horizontal Canals              Right: (-) vertigo, (-) nystagmus              Left: (-) vertigo, (-) nystagmus  Treatment Performed: not indicated this date    Home Exercises Provided and Patient Education Provided     Education provided:   - POC  - PT educated patient to try to avoid sleeping on her right side and to avoid prolonged cervical extension (especially at her hair appointment tomorrow) over the next 24-48 hours to prevent migration of crystals, especially since patient just had recurrent episode. PT also educated patient to discuss with MD at appointment next week, the potential benefit of taking Vitamin D3 to help reduce BPPV episode recurrence. Patient verbalized good understanding.      Written Home Exercises Provided: yes. Hold current HEP while undergoing BPPV " treatment.   Ambulation in hallway with R<>L head turns.   Exercises were reviewed and Chelsea was able to demonstrate them prior to the end of the session.  Chelsea demonstrated good  understanding of the education provided.     See EMR under Patient Instructions for exercises provided 2/27/2020.    Assessment   Patient tolerated therapy session well this afternoon with reports of improved dizziness post CRT maneuvers last session. Patient (-) for both vertigo and nystagmus during both posterior canal and horizontal canal canalith repositioning test positions. Therefore, PT reassessed postural stability and balance. Patient demonstrates improved performance on GST compared to prior trial but increased sway still noted with vision eliminated conditions. FGA remains consistent with prior performance with current score placing patient out of fall risk category. Therefore, plan to continue to focus on motion tolerance and remaining balance deficits. Continue POC.     Chelsea is progressing well towards her goals.   Pt prognosis is Excellent.     Pt will continue to benefit from skilled outpatient physical therapy to address the deficits listed in the problem list box on initial evaluation, provide pt/family education and to maximize pt's level of independence in the home and community environment.     Pt's spiritual, cultural and educational needs considered and pt agreeable to plan of care and goals.     Anticipated Barriers for therapy: co-morbidities, work schedule    Goals:  Short Term Goals = Long Term Goals: 8 weeks from 05/04/20  1. Patient to be (I) with established HEP to manage vestibular deficits. Ongoing  2. Patient to be (-) for both vertigo and nystagmus during both [place]azo pike tests for resolution of BPPV episode. MET 05/06/20  3. Patient to be (I) for both vertigo and nystagmus during both horizontal side lying tests for resolution of BPPV episode. MET 05/06/20  4. Patient to endorse at least 90%  improvement in concordant symptoms of vertigo for improved activity participation. Ongoing   5. Updated 02/27/20: Patient to perform VOR 1 at 90 bpm WFL for improved gaze stabilization. MET 05/06/20  6. Updated 02/27/20: Patient to improve GST condition 4 to at least 20 sec for improved balance in low vision environments. MET 05/06/20    Plan     Focus on remaining balance and motion tolerance deficits.       Annamaria Ferrer, PT  5/6/2020

## 2020-05-11 ENCOUNTER — CLINICAL SUPPORT (OUTPATIENT)
Dept: REHABILITATION | Facility: HOSPITAL | Age: 70
End: 2020-05-11
Payer: MEDICARE

## 2020-05-11 DIAGNOSIS — H81.11 BPPV (BENIGN PAROXYSMAL POSITIONAL VERTIGO), RIGHT: ICD-10-CM

## 2020-05-11 PROCEDURE — 97112 NEUROMUSCULAR REEDUCATION: CPT | Mod: HCNC,PO

## 2020-05-11 NOTE — PROGRESS NOTES
"  Physical Therapy Daily Treatment Note     Name: Chelsea Ford Child  Clinic Number: [id number]    Therapy Diagnosis:   Encounter Diagnosis   Name Primary?    BPPV (benign paroxysmal positional vertigo), right      Physician: Elva Tripathi MD    Visit Date: 5/11/2020    Physician Orders: PT Eval and Treat   Medical Diagnosis from Referral: H81.10 (ICD-10-CM) - Benign paroxysmal positional vertigo, unspecified laterality  Evaluation Date: 2/21/2020  Authorization Period Expiration: 02/27/20 to 08/27/20  Plan of Care Expiration: 05/04/20 to 06/29/20  Visit # / Visits authorized: 08/ 20 (9 total visits)     Time In: 11:00  Time Out: 11:40  Total Billable Time: 40 minutes     Precautions: Standard, h/o cancer, osteoporosis of lumbar spine and hip regions    Subjective     Pt reports: that she is doing well this morning with no recent dizziness.     She was compliant with HEP provided.   Response to previous treatment: improved vertigo with bed mobility  Functional change: Pt stated she was able to complete activities like getting bowl out of low cabinet the other day with no symptoms elicited.    Pain: 0/10  Location: NA    Objective     Chelsea participated in neuromuscular re-education activities to improve: vestibular function for 40 minutes. The following activities were included:    Seated EOM: picking up cones  X 6 cones, patient bends down to right side with LUE > comes up to midline> turns to L to place cones on mat; patient then returns cones to starting point on ground;no dizziness throughout  X 6 cones, patient bends down to left side with RUE > comes up to midline> turns to R to place cones on mat; patient then returns cones to starting point on ground; no dizziness throughout    Foam pad: narrow JEFF, no UE support  3 x 30" static stance with eyes closed, SBA to occ CGA  3 x 30" static stance with R<>L head turns, SBA to occ CGA  3 x 30" static stance with up <> down head turns, SBA  2 x 10 reps, each " "LE, SLS with alternate LE tapping 2 large cones placed in front, CGA to occ Min A, no UE support    X 4 laps x 100 feet forward ambulation in closed hallway with R<>L head turns, slight sway, no dizziness, SBA    X 4 laps tandem ambulation in // bars, no UE support, SBA    3 x 10 reps, BLE standing heel raises, BUE support  3 x 10 reps, BLE standing toe raises, BUE support    2 x 30" each LE in front, tandem stance with eyes open, CGA to SBA, no UE support  2 x 30" each LE in front, tandem stance with eyes closed, CGA, no UE support    Figure 8 ambulation around 2 large cones 6 feet apart:  X 5 laps figure 8 ambulation, no dizziness, SBA  X 5 laps, figure 8 ambulation + R<>L head turns, no dizziness, SBA    3 x 30" each LE, SLS with alternate LE on BOSU, soft side down, no UE support, CGA    2 x 30" R<>L weight shifting on single point foam fitter board, BUE support, CGA    3 x 30" BLE standing calf stretch on incline with BUE support    Home Exercises Provided and Patient Education Provided     Education provided:   - POC    Written Home Exercises Provided: yes. Continue current HEP for movement habituation and balance.  Ambulation in hallway with R<>L head turns.   Exercises were reviewed and Chelsea was able to demonstrate them prior to the end of the session.  Chelsea demonstrated good  understanding of the education provided.     See EMR under Patient Instructions for exercises provided 2/27/2020.    Assessment   Patient tolerated therapy session well this morning with continued reports of improved dizziness. No dizziness throughout any activities performed today. Therapy session also focused heavy on balance training. Patient demonstrates improved steadiness with vision eliminated balance activities compared to prior trial. Continue POC.     Chelsea is progressing well towards her goals.   Pt prognosis is Excellent.     Pt will continue to benefit from skilled outpatient physical therapy to address the deficits " listed in the problem list box on initial evaluation, provide pt/family education and to maximize pt's level of independence in the home and community environment.     Pt's spiritual, cultural and educational needs considered and pt agreeable to plan of care and goals.     Anticipated Barriers for therapy: co-morbidities, work schedule    Goals:  Short Term Goals = Long Term Goals: 8 weeks from 05/04/20  1. Patient to be (I) with established HEP to manage vestibular deficits. Ongoing  2. Patient to be (-) for both vertigo and nystagmus during both [place] Lazo pike tests for resolution of BPPV episode. MET 05/06/20  3. Patient to be (I) for both vertigo and nystagmus during both horizontal side lying tests for resolution of BPPV episode. MET 05/06/20  4. Patient to endorse at least 90% improvement in concordant symptoms of vertigo for improved activity participation. Ongoing   5. Updated 02/27/20: Patient to perform VOR 1 at 90 bpm WFL for improved gaze stabilization. MET 05/06/20  6. Updated 02/27/20: Patient to improve GST condition 4 to at least 20 sec for improved balance in low vision environments. MET 05/06/20    Plan     Focus on remaining balance and motion tolerance deficits.       Annamaria Ferrer, PT  5/11/2020

## 2020-05-13 ENCOUNTER — PATIENT OUTREACH (OUTPATIENT)
Dept: OTHER | Facility: OTHER | Age: 70
End: 2020-05-13

## 2020-05-13 ENCOUNTER — CLINICAL SUPPORT (OUTPATIENT)
Dept: REHABILITATION | Facility: HOSPITAL | Age: 70
End: 2020-05-13
Payer: MEDICARE

## 2020-05-13 ENCOUNTER — OFFICE VISIT (OUTPATIENT)
Dept: INTERNAL MEDICINE | Facility: CLINIC | Age: 70
End: 2020-05-13
Payer: MEDICARE

## 2020-05-13 VITALS
BODY MASS INDEX: 19.33 KG/M2 | HEIGHT: 65 IN | SYSTOLIC BLOOD PRESSURE: 112 MMHG | HEART RATE: 74 BPM | DIASTOLIC BLOOD PRESSURE: 72 MMHG | OXYGEN SATURATION: 95 % | WEIGHT: 116 LBS

## 2020-05-13 DIAGNOSIS — M54.12 CERVICAL RADICULOPATHY: ICD-10-CM

## 2020-05-13 DIAGNOSIS — H81.10 BENIGN PAROXYSMAL POSITIONAL VERTIGO, UNSPECIFIED LATERALITY: ICD-10-CM

## 2020-05-13 DIAGNOSIS — Z12.12 ENCOUNTER FOR COLORECTAL CANCER SCREENING: ICD-10-CM

## 2020-05-13 DIAGNOSIS — I10 ESSENTIAL HYPERTENSION: Primary | ICD-10-CM

## 2020-05-13 DIAGNOSIS — N95.2 VAGINAL ATROPHY: ICD-10-CM

## 2020-05-13 DIAGNOSIS — Z12.11 ENCOUNTER FOR COLORECTAL CANCER SCREENING: ICD-10-CM

## 2020-05-13 DIAGNOSIS — Z00.00 ANNUAL PHYSICAL EXAM: ICD-10-CM

## 2020-05-13 DIAGNOSIS — H81.11 BPPV (BENIGN PAROXYSMAL POSITIONAL VERTIGO), RIGHT: ICD-10-CM

## 2020-05-13 DIAGNOSIS — M81.0 OSTEOPOROSIS, UNSPECIFIED OSTEOPOROSIS TYPE, UNSPECIFIED PATHOLOGICAL FRACTURE PRESENCE: ICD-10-CM

## 2020-05-13 PROCEDURE — 99214 PR OFFICE/OUTPT VISIT, EST, LEVL IV, 30-39 MIN: ICD-10-PCS | Mod: HCNC,S$GLB,, | Performed by: NURSE PRACTITIONER

## 2020-05-13 PROCEDURE — 99499 UNLISTED E&M SERVICE: CPT | Mod: HCNC,S$GLB,, | Performed by: NURSE PRACTITIONER

## 2020-05-13 PROCEDURE — 3074F SYST BP LT 130 MM HG: CPT | Mod: HCNC,CPTII,S$GLB, | Performed by: NURSE PRACTITIONER

## 2020-05-13 PROCEDURE — 3078F PR MOST RECENT DIASTOLIC BLOOD PRESSURE < 80 MM HG: ICD-10-PCS | Mod: HCNC,CPTII,S$GLB, | Performed by: NURSE PRACTITIONER

## 2020-05-13 PROCEDURE — 1126F AMNT PAIN NOTED NONE PRSNT: CPT | Mod: HCNC,S$GLB,, | Performed by: NURSE PRACTITIONER

## 2020-05-13 PROCEDURE — 1159F PR MEDICATION LIST DOCUMENTED IN MEDICAL RECORD: ICD-10-PCS | Mod: HCNC,S$GLB,, | Performed by: NURSE PRACTITIONER

## 2020-05-13 PROCEDURE — 3074F PR MOST RECENT SYSTOLIC BLOOD PRESSURE < 130 MM HG: ICD-10-PCS | Mod: HCNC,CPTII,S$GLB, | Performed by: NURSE PRACTITIONER

## 2020-05-13 PROCEDURE — 99499 RISK ADDL DX/OHS AUDIT: ICD-10-PCS | Mod: HCNC,S$GLB,, | Performed by: NURSE PRACTITIONER

## 2020-05-13 PROCEDURE — 1101F PR PT FALLS ASSESS DOC 0-1 FALLS W/OUT INJ PAST YR: ICD-10-PCS | Mod: HCNC,CPTII,S$GLB, | Performed by: NURSE PRACTITIONER

## 2020-05-13 PROCEDURE — 1101F PT FALLS ASSESS-DOCD LE1/YR: CPT | Mod: HCNC,CPTII,S$GLB, | Performed by: NURSE PRACTITIONER

## 2020-05-13 PROCEDURE — 3078F DIAST BP <80 MM HG: CPT | Mod: HCNC,CPTII,S$GLB, | Performed by: NURSE PRACTITIONER

## 2020-05-13 PROCEDURE — 97112 NEUROMUSCULAR REEDUCATION: CPT | Mod: HCNC,PO

## 2020-05-13 PROCEDURE — 1126F PR PAIN SEVERITY QUANTIFIED, NO PAIN PRESENT: ICD-10-PCS | Mod: HCNC,S$GLB,, | Performed by: NURSE PRACTITIONER

## 2020-05-13 PROCEDURE — 99214 OFFICE O/P EST MOD 30 MIN: CPT | Mod: HCNC,S$GLB,, | Performed by: NURSE PRACTITIONER

## 2020-05-13 PROCEDURE — 99999 PR PBB SHADOW E&M-EST. PATIENT-LVL IV: ICD-10-PCS | Mod: PBBFAC,HCNC,, | Performed by: NURSE PRACTITIONER

## 2020-05-13 PROCEDURE — 99999 PR PBB SHADOW E&M-EST. PATIENT-LVL IV: CPT | Mod: PBBFAC,HCNC,, | Performed by: NURSE PRACTITIONER

## 2020-05-13 PROCEDURE — 1159F MED LIST DOCD IN RCRD: CPT | Mod: HCNC,S$GLB,, | Performed by: NURSE PRACTITIONER

## 2020-05-13 RX ORDER — CHOLECALCIFEROL (VITAMIN D3) 25 MCG
2000 TABLET ORAL DAILY
COMMUNITY

## 2020-05-13 RX ORDER — ESTRADIOL 0.1 MG/G
CREAM VAGINAL
Qty: 42.5 G | Refills: 11 | Status: SHIPPED | OUTPATIENT
Start: 2020-05-13 | End: 2020-11-24 | Stop reason: SDUPTHER

## 2020-05-13 RX ORDER — CALCIUM CARBONATE 600 MG
1200 TABLET ORAL ONCE
COMMUNITY
End: 2023-11-14 | Stop reason: ALTCHOICE

## 2020-05-13 RX ORDER — CARVEDILOL 12.5 MG/1
12.5 TABLET ORAL 2 TIMES DAILY WITH MEALS
Qty: 180 TABLET | Refills: 3 | Status: SHIPPED | OUTPATIENT
Start: 2020-05-13 | End: 2021-04-12

## 2020-05-13 NOTE — PROGRESS NOTES
Internal Medicine Annual Exam       CHIEF COMPLAINT     The patient, Chelsea Ford Child, who is a 70 y.o. female with BPPV, HTN, and h/o basal cell carcinoma who presents for an annual exam.    HPI   Here for annual physical. Pt of Dr Tripathi  Last physical 20     BPPV - under went PT with success.     HTN- taking coreg and benicar - resumed 12.5mg bid of coreg. BP log reviewed.     Osteoporosis- Previous DEXA 19   Taking calcium and vitamin D (caltrate)   Has not been able to perform weight bearing exercises 2/2 vertigo.   Wanted to avoid bisphosphonate and/or prolia   Trying to consume more calcium and vitamin D and continues to walk and trying to increase weight bearing exercises.     Right sided neck pain with some bilateral finger numbness. Uses tylenol as needed. Denies injury and trauma     -   CRCS- FOBT 19   MMG- 10/21/19   DEXA- 19   Lipids -19  Tdap- >10 years       Past Medical History:  Past Medical History:   Diagnosis Date    BCC (basal cell carcinoma)     Exotropia of left eye     Hypertension     Osteoporosis     Palpitations 2017    Vertigo 2017       Past Surgical History:   Procedure Laterality Date     SECTION      Mohs      BCC    STRABISMUS SURGERY Left         Family History   Problem Relation Age of Onset    Hypertension Mother     Hypertension Father     COPD Father     Stroke Father 72    No Known Problems Son     Breast cancer Neg Hx     Colon cancer Neg Hx     Ovarian cancer Neg Hx     Cataracts Neg Hx     Glaucoma Neg Hx     Macular degeneration Neg Hx         Social History     Socioeconomic History    Marital status: Single     Spouse name: Not on file    Number of children: Not on file    Years of education: Not on file    Highest education level: Not on file   Occupational History    Occupation: RN - retired     Comment: worker's comp   Social Needs    Financial resource strain: Not hard at all    Food  insecurity:     Worry: Never true     Inability: Never true    Transportation needs:     Medical: No     Non-medical: No   Tobacco Use    Smoking status: Former Smoker     Start date:      Last attempt to quit:      Years since quittin.3    Smokeless tobacco: Never Used   Substance and Sexual Activity    Alcohol use: No     Frequency: Never     Binge frequency: Never    Drug use: No    Sexual activity: Never     Birth control/protection: Post-menopausal   Lifestyle    Physical activity:     Days per week: 5 days     Minutes per session: 80 min    Stress: Not at all   Relationships    Social connections:     Talks on phone: More than three times a week     Gets together: More than three times a week     Attends Islam service: Not on file     Active member of club or organization: Yes     Attends meetings of clubs or organizations: More than 4 times per year     Relationship status: Never    Other Topics Concern    Are you pregnant or think you may be? Not Asked    Breast-feeding Not Asked   Social History Narrative    Not on file        Social History     Tobacco Use   Smoking Status Former Smoker    Start date:     Last attempt to quit: 1984    Years since quittin.3   Smokeless Tobacco Never Used        Allergies as of 2020 - Reviewed 2020   Allergen Reaction Noted    Omnicef [cefdinir] Diarrhea 2020    Adhesive Dermatitis 10/16/2017    Nickel sutures [surgical stainless steel]  2018          Home Medications:  Prior to Admission medications    Medication Sig Start Date End Date Taking? Authorizing Provider   CALCIUM ORAL Take by mouth.    Historical Provider, MD   carvediloL (COREG) 12.5 MG tablet Take 0.5 tablets (6.25 mg total) by mouth 2 (two) times daily with meals. 20   Elva Tripathi MD   estradiol (ESTRACE) 0.01 % (0.1 mg/gram) vaginal cream Rub dime sized amount of cream to the urethra nightly 19   M. Madison Khoury  "NP   FLUZONE HIGH-DOSE 2019-20, PF, 180 mcg/0.5 mL Syrg  10/12/19   Historical Provider, MD   olmesartan (BENICAR) 5 MG Tab Take 1 tablet (5 mg total) by mouth once daily. 6/6/19   Elva Tripathi MD       Review of Systems:  Review of Systems   Constitutional: Negative for activity change and unexpected weight change.   HENT: Positive for rhinorrhea. Negative for hearing loss and trouble swallowing.    Eyes: Positive for visual disturbance. Negative for discharge.   Respiratory: Negative for chest tightness and wheezing.    Cardiovascular: Positive for leg swelling (1+). Negative for chest pain and palpitations.   Gastrointestinal: Negative for blood in stool, constipation, diarrhea and vomiting.   Endocrine: Negative for polydipsia and polyuria.   Genitourinary: Negative for difficulty urinating, dysuria, hematuria and menstrual problem.   Musculoskeletal: Positive for neck pain. Negative for arthralgias and joint swelling.   Skin: Negative for rash.   Neurological: Positive for dizziness. Negative for weakness, light-headedness and headaches.   Psychiatric/Behavioral: Negative for confusion and dysphoric mood.       Health Maintainence:   Immunizations:  Health Maintenance       Date Due Completion Date    TETANUS VACCINE 01/06/1967 ---    Shingles Vaccine (1 of 2) 01/06/1999 ---    Pneumococcal Vaccine (65+ High/Highest Risk) (2 of 2 - PPSV23) 11/21/2017 9/26/2017    Fecal Occult Blood Test (FOBT)/[employer] 05/26/2020 5/26/2019    Override on 12/5/2017: Done    Mammogram 10/21/2021 10/21/2019    DEXA SCAN 11/22/2021 11/22/2019    Lipid Panel 05/24/2024 5/24/2019           PHYSICAL EXAM     /72 (BP Location: Right arm, Patient Position: Sitting, BP Method: Small (Manual))   Pulse 74   Ht 5' 5" (1.651 m)   Wt 52.6 kg (116 lb)   LMP  (LMP Unknown)   SpO2 95%   BMI 19.30 kg/m²  Body mass index is 19.3 kg/m².    Physical Exam   Constitutional: She is oriented to person, place, and time. She appears " well-developed and well-nourished.   HENT:   Head: Normocephalic.   Right Ear: Tympanic membrane and external ear normal.   Left Ear: Tympanic membrane and external ear normal.   Nose: Nose normal. No mucosal edema or rhinorrhea. Right sinus exhibits no maxillary sinus tenderness and no frontal sinus tenderness. Left sinus exhibits no maxillary sinus tenderness and no frontal sinus tenderness.   Mouth/Throat: Oropharynx is clear and moist and mucous membranes are normal. No oropharyngeal exudate.   Eyes: Pupils are equal, round, and reactive to light.   Neck: Neck supple. No JVD present. No tracheal deviation present. No thyromegaly present.   Cardiovascular: Normal rate, regular rhythm, normal heart sounds and intact distal pulses. Exam reveals no gallop and no friction rub.   No murmur heard.  Pulmonary/Chest: Effort normal and breath sounds normal. No respiratory distress. She has no wheezes. She has no rales.   Abdominal: Soft. Bowel sounds are normal. She exhibits no distension. There is no tenderness.   Musculoskeletal: Normal range of motion. She exhibits no edema or tenderness.   Lymphadenopathy:     She has no cervical adenopathy.   Neurological: She is alert and oriented to person, place, and time. She displays normal reflexes. No cranial nerve deficit. She exhibits normal muscle tone. Coordination normal.   Skin: Skin is warm and dry. Capillary refill takes less than 2 seconds. No rash noted.        Psychiatric: She has a normal mood and affect. Her behavior is normal.   Vitals reviewed.      LABS     Lab Results   Component Value Date    HGBA1C 5.6 08/03/2017     CMP  Sodium   Date Value Ref Range Status   05/24/2019 138 136 - 145 mmol/L Final     Potassium   Date Value Ref Range Status   05/24/2019 4.2 3.5 - 5.1 mmol/L Final     Chloride   Date Value Ref Range Status   05/24/2019 104 95 - 110 mmol/L Final     CO2   Date Value Ref Range Status   05/24/2019 28 23 - 29 mmol/L Final     Glucose   Date Value  Ref Range Status   05/24/2019 99 70 - 110 mg/dL Final     BUN, Bld   Date Value Ref Range Status   05/24/2019 13 8 - 23 mg/dL Final     Creatinine   Date Value Ref Range Status   05/24/2019 1.0 0.5 - 1.4 mg/dL Final     Calcium   Date Value Ref Range Status   05/24/2019 9.9 8.7 - 10.5 mg/dL Final     Total Protein   Date Value Ref Range Status   05/24/2019 7.0 6.0 - 8.4 g/dL Final     Albumin   Date Value Ref Range Status   05/24/2019 4.3 3.5 - 5.2 g/dL Final     Total Bilirubin   Date Value Ref Range Status   05/24/2019 0.6 0.1 - 1.0 mg/dL Final     Comment:     For infants and newborns, interpretation of results should be based  on gestational age, weight and in agreement with clinical  observations.  Premature Infant recommended reference ranges:  Up to 24 hours.............<8.0 mg/dL  Up to 48 hours............<12.0 mg/dL  3-5 days..................<15.0 mg/dL  6-29 days.................<15.0 mg/dL       Alkaline Phosphatase   Date Value Ref Range Status   05/24/2019 75 55 - 135 U/L Final     AST   Date Value Ref Range Status   05/24/2019 21 10 - 40 U/L Final     ALT   Date Value Ref Range Status   05/24/2019 17 10 - 44 U/L Final     Anion Gap   Date Value Ref Range Status   05/24/2019 6 (L) 8 - 16 mmol/L Final     eGFR if    Date Value Ref Range Status   05/24/2019 >60 >60 mL/min/1.73 m^2 Final     eGFR if non    Date Value Ref Range Status   05/24/2019 57 (A) >60 mL/min/1.73 m^2 Final     Comment:     Calculation used to obtain the estimated glomerular filtration  rate (eGFR) is the CKD-EPI equation.        Lab Results   Component Value Date    WBC 4.15 05/24/2019    HGB 12.5 05/24/2019    HCT 37.7 05/24/2019    MCV 94 05/24/2019     (L) 05/24/2019     Lab Results   Component Value Date    CHOL 191 05/24/2019    CHOL 212 (H) 08/09/2018    CHOL 182 08/03/2017     Lab Results   Component Value Date    HDL 71 05/24/2019    HDL 69 08/09/2018    HDL 68 08/03/2017     Lab  Results   Component Value Date    LDLCALC 108.8 05/24/2019    LDLCALC 131.2 08/09/2018    LDLCALC 105.2 08/03/2017     Lab Results   Component Value Date    TRIG 56 05/24/2019    TRIG 59 08/09/2018    TRIG 44 08/03/2017     Lab Results   Component Value Date    CHOLHDL 37.2 05/24/2019    CHOLHDL 32.5 08/09/2018    CHOLHDL 37.4 08/03/2017     Lab Results   Component Value Date    TSH 3.518 05/24/2019       ASSESSMENT/PLAN     Chelsea Ford Child is a 70 y.o. female    Essential hypertension- at goal. Cont current meds. Low na diet. Labs ordered.   -     CBC auto differential; Future; Expected date: 05/13/2020  -     Comprehensive metabolic panel; Future; Expected date: 05/13/2020  -     Lipid Panel; Future; Expected date: 05/13/2020  -     TSH; Future; Expected date: 05/13/2020  -     Vitamin D; Future; Expected date: 05/13/2020  -     carvediloL (COREG) 12.5 MG tablet; Take 1 tablet (12.5 mg total) by mouth 2 (two) times daily with meals.  Dispense: 180 tablet; Refill: 3    Benign paroxysmal positional vertigo, unspecified laterality- stable. Cont PT as needed. Increase fluids. May use meclizine as needed.   -     CBC auto differential; Future; Expected date: 05/13/2020  -     Comprehensive metabolic panel; Future; Expected date: 05/13/2020  -     Lipid Panel; Future; Expected date: 05/13/2020  -     TSH; Future; Expected date: 05/13/2020  -     Vitamin D; Future; Expected date: 05/13/2020    Osteoporosis, unspecified osteoporosis type, unspecified pathological fracture presence- increase weight bearing exercises. Cont calcium and vitamin D.   -     CBC auto differential; Future; Expected date: 05/13/2020  -     Comprehensive metabolic panel; Future; Expected date: 05/13/2020  -     Lipid Panel; Future; Expected date: 05/13/2020  -     TSH; Future; Expected date: 05/13/2020  -     Vitamin D; Future; Expected date: 05/13/2020    Encounter for colorectal cancer screening  -     Fecal Immunochemical Test (iFOBT);  Future; Expected date: 05/13/2020    Cervical radiculopathy- may use tumeric and tylenol as needed.     Vaginal atrophy  -     estradioL (ESTRACE) 0.01 % (0.1 mg/gram) vaginal cream; Rub dime sized amount of cream to the urethra nightly  Dispense: 42.5 g; Refill: 11    Annual physical exam    Follow up with PCP in 6 months     Jessie Montilla-Ok LEYVA, JACK, FNP-c   Department of Internal Medicine - [facility]  8:31 AM

## 2020-05-13 NOTE — PROGRESS NOTES
"  Physical Therapy Daily Treatment Note     Name: Chelsea Ford Child  Clinic Number: [id number]    Therapy Diagnosis:   Encounter Diagnosis   Name Primary?    BPPV (benign paroxysmal positional vertigo), right      Physician: Elva Tripathi MD    Visit Date: 5/13/2020    Physician Orders: PT Eval and Treat   Medical Diagnosis from Referral: H81.10 (ICD-10-CM) - Benign paroxysmal positional vertigo, unspecified laterality  Evaluation Date: 2/21/2020  Authorization Period Expiration: 02/27/20 to 08/27/20  Plan of Care Expiration: 05/04/20 to 06/29/20  Visit # / Visits authorized: 09/ 20 (10 total visits)     Time In: 13:10  Time Out: 13:50  Total Billable Time: 40 minutes     Precautions: Standard, h/o cancer, osteoporosis of lumbar spine and hip regions    Subjective     Pt reports: that she is doing well this morning with no recent dizziness.     She was compliant with HEP provided.   Response to previous treatment: improved vertigo with bed mobility  Functional change: Pt stated she was able to complete activities like getting bowl out of low cabinet the other day with no symptoms elicited.    Pain: 0/10  Location: NA    Objective     Chelsea participated in neuromuscular re-education activities to improve: vestibular function for 40 minutes. The following activities were included:    Foam balance beam:  X 4 laps forward tandem ambulation, intermittent UE support, CGA to SBA  X 6 laps, R<>L side stepping along beam, occ touchdown support, SBA  2 x 30" each LE in front, tandem stance with eyes open, CGA to SBA, no UE support    Foam pad: narrow JEFF, no UE support  3 x 30" static stance with eyes closed, SBA to occ CGA  3 x 30" static stance with R<>L head turns, SBA to occ CGA  3 x 30" static stance with up <> down head turns, SBA  2 x 10 reps, each LE, SLS with alternate LE tapping 2 large cones placed in front, CGA, no UE support    X 4 laps x 100 feet forward ambulation in closed hallway with R<>L head " "turns, slight sway, no dizziness, SBA    X 4 laps tandem ambulation in // bars, no UE support, SBA  X 4 laps backward tandem ambulation in // bars, no UE support, CGA    3 x 10 reps, BLE standing heel raises, BUE support    2 x 30" each LE in front, tandem stance with eyes closed, CGA, no UE support    4 pt foam fitter: 2 x 10 reps, R<>L side stepping across fitter, 1 UE support, CGA    3 x 30" each LE, SLS with alternate LE on BOSU, soft side down, no UE support, CGA    2 x 30" R<>L weight shifting on single point foam fitter board, BUE support, CGA    3 x 30" BLE standing calf stretch on incline with BUE support    Home Exercises Provided and Patient Education Provided     Education provided:   - POC    Written Home Exercises Provided: yes. Continue current HEP for movement habituation and balance.  Ambulation in hallway with R<>L head turns.   Exercises were reviewed and Chelsea was able to demonstrate them prior to the end of the session.  Chelsea demonstrated good  understanding of the education provided.     See EMR under Patient Instructions for exercises provided 2/27/2020.    Assessment   Patient tolerated therapy session well this afternoon and continues to demonstrate improved steadiness with both static and dynamic balance activities. Able to progress difficulty of dynamic balance activities for increased challenge. Plan to review HEP next week with anticipated d/c once last 2 follow up sessions are completed. Continue POC.     Chelsea is progressing well towards her goals.   Pt prognosis is Excellent.     Pt will continue to benefit from skilled outpatient physical therapy to address the deficits listed in the problem list box on initial evaluation, provide pt/family education and to maximize pt's level of independence in the home and community environment.     Pt's spiritual, cultural and educational needs considered and pt agreeable to plan of care and goals.     Anticipated Barriers for therapy: " co-morbidities, work schedule    Goals:  Short Term Goals = Long Term Goals: 8 weeks from 05/04/20  1. Patient to be (I) with established HEP to manage vestibular deficits. Ongoing  2. Patient to be (-) for both vertigo and nystagmus during both [place] tests for resolution of BPPV episode. MET 05/06/20  3. Patient to be (I) for both vertigo and nystagmus during both horizontal side lying tests for resolution of BPPV episode. MET 05/06/20  4. Patient to endorse at least 90% improvement in concordant symptoms of vertigo for improved activity participation. Ongoing   5. Updated 02/27/20: Patient to perform VOR 1 at 90 bpm WFL for improved gaze stabilization. MET 05/06/20  6. Updated 02/27/20: Patient to improve GST condition 4 to at least 20 sec for improved balance in low vision environments. MET 05/06/20    Plan     Focus on remaining balance and motion tolerance deficits.       Annamaria Ferrer, PT  5/13/2020

## 2020-05-13 NOTE — PROGRESS NOTES
Patient, Chelsea Cavazos (MRN #[id number]), presented with a recent Platelet count less than 150 K/uL consistent with the definition of thrombocytopenia (ICD10 - D69.6).    Platelets   Date Value Ref Range Status   05/24/2019 142 (L) 150 - 350 K/uL Final     The patient's thrombocytopenia was monitored, evaluated, addressed and/or treated. This addendum to the medical record is made on 05/13/2020.

## 2020-05-15 NOTE — PROGRESS NOTES
Digital Medicine: Clinician Follow-Up    Called patient for digital medicine follow up. Patient is doing well today. Carvedilol dose was reduced by Dr. Tripathi, after patient experienced episode of dizziness. Blood pressure trended upward on lower dose and patient resumed higher dose of carvedilol. Treated for BPPV with PT and feels much better. Patient would like to avoid changes to carvedilol if possible. No symptoms of hypotension with lower blood pressure readings.     The history is provided by the patient. No  was used.     Follow Up  Follow-up reason(s): reading review and routine education            Assessment:  Patient's current 30-day average is at goal of <130/80 mmHg.       Plan:  Continue current regimen.   Patients health , Nery Bird, will follow-up as scheduled.    I will continue to monitor regularly.    Patient has my contact information and knows to call with any concerns or clinical changes.          There are no preventive care reminders to display for this patient.    Last 5 Patient Entered Readings                                      Current 30 Day Average: 124/71     Recent Readings 5/15/2020 5/15/2020 5/14/2020 5/14/2020 5/13/2020    SBP (mmHg) 93 100 112 132 98    DBP (mmHg) 54 64 68 67 52    Pulse 62 59 68 52 68             Hypertension Medications             carvediloL (COREG) 12.5 MG tablet Take 1 tablet (12.5 mg total) by mouth 2 (two) times daily with meals.    olmesartan (BENICAR) 5 MG Tab Take 1 tablet (5 mg total) by mouth once daily.                 Screenings

## 2020-05-18 ENCOUNTER — CLINICAL SUPPORT (OUTPATIENT)
Dept: REHABILITATION | Facility: HOSPITAL | Age: 70
End: 2020-05-18
Payer: MEDICARE

## 2020-05-18 DIAGNOSIS — H81.11 BPPV (BENIGN PAROXYSMAL POSITIONAL VERTIGO), RIGHT: ICD-10-CM

## 2020-05-18 PROCEDURE — 97112 NEUROMUSCULAR REEDUCATION: CPT | Mod: HCNC,PO

## 2020-05-18 NOTE — PROGRESS NOTES
"  Physical Therapy Daily Treatment Note     Name: Chelsea Ford Child  Clinic Number: [id number]    Therapy Diagnosis:   Encounter Diagnosis   Name Primary?    BPPV (benign paroxysmal positional vertigo), right      Physician: Elva Tripathi MD    Visit Date: 5/18/2020    Physician Orders: PT Eval and Treat   Medical Diagnosis from Referral: H81.10 (ICD-10-CM) - Benign paroxysmal positional vertigo, unspecified laterality  Evaluation Date: 2/21/2020  Authorization Period Expiration: 02/27/20 to 08/27/20  Plan of Care Expiration: 05/04/20 to 06/29/20  Visit # / Visits authorized: 10/ 20 (10 total visits)     Time In: 09:25  Time Out: 10:00  Total Billable Time: 35 minutes     Precautions: Standard, h/o cancer, osteoporosis of lumbar spine and hip regions    Subjective     Pt reports: that she continues to do well with no significant dizziness    She was compliant with HEP provided.   Response to previous treatment: improved vertigo with bed mobility  Functional change: Pt stated she was able to complete activities like getting bowl out of low cabinet the other day with no symptoms elicited.    Pain: 0/10  Location: NA    Objective     Chelsea participated in neuromuscular re-education activities to improve: vestibular function for 45 minutes. The following activities were included:    Foam balance beam:  X 4 laps forward tandem ambulation, intermittent UE support, CGA to SBA  X 6 laps, R<>L side stepping along beam, occ touchdown support, SBA  2 x 30" each LE in front, tandem stance with eyes open, CGA to SBA, no UE support    Foam pad: narrow JEFF, no UE support  3 x 30" static stance with eyes closed, SBA to occ CGA  2 x 30" static stance with R<>L head turns, SBA to occ CGA  2 x 30" static stance with up <> down head turns, SBA  X 20 reps, alternating toe tapping to large cone placed in front, CGA to occ Min A, no UE support  2 x 10 reps, each LE, SLS with alternate LE tapping 2 large cones placed in front, " "CGA, no UE support    X 4 laps x 100 feet forward ambulation in closed hallway with R<>L head turns, slight sway, no dizziness, SBA    X 4 laps tandem ambulation in // bars, no UE support, SBA  X 4 laps backward tandem ambulation in // bars, no UE support, CGA    3 x 10 reps, BLE standing heel raises, BUE support    2 x 30" each LE in front, tandem stance with eyes closed, CGA, no UE support    3 x 30" each LE, SLS with alternate LE on BOSU, soft side down, no UE support, CGA    3 x 30" BLE standing calf stretch on incline with BUE support    Home Exercises Provided and Patient Education Provided     Education provided:   - POC    Written Home Exercises Provided: yes. Continue current HEP for movement habituation and balance.  Ambulation in hallway with R<>L head turns.   Exercises were reviewed and Chelsea was able to demonstrate them prior to the end of the session.  Chelsea demonstrated good  understanding of the education provided.     See EMR under Patient Instructions for exercises provided 2/27/2020.    Assessment   Patient tolerated therapy session well this morning with improved steadiness throughout both static and dynamic balance challenges. No dizziness throughout therapy activities. Plan to review HEP and d/c next therapy session.     Chelsea is progressing well towards her goals.   Pt prognosis is Excellent.     Pt will continue to benefit from skilled outpatient physical therapy to address the deficits listed in the problem list box on initial evaluation, provide pt/family education and to maximize pt's level of independence in the home and community environment.     Pt's spiritual, cultural and educational needs considered and pt agreeable to plan of care and goals.     Anticipated Barriers for therapy: co-morbidities, work schedule    Goals:  Short Term Goals = Long Term Goals: 8 weeks from 05/04/20  1. Patient to be (I) with established HEP to manage vestibular deficits. Ongoing  2. Patient to be (-) " for both vertigo and nystagmus during both [place] tests for resolution of BPPV episode. MET 05/06/20  3. Patient to be (I) for both vertigo and nystagmus during both horizontal side lying tests for resolution of BPPV episode. MET 05/06/20  4. Patient to endorse at least 90% improvement in concordant symptoms of vertigo for improved activity participation. MET 05/18/20  5. Updated 02/27/20: Patient to perform VOR 1 at 90 bpm WFL for improved gaze stabilization. MET 05/06/20  6. Updated 02/27/20: Patient to improve GST condition 4 to at least 20 sec for improved balance in low vision environments. MET 05/06/20    Plan     Review HEP and d/c next session.       Annamaria Ferrer, PT  5/18/2020

## 2020-05-20 ENCOUNTER — CLINICAL SUPPORT (OUTPATIENT)
Dept: REHABILITATION | Facility: HOSPITAL | Age: 70
End: 2020-05-20
Payer: MEDICARE

## 2020-05-20 ENCOUNTER — PATIENT MESSAGE (OUTPATIENT)
Dept: INTERNAL MEDICINE | Facility: CLINIC | Age: 70
End: 2020-05-20

## 2020-05-20 DIAGNOSIS — H81.11 BPPV (BENIGN PAROXYSMAL POSITIONAL VERTIGO), RIGHT: ICD-10-CM

## 2020-05-20 PROCEDURE — 97112 NEUROMUSCULAR REEDUCATION: CPT | Mod: HCNC,PO

## 2020-05-20 NOTE — PATIENT INSTRUCTIONS
Home Exercise Balance Activities:    1. X 4 laps x 10-12 feet, forward tandem ambulation; use upper extremity support as needed  2. X 4 laps x 10-12 feet, backward tandem ambulation; use upper extremity support as needed  3. X 4 laps ambulation in hallway with R<>L head turns; use upper extremity support as needed    4. Foam activities          5. Cone tapping: Use 2 plastic cups or 2 plastic cones. Stand on hard ground. Stand on one leg and tap both cones with alternate leg before returning to start position. Perform 10 reps on one side and then switch to other leg.  Use upper extremity support as needed.     6. Tandem stance- Eyes open

## 2020-05-20 NOTE — PROGRESS NOTES
"  Physical Therapy Daily Treatment Note and Discharge Summary     Name: Chelsea Ford Child  Clinic Number: [id number]    Therapy Diagnosis:   Encounter Diagnosis   Name Primary?    BPPV (benign paroxysmal positional vertigo), right      Physician: Elva Tripathi MD    Visit Date: 5/20/2020    Physician Orders: PT Eval and Treat   Medical Diagnosis from Referral: H81.10 (ICD-10-CM) - Benign paroxysmal positional vertigo, unspecified laterality  Evaluation Date: 2/21/2020  Authorization Period Expiration: 02/27/20 to 08/27/20  Plan of Care Expiration: 05/04/20 to 06/29/20  Visit # / Visits authorized: 11/ 20 (11 total visits)     Time In: 09:30  Time Out: 10:10  Total Billable Time: 40 minutes     Precautions: Standard, h/o cancer, osteoporosis of lumbar spine and hip regions    Subjective     Pt reports: that she is doing well this morning. No recent dizziness.     She was compliant with HEP provided.   Response to previous treatment: improved vertigo with bed mobility  Functional change: Pt stated she was able to complete activities like getting bowl out of low cabinet the other day with no symptoms elicited.    Pain: 0/10  Location: NA    Objective     Chelsea participated in neuromuscular re-education activities to improve: vestibular function for 40 minutes. The following activities were included:    SOL SENSORY TESTING:  (P= Pass, F= Fail; note any sway; hold each position for 30")  Condition 1: (firm surface/feet together/eyes open) P  Condition 2: (firm surface/feet together/eyes closed) P  Condition 3: (firm surface/feet in tandem/eyes open) P  Condition 4: (firm surface/feet in tandem/eyes closed) P, min sway  Condition 5: (soft surface/feet together/eyes open) P  Condition 6: (soft surface/feet together/eyes closed) P  Condition 7: (Fakuda step test), measure distance varied from center starting position P, 22 deg bias to right     Functional Gait Assessment:   1. Gait on level surface =  3  2. Change " "in Gait Speed = 3  3. Gait with horizontal head turns  = 3  4. Gait with vertical head turns = 3  5. Gait with pivot turns = 3  6. Step over obstacle = 3  7. Gait with Narrow JEFF = 3  8. Gait with eyes closed = 3  9. Ambulating Backwards = 3  10. Steps = 3     Score 30/30     Foam pad: narrow JEFF, no UE support  3 x 30" static stance with eyes closed, SBA   2 x 30" static stance with R<>L head turns, SBA   2 x 30" static stance with up <> down head turns, SBA  1 x 10 reps, each LE, SLS with alternate LE tapping 2 large cones placed in front, CGA to SBA, no UE support    1 x 10 reps, each LE, SLS on hard ground with alternate LE tapping 2 large cones placed in front, SBA, no UE support    X 4 laps tandem ambulation in // bars, no UE support, SBA  X 4 laps backward tandem ambulation in // bars, no UE support, SBA    3 x 10 reps, BLE standing heel raises, BUE support    2 x 30" each LE in front, tandem stance with eyes open, SBA, no UE support    3 x 30" BLE standing calf stretch on incline with BUE support    Home Exercises Provided and Patient Education Provided     Education provided:   - d/c from therapy    Written Home Exercises Provided: yes. HEP updated 05/20/20.   Ambulation in hallway with R<>L head turns.   Exercises were reviewed and Chelsea was able to demonstrate them prior to the end of the session.  Chelsea demonstrated good  understanding of the education provided.     See EMR under Patient Instructions for exercises provided 2/27/2020 and 05/20/20.     PHYSICAL THERAPY DISCHARGE SUMMARY   Total Visits: 11  Missed Visits: 0  Cancelled Visits: 0    Status Towards Goals Met:  Chelsea demonstrates significant improvement with vestibular therapy. Initial episode of BPPV and then recurrent episode of BPPV that occurred in early May both resolved. Gaze stabilization WFL with no dizziness throughout. Patient demonstrates improved postural stability and improved dynamic balance with current scores on GST and FGA " placing her out of elevated fall risk category.Patient remains compliant and independent with vestibular HEP.     Goals:  Short Term Goals = Long Term Goals: 8 weeks from 05/04/20  1. Patient to be (I) with established HEP to manage vestibular deficits. MET 05/20/20  2. Patient to be (-) for both vertigo and nystagmus during both [place] pike tests for resolution of BPPV episode. MET 05/06/20  3. Patient to be (I) for both vertigo and nystagmus during both horizontal side lying tests for resolution of BPPV episode. MET 05/06/20  4. Patient to endorse at least 90% improvement in concordant symptoms of vertigo for improved activity participation. MET 05/18/20  5. Updated 02/27/20: Patient to perform VOR 1 at 90 bpm WFL for improved gaze stabilization. MET 05/06/20  6. Updated 02/27/20: Patient to improve GST condition 4 to at least 20 sec for improved balance in low vision environments. MET 05/06/20    Goals Not achieved and why:   Patient has met all established goals at this time.       Discharge reason : Met goals and Patient has maximized benefit from PT at this time    PLAN   This patient is discharged from Outpatient Physical Therapy Services.     Annamaria Ferrer, PT  05/20/2020

## 2020-05-21 DIAGNOSIS — I10 BENIGN ESSENTIAL HYPERTENSION: ICD-10-CM

## 2020-05-22 RX ORDER — OLMESARTAN MEDOXOMIL 5 MG/1
TABLET ORAL
Qty: 90 TABLET | Refills: 0 | Status: SHIPPED | OUTPATIENT
Start: 2020-05-22 | End: 2020-06-15

## 2020-05-22 NOTE — TELEPHONE ENCOUNTER
Provider Staff:     Please schedule patient for Annual and Labs (CMP, Lipid)    Please also check with your provider if any further labs need to be added and scheduled together.    Thanks!  [facility]     Appointments  past 12m or future 3m with PCP    Date Provider   Last Visit   5/29/2019 Elva Tripathi MD   Next Visit   Visit date not found Elva Tripathi MD     Note composed:7:03 AM 05/22/2020

## 2020-05-22 NOTE — PROGRESS NOTES
Refill Authorization Note    is requesting a refill authorization.    Brief assessment and rationale for refill: APPROVE: needs labs/appt     Medication-related problems identified:   Requires labs  Requires appointment    Medication Therapy Plan: Needs appt (ANNUAL); NTBO(CMP, Lipid)    Medication reconciliation completed: No                         Comments:      Requested Prescriptions   Signed Prescriptions Disp Refills    olmesartan (BENICAR) 5 MG Tab 90 tablet 0     Sig: TAKE 1 TABLET BY MOUTH EVERY DAY       Cardiovascular:  Angiotensin Receptor Blockers Failed - 5/21/2020  1:39 AM        Failed - Cr is 1.3 or below and within 360 days     Creatinine   Date Value Ref Range Status   05/24/2019 1.0 0.5 - 1.4 mg/dL Final   08/09/2018 0.9 0.5 - 1.4 mg/dL Final   09/26/2017 0.8 0.5 - 1.4 mg/dL Final              Failed - K in normal range and within 360 days     Potassium   Date Value Ref Range Status   05/24/2019 4.2 3.5 - 5.1 mmol/L Final   08/09/2018 4.2 3.5 - 5.1 mmol/L Final   09/26/2017 3.9 3.5 - 5.1 mmol/L Final              Failed - eGFR within 360 days     eGFR if non    Date Value Ref Range Status   05/24/2019 57 (A) >60 mL/min/1.73 m^2 Final     Comment:     Calculation used to obtain the estimated glomerular filtration  rate (eGFR) is the CKD-EPI equation.      08/09/2018 >60 >60 mL/min/1.73 m^2 Final     Comment:     Calculation used to obtain the estimated glomerular filtration  rate (eGFR) is the CKD-EPI equation.      09/26/2017 >60 >60 mL/min/1.73 m^2 Final     Comment:     Calculation used to obtain the estimated glomerular filtration  rate (eGFR) is the CKD-EPI equation. Since race is unknown   in our information system, the eGFR values for   -American and Non--American patients are given   for each creatinine result.       eGFR if    Date Value Ref Range Status   05/24/2019 >60 >60 mL/min/1.73 m^2 Final   08/09/2018 >60 >60 mL/min/1.73 m^2  Final   09/26/2017 >60 >60 mL/min/1.73 m^2 Final              Passed - Patient is at least 18 years old        Passed - Last BP in normal range within 360 days.     BP Readings from Last 3 Encounters:   05/13/20 112/72   01/02/20 108/72   12/27/19 116/62              Passed - Office visit in past 12 months or future 90 days.     Recent Outpatient Visits            1 week ago Essential hypertension    [facility] Jessie Gerber NP    4 months ago Antibiotic-associated diarrhea    [facility] Jhon Knutson MD    4 months ago Sinusitis, unspecified chronicity, unspecified location    [facility] Nichole Orozco MD    5 months ago Upper respiratory tract infection, unspecified type    [facility] Nani Valadez PA-C    7 months ago Urinary hesitancy    [facility] 4th Floor Jaquelin Espino MD          Future Appointments              In 5 days Cony Waters MD [place] [facility] [place]                 Appointments  past 12m or future 3m with PCP    Date Provider   Last Visit   5/29/2019 Elva Tripathi MD   Next Visit   Visit date not found Elva Tripathi MD   ED visits in past 90 days: 0     Note composed:7:02 AM 05/22/2020

## 2020-05-26 ENCOUNTER — PATIENT OUTREACH (OUTPATIENT)
Dept: ADMINISTRATIVE | Facility: OTHER | Age: 70
End: 2020-05-26

## 2020-05-27 ENCOUNTER — OFFICE VISIT (OUTPATIENT)
Dept: DERMATOLOGY | Facility: CLINIC | Age: 70
End: 2020-05-27
Payer: MEDICARE

## 2020-05-27 VITALS — WEIGHT: 116 LBS | BODY MASS INDEX: 19.3 KG/M2

## 2020-05-27 DIAGNOSIS — D48.5 NEOPLASM OF UNCERTAIN BEHAVIOR OF SKIN: Primary | ICD-10-CM

## 2020-05-27 DIAGNOSIS — B08.1 BATEMAN'S DISEASE: ICD-10-CM

## 2020-05-27 DIAGNOSIS — L82.1 SEBORRHEIC KERATOSES: ICD-10-CM

## 2020-05-27 DIAGNOSIS — D22.9 NEVUS OF MULTIPLE SITES: ICD-10-CM

## 2020-05-27 DIAGNOSIS — L81.4 LENTIGINES: ICD-10-CM

## 2020-05-27 DIAGNOSIS — Z12.83 SKIN EXAM, SCREENING FOR CANCER: ICD-10-CM

## 2020-05-27 DIAGNOSIS — R20.2 NOTALGIA PARESTHETICA: ICD-10-CM

## 2020-05-27 DIAGNOSIS — L57.0 ACTINIC KERATOSIS: ICD-10-CM

## 2020-05-27 DIAGNOSIS — L28.2 PRURIGO: ICD-10-CM

## 2020-05-27 PROCEDURE — 99214 PR OFFICE/OUTPT VISIT, EST, LEVL IV, 30-39 MIN: ICD-10-PCS | Mod: 25,HCNC,S$GLB, | Performed by: DERMATOLOGY

## 2020-05-27 PROCEDURE — 88305 TISSUE EXAM BY PATHOLOGIST: CPT | Mod: HCNC | Performed by: DERMATOLOGY

## 2020-05-27 PROCEDURE — 1101F PR PT FALLS ASSESS DOC 0-1 FALLS W/OUT INJ PAST YR: ICD-10-PCS | Mod: HCNC,CPTII,S$GLB, | Performed by: DERMATOLOGY

## 2020-05-27 PROCEDURE — 11102 PR TANGENTIAL BIOPSY, SKIN, SINGLE LESION: ICD-10-PCS | Mod: HCNC,S$GLB,, | Performed by: DERMATOLOGY

## 2020-05-27 PROCEDURE — 88305 TISSUE EXAM BY PATHOLOGIST: ICD-10-PCS | Mod: 26,HCNC,, | Performed by: DERMATOLOGY

## 2020-05-27 PROCEDURE — 99999 PR PBB SHADOW E&M-EST. PATIENT-LVL III: ICD-10-PCS | Mod: PBBFAC,HCNC,, | Performed by: DERMATOLOGY

## 2020-05-27 PROCEDURE — 1126F PR PAIN SEVERITY QUANTIFIED, NO PAIN PRESENT: ICD-10-PCS | Mod: HCNC,S$GLB,, | Performed by: DERMATOLOGY

## 2020-05-27 PROCEDURE — 1159F MED LIST DOCD IN RCRD: CPT | Mod: HCNC,S$GLB,, | Performed by: DERMATOLOGY

## 2020-05-27 PROCEDURE — 99214 OFFICE O/P EST MOD 30 MIN: CPT | Mod: 25,HCNC,S$GLB, | Performed by: DERMATOLOGY

## 2020-05-27 PROCEDURE — 1101F PT FALLS ASSESS-DOCD LE1/YR: CPT | Mod: HCNC,CPTII,S$GLB, | Performed by: DERMATOLOGY

## 2020-05-27 PROCEDURE — 99999 PR PBB SHADOW E&M-EST. PATIENT-LVL III: CPT | Mod: PBBFAC,HCNC,, | Performed by: DERMATOLOGY

## 2020-05-27 PROCEDURE — 88305 TISSUE EXAM BY PATHOLOGIST: CPT | Mod: 26,HCNC,, | Performed by: DERMATOLOGY

## 2020-05-27 PROCEDURE — 17000 PR DESTRUCTION(LASER SURGERY,CRYOSURGERY,CHEMOSURGERY),PREMALIGNANT LESIONS,FIRST LESION: ICD-10-PCS | Mod: HCNC,59,S$GLB, | Performed by: DERMATOLOGY

## 2020-05-27 PROCEDURE — 1159F PR MEDICATION LIST DOCUMENTED IN MEDICAL RECORD: ICD-10-PCS | Mod: HCNC,S$GLB,, | Performed by: DERMATOLOGY

## 2020-05-27 PROCEDURE — 17000 DESTRUCT PREMALG LESION: CPT | Mod: HCNC,59,S$GLB, | Performed by: DERMATOLOGY

## 2020-05-27 PROCEDURE — 1126F AMNT PAIN NOTED NONE PRSNT: CPT | Mod: HCNC,S$GLB,, | Performed by: DERMATOLOGY

## 2020-05-27 PROCEDURE — 11102 TANGNTL BX SKIN SINGLE LES: CPT | Mod: HCNC,S$GLB,, | Performed by: DERMATOLOGY

## 2020-05-27 RX ORDER — BETAMETHASONE DIPROPIONATE 0.5 MG/G
CREAM TOPICAL
Qty: 45 G | Refills: 3 | Status: SHIPPED | OUTPATIENT
Start: 2020-05-27 | End: 2023-04-21 | Stop reason: ALTCHOICE

## 2020-05-27 NOTE — PROGRESS NOTES
Subjective:       Patient ID:  Chelsea Ford Child is a 70 y.o. female who presents for   Chief Complaint   Patient presents with    Skin Check     upper and lower exam     Spot     lower legs     Has a lesion on her nose which bleeds some present for several months.  This is a high risk patient here to check for the development of new lesions.  Also itching on back no rash and has lesions on lower legs for over a year no tx.     Spot  - Initial  Affected locations: face, left arm, right arm, chest, torso, right lower leg and left lower leg  Signs / symptoms: asymptomatic  Aggravated by: nothing  Relieving factors/Treatments tried: nothing        Review of Systems   Constitutional: Negative for fever.   Skin: Negative for itching and rash.   Hematologic/Lymphatic: Does not bruise/bleed easily.        Objective:    Physical Exam   Constitutional: She appears well-developed and well-nourished. No distress.   Neurological: She is alert and oriented to person, place, and time. She is not disoriented.   Psychiatric: She has a normal mood and affect.   Skin:   Areas Examined (abnormalities noted in diagram):   Head / Face Inspection Performed  Neck Inspection Performed  Chest / Axilla Inspection Performed  Abdomen Inspection Performed  Back Inspection Performed  RUE Inspected  LUE Inspection Performed                   Diagram Legend     Erythematous scaling macule/papule c/w actinic keratosis       Vascular papule c/w angioma      Pigmented verrucoid papule/plaque c/w seborrheic keratosis      Yellow umbilicated papule c/w sebaceous hyperplasia      Irregularly shaped tan macule c/w lentigo     1-2 mm smooth white papules consistent with Milia      Movable subcutaneous cyst with punctum c/w epidermal inclusion cyst      Subcutaneous movable cyst c/w pilar cyst      Firm pink to brown papule c/w dermatofibroma      Pedunculated fleshy papule(s) c/w skin tag(s)      Evenly pigmented macule c/w junctional nevus      "Mildly variegated pigmented, slightly irregular-bordered macule c/w mildly atypical nevus      Flesh colored to evenly pigmented papule c/w intradermal nevus       Pink pearly papule/plaque c/w basal cell carcinoma      Erythematous hyperkeratotic cursted plaque c/w SCC      Surgical scar with no sign of skin cancer recurrence      Open and closed comedones      Inflammatory papules and pustules      Verrucoid papule consistent consistent with wart     Erythematous eczematous patches and plaques     Dystrophic onycholytic nail with subungual debris c/w onychomycosis     Umbilicated papule    Erythematous-base heme-crusted tan verrucoid plaque consistent with inflamed seborrheic keratosis     Erythematous Silvery Scaling Plaque c/w Psoriasis     See annotation      Assessment / Plan:      Pathology Orders:     Normal Orders This Visit    Specimen to Pathology, Dermatology     Questions:    Procedure Type:  Dermatology and skin neoplasms    Number of Specimens:  1    ------------------------:  -------------------------    Spec 1 Procedure:  Biopsy    Spec 1 Clinical Impression:  ro bcc    Spec 1 Source:  left nose        Neoplasm of uncertain behavior of skin  -     Specimen to Pathology, Dermatology  Shave biopsy performed after verbal consent including risk of infection, scar, recurrence, need for additional treatment of site. Area prepped with alcohol, anesthetized with 1% lidocaine with epinephrine. . Hemostasis achieved with monsels. No complications. Dressing applied. Wound care explained. Will need further rx if + ca          Seborrheic keratoses  reassurance      Notalgia paresthetica  cerave itch relief    Lentigines  The "ABCD" rules to observe pigmented lesions were reviewed.      Michel's disease  reassurance      Prurigo/eczema lower legs   Diprolene cream bid     Skin exam, screening for cancer  Area(s) of previous NMSC evaluated with no signs of recurrence.    Upper body skin examination performed " "today including at least 6 points as noted in physical examination.    Nevi  The "ABCD" rules to observe pigmented lesions were reviewed.  Actinic keratosis   Cryosurgery Procedure Note    Verbal consent from the patient is obtained and the patient is aware of the precancerous quality and need for treatment of these lesions. Liquid nitrogen cryosurgery is applied to the 1 actinic keratoses, as detailed in the physical exam, to produce a freeze injury.           Follow up in about 6 months (around 11/27/2020).  "

## 2020-05-31 PROCEDURE — 99454 PR REMOTE MNTR, PHYS PARAM, INITIAL, EA 30 DAYS: ICD-10-PCS | Mod: S$GLB,,, | Performed by: INTERNAL MEDICINE

## 2020-05-31 PROCEDURE — 99454 REM MNTR PHYSIOL PARAM 16-30: CPT | Mod: S$GLB,,, | Performed by: INTERNAL MEDICINE

## 2020-06-04 LAB
FINAL PATHOLOGIC DIAGNOSIS: NORMAL
GROSS: NORMAL
MICROSCOPIC EXAM: NORMAL

## 2020-06-08 ENCOUNTER — PATIENT OUTREACH (OUTPATIENT)
Dept: ADMINISTRATIVE | Facility: OTHER | Age: 70
End: 2020-06-08

## 2020-06-08 ENCOUNTER — TELEPHONE (OUTPATIENT)
Dept: DERMATOLOGY | Facility: CLINIC | Age: 70
End: 2020-06-08

## 2020-06-08 NOTE — TELEPHONE ENCOUNTER
Left message the lesion removed from her nose was benign.  Will recheck in 6 months, observe for recurrence of ak on right cheek near scar of previous skin cancer.  Call if recurs.

## 2020-06-17 ENCOUNTER — LAB VISIT (OUTPATIENT)
Dept: LAB | Facility: HOSPITAL | Age: 70
End: 2020-06-17
Attending: NURSE PRACTITIONER
Payer: MEDICARE

## 2020-06-17 DIAGNOSIS — Z12.11 ENCOUNTER FOR COLORECTAL CANCER SCREENING: ICD-10-CM

## 2020-06-17 DIAGNOSIS — Z12.12 ENCOUNTER FOR COLORECTAL CANCER SCREENING: ICD-10-CM

## 2020-06-17 PROCEDURE — 82274 ASSAY TEST FOR BLOOD FECAL: CPT | Mod: HCNC

## 2020-06-25 LAB — HEMOCCULT STL QL IA: NEGATIVE

## 2020-06-26 ENCOUNTER — LAB VISIT (OUTPATIENT)
Dept: LAB | Facility: HOSPITAL | Age: 70
End: 2020-06-26
Payer: MEDICARE

## 2020-06-26 DIAGNOSIS — H81.10 BENIGN PAROXYSMAL POSITIONAL VERTIGO, UNSPECIFIED LATERALITY: ICD-10-CM

## 2020-06-26 DIAGNOSIS — M81.0 OSTEOPOROSIS, UNSPECIFIED OSTEOPOROSIS TYPE, UNSPECIFIED PATHOLOGICAL FRACTURE PRESENCE: ICD-10-CM

## 2020-06-26 DIAGNOSIS — I10 ESSENTIAL HYPERTENSION: ICD-10-CM

## 2020-06-26 DIAGNOSIS — I10 BENIGN ESSENTIAL HYPERTENSION: ICD-10-CM

## 2020-06-26 LAB
25(OH)D3+25(OH)D2 SERPL-MCNC: 44 NG/ML (ref 30–96)
ALBUMIN SERPL BCP-MCNC: 3.8 G/DL (ref 3.5–5.2)
ALP SERPL-CCNC: 76 U/L (ref 55–135)
ALT SERPL W/O P-5'-P-CCNC: 10 U/L (ref 10–44)
ANION GAP SERPL CALC-SCNC: 6 MMOL/L (ref 8–16)
AST SERPL-CCNC: 17 U/L (ref 10–40)
BASOPHILS # BLD AUTO: 0.03 K/UL (ref 0–0.2)
BASOPHILS NFR BLD: 0.8 % (ref 0–1.9)
BILIRUB SERPL-MCNC: 0.4 MG/DL (ref 0.1–1)
BUN SERPL-MCNC: 13 MG/DL (ref 8–23)
CALCIUM SERPL-MCNC: 9.8 MG/DL (ref 8.7–10.5)
CHLORIDE SERPL-SCNC: 104 MMOL/L (ref 95–110)
CHOLEST SERPL-MCNC: 172 MG/DL (ref 120–199)
CHOLEST/HDLC SERPL: 2.5 {RATIO} (ref 2–5)
CO2 SERPL-SCNC: 27 MMOL/L (ref 23–29)
CREAT SERPL-MCNC: 0.9 MG/DL (ref 0.5–1.4)
DIFFERENTIAL METHOD: ABNORMAL
EOSINOPHIL # BLD AUTO: 0.1 K/UL (ref 0–0.5)
EOSINOPHIL NFR BLD: 1.5 % (ref 0–8)
ERYTHROCYTE [DISTWIDTH] IN BLOOD BY AUTOMATED COUNT: 12.8 % (ref 11.5–14.5)
EST. GFR  (AFRICAN AMERICAN): >60 ML/MIN/1.73 M^2
EST. GFR  (NON AFRICAN AMERICAN): >60 ML/MIN/1.73 M^2
GLUCOSE SERPL-MCNC: 96 MG/DL (ref 70–110)
HCT VFR BLD AUTO: 35.5 % (ref 37–48.5)
HDLC SERPL-MCNC: 68 MG/DL (ref 40–75)
HDLC SERPL: 39.5 % (ref 20–50)
HGB BLD-MCNC: 11.6 G/DL (ref 12–16)
IMM GRANULOCYTES # BLD AUTO: 0.01 K/UL (ref 0–0.04)
IMM GRANULOCYTES NFR BLD AUTO: 0.3 % (ref 0–0.5)
LDLC SERPL CALC-MCNC: 96.2 MG/DL (ref 63–159)
LYMPHOCYTES # BLD AUTO: 1.7 K/UL (ref 1–4.8)
LYMPHOCYTES NFR BLD: 42.4 % (ref 18–48)
MCH RBC QN AUTO: 31 PG (ref 27–31)
MCHC RBC AUTO-ENTMCNC: 32.7 G/DL (ref 32–36)
MCV RBC AUTO: 95 FL (ref 82–98)
MONOCYTES # BLD AUTO: 0.4 K/UL (ref 0.3–1)
MONOCYTES NFR BLD: 10.5 % (ref 4–15)
NEUTROPHILS # BLD AUTO: 1.8 K/UL (ref 1.8–7.7)
NEUTROPHILS NFR BLD: 44.5 % (ref 38–73)
NONHDLC SERPL-MCNC: 104 MG/DL
NRBC BLD-RTO: 0 /100 WBC
PLATELET # BLD AUTO: 128 K/UL (ref 150–350)
PMV BLD AUTO: 10.6 FL (ref 9.2–12.9)
POTASSIUM SERPL-SCNC: 4.2 MMOL/L (ref 3.5–5.1)
PROT SERPL-MCNC: 6.6 G/DL (ref 6–8.4)
RBC # BLD AUTO: 3.74 M/UL (ref 4–5.4)
SODIUM SERPL-SCNC: 137 MMOL/L (ref 136–145)
TRIGL SERPL-MCNC: 39 MG/DL (ref 30–150)
TSH SERPL DL<=0.005 MIU/L-ACNC: 3.96 UIU/ML (ref 0.4–4)
WBC # BLD AUTO: 3.99 K/UL (ref 3.9–12.7)

## 2020-06-26 PROCEDURE — 80053 COMPREHEN METABOLIC PANEL: CPT | Mod: HCNC

## 2020-06-26 PROCEDURE — 36415 COLL VENOUS BLD VENIPUNCTURE: CPT | Mod: HCNC

## 2020-06-26 PROCEDURE — 84443 ASSAY THYROID STIM HORMONE: CPT | Mod: HCNC

## 2020-06-26 PROCEDURE — 85025 COMPLETE CBC W/AUTO DIFF WBC: CPT | Mod: HCNC

## 2020-06-26 PROCEDURE — 82306 VITAMIN D 25 HYDROXY: CPT | Mod: HCNC

## 2020-06-26 PROCEDURE — 80061 LIPID PANEL: CPT | Mod: HCNC

## 2020-06-30 ENCOUNTER — PATIENT MESSAGE (OUTPATIENT)
Dept: INTERNAL MEDICINE | Facility: CLINIC | Age: 70
End: 2020-06-30

## 2020-07-02 LAB — HEMOCCULT STL QL IA: NEGATIVE

## 2020-07-17 ENCOUNTER — PATIENT OUTREACH (OUTPATIENT)
Dept: OTHER | Facility: OTHER | Age: 70
End: 2020-07-17

## 2020-07-17 NOTE — PROGRESS NOTES
Digital Medicine: Clinician Follow-Up    Called patient for follow up in response to voicemail. She has not been feeling well over the weekend. Reports episodes of diarrhea, fatigue and sinus congestion throughout the week. Symptoms started last Saturday and are only slightly improved today. Patient inquiring if it is related to antibiotic use in December. Explained that it is not likely as she has been off of antibiotics for several months. Encouraged patient to follow up PCP to discuss symptoms.     The history is provided by the patient.   Follow-up reason(s): returning patient call.     Patient readings are stable     Patient is not experiencing signs/symptoms of hypotension.  Patient is not experiencing signs/symptoms of hypertension.  Patient is not experiencing signs/symptoms of hypoglycemia.  Patient is not experiencing signs/symptoms of hyperglycemia.        Last 5 Patient Entered Readings                                      Current 30 Day Average: 118/68     Recent Readings 7/16/2020 7/16/2020 7/16/2020 7/16/2020 7/15/2020    SBP (mmHg) 101 108 102 101 102    DBP (mmHg) 63 65 64 59 60    Pulse 67 65 62 60 68             Screenings    ASSESSMENT(S)  Patients BP average is 117/68 mmHg, which is at goal. Patient's BP goal is less than or equal to 130/80 per 2017 ACC/AHA Hypertension Guidelines.      PLAN  Continue current therapy:    Patient verbalizes understanding. Patient did not express questions or concerns and patient has contact information if needed.            Hypertension Medications             carvediloL (COREG) 12.5 MG tablet Take 1 tablet (12.5 mg total) by mouth 2 (two) times daily with meals.    olmesartan (BENICAR) 5 MG Tab TAKE 1 TABLET BY MOUTH EVERY DAY

## 2020-07-20 ENCOUNTER — PATIENT OUTREACH (OUTPATIENT)
Dept: ADMINISTRATIVE | Facility: HOSPITAL | Age: 70
End: 2020-07-20

## 2020-07-31 PROCEDURE — 99454 PR REMOTE MNTR, PHYS PARAM, INITIAL, EA 30 DAYS: ICD-10-PCS | Mod: S$GLB,,, | Performed by: INTERNAL MEDICINE

## 2020-07-31 PROCEDURE — 99454 REM MNTR PHYSIOL PARAM 16-30: CPT | Mod: S$GLB,,, | Performed by: INTERNAL MEDICINE

## 2020-08-07 ENCOUNTER — PATIENT OUTREACH (OUTPATIENT)
Dept: OTHER | Facility: OTHER | Age: 70
End: 2020-08-07

## 2020-08-13 DIAGNOSIS — I10 BENIGN ESSENTIAL HYPERTENSION: ICD-10-CM

## 2020-08-13 RX ORDER — OLMESARTAN MEDOXOMIL 5 MG/1
TABLET ORAL
Qty: 90 TABLET | Refills: 2 | Status: SHIPPED | OUTPATIENT
Start: 2020-08-13 | End: 2020-08-14

## 2020-08-13 NOTE — PROGRESS NOTES
Provider Staff:     Please schedule patient for Annual    Please also check with your provider if any further labs need to be added and scheduled together.    Thanks!  [facility]     Appointments  past 12m or future 3m with PCP    Date Provider   Last Visit   5/29/2019 Elva Tripathi MD   Next Visit   Visit date not found Elva Tripathi MD     Note composed:2:48 PM 08/13/2020

## 2020-08-13 NOTE — PROGRESS NOTES
Refill Authorization Note     is requesting a refill authorization.    Brief assessment and rationale for refill: APPROVE: needs appt(Annual)     Medication-related problems identified: Requires appointment    Medication Therapy Plan: CDMR. Needs appt(Annual)    Medication reconciliation completed: No                         Comments:   Automatic Epic Protocol Generated Data:    Requested Prescriptions   Signed Prescriptions Disp Refills    olmesartan (BENICAR) 5 MG Tab 90 tablet 2     Sig: TAKE 1 TABLET BY MOUTH EVERY DAY       Cardiovascular:  Angiotensin Receptor Blockers Passed - 8/13/2020  8:31 AM        Passed - Patient is at least 18 years old        Passed - Last BP in normal range within 360 days.     BP Readings from Last 3 Encounters:   05/13/20 112/72   01/02/20 108/72   12/27/19 116/62              Passed - Office visit in past 12 months or future 90 days.     Recent Outpatient Visits            2 months ago Neoplasm of uncertain behavior of skin    [place] [facility] Cony Waters MD    3 months ago Essential hypertension    [facility] Jessie Gerber NP    7 months ago Antibiotic-associated diarrhea    [facility] Jhon Knutson MD    7 months ago Sinusitis, unspecified chronicity, unspecified location    [facility] Nichole Orozco MD    7 months ago Upper respiratory tract infection, unspecified type    [facility] Nani Valadez PA-C          Future Appointments              In 1 week [facility] - Optometry, [place]    In 2 months Cony Waters MD [facility] [place]                Passed - Cr is 1.3 or below and within 360 days     Creatinine   Date Value Ref Range Status   06/26/2020 0.9 0.5 - 1.4 mg/dL Final   05/24/2019 1.0 0.5 - 1.4 mg/dL Final   08/09/2018 0.9 0.5 - 1.4 mg/dL Final              Passed - K in normal range and within 360 days     Potassium    Date Value Ref Range Status   06/26/2020 4.2 3.5 - 5.1 mmol/L Final   05/24/2019 4.2 3.5 - 5.1 mmol/L Final   08/09/2018 4.2 3.5 - 5.1 mmol/L Final              Passed - eGFR within 360 days     eGFR if non    Date Value Ref Range Status   06/26/2020 >60.0 >60 mL/min/1.73 m^2 Final     Comment:     Calculation used to obtain the estimated glomerular filtration  rate (eGFR) is the CKD-EPI equation.      05/24/2019 57 (A) >60 mL/min/1.73 m^2 Final     Comment:     Calculation used to obtain the estimated glomerular filtration  rate (eGFR) is the CKD-EPI equation.      08/09/2018 >60 >60 mL/min/1.73 m^2 Final     Comment:     Calculation used to obtain the estimated glomerular filtration  rate (eGFR) is the CKD-EPI equation.        eGFR if    Date Value Ref Range Status   06/26/2020 >60.0 >60 mL/min/1.73 m^2 Final   05/24/2019 >60 >60 mL/min/1.73 m^2 Final   08/09/2018 >60 >60 mL/min/1.73 m^2 Final                    Appointments  past 12m or future 3m with PCP    Date Provider   Last Visit   5/29/2019 Elva Tripathi MD   Next Visit   Visit date not found Elva Tripathi MD   ED visits in past 90 days: 0     Note composed:2:48 PM 08/13/2020

## 2020-08-13 NOTE — TELEPHONE ENCOUNTER
Care Due:                  Date            Visit Type   Department     Provider  --------------------------------------------------------------------------------                                PHYSICAL -                              ESTABLISHED   [facility]  Last Visit: 05-      PATIENT      MEDICINE       TALI IYER  Next Visit: None Scheduled  None         None Found                                                            Last  Test          Frequency    Reason                     Performed    Due Date  --------------------------------------------------------------------------------    Office Visit  12 months..  olmesartan...............  05- 05-    Powered by [employer]. Reference number: [id number]. 8/13/2020 8:31:37 AM CDT

## 2020-08-14 RX ORDER — OLMESARTAN MEDOXOMIL 5 MG/1
5 TABLET ORAL DAILY
Qty: 90 TABLET | Refills: 0 | Status: SHIPPED | OUTPATIENT
Start: 2020-08-14 | End: 2021-01-28

## 2020-08-31 PROCEDURE — 99454 PR REMOTE MNTR, PHYS PARAM, INITIAL, EA 30 DAYS: ICD-10-PCS | Mod: S$GLB,,, | Performed by: INTERNAL MEDICINE

## 2020-08-31 PROCEDURE — 99454 REM MNTR PHYSIOL PARAM 16-30: CPT | Mod: S$GLB,,, | Performed by: INTERNAL MEDICINE

## 2020-09-22 ENCOUNTER — PATIENT MESSAGE (OUTPATIENT)
Dept: ADMINISTRATIVE | Facility: OTHER | Age: 70
End: 2020-09-22

## 2020-09-28 ENCOUNTER — PATIENT OUTREACH (OUTPATIENT)
Dept: ADMINISTRATIVE | Facility: OTHER | Age: 70
End: 2020-09-28

## 2020-09-29 ENCOUNTER — OFFICE VISIT (OUTPATIENT)
Dept: OPTOMETRY | Facility: CLINIC | Age: 70
End: 2020-09-29
Payer: COMMERCIAL

## 2020-09-29 DIAGNOSIS — H53.002 AMBLYOPIA OF LEFT EYE: ICD-10-CM

## 2020-09-29 DIAGNOSIS — H25.13 NUCLEAR SCLEROSIS OF BOTH EYES: ICD-10-CM

## 2020-09-29 DIAGNOSIS — Z01.00 ROUTINE EYE EXAM: Primary | ICD-10-CM

## 2020-09-29 DIAGNOSIS — H52.03 HYPEROPIA WITH PRESBYOPIA OF BOTH EYES: ICD-10-CM

## 2020-09-29 DIAGNOSIS — H52.4 HYPEROPIA WITH PRESBYOPIA OF BOTH EYES: ICD-10-CM

## 2020-09-29 DIAGNOSIS — I10 BENIGN ESSENTIAL HYPERTENSION: ICD-10-CM

## 2020-09-29 PROCEDURE — 92014 PR EYE EXAM, EST PATIENT,COMPREHESV: ICD-10-PCS | Mod: HCNC,S$GLB,, | Performed by: OPTOMETRIST

## 2020-09-29 PROCEDURE — 92015 PR REFRACTION: ICD-10-PCS | Mod: HCNC,S$GLB,, | Performed by: OPTOMETRIST

## 2020-09-29 PROCEDURE — 92014 COMPRE OPH EXAM EST PT 1/>: CPT | Mod: HCNC,S$GLB,, | Performed by: OPTOMETRIST

## 2020-09-29 PROCEDURE — 92015 DETERMINE REFRACTIVE STATE: CPT | Mod: HCNC,S$GLB,, | Performed by: OPTOMETRIST

## 2020-09-29 PROCEDURE — 99999 PR PBB SHADOW E&M-EST. PATIENT-LVL III: CPT | Mod: PBBFAC,HCNC,, | Performed by: OPTOMETRIST

## 2020-09-29 PROCEDURE — 99999 PR PBB SHADOW E&M-EST. PATIENT-LVL III: ICD-10-PCS | Mod: PBBFAC,HCNC,, | Performed by: OPTOMETRIST

## 2020-09-29 NOTE — PROGRESS NOTES
HPI     Last eye exam was 6/6/19 with Dr. Cottrell.  Patient states using +2.75 OTC readers all the time-doesn't really need   them to drive but leaves them on. Has been having OU itchy but didn't like   Astelin drops (horrible taste).  Patient denies diplopia, headaches, flashes/floaters, and pain.      Last edited by Theresa Weller on 9/29/2020  2:12 PM. (History)    HPI     Last eye exam was 6/6/19 with Dr. Cottrell.  Patient states using +2.75 OTC readers all the time-doesn't really need   them to drive but leaves them on. Has been having OU itchy but didn't like   Astelin drops (horrible taste).  Patient denies diplopia, headaches, flashes/floaters, and pain.      Last edited by Theresa Weller on 9/29/2020  2:12 PM. (History)            Assessment /Plan     For exam results, see Encounter Report.    Routine eye exam    Hyperopia with presbyopia of both eyes    Amblyopia of left eye    Benign essential hypertension    Nuclear sclerosis of both eyes          1-2.  No rx given.  Can use +1.25 OTC readers for distance.   3.  Longstanding-history of extropia OS with surgery at age 9.    4.  No retinopathy--monitor yearly.  BP control.  Eye health normal OU.  5.  Educated on cataracts and affects on vision.  Early-monitor.

## 2020-10-09 NOTE — PROGRESS NOTES
Chart Reviewed. Patient's current 30-day average is at goal of <130/80 mmHg. No medication recommendations at this time. I will continue monitoring and follow-up in 2-3 months, sooner if blood pressure begins to trend upward or downward.     Last 5 Patient Entered Readings                                      Current 30 Day Average: 125/68     Recent Readings 10/4/2020 9/29/2020 9/27/2020 9/26/2020 9/26/2020    SBP (mmHg) 124 104 153 132 118    DBP (mmHg) 67 62 76 69 64    Pulse 54 53 59 63 58        Hypertension Medications             carvediloL (COREG) 12.5 MG tablet Take 1 tablet (12.5 mg total) by mouth 2 (two) times daily with meals.    olmesartan (BENICAR) 5 MG Tab Take 1 tablet (5 mg total) by mouth once daily.

## 2020-10-20 ENCOUNTER — OFFICE VISIT (OUTPATIENT)
Dept: DERMATOLOGY | Facility: CLINIC | Age: 70
End: 2020-10-20
Payer: MEDICARE

## 2020-10-20 VITALS — WEIGHT: 116 LBS | BODY MASS INDEX: 19.3 KG/M2

## 2020-10-20 DIAGNOSIS — L81.4 LENTIGINES: ICD-10-CM

## 2020-10-20 DIAGNOSIS — D22.9 NEVUS OF MULTIPLE SITES: ICD-10-CM

## 2020-10-20 DIAGNOSIS — D48.5 NEOPLASM OF UNCERTAIN BEHAVIOR OF SKIN: Primary | ICD-10-CM

## 2020-10-20 DIAGNOSIS — Z85.828 HISTORY OF SKIN CANCER: ICD-10-CM

## 2020-10-20 PROCEDURE — 1101F PR PT FALLS ASSESS DOC 0-1 FALLS W/OUT INJ PAST YR: ICD-10-PCS | Mod: HCNC,CPTII,S$GLB, | Performed by: DERMATOLOGY

## 2020-10-20 PROCEDURE — 99999 PR PBB SHADOW E&M-EST. PATIENT-LVL III: ICD-10-PCS | Mod: PBBFAC,HCNC,, | Performed by: DERMATOLOGY

## 2020-10-20 PROCEDURE — 88305 TISSUE EXAM BY PATHOLOGIST: CPT | Mod: HCNC | Performed by: PATHOLOGY

## 2020-10-20 PROCEDURE — 99999 PR PBB SHADOW E&M-EST. PATIENT-LVL III: CPT | Mod: PBBFAC,HCNC,, | Performed by: DERMATOLOGY

## 2020-10-20 PROCEDURE — 11102 PR TANGENTIAL BIOPSY, SKIN, SINGLE LESION: ICD-10-PCS | Mod: HCNC,S$GLB,, | Performed by: DERMATOLOGY

## 2020-10-20 PROCEDURE — 88305 TISSUE EXAM BY PATHOLOGIST: CPT | Mod: 26,HCNC,, | Performed by: PATHOLOGY

## 2020-10-20 PROCEDURE — 99213 PR OFFICE/OUTPT VISIT, EST, LEVL III, 20-29 MIN: ICD-10-PCS | Mod: 25,HCNC,S$GLB, | Performed by: DERMATOLOGY

## 2020-10-20 PROCEDURE — 3008F BODY MASS INDEX DOCD: CPT | Mod: HCNC,CPTII,S$GLB, | Performed by: DERMATOLOGY

## 2020-10-20 PROCEDURE — 1126F AMNT PAIN NOTED NONE PRSNT: CPT | Mod: HCNC,S$GLB,, | Performed by: DERMATOLOGY

## 2020-10-20 PROCEDURE — 1101F PT FALLS ASSESS-DOCD LE1/YR: CPT | Mod: HCNC,CPTII,S$GLB, | Performed by: DERMATOLOGY

## 2020-10-20 PROCEDURE — 1159F MED LIST DOCD IN RCRD: CPT | Mod: HCNC,S$GLB,, | Performed by: DERMATOLOGY

## 2020-10-20 PROCEDURE — 99213 OFFICE O/P EST LOW 20 MIN: CPT | Mod: 25,HCNC,S$GLB, | Performed by: DERMATOLOGY

## 2020-10-20 PROCEDURE — 11102 TANGNTL BX SKIN SINGLE LES: CPT | Mod: HCNC,S$GLB,, | Performed by: DERMATOLOGY

## 2020-10-20 PROCEDURE — 88305 TISSUE EXAM BY PATHOLOGIST: ICD-10-PCS | Mod: 26,HCNC,, | Performed by: PATHOLOGY

## 2020-10-20 PROCEDURE — 1159F PR MEDICATION LIST DOCUMENTED IN MEDICAL RECORD: ICD-10-PCS | Mod: HCNC,S$GLB,, | Performed by: DERMATOLOGY

## 2020-10-20 PROCEDURE — 3008F PR BODY MASS INDEX (BMI) DOCUMENTED: ICD-10-PCS | Mod: HCNC,CPTII,S$GLB, | Performed by: DERMATOLOGY

## 2020-10-20 PROCEDURE — 1126F PR PAIN SEVERITY QUANTIFIED, NO PAIN PRESENT: ICD-10-PCS | Mod: HCNC,S$GLB,, | Performed by: DERMATOLOGY

## 2020-10-20 NOTE — PROGRESS NOTES
Subjective:       Patient ID:  Chelsea Ford Child is a 70 y.o. female who presents for   Chief Complaint   Patient presents with    Follow-up     upper and lower exam      This is a high risk patient here to check for the development of new lesions.        Review of Systems   Constitutional: Negative for fever, chills, weight loss, weight gain, fatigue, night sweats and malaise.   Skin: Negative for daily sunscreen use, activity-related sunscreen use and wears hat.   Hematologic/Lymphatic: Does not bruise/bleed easily.        Objective:    Physical Exam   Constitutional: She appears well-developed and well-nourished. No distress.   Neurological: She is alert and oriented to person, place, and time. She is not disoriented.   Psychiatric: She has a normal mood and affect.   Skin:   Areas Examined (abnormalities noted in diagram):   Head / Face Inspection Performed  Neck Inspection Performed  Chest / Axilla Inspection Performed  Abdomen Inspection Performed  Back Inspection Performed  RUE Inspected  LUE Inspection Performed  RLE Inspected  LLE Inspection Performed                   Diagram Legend     Erythematous scaling macule/papule c/w actinic keratosis       Vascular papule c/w angioma      Pigmented verrucoid papule/plaque c/w seborrheic keratosis      Yellow umbilicated papule c/w sebaceous hyperplasia      Irregularly shaped tan macule c/w lentigo     1-2 mm smooth white papules consistent with Milia      Movable subcutaneous cyst with punctum c/w epidermal inclusion cyst      Subcutaneous movable cyst c/w pilar cyst      Firm pink to brown papule c/w dermatofibroma      Pedunculated fleshy papule(s) c/w skin tag(s)      Evenly pigmented macule c/w junctional nevus     Mildly variegated pigmented, slightly irregular-bordered macule c/w mildly atypical nevus      Flesh colored to evenly pigmented papule c/w intradermal nevus       Pink pearly papule/plaque c/w basal cell carcinoma      Erythematous  "hyperkeratotic cursted plaque c/w SCC      Surgical scar with no sign of skin cancer recurrence      Open and closed comedones      Inflammatory papules and pustules      Verrucoid papule consistent consistent with wart     Erythematous eczematous patches and plaques     Dystrophic onycholytic nail with subungual debris c/w onychomycosis     Umbilicated papule    Erythematous-base heme-crusted tan verrucoid plaque consistent with inflamed seborrheic keratosis     Erythematous Silvery Scaling Plaque c/w Psoriasis     See annotation      Assessment / Plan:      Pathology Orders:     Normal Orders This Visit    Specimen to Pathology, Dermatology     Comments:    Number of Specimens:->1  ------------------------->-------------------------  Spec 1 Procedure:->Biopsy  Spec 1 Clinical Impression:->r/o bcc  Spec 1 Source:->right cheek    Questions:    Procedure Type: Dermatology and skin neoplasms    Number of Specimens: 1    ------------------------: -------------------------    Spec 1 Procedure: Biopsy    Spec 1 Clinical Impression: r/o bcc    Spec 1 Source: right cheek        Neoplasm of uncertain behavior of skin  -     Specimen to Pathology, Dermatology  Shave biopsy performed after verbal consent including risk of infection, scar, recurrence, need for additional treatment of site. Area prepped with alcohol, anesthetized with 1% lidocaine with epinephrine. . Hemostasis achieved with monsels. No complications. Dressing applied. Wound care explained. Will need further rx if + ca          Nevus of multiple sites  The "ABCD" rules to observe pigmented lesions were reviewed.      Lentigines  The "ABCD" rules to observe pigmented lesions were reviewed.  sunscreen    History of skin cancer             Follow up in about 6 months (around 4/20/2021).  "

## 2020-10-23 LAB
FINAL PATHOLOGIC DIAGNOSIS: NORMAL
GROSS: NORMAL
MICROSCOPIC EXAM: NORMAL

## 2020-10-26 ENCOUNTER — PATIENT MESSAGE (OUTPATIENT)
Dept: INTERNAL MEDICINE | Facility: CLINIC | Age: 70
End: 2020-10-26

## 2020-11-22 ENCOUNTER — PATIENT OUTREACH (OUTPATIENT)
Dept: ADMINISTRATIVE | Facility: OTHER | Age: 70
End: 2020-11-22

## 2020-11-24 ENCOUNTER — OFFICE VISIT (OUTPATIENT)
Dept: OBSTETRICS AND GYNECOLOGY | Facility: CLINIC | Age: 70
End: 2020-11-24
Payer: MEDICARE

## 2020-11-24 VITALS
SYSTOLIC BLOOD PRESSURE: 144 MMHG | HEIGHT: 65 IN | DIASTOLIC BLOOD PRESSURE: 82 MMHG | WEIGHT: 119.06 LBS | BODY MASS INDEX: 19.83 KG/M2

## 2020-11-24 DIAGNOSIS — N95.9 MENOPAUSAL AND PERIMENOPAUSAL DISORDER: ICD-10-CM

## 2020-11-24 DIAGNOSIS — N95.2 ATROPHIC VAGINITIS: ICD-10-CM

## 2020-11-24 DIAGNOSIS — Z01.419 VISIT FOR GYNECOLOGIC EXAMINATION: Primary | ICD-10-CM

## 2020-11-24 DIAGNOSIS — Z12.31 ENCOUNTER FOR SCREENING MAMMOGRAM FOR MALIGNANT NEOPLASM OF BREAST: ICD-10-CM

## 2020-11-24 DIAGNOSIS — N95.2 VAGINAL ATROPHY: ICD-10-CM

## 2020-11-24 PROCEDURE — 3008F BODY MASS INDEX DOCD: CPT | Mod: HCNC,CPTII,S$GLB, | Performed by: OBSTETRICS & GYNECOLOGY

## 2020-11-24 PROCEDURE — G0101 CA SCREEN;PELVIC/BREAST EXAM: HCPCS | Mod: HCNC,S$GLB,, | Performed by: OBSTETRICS & GYNECOLOGY

## 2020-11-24 PROCEDURE — G0101 PR CA SCREEN;PELVIC/BREAST EXAM: ICD-10-PCS | Mod: HCNC,S$GLB,, | Performed by: OBSTETRICS & GYNECOLOGY

## 2020-11-24 PROCEDURE — 99999 PR PBB SHADOW E&M-EST. PATIENT-LVL III: CPT | Mod: PBBFAC,HCNC,, | Performed by: OBSTETRICS & GYNECOLOGY

## 2020-11-24 PROCEDURE — 99999 PR PBB SHADOW E&M-EST. PATIENT-LVL III: ICD-10-PCS | Mod: PBBFAC,HCNC,, | Performed by: OBSTETRICS & GYNECOLOGY

## 2020-11-24 PROCEDURE — 3008F PR BODY MASS INDEX (BMI) DOCUMENTED: ICD-10-PCS | Mod: HCNC,CPTII,S$GLB, | Performed by: OBSTETRICS & GYNECOLOGY

## 2020-11-24 RX ORDER — ESTRADIOL 0.1 MG/G
CREAM VAGINAL
Qty: 42.5 G | Refills: 11 | Status: SHIPPED | OUTPATIENT
Start: 2020-11-24 | End: 2023-01-17 | Stop reason: SDUPTHER

## 2020-11-24 NOTE — PROGRESS NOTES
"HISTORY OF PRESENT ILLNESS:    Chelsea Ford Child is a 70 y.o. female , presents for a routine exam and has no complaints.  REF MAMMO.  HAD BEEN GIVEN ESTRACE CREAM FOR ATROPHY SX AND IT'S HELPFUL.   SAW UROL FOR DETRUSOR INSTABILITY, REC PELVIC FLOOR PT, BUT NOT VERY SYMPTOMATIC AND DEFERD  RETIRED  NURSE TYRESE LOCKWOOD     HARDY:  Chelsea Ford Child is a 69 y.o. female , presents with c/o of urinary hesitancy for about 6 months.  -Urinary hesitancy "may be from incomplete bladder emptying" and denies associated fever, pelvic, vaginal or back pain, vaginal discharge, odor, itching, dysuria, incontinence, frequency, hematuria.   -Does have urgency and nocturia.    Past Medical History:   Diagnosis Date    Amblyopia of left eye     BCC (basal cell carcinoma)     Cataract     Exotropia of left eye     Hypertension     Osteoporosis     Palpitations 2017    Vertigo 2017       Past Surgical History:   Procedure Laterality Date     SECTION      COSMETIC SURGERY      Basal cell carcinoma    EYE SURGERY      Lazy eye    Mohs      BCC    STRABISMUS SURGERY Left         MEDICATIONS AND ALLERGIES:      Current Outpatient Medications:     betamethasone dipropionate (DIPROLENE) 0.05 % cream, Use bid for eczema spots  on lower legs, Disp: 45 g, Rfl: 3    calcium carbonate (OS-DAVID) 600 mg calcium (1,500 mg) Tab, Take 1,200 mg by mouth once., Disp: , Rfl:     CALCIUM ORAL, Take by mouth., Disp: , Rfl:     carvediloL (COREG) 12.5 MG tablet, Take 1 tablet (12.5 mg total) by mouth 2 (two) times daily with meals., Disp: 180 tablet, Rfl: 3    estradioL (ESTRACE) 0.01 % (0.1 mg/gram) vaginal cream, Rub dime sized amount of cream to the urethra nightly, Disp: 42.5 g, Rfl: 11    FLUZONE HIGH-DOSE , PF, 180 mcg/0.5 mL Syrg, , Disp: , Rfl:     olmesartan (BENICAR) 5 MG Tab, Take 1 tablet (5 mg total) by mouth once daily., Disp: 90 tablet, Rfl: 0    vitamin D " (VITAMIN D3) 1000 units Tab, Take 2,000 Units by mouth once daily., Disp: , Rfl:     Review of patient's allergies indicates:   Allergen Reactions    Omnicef [cefdinir] Diarrhea    Adhesive Dermatitis    Nickel sutures [surgical stainless steel]      Rash with nickel       Family History   Problem Relation Age of Onset    Hypertension Mother     Hypertension Father     COPD Father     Stroke Father 72    No Known Problems Son     Breast cancer Neg Hx     Colon cancer Neg Hx     Ovarian cancer Neg Hx     Cataracts Neg Hx     Glaucoma Neg Hx     Macular degeneration Neg Hx        Social History     Socioeconomic History    Marital status: Single     Spouse name: Not on file    Number of children: Not on file    Years of education: Not on file    Highest education level: Not on file   Occupational History    Occupation: RN - retired     Comment: worker's comp   Social Needs    Financial resource strain: Not hard at all    Food insecurity     Worry: Never true     Inability: Never true    Transportation needs     Medical: No     Non-medical: No   Tobacco Use    Smoking status: Former Smoker     Packs/day: 1.00     Years: 5.00     Pack years: 5.00     Types: Cigarettes     Start date:      Quit date:      Years since quittin.9    Smokeless tobacco: Never Used   Substance and Sexual Activity    Alcohol use: No     Frequency: Never     Binge frequency: Never    Drug use: No    Sexual activity: Never     Birth control/protection: Post-menopausal   Lifestyle    Physical activity     Days per week: 5 days     Minutes per session: 80 min    Stress: Not at all   Relationships    Social connections     Talks on phone: More than three times a week     Gets together: More than three times a week     Attends Judaism service: Not on file     Active member of club or organization: Yes     Attends meetings of clubs or organizations: More than 4 times per year     Relationship status: Never  "   Other Topics Concern    Are you pregnant or think you may be? Not Asked    Breast-feeding Not Asked   Social History Narrative    Not on file       COMPREHENSIVE GYN HISTORY:  PAP History:  Denies abnormal Paps except a noted above.  Infection History: Denies STDs. Denies PID.  Benign History: Denies uterine fibroids. Denies ovarian cysts. Denies endometriosis.  Denies other conditions.  Cancer History: Denies cervical cancer. Denies uterine cancer or hyperplasia. Denies ovarian cancer. Denies vulvar cancer or pre-cancer. Denies vaginal cancer or pre-cancer. Denies breast cancer. Denies colon cancer.    ROS:  GENERAL: No weight changes. No swelling. No fatigue. No fever.  CARDIOVASCULAR: No chest pain. No shortness of breath. No leg cramps.   NEUROLOGICAL: No headaches. No vision changes.  BREASTS: No pain. No lumps. No discharge.  ABDOMEN: No pain. No nausea. No vomiting. No diarrhea. No constipation.  REPRODUCTIVE: No abnormal bleeding.   VULVA: No pain. No lesions. No itching.  VAGINA: No relaxation. No itching. No odor. No discharge. No lesions.  URINARY: No incontinence. No nocturia. No frequency. No dysuria.    BP (!) 144/82   Ht 5' 5" (1.651 m)   Wt 54 kg (119 lb 0.8 oz)   LMP  (LMP Unknown)   BMI 19.81 kg/m²     PE:  APPEARANCE: Well nourished, well developed, in no acute distress.  AFFECT: WNL, alert and oriented x 3.  SKIN: No hirsutism or acne.  NECK: Neck symmetric without masses or thyromegaly.  NODES: No inguinal, cervical, axillary or femoral lymph node enlargement.  CHEST: Good respiratory effort.   ABDOMEN: Soft. No tenderness or masses. No hepatosplenomegaly. No hernias.  BREASTS: Symmetrical, no skin changes or visible lesions. No palpable masses, nipple discharge bilaterally.  PELVIC: ATROPHIC EXTERNAL FEMALE GENITALIA without lesions. Normal hair distribution. Adequate perineal body, normal urethral meatus. VAGINA DRY without lesions or discharge. CERVIX STENOTIC without " lesions, discharge or tenderness. No significant cystocele or rectocele. Bimanual exam shows uterus to be normal size, regular, mobile and nontender. Adnexa without masses or tenderness.  EXTREMITIES: No edema.    PROCEDURES:      DIAGNOSIS:  1. Visit for gynecologic examination     2. Encounter for screening mammogram for malignant neoplasm of breast  Mammo Digital Screening Bilat w/ [facility]   3. Menopausal and perimenopausal disorder     4. Atrophic vaginitis     5. Vaginal atrophy  estradioL (ESTRACE) 0.01 % (0.1 mg/gram) vaginal cream       PLAN:    LABS AND TESTS ORDERED:  Mammogram    COUNSELING:  The patient was counseled today on osteoporosis prevention, calcium supplementation, and regular weight bearing exercise. The patient was also counseled today on ACS PAP guidelines, with recommendations for yearly pelvic exams unless their uterus, cervix, and ovaries were removed for benign reasons; in that case, examinations every 3-5 years are recommended.  The patient was also counseled regarding monthly breast self-examination, routine STD screening for at-risk populations, prophylactic immunizations for transmitted infections such as  HPV, Pertussis, or Influenza as appropriate, and yearly mammograms when indicated by ACS guidelines.  She was advised to see her primary care physician for all other health maintenance.    FOLLOW-UP with me annually.

## 2020-11-28 ENCOUNTER — HOSPITAL ENCOUNTER (OUTPATIENT)
Dept: RADIOLOGY | Facility: HOSPITAL | Age: 70
Discharge: HOME | End: 2020-11-28
Attending: OBSTETRICS & GYNECOLOGY
Payer: MEDICARE

## 2020-11-28 DIAGNOSIS — Z12.31 ENCOUNTER FOR SCREENING MAMMOGRAM FOR MALIGNANT NEOPLASM OF BREAST: ICD-10-CM

## 2020-11-28 PROCEDURE — 77063 MAMMO DIGITAL SCREENING BILAT WITH TOMO: ICD-10-PCS | Mod: 26,HCNC,, | Performed by: RADIOLOGY

## 2020-11-28 PROCEDURE — 77063 BREAST TOMOSYNTHESIS BI: CPT | Mod: 26,HCNC,, | Performed by: RADIOLOGY

## 2020-11-28 PROCEDURE — 77067 SCR MAMMO BI INCL CAD: CPT | Mod: TC,HCNC

## 2020-11-28 PROCEDURE — 77067 SCR MAMMO BI INCL CAD: CPT | Mod: 26,HCNC,, | Performed by: RADIOLOGY

## 2020-11-28 PROCEDURE — 77067 MAMMO DIGITAL SCREENING BILAT WITH TOMO: ICD-10-PCS | Mod: 26,HCNC,, | Performed by: RADIOLOGY

## 2020-12-01 ENCOUNTER — TELEPHONE (OUTPATIENT)
Dept: RADIOLOGY | Facility: HOSPITAL | Age: 70
End: 2020-12-01

## 2020-12-01 NOTE — TELEPHONE ENCOUNTER
Spoke with patient and explained mammogram findings.Patient expressed understanding of results. Patient scheduled abnormal mammogram follow up appointment at [facility] [place] on 12/2/2020.

## 2020-12-02 ENCOUNTER — HOSPITAL ENCOUNTER (OUTPATIENT)
Dept: RADIOLOGY | Facility: HOSPITAL | Age: 70
Discharge: HOME | End: 2020-12-02
Attending: OBSTETRICS & GYNECOLOGY
Payer: MEDICARE

## 2020-12-02 DIAGNOSIS — R92.8 ABNORMAL MAMMOGRAM: ICD-10-CM

## 2020-12-02 PROCEDURE — 77065 DX MAMMO INCL CAD UNI: CPT | Mod: TC,HCNC,LT

## 2020-12-02 PROCEDURE — 77065 DX MAMMO INCL CAD UNI: CPT | Mod: 26,HCNC,LT, | Performed by: RADIOLOGY

## 2020-12-02 PROCEDURE — 77065 MAMMO DIGITAL DIAGNOSTIC LEFT WITH TOMO: ICD-10-PCS | Mod: 26,HCNC,LT, | Performed by: RADIOLOGY

## 2020-12-02 PROCEDURE — 77061 BREAST TOMOSYNTHESIS UNI: CPT | Mod: TC,HCNC,LT

## 2020-12-02 PROCEDURE — 77061 MAMMO DIGITAL DIAGNOSTIC LEFT WITH TOMO: ICD-10-PCS | Mod: 26,HCNC,LT, | Performed by: RADIOLOGY

## 2020-12-02 PROCEDURE — 77061 BREAST TOMOSYNTHESIS UNI: CPT | Mod: 26,HCNC,LT, | Performed by: RADIOLOGY

## 2020-12-16 ENCOUNTER — OFFICE VISIT (OUTPATIENT)
Dept: OTOLARYNGOLOGY | Facility: CLINIC | Age: 70
End: 2020-12-16
Payer: MEDICARE

## 2020-12-16 VITALS — WEIGHT: 122.13 LBS | BODY MASS INDEX: 20.32 KG/M2

## 2020-12-16 DIAGNOSIS — R42 CERVICAL VERTIGO: Primary | ICD-10-CM

## 2020-12-16 DIAGNOSIS — R42 DIZZINESSES: ICD-10-CM

## 2020-12-16 DIAGNOSIS — H61.23 BILATERAL IMPACTED CERUMEN: ICD-10-CM

## 2020-12-16 PROCEDURE — 1159F MED LIST DOCD IN RCRD: CPT | Mod: HCNC,S$GLB,, | Performed by: OTOLARYNGOLOGY

## 2020-12-16 PROCEDURE — 69210 REMOVE IMPACTED EAR WAX UNI: CPT | Mod: HCNC,S$GLB,, | Performed by: OTOLARYNGOLOGY

## 2020-12-16 PROCEDURE — 1101F PT FALLS ASSESS-DOCD LE1/YR: CPT | Mod: HCNC,CPTII,S$GLB, | Performed by: OTOLARYNGOLOGY

## 2020-12-16 PROCEDURE — 1159F PR MEDICATION LIST DOCUMENTED IN MEDICAL RECORD: ICD-10-PCS | Mod: HCNC,S$GLB,, | Performed by: OTOLARYNGOLOGY

## 2020-12-16 PROCEDURE — 99203 PR OFFICE/OUTPT VISIT, NEW, LEVL III, 30-44 MIN: ICD-10-PCS | Mod: 25,HCNC,S$GLB, | Performed by: OTOLARYNGOLOGY

## 2020-12-16 PROCEDURE — 3288F FALL RISK ASSESSMENT DOCD: CPT | Mod: HCNC,CPTII,S$GLB, | Performed by: OTOLARYNGOLOGY

## 2020-12-16 PROCEDURE — 3288F PR FALLS RISK ASSESSMENT DOCUMENTED: ICD-10-PCS | Mod: HCNC,CPTII,S$GLB, | Performed by: OTOLARYNGOLOGY

## 2020-12-16 PROCEDURE — 1126F AMNT PAIN NOTED NONE PRSNT: CPT | Mod: HCNC,S$GLB,, | Performed by: OTOLARYNGOLOGY

## 2020-12-16 PROCEDURE — 1126F PR PAIN SEVERITY QUANTIFIED, NO PAIN PRESENT: ICD-10-PCS | Mod: HCNC,S$GLB,, | Performed by: OTOLARYNGOLOGY

## 2020-12-16 PROCEDURE — 3008F PR BODY MASS INDEX (BMI) DOCUMENTED: ICD-10-PCS | Mod: HCNC,CPTII,S$GLB, | Performed by: OTOLARYNGOLOGY

## 2020-12-16 PROCEDURE — 99999 PR PBB SHADOW E&M-EST. PATIENT-LVL III: CPT | Mod: PBBFAC,HCNC,, | Performed by: OTOLARYNGOLOGY

## 2020-12-16 PROCEDURE — 1101F PR PT FALLS ASSESS DOC 0-1 FALLS W/OUT INJ PAST YR: ICD-10-PCS | Mod: HCNC,CPTII,S$GLB, | Performed by: OTOLARYNGOLOGY

## 2020-12-16 PROCEDURE — 69210 EAR CERUMEN REMOVAL: ICD-10-PCS | Mod: HCNC,S$GLB,, | Performed by: OTOLARYNGOLOGY

## 2020-12-16 PROCEDURE — 3008F BODY MASS INDEX DOCD: CPT | Mod: HCNC,CPTII,S$GLB, | Performed by: OTOLARYNGOLOGY

## 2020-12-16 PROCEDURE — 99203 OFFICE O/P NEW LOW 30 MIN: CPT | Mod: 25,HCNC,S$GLB, | Performed by: OTOLARYNGOLOGY

## 2020-12-16 PROCEDURE — 99999 PR PBB SHADOW E&M-EST. PATIENT-LVL III: ICD-10-PCS | Mod: PBBFAC,HCNC,, | Performed by: OTOLARYNGOLOGY

## 2020-12-16 NOTE — PROCEDURES
Ear Cerumen Removal    Date/Time: 12/16/2020 8:30 AM  Performed by: Steven Brasher MD  Authorized by: Steven Brasher MD     Consent Done?:  Yes (Verbal)  Location details:  Both ears  Procedure type: curette    Cerumen  Removal Results:  Cerumen completely removed  Patient tolerance:  Patient tolerated the procedure well with no immediate complications     Binocular microscopy used to examine ear canal and tympanic membrane.  There was a cerumen impaction on the right side and an impaction of cerumen on the left.  This was removed using a curette and other microinstrumentation.

## 2020-12-16 NOTE — PROGRESS NOTES
Answers for HPI/ROS submitted by the patient on 12/14/2020   Fatigue (Tiredness)?: Yes  sinus pressure : Yes  Tooth/Dental Problems?: Yes  eye itching: Yes  None of these: Yes  Irregular heartbeat?: Yes  Foot swelling?: Yes  None of these: Yes  Difficulty urinating?: Yes  Muscle aches / pain?: Yes  back pain: Yes  neck pain: Yes  None of these : Yes  Seasonal Allergies?: Yes  None of these : Yes  dizziness: Yes  Light-headedness: Yes  None of these: Yes  None of these: Yes

## 2020-12-16 NOTE — PROGRESS NOTES
Subjective:       Patient ID: Chelsea Ford Child is a 70 y.o. female.    Chief Complaint: Dizziness    HPI       Chelsea Ford Child is a 70 y.o. female h/o BPPV 7 months ago presents for dizziness. She states that the dizziness is not spinning vertigo like she experienced with BPPV, but is a sensation of motion associated with turning her head, often looking behind her or bending over.  She reports associated neck stiffness and neck pain.  Denies fluctuating hearing loss, tinnitus or aural fullness associated with episodes.    Review of Systems   HENT: Positive for sinus pressure/congestion.    Eyes: Positive for itching.   Respiratory: Negative.    Gastrointestinal: Negative.    Endocrine: Negative.    Genitourinary: Positive for difficulty urinating.   Musculoskeletal: Positive for back pain and neck pain.   Integumentary:  Negative.   Neurological: Positive for dizziness and light-headedness.   Hematological: Negative.    Psychiatric/Behavioral: Negative.          Objective:      Physical Exam  Vitals signs and nursing note reviewed.   Constitutional:       General: She is not in acute distress.     Appearance: She is well-developed.   HENT:      Head: Normocephalic and atraumatic.      Right Ear: Tympanic membrane, ear canal and external ear normal. There is impacted cerumen.      Left Ear: Tympanic membrane, ear canal and external ear normal. There is impacted cerumen.      Nose: Nose normal.      Mouth/Throat:      Pharynx: Uvula midline.   Eyes:      Conjunctiva/sclera: Conjunctivae normal.      Pupils: Pupils are equal, round, and reactive to light.   Neck:      Musculoskeletal: Neck rigidity present.      Thyroid: No thyromegaly.      Trachea: No tracheal deviation.   Cardiovascular:      Rate and Rhythm: Normal rate and regular rhythm.   Pulmonary:      Effort: Pulmonary effort is normal. No respiratory distress.   Musculoskeletal: Normal range of motion.   Lymphadenopathy:      Head:      Right side of  head: No submental, submandibular or tonsillar adenopathy.      Left side of head: No submental, submandibular or tonsillar adenopathy.      Cervical: No cervical adenopathy.   Neurological:      Mental Status: She is alert and oriented to person, place, and time.      Cranial Nerves: No cranial nerve deficit.      Gait: Gait normal.      Comments: Head thrust: negative     Psychiatric:         Behavior: Behavior normal.         Assessment:       1. Cervical vertigo    2. Dizzinesses    3. Bilateral impacted cerumen        Plan:        .In summary this is a pleasant 70 y.o. with h/od of BPPV now complaining of neck symptoms and associated dizziness. Recommend cawthorne exercises and manual neck therapy.  Patient requests formal PT eval.        Answers for HPI/ROS submitted by the patient on 12/14/2020   Fatigue (Tiredness)?: Yes  Tooth/Dental Problems?: Yes  Irregular heartbeat?: Yes  Foot swelling?: Yes  Muscle aches / pain?: Yes  Seasonal Allergies?: Yes

## 2020-12-19 ENCOUNTER — PATIENT MESSAGE (OUTPATIENT)
Dept: ADMINISTRATIVE | Facility: OTHER | Age: 70
End: 2020-12-19

## 2020-12-29 ENCOUNTER — CLINICAL SUPPORT (OUTPATIENT)
Dept: REHABILITATION | Facility: HOSPITAL | Age: 70
End: 2020-12-29
Attending: OTOLARYNGOLOGY
Payer: MEDICARE

## 2020-12-29 DIAGNOSIS — R42 DIZZINESS: ICD-10-CM

## 2020-12-29 DIAGNOSIS — R26.89 IMPAIRMENT OF BALANCE: ICD-10-CM

## 2020-12-29 DIAGNOSIS — R29.3 POOR POSTURE: ICD-10-CM

## 2020-12-29 DIAGNOSIS — R42 CERVICAL VERTIGO: ICD-10-CM

## 2020-12-29 PROCEDURE — 97162 PT EVAL MOD COMPLEX 30 MIN: CPT | Mod: HCNC,PO

## 2020-12-29 NOTE — PLAN OF CARE
[facility]  Physical Therapy Neurological Rehabilitation Initial Evaluation    Name: Chelsea Ford Child  Clinic Number: [id number]    Therapy Diagnosis:   Encounter Diagnoses   Name Primary?    Cervical vertigo     Dizziness     Impairment of balance     Poor posture      Physician: Steven Brasher MD    Physician Orders: PT Eval and Treat   Medical Diagnosis from Referral: Cervical vertigo  Evaluation Date: 12/29/2020  Authorization Period Expiration: 12/16/20 to 12/31/20  Plan of Care Expiration: 02/23/21  Visit # / Visits authorized: 01/ 01    Time In: 10:25  Time Out: 11:15  Total Billable Time: 50 minutes    Precautions: Standard    Subjective   Date of onset: recent episode of dizziness started November 2020    History of current condition - Chelsea reports: that she was at a friend's house in November, filming a butterfly release when she noticed dizziness when turning her head side to side. She continues to notice dizziness symptoms when moving her head side to side. Symptoms have fluctuated since that time dependent on her activity. Current dizziness is described as a woozy sensation with associated imbalance. Denies spinning sensation. She notices that she veers when walking. 1 recent fall months ago when walking in grass and looking down at her phone. Denies any recent head or neck trauma. Denies any recent URI and sinus infections but does endorse sinus congestion. She has a h/o BPPV but reports that these symptoms are different. Denies dizziness when getting into and out of bed. Denies recent changes in hearing. Denies recent head/neck trauma.      Medical History:   Past Medical History:   Diagnosis Date    Amblyopia of left eye     BCC (basal cell carcinoma)     Cataract     Exotropia of left eye     Hypertension     Osteoporosis     Palpitations 9/26/2017    Vertigo 9/26/2017       Surgical History:   Chelsea Cavazos  has a past surgical history that includes  "Mohs;  section; Strabismus surgery (Left); Eye surgery (1959); and Cosmetic surgery ().    Medications:   Chelsea has a current medication list which includes the following prescription(s): betamethasone dipropionate, calcium carbonate, calcium, carvedilol, estradiol, fluzone high-dose 2019-20 (pf), olmesartan, and vitamin d.    Allergies:   Review of patient's allergies indicates:   Allergen Reactions    Omnicef [cefdinir] Diarrhea    Adhesive Dermatitis    Nickel sutures [surgical stainless steel]      Rash with nickel        Imaging: no recent imaging on file    Prior Therapy: vestibular PT in early   Social History: lives alone  Falls: 1 fall months ago when walking on grass and looking down at her phone  DME: none  Home Environment: [facility], [address]  Exercise Routine / History: cawthorne exercises provided by ENT; some vestibular exercises form prior HEP; not consistent though with recent holidays  Family Present at time of Eval: none present  Occupation: retired  Prior Level of Function: (I) with functional mobility/ADL, driving, cooking, cleaning, running errands  Current Level of Function: (I) with functional mobility/ADL, driving, cooking, cleaning, running errands- more cautious with movement when symptoms present    Pain:  Current 1/10, worst 4/10, best 0/10   Location: bilateral cervical paraspinal regions and bilateral thoracic paraspinal region   Description: Aching and Burning  Aggravating Factors: reaching over head, driving in the car for long periods of time, yard work  Easing Factors: tylenol    Patient's goals: "To see an improvement in my gait and less dizziness when moving my neck and head. I would like to strengthen my arms."    Objective     - Follows commands: 100% of time   - Speech: no deficits      Mental status: alert, oriented to person, place, and time, normal mood, behavior, speech, dress, motor activity, and thought processes  Appearance: Casually dressed  Behavior:  calm " and cooperative  Attention Span and Concentration:  Normal    Dominant hand:  right     Posture Alignment in sitting:   Head: forward head   Scapulae: rest in abducted and winged positions, rounded shoulders   Trunk: slouched posture   Pelvis: neutral   Legs: neutral    Sensation: Light Touch: occasional numbness in feet, L > R; occasional numbness in both hands--mainly at night       Proprioception:   NT    Tone: gross BUE and BLE WFL    Visual/Oculomotor Performance: denies changes   Smooth Pursuit/Tracking:WFL, no dizziness  Saccades: WFL, slightly decreased speed but no visual slippage and no symptoms  VOR 1: impaired, mild jerkiness of eyes mild dizziness, no sig visual slippage  Convergence: WFL 8.1 cm  Acuity:wears glasses  R/L discrimination: Intact  Visual field: Intact  VCR (vestibulocollic reflex): (-) concordant symptoms of dizziness bilaterally    Coordination: not tested this date    Upper Cervical Instability Testing: performed with patient in sitting  Sharp Miya: (-)  Alar Ligament: (-) bilaterally      CERVICAL SPINE  Flexion 51 degrees (80-90 deg)  Extension 46 degrees (70-80 deg)  L side bend 22 degrees, R side bend 36 degrees (20-45 degrees)  L rotation 66 degrees, R rotation 61 degrees (70-90 degrees)  Are concurrent symptoms present with any of these movements: see above    Deep neck flexion: able to complete seated active chin tuck without pain or tightness in neck/suboccipital regions    Cervical joint restrictions: PIVM assessment- not formally assessed this date    Palpation: performed with patient in sitting  Mild increase muscle tone noted throughout bilateral suboccipital muscles, bilateral cervical paraspinal muscles, bilateral upper trap muscles, bilateral levator scap muscles, and bilateral thoracic paraspinal muscles     ROM:   UPPER EXTREMITY--AROM/PROM  (R) UE: WFLs  (L) UE: WFLs         Strength: manual muscle test grades below   Upper Extremity Strength- performed with patient  "in sitting  (R) UE  (L) UE    Shoulder Flexion: 4+/5 Shoulder Flexion: 4+/5   Shoulder Abduction: 4-/5 Shoulder Abduction: 3+/5   Shoulder Extension: 4+/5 Shoulder Extension:   4+/5   Elbow Flexion: 5/5 Elbow Flexion: 5/5   Elbow Extension: 5/5 Elbow Extension: 5/5   Wrist Flexion: 5/5 Wrist Flexion: 5/5   Wrist Extension: 5/5 Wrist Extension: 5/5   : WFL : WFL     Periscapular Strength- performed with patient in Prone  (R) UE  (L) UE    Lower Trapezius: 3/5 Lower Trapezius: 3-/5   Middle Trapezius: 3+/5 Middle Trapezius: 3+/5   Rhomboids: 3+/5 Rhomboids:   3+/5     SOL SENSORY TESTING:  (P= Pass, F= Fail; note any sway; hold each position for 30")  Condition 1: (firm surface/feet together/eyes open) P  Condition 2: (firm surface/feet together/eyes closed) P  Condition 3: (firm surface/feet in tandem/eyes open) P  Condition 4: (firm surface/feet in tandem/eyes closed) F, 7 sec  Condition 5: (soft surface/feet together/eyes open) P  Condition 6: (soft surface/feet together/eyes closed) F, 18 sec  Condition 7: (Fakuda step test), measure distance varied from center starting position P, 23 deg bias to L    Functional Gait Assessment:   1. Gait on level surface =  3  2. Change in Gait Speed = 3  3. Gait with horizontal head turns  = 2, concordant dizziness  4. Gait with vertical head turns = 2, slight concordant dizziness  5. Gait with pivot turns = 3  6. Step over obstacle = 3  7. Gait with Narrow JEFF = 2  8. Gait with eyes closed = 2  9. Ambulating Backwards = 3  10. Steps = 3    Score 26/30     Score:   <22/30 fall risk   <20/30 fall risk in older adults   <18/30 fall risk in Parkinsons       CMS Impairment/Limitation/Restriction for FOTO Survey- not performed this date           TREATMENT   No treatment time. Entire time spent on evaluation    Home Exercises and Patient Education Provided    Education provided:   - POC, scheduling    Written Home Exercises Provided: to be provided at first follow up " session. To include cervical stretches, cervical AROM, and upper quadrant postural exercises.     Assessment   Chelsea is a 70 y.o. female referred to outpatient Physical Therapy with a medical diagnosis of Cervical vertigo. Patient presents with chief complaints of dizziness and feelings of imbalance mainly noted with head and body movements that have become more prevalent over the past 2 months. Patient denies spinning sensation and denies positional changes as triggering factor. Impairments noted include slight oculomotor deficits, limited cervical AROM, poor upper quadrant posture, decreased proximal BUE strength, poor periscapular strength, mild balance deficits, and decreased motion tolerance. During oculomotor testing, slight concordant symptoms re-produced only with VOR 1, but no visual slippage noted. Interestingly, VCR (-) bilaterally for concordant symptoms of dizziness; no dizziness provoked during this test, suggesting that dizziness may be more related to vestibular dysfunction vs c-spine dysfunction. Upper cervical instability testing (-) bilaterally. During cervical AROM testing, limitations noted with flexion, L side bend, and bilateral cervical rotation. ROM restrictions likely attributed to poor resting posture, tight surrounding musculature, and age related changes (patient with PMH of arthritic changes in c-spine). Patient able to perform seated active chin tuck without symptom provocation. BUE gross AROM WFL and symmetrical. During BUE strength testing, limitations noted mainly in bilateral shoulder regions. Periscapular strength limited bilaterally for all tested motions. During GST, difficulty noted with vision eliminated tandem stance and vision eliminated stance on foam pad. Fukuda step test WFL, but on high end of this range with 23 deg bias noted to left side. During FGA, most difficulty noted on conditions involving vestibular triggers, with concordant dizziness symptoms provoked with  ambulation with both horizontal and vertical head movements (horizontal worse compared to vertical). Patient's current symptom presentation appears most consistent with de-compensated vestibulopathy. However, cervical mobility limitations and postural deficits likely contribute to current symptom presentation. Patient to benefit from continued PT intervention to establish HEP for cervical mobility and postural correction and to address vestibular deficits listed above.    Patient prognosis is Good.   Patient will benefit from skilled outpatient Physical Therapy to address the deficits stated above and in the chart below, provide patient/family education, and to maximize patient's level of independence.     Plan of care discussed with patient: Yes  Patient's spiritual, cultural and educational needs considered and patient is agreeable to the plan of care and goals as stated below:     Anticipated Barriers for therapy: co-morbidities    Medical Necessity is demonstrated by the following  History  Co-morbidities and personal factors that may impact the plan of care Co-morbidities:   Hypertension, osteoporosis, palpitations, h/o basal cell carcinoma, h/o BPPV, h/o degenerative arthritis in cervical spine    Personal Factors:   coping style     high   Examination  Body Structures and Functions, activity limitations and participation restrictions that may impact the plan of care Body Regions:   head  neck  upper extremities  trunk    Body Systems:    gross symmetry  ROM  strength  gross coordinated movement  balance  gait  transfers  transitions  motor control  motor learning    Participation Restrictions:   Co-morbidities    Activity limitations:   Learning and applying knowledge  no deficits    General Tasks and Commands  undertaking multiple tasks    Communication  no deficits    Mobility  walking  reaching overhead    Self care  no deficits    Domestic Life  no deficits    Interactions/Relationships  no  deficits    Life Areas  no deficits    Community and Social Life  community life  recreation and leisure         moderate   Clinical Presentation evolving clinical presentation with changing clinical characteristics moderate   Decision Making/ Complexity Score: moderate     Goals:  Short Term Goals: 4 weeks   1. Patient to be (I) with established HEP for cervical mobility and postural correction.   2. Patient to perform VOR 1 at 80 bpm WFL for improved gaze stabilization.   3. Patient to improve cervical flexion AROM to at least 60 deg for improved ability to scan environment.   4. Patient to improve L cervical side bend AROM to at least 30 deg for improved symmetry.   5. Patient to improve B cervical rotation AROM to at least 70 deg for improved ability to turn head when driving.   6. Patient to improve L shoulder abduction strength to at least 4-/5 for improved ability to reach overhead.   7. Patient to improve bilateral lower trap strength to at least 3+/5 for improved postural stability.   8. Patient to improve GST condition 4 to at least 12 sec for improved balance in low vision environments.       Long Term Goals: 8 weeks   1. Patient to be (I) with advanced HEP for postural correction and motion tolerance.   2. Patient to perform VOR 1 at 100 bpm WFL for improved gaze stabilization.   3. Patient to improve cervical flexion AROM to at least 65 deg for improved ability to scan environment.   4. Patient to improve B shoulder abduction strength to at least 4/5 for improved ability to reach overhead.   5. Patient to improve bilateral periscapular strength to at least 4-/5 for improved postural stability.   6. Patient to improve GST condition 4 to at least 16 sec for improved balance in low vision environments.   7. Patient to improve GST condition 6 to at least 30 sec for improved balance in low vision environments.   8. Patient to improve FGA score to at least 29/30 for improved balance in community environments.      Plan   Plan of care Certification: 12/29/2020 to 02/23/21.    Outpatient Physical Therapy 2 times weekly for 8 weeks to include the following interventions: Gait Training, Manual Therapy, Moist Heat/ Ice, Neuromuscular Re-ed, Orthotic Management and Training, Patient Education, Self Care, Therapeutic Activites, Therapeutic Exercise and Canalith Repositioning Procedures.     Annamaria Ferrer, PT

## 2021-01-06 ENCOUNTER — CLINICAL SUPPORT (OUTPATIENT)
Dept: REHABILITATION | Facility: HOSPITAL | Age: 71
End: 2021-01-06
Attending: OTOLARYNGOLOGY
Payer: MEDICARE

## 2021-01-06 DIAGNOSIS — R26.89 IMPAIRMENT OF BALANCE: ICD-10-CM

## 2021-01-06 DIAGNOSIS — R42 DIZZINESS: ICD-10-CM

## 2021-01-06 DIAGNOSIS — R29.3 POOR POSTURE: ICD-10-CM

## 2021-01-06 PROCEDURE — 97110 THERAPEUTIC EXERCISES: CPT | Mod: HCNC,PO

## 2021-01-08 ENCOUNTER — CLINICAL SUPPORT (OUTPATIENT)
Dept: REHABILITATION | Facility: HOSPITAL | Age: 71
End: 2021-01-08
Attending: OTOLARYNGOLOGY
Payer: MEDICARE

## 2021-01-08 DIAGNOSIS — R42 DIZZINESS: ICD-10-CM

## 2021-01-08 DIAGNOSIS — R29.3 POOR POSTURE: ICD-10-CM

## 2021-01-08 DIAGNOSIS — R26.89 IMPAIRMENT OF BALANCE: ICD-10-CM

## 2021-01-08 PROCEDURE — 97110 THERAPEUTIC EXERCISES: CPT | Mod: HCNC,PO

## 2021-01-12 ENCOUNTER — CLINICAL SUPPORT (OUTPATIENT)
Dept: REHABILITATION | Facility: HOSPITAL | Age: 71
End: 2021-01-12
Attending: OTOLARYNGOLOGY
Payer: MEDICARE

## 2021-01-12 DIAGNOSIS — R29.3 POOR POSTURE: ICD-10-CM

## 2021-01-12 DIAGNOSIS — R26.89 IMPAIRMENT OF BALANCE: ICD-10-CM

## 2021-01-12 DIAGNOSIS — R42 DIZZINESS: ICD-10-CM

## 2021-01-12 PROCEDURE — 97110 THERAPEUTIC EXERCISES: CPT | Mod: PO

## 2021-01-14 ENCOUNTER — CLINICAL SUPPORT (OUTPATIENT)
Dept: REHABILITATION | Facility: HOSPITAL | Age: 71
End: 2021-01-14
Attending: OTOLARYNGOLOGY
Payer: MEDICARE

## 2021-01-14 DIAGNOSIS — R29.3 POOR POSTURE: ICD-10-CM

## 2021-01-14 DIAGNOSIS — R42 DIZZINESS: ICD-10-CM

## 2021-01-14 DIAGNOSIS — R26.89 IMPAIRMENT OF BALANCE: ICD-10-CM

## 2021-01-14 PROCEDURE — 97110 THERAPEUTIC EXERCISES: CPT | Mod: PO

## 2021-01-19 ENCOUNTER — CLINICAL SUPPORT (OUTPATIENT)
Dept: REHABILITATION | Facility: HOSPITAL | Age: 71
End: 2021-01-19
Attending: OTOLARYNGOLOGY
Payer: MEDICARE

## 2021-01-19 DIAGNOSIS — R42 DIZZINESS: ICD-10-CM

## 2021-01-19 DIAGNOSIS — R29.3 POOR POSTURE: ICD-10-CM

## 2021-01-19 DIAGNOSIS — R26.89 IMPAIRMENT OF BALANCE: ICD-10-CM

## 2021-01-19 PROCEDURE — 97112 NEUROMUSCULAR REEDUCATION: CPT | Mod: PO

## 2021-01-19 PROCEDURE — 97110 THERAPEUTIC EXERCISES: CPT | Mod: PO

## 2021-01-21 ENCOUNTER — CLINICAL SUPPORT (OUTPATIENT)
Dept: REHABILITATION | Facility: HOSPITAL | Age: 71
End: 2021-01-21
Attending: OTOLARYNGOLOGY
Payer: MEDICARE

## 2021-01-21 ENCOUNTER — PATIENT MESSAGE (OUTPATIENT)
Dept: ADMINISTRATIVE | Facility: OTHER | Age: 71
End: 2021-01-21

## 2021-01-21 DIAGNOSIS — R26.89 IMPAIRMENT OF BALANCE: ICD-10-CM

## 2021-01-21 DIAGNOSIS — R42 DIZZINESS: Primary | ICD-10-CM

## 2021-01-21 DIAGNOSIS — R29.3 POOR POSTURE: ICD-10-CM

## 2021-01-21 PROCEDURE — 97110 THERAPEUTIC EXERCISES: CPT | Mod: PO

## 2021-01-21 PROCEDURE — 97112 NEUROMUSCULAR REEDUCATION: CPT | Mod: PO

## 2021-01-26 ENCOUNTER — CLINICAL SUPPORT (OUTPATIENT)
Dept: REHABILITATION | Facility: HOSPITAL | Age: 71
End: 2021-01-26
Attending: OTOLARYNGOLOGY
Payer: MEDICARE

## 2021-01-26 DIAGNOSIS — R29.3 POOR POSTURE: ICD-10-CM

## 2021-01-26 DIAGNOSIS — R42 DIZZINESS: ICD-10-CM

## 2021-01-26 DIAGNOSIS — R26.89 IMPAIRMENT OF BALANCE: ICD-10-CM

## 2021-01-26 PROCEDURE — 97110 THERAPEUTIC EXERCISES: CPT | Mod: PO

## 2021-01-26 PROCEDURE — 97112 NEUROMUSCULAR REEDUCATION: CPT | Mod: PO

## 2021-01-29 ENCOUNTER — CLINICAL SUPPORT (OUTPATIENT)
Dept: REHABILITATION | Facility: HOSPITAL | Age: 71
End: 2021-01-29
Attending: INTERNAL MEDICINE
Payer: MEDICARE

## 2021-01-29 DIAGNOSIS — R29.3 POOR POSTURE: ICD-10-CM

## 2021-01-29 DIAGNOSIS — R26.89 IMPAIRMENT OF BALANCE: ICD-10-CM

## 2021-01-29 DIAGNOSIS — R42 DIZZINESS: ICD-10-CM

## 2021-01-29 PROCEDURE — 97112 NEUROMUSCULAR REEDUCATION: CPT | Mod: PO

## 2021-01-29 PROCEDURE — 97110 THERAPEUTIC EXERCISES: CPT | Mod: PO

## 2021-02-01 ENCOUNTER — PATIENT MESSAGE (OUTPATIENT)
Dept: INTERNAL MEDICINE | Facility: CLINIC | Age: 71
End: 2021-02-01

## 2021-02-02 ENCOUNTER — CLINICAL SUPPORT (OUTPATIENT)
Dept: REHABILITATION | Facility: HOSPITAL | Age: 71
End: 2021-02-02
Attending: OTOLARYNGOLOGY
Payer: MEDICARE

## 2021-02-02 DIAGNOSIS — R42 DIZZINESS: ICD-10-CM

## 2021-02-02 DIAGNOSIS — R26.89 IMPAIRMENT OF BALANCE: ICD-10-CM

## 2021-02-02 DIAGNOSIS — R29.3 POOR POSTURE: ICD-10-CM

## 2021-02-02 PROCEDURE — 97110 THERAPEUTIC EXERCISES: CPT | Mod: PO

## 2021-02-02 PROCEDURE — 97112 NEUROMUSCULAR REEDUCATION: CPT | Mod: PO

## 2021-02-04 ENCOUNTER — CLINICAL SUPPORT (OUTPATIENT)
Dept: REHABILITATION | Facility: HOSPITAL | Age: 71
End: 2021-02-04
Attending: OTOLARYNGOLOGY
Payer: MEDICARE

## 2021-02-04 DIAGNOSIS — R29.3 POOR POSTURE: ICD-10-CM

## 2021-02-04 DIAGNOSIS — R26.89 IMPAIRMENT OF BALANCE: ICD-10-CM

## 2021-02-04 DIAGNOSIS — R42 DIZZINESS: ICD-10-CM

## 2021-02-04 PROCEDURE — 97112 NEUROMUSCULAR REEDUCATION: CPT | Mod: PO

## 2021-02-04 PROCEDURE — 97110 THERAPEUTIC EXERCISES: CPT | Mod: PO

## 2021-02-06 ENCOUNTER — PATIENT MESSAGE (OUTPATIENT)
Dept: INTERNAL MEDICINE | Facility: CLINIC | Age: 71
End: 2021-02-06

## 2021-02-06 DIAGNOSIS — R00.2 PALPITATIONS: Primary | ICD-10-CM

## 2021-02-09 ENCOUNTER — CLINICAL SUPPORT (OUTPATIENT)
Dept: REHABILITATION | Facility: HOSPITAL | Age: 71
End: 2021-02-09
Attending: OTOLARYNGOLOGY
Payer: MEDICARE

## 2021-02-09 ENCOUNTER — HOSPITAL ENCOUNTER (OUTPATIENT)
Dept: CARDIOLOGY | Facility: CLINIC | Age: 71
Discharge: HOME | End: 2021-02-09
Payer: MEDICARE

## 2021-02-09 DIAGNOSIS — R26.89 IMPAIRMENT OF BALANCE: ICD-10-CM

## 2021-02-09 DIAGNOSIS — R29.3 POOR POSTURE: ICD-10-CM

## 2021-02-09 DIAGNOSIS — R42 DIZZINESS: ICD-10-CM

## 2021-02-09 DIAGNOSIS — R00.2 PALPITATIONS: ICD-10-CM

## 2021-02-09 PROCEDURE — 93010 EKG 12-LEAD: ICD-10-PCS | Mod: S$GLB,,, | Performed by: INTERNAL MEDICINE

## 2021-02-09 PROCEDURE — 93005 ELECTROCARDIOGRAM TRACING: CPT | Mod: S$GLB,,, | Performed by: INTERNAL MEDICINE

## 2021-02-09 PROCEDURE — 93010 ELECTROCARDIOGRAM REPORT: CPT | Mod: S$GLB,,, | Performed by: INTERNAL MEDICINE

## 2021-02-09 PROCEDURE — 97112 NEUROMUSCULAR REEDUCATION: CPT | Mod: PO

## 2021-02-09 PROCEDURE — 93005 EKG 12-LEAD: ICD-10-PCS | Mod: S$GLB,,, | Performed by: INTERNAL MEDICINE

## 2021-02-09 PROCEDURE — 97110 THERAPEUTIC EXERCISES: CPT | Mod: PO

## 2021-02-11 ENCOUNTER — CLINICAL SUPPORT (OUTPATIENT)
Dept: REHABILITATION | Facility: HOSPITAL | Age: 71
End: 2021-02-11
Attending: OTOLARYNGOLOGY
Payer: MEDICARE

## 2021-02-11 DIAGNOSIS — R26.89 IMPAIRMENT OF BALANCE: ICD-10-CM

## 2021-02-11 DIAGNOSIS — R42 DIZZINESS: ICD-10-CM

## 2021-02-11 DIAGNOSIS — R29.3 POOR POSTURE: ICD-10-CM

## 2021-02-11 PROCEDURE — 97110 THERAPEUTIC EXERCISES: CPT | Mod: PO

## 2021-02-11 PROCEDURE — 97112 NEUROMUSCULAR REEDUCATION: CPT | Mod: PO

## 2021-02-15 ENCOUNTER — PATIENT MESSAGE (OUTPATIENT)
Dept: INTERNAL MEDICINE | Facility: CLINIC | Age: 71
End: 2021-02-15

## 2021-02-17 ENCOUNTER — CLINICAL SUPPORT (OUTPATIENT)
Dept: REHABILITATION | Facility: HOSPITAL | Age: 71
End: 2021-02-17
Attending: OTOLARYNGOLOGY
Payer: MEDICARE

## 2021-02-17 DIAGNOSIS — R42 DIZZINESS: ICD-10-CM

## 2021-02-17 DIAGNOSIS — R29.3 POOR POSTURE: ICD-10-CM

## 2021-02-17 DIAGNOSIS — R26.89 IMPAIRMENT OF BALANCE: ICD-10-CM

## 2021-02-17 PROCEDURE — 97110 THERAPEUTIC EXERCISES: CPT | Mod: PO

## 2021-02-19 ENCOUNTER — CLINICAL SUPPORT (OUTPATIENT)
Dept: REHABILITATION | Facility: HOSPITAL | Age: 71
End: 2021-02-19
Attending: OTOLARYNGOLOGY
Payer: MEDICARE

## 2021-02-19 DIAGNOSIS — R26.89 IMPAIRMENT OF BALANCE: ICD-10-CM

## 2021-02-19 DIAGNOSIS — R42 DIZZINESS: ICD-10-CM

## 2021-02-19 DIAGNOSIS — R29.3 POOR POSTURE: ICD-10-CM

## 2021-02-19 PROCEDURE — 97112 NEUROMUSCULAR REEDUCATION: CPT | Mod: PO

## 2021-02-19 PROCEDURE — 97110 THERAPEUTIC EXERCISES: CPT | Mod: PO

## 2021-02-22 ENCOUNTER — CLINICAL SUPPORT (OUTPATIENT)
Dept: CARDIOLOGY | Facility: HOSPITAL | Age: 71
End: 2021-02-22
Attending: INTERNAL MEDICINE
Payer: MEDICARE

## 2021-02-22 DIAGNOSIS — R00.2 PALPITATIONS: ICD-10-CM

## 2021-02-22 PROCEDURE — 93225 XTRNL ECG REC<48 HRS REC: CPT

## 2021-02-22 PROCEDURE — 93227 XTRNL ECG REC<48 HR R&I: CPT | Mod: ,,, | Performed by: INTERNAL MEDICINE

## 2021-02-22 PROCEDURE — 93227 HOLTER MONITOR - 48 HOUR (CUPID ONLY): ICD-10-PCS | Mod: ,,, | Performed by: INTERNAL MEDICINE

## 2021-02-23 ENCOUNTER — CLINICAL SUPPORT (OUTPATIENT)
Dept: REHABILITATION | Facility: HOSPITAL | Age: 71
End: 2021-02-23
Attending: INTERNAL MEDICINE
Payer: MEDICARE

## 2021-02-23 DIAGNOSIS — R26.89 IMPAIRMENT OF BALANCE: ICD-10-CM

## 2021-02-23 DIAGNOSIS — R42 DIZZINESS: ICD-10-CM

## 2021-02-23 DIAGNOSIS — R29.3 POOR POSTURE: ICD-10-CM

## 2021-02-23 PROCEDURE — 97110 THERAPEUTIC EXERCISES: CPT | Mod: PO

## 2021-02-25 ENCOUNTER — CLINICAL SUPPORT (OUTPATIENT)
Dept: REHABILITATION | Facility: HOSPITAL | Age: 71
End: 2021-02-25
Attending: INTERNAL MEDICINE
Payer: MEDICARE

## 2021-02-25 DIAGNOSIS — R29.3 POOR POSTURE: ICD-10-CM

## 2021-02-25 DIAGNOSIS — R26.89 IMPAIRMENT OF BALANCE: ICD-10-CM

## 2021-02-25 DIAGNOSIS — R42 DIZZINESS: ICD-10-CM

## 2021-02-25 PROCEDURE — 97110 THERAPEUTIC EXERCISES: CPT | Mod: PO

## 2021-02-26 LAB
OHS CV EVENT MONITOR DAY: 0
OHS CV HOLTER LENGTH DECIMAL HOURS: 48
OHS CV HOLTER LENGTH HOURS: 48
OHS CV HOLTER LENGTH MINUTES: 0

## 2021-03-08 ENCOUNTER — PES CALL (OUTPATIENT)
Dept: ADMINISTRATIVE | Facility: CLINIC | Age: 71
End: 2021-03-08

## 2021-03-22 ENCOUNTER — PATIENT MESSAGE (OUTPATIENT)
Dept: INTERNAL MEDICINE | Facility: CLINIC | Age: 71
End: 2021-03-22

## 2021-03-22 DIAGNOSIS — R00.2 PALPITATIONS: ICD-10-CM

## 2021-03-22 DIAGNOSIS — I49.1 PAC (PREMATURE ATRIAL CONTRACTION): Primary | ICD-10-CM

## 2021-04-05 ENCOUNTER — PATIENT MESSAGE (OUTPATIENT)
Dept: ADMINISTRATIVE | Facility: HOSPITAL | Age: 71
End: 2021-04-05

## 2021-04-05 ENCOUNTER — PATIENT OUTREACH (OUTPATIENT)
Dept: ADMINISTRATIVE | Facility: OTHER | Age: 71
End: 2021-04-05

## 2021-04-06 ENCOUNTER — TELEPHONE (OUTPATIENT)
Dept: ELECTROPHYSIOLOGY | Facility: CLINIC | Age: 71
End: 2021-04-06

## 2021-04-07 ENCOUNTER — LAB VISIT (OUTPATIENT)
Dept: LAB | Facility: HOSPITAL | Age: 71
End: 2021-04-07
Attending: STUDENT IN AN ORGANIZED HEALTH CARE EDUCATION/TRAINING PROGRAM
Payer: MEDICARE

## 2021-04-07 ENCOUNTER — OFFICE VISIT (OUTPATIENT)
Dept: ELECTROPHYSIOLOGY | Facility: CLINIC | Age: 71
End: 2021-04-07
Payer: MEDICARE

## 2021-04-07 VITALS
WEIGHT: 123.88 LBS | DIASTOLIC BLOOD PRESSURE: 64 MMHG | HEART RATE: 63 BPM | SYSTOLIC BLOOD PRESSURE: 126 MMHG | HEIGHT: 65 IN | BODY MASS INDEX: 20.64 KG/M2

## 2021-04-07 DIAGNOSIS — I49.1 ATRIAL PREMATURE CONTRACTIONS: Primary | Chronic | ICD-10-CM

## 2021-04-07 DIAGNOSIS — M79.89 LEG SWELLING: Chronic | ICD-10-CM

## 2021-04-07 DIAGNOSIS — I49.1 PAC (PREMATURE ATRIAL CONTRACTION): ICD-10-CM

## 2021-04-07 DIAGNOSIS — I49.8 OTHER SPECIFIED CARDIAC ARRHYTHMIAS: ICD-10-CM

## 2021-04-07 DIAGNOSIS — I10 BENIGN ESSENTIAL HYPERTENSION: ICD-10-CM

## 2021-04-07 DIAGNOSIS — R00.2 PALPITATIONS: ICD-10-CM

## 2021-04-07 DIAGNOSIS — I49.8 OTHER SPECIFIED CARDIAC ARRHYTHMIAS: Primary | ICD-10-CM

## 2021-04-07 LAB — TSH SERPL DL<=0.005 MIU/L-ACNC: 2.04 UIU/ML (ref 0.4–4)

## 2021-04-07 PROCEDURE — 3008F PR BODY MASS INDEX (BMI) DOCUMENTED: ICD-10-PCS | Mod: CPTII,S$GLB,, | Performed by: INTERNAL MEDICINE

## 2021-04-07 PROCEDURE — 3074F PR MOST RECENT SYSTOLIC BLOOD PRESSURE < 130 MM HG: ICD-10-PCS | Mod: CPTII,S$GLB,, | Performed by: INTERNAL MEDICINE

## 2021-04-07 PROCEDURE — 1126F AMNT PAIN NOTED NONE PRSNT: CPT | Mod: S$GLB,,, | Performed by: INTERNAL MEDICINE

## 2021-04-07 PROCEDURE — 3288F FALL RISK ASSESSMENT DOCD: CPT | Mod: CPTII,S$GLB,, | Performed by: INTERNAL MEDICINE

## 2021-04-07 PROCEDURE — 84443 ASSAY THYROID STIM HORMONE: CPT | Performed by: STUDENT IN AN ORGANIZED HEALTH CARE EDUCATION/TRAINING PROGRAM

## 2021-04-07 PROCEDURE — 3074F SYST BP LT 130 MM HG: CPT | Mod: CPTII,S$GLB,, | Performed by: INTERNAL MEDICINE

## 2021-04-07 PROCEDURE — 1159F PR MEDICATION LIST DOCUMENTED IN MEDICAL RECORD: ICD-10-PCS | Mod: S$GLB,,, | Performed by: INTERNAL MEDICINE

## 2021-04-07 PROCEDURE — 93005 ELECTROCARDIOGRAM TRACING: CPT | Mod: S$GLB,,, | Performed by: INTERNAL MEDICINE

## 2021-04-07 PROCEDURE — 99204 OFFICE O/P NEW MOD 45 MIN: CPT | Mod: S$GLB,,, | Performed by: INTERNAL MEDICINE

## 2021-04-07 PROCEDURE — 36415 COLL VENOUS BLD VENIPUNCTURE: CPT | Performed by: STUDENT IN AN ORGANIZED HEALTH CARE EDUCATION/TRAINING PROGRAM

## 2021-04-07 PROCEDURE — 99999 PR PBB SHADOW E&M-EST. PATIENT-LVL V: ICD-10-PCS | Mod: PBBFAC,,, | Performed by: INTERNAL MEDICINE

## 2021-04-07 PROCEDURE — 3288F PR FALLS RISK ASSESSMENT DOCUMENTED: ICD-10-PCS | Mod: CPTII,S$GLB,, | Performed by: INTERNAL MEDICINE

## 2021-04-07 PROCEDURE — 93010 ELECTROCARDIOGRAM REPORT: CPT | Mod: S$GLB,,, | Performed by: INTERNAL MEDICINE

## 2021-04-07 PROCEDURE — 3078F DIAST BP <80 MM HG: CPT | Mod: CPTII,S$GLB,, | Performed by: INTERNAL MEDICINE

## 2021-04-07 PROCEDURE — 1101F PT FALLS ASSESS-DOCD LE1/YR: CPT | Mod: CPTII,S$GLB,, | Performed by: INTERNAL MEDICINE

## 2021-04-07 PROCEDURE — 3078F PR MOST RECENT DIASTOLIC BLOOD PRESSURE < 80 MM HG: ICD-10-PCS | Mod: CPTII,S$GLB,, | Performed by: INTERNAL MEDICINE

## 2021-04-07 PROCEDURE — 1101F PR PT FALLS ASSESS DOC 0-1 FALLS W/OUT INJ PAST YR: ICD-10-PCS | Mod: CPTII,S$GLB,, | Performed by: INTERNAL MEDICINE

## 2021-04-07 PROCEDURE — 3008F BODY MASS INDEX DOCD: CPT | Mod: CPTII,S$GLB,, | Performed by: INTERNAL MEDICINE

## 2021-04-07 PROCEDURE — 93010 RHYTHM STRIP: ICD-10-PCS | Mod: S$GLB,,, | Performed by: INTERNAL MEDICINE

## 2021-04-07 PROCEDURE — 1126F PR PAIN SEVERITY QUANTIFIED, NO PAIN PRESENT: ICD-10-PCS | Mod: S$GLB,,, | Performed by: INTERNAL MEDICINE

## 2021-04-07 PROCEDURE — 1159F MED LIST DOCD IN RCRD: CPT | Mod: S$GLB,,, | Performed by: INTERNAL MEDICINE

## 2021-04-07 PROCEDURE — 99999 PR PBB SHADOW E&M-EST. PATIENT-LVL V: CPT | Mod: PBBFAC,,, | Performed by: INTERNAL MEDICINE

## 2021-04-07 PROCEDURE — 93005 RHYTHM STRIP: ICD-10-PCS | Mod: S$GLB,,, | Performed by: INTERNAL MEDICINE

## 2021-04-07 PROCEDURE — 99204 PR OFFICE/OUTPT VISIT, NEW, LEVL IV, 45-59 MIN: ICD-10-PCS | Mod: S$GLB,,, | Performed by: INTERNAL MEDICINE

## 2021-04-07 RX ORDER — ASPIRIN 81 MG/1
81 TABLET ORAL
COMMUNITY

## 2021-04-07 RX ORDER — IRON 18 MG
TABLET ORAL
COMMUNITY

## 2021-04-20 ENCOUNTER — HOSPITAL ENCOUNTER (OUTPATIENT)
Dept: CARDIOLOGY | Facility: HOSPITAL | Age: 71
Discharge: HOME | End: 2021-04-20
Attending: STUDENT IN AN ORGANIZED HEALTH CARE EDUCATION/TRAINING PROGRAM
Payer: MEDICARE

## 2021-04-20 VITALS
WEIGHT: 123 LBS | HEIGHT: 65 IN | HEART RATE: 68 BPM | DIASTOLIC BLOOD PRESSURE: 70 MMHG | SYSTOLIC BLOOD PRESSURE: 130 MMHG | BODY MASS INDEX: 20.49 KG/M2

## 2021-04-20 DIAGNOSIS — M79.89 LEG SWELLING: ICD-10-CM

## 2021-04-20 DIAGNOSIS — I49.1 PAC (PREMATURE ATRIAL CONTRACTION): ICD-10-CM

## 2021-04-20 DIAGNOSIS — I10 BENIGN ESSENTIAL HYPERTENSION: ICD-10-CM

## 2021-04-20 LAB
ASCENDING AORTA: 2.96 CM
AV INDEX (PROSTH): 0.85
AV MEAN GRADIENT: 4 MMHG
AV PEAK GRADIENT: 9 MMHG
AV VALVE AREA: 2.63 CM2
AV VELOCITY RATIO: 0.85
BSA FOR ECHO PROCEDURE: 1.6 M2
CV ECHO LV RWT: 0.38 CM
DOP CALC AO PEAK VEL: 1.5 M/S
DOP CALC AO VTI: 30.73 CM
DOP CALC LVOT AREA: 3.1 CM2
DOP CALC LVOT DIAMETER: 1.99 CM
DOP CALC LVOT PEAK VEL: 1.28 M/S
DOP CALC LVOT STROKE VOLUME: 80.83 CM3
DOP CALCLVOT PEAK VEL VTI: 26 CM
E WAVE DECELERATION TIME: 183.81 MSEC
E/A RATIO: 1.65
E/E' RATIO: 11.89 M/S
ECHO LV POSTERIOR WALL: 0.87 CM (ref 0.6–1.1)
EJECTION FRACTION: 65 %
FRACTIONAL SHORTENING: 35 % (ref 28–44)
INTERVENTRICULAR SEPTUM: 0.88 CM (ref 0.6–1.1)
LA MAJOR: 5.21 CM
LA MINOR: 4.8 CM
LA WIDTH: 3.99 CM
LEFT ATRIUM SIZE: 3.59 CM
LEFT ATRIUM VOLUME INDEX MOD: 28 ML/M2
LEFT ATRIUM VOLUME INDEX: 37.8 ML/M2
LEFT ATRIUM VOLUME MOD: 45 CM3
LEFT ATRIUM VOLUME: 60.84 CM3
LEFT INTERNAL DIMENSION IN SYSTOLE: 2.96 CM (ref 2.1–4)
LEFT VENTRICLE DIASTOLIC VOLUME INDEX: 57.94 ML/M2
LEFT VENTRICLE DIASTOLIC VOLUME: 93.28 ML
LEFT VENTRICLE MASS INDEX: 80 G/M2
LEFT VENTRICLE SYSTOLIC VOLUME INDEX: 21.1 ML/M2
LEFT VENTRICLE SYSTOLIC VOLUME: 33.99 ML
LEFT VENTRICULAR INTERNAL DIMENSION IN DIASTOLE: 4.52 CM (ref 3.5–6)
LEFT VENTRICULAR MASS: 128.85 G
LV LATERAL E/E' RATIO: 11.89 M/S
LV SEPTAL E/E' RATIO: 11.89 M/S
MV A" WAVE DURATION": 9.13 MSEC
MV PEAK A VEL: 0.65 M/S
MV PEAK E VEL: 1.07 M/S
MV STENOSIS PRESSURE HALF TIME: 53.3 MS
MV VALVE AREA P 1/2 METHOD: 4.13 CM2
PISA MRMAX VEL: 0.05 M/S
PISA TR MAX VEL: 2.97 M/S
PULM VEIN S/D RATIO: 1.73
PV PEAK D VEL: 0.59 M/S
PV PEAK S VEL: 1.02 M/S
RA MAJOR: 4.93 CM
RA PRESSURE: 8 MMHG
RA WIDTH: 3.89 CM
RIGHT VENTRICULAR END-DIASTOLIC DIMENSION: 3.49 CM
SINUS: 3.29 CM
STJ: 2.65 CM
TDI LATERAL: 0.09 M/S
TDI SEPTAL: 0.09 M/S
TDI: 0.09 M/S
TR MAX PG: 35 MMHG
TRICUSPID ANNULAR PLANE SYSTOLIC EXCURSION: 2.35 CM
TV REST PULMONARY ARTERY PRESSURE: 43 MMHG

## 2021-04-20 PROCEDURE — 93306 ECHO (CUPID ONLY): ICD-10-PCS | Mod: 26,,, | Performed by: INTERNAL MEDICINE

## 2021-04-20 PROCEDURE — 93306 TTE W/DOPPLER COMPLETE: CPT

## 2021-04-20 PROCEDURE — 93306 TTE W/DOPPLER COMPLETE: CPT | Mod: 26,,, | Performed by: INTERNAL MEDICINE

## 2021-04-24 ENCOUNTER — OFFICE VISIT (OUTPATIENT)
Dept: URGENT CARE | Facility: CLINIC | Age: 71
End: 2021-04-24
Payer: MEDICARE

## 2021-04-24 VITALS
HEIGHT: 65 IN | DIASTOLIC BLOOD PRESSURE: 76 MMHG | WEIGHT: 120 LBS | TEMPERATURE: 99 F | OXYGEN SATURATION: 96 % | SYSTOLIC BLOOD PRESSURE: 142 MMHG | BODY MASS INDEX: 19.99 KG/M2 | HEART RATE: 58 BPM

## 2021-04-24 DIAGNOSIS — S05.01XA ABRASION OF RIGHT CORNEA, INITIAL ENCOUNTER: Primary | ICD-10-CM

## 2021-04-24 PROCEDURE — 99203 PR OFFICE/OUTPT VISIT, NEW, LEVL III, 30-44 MIN: ICD-10-PCS | Mod: S$GLB,,, | Performed by: NURSE PRACTITIONER

## 2021-04-24 PROCEDURE — 3008F BODY MASS INDEX DOCD: CPT | Mod: CPTII,S$GLB,, | Performed by: NURSE PRACTITIONER

## 2021-04-24 PROCEDURE — 99203 OFFICE O/P NEW LOW 30 MIN: CPT | Mod: S$GLB,,, | Performed by: NURSE PRACTITIONER

## 2021-04-24 PROCEDURE — 3008F PR BODY MASS INDEX (BMI) DOCUMENTED: ICD-10-PCS | Mod: CPTII,S$GLB,, | Performed by: NURSE PRACTITIONER

## 2021-04-24 RX ORDER — CIPROFLOXACIN HYDROCHLORIDE 3 MG/ML
1 SOLUTION/ DROPS OPHTHALMIC
Qty: 5 ML | Refills: 0 | Status: SHIPPED | OUTPATIENT
Start: 2021-04-24 | End: 2021-05-18

## 2021-04-26 ENCOUNTER — OFFICE VISIT (OUTPATIENT)
Dept: OPTOMETRY | Facility: CLINIC | Age: 71
End: 2021-04-26
Payer: MEDICARE

## 2021-04-26 DIAGNOSIS — H57.11 PAIN OF RIGHT EYE: Primary | ICD-10-CM

## 2021-04-26 PROCEDURE — 99999 PR PBB SHADOW E&M-EST. PATIENT-LVL III: ICD-10-PCS | Mod: PBBFAC,,, | Performed by: OPTOMETRIST

## 2021-04-26 PROCEDURE — 92012 INTRM OPH EXAM EST PATIENT: CPT | Mod: S$GLB,,, | Performed by: OPTOMETRIST

## 2021-04-26 PROCEDURE — 92012 PR EYE EXAM, EST PATIENT,INTERMED: ICD-10-PCS | Mod: S$GLB,,, | Performed by: OPTOMETRIST

## 2021-04-26 PROCEDURE — 99999 PR PBB SHADOW E&M-EST. PATIENT-LVL III: CPT | Mod: PBBFAC,,, | Performed by: OPTOMETRIST

## 2021-05-10 ENCOUNTER — PATIENT OUTREACH (OUTPATIENT)
Dept: ADMINISTRATIVE | Facility: OTHER | Age: 71
End: 2021-05-10

## 2021-05-10 ENCOUNTER — PATIENT MESSAGE (OUTPATIENT)
Dept: ELECTROPHYSIOLOGY | Facility: CLINIC | Age: 71
End: 2021-05-10

## 2021-05-11 ENCOUNTER — OFFICE VISIT (OUTPATIENT)
Dept: DERMATOLOGY | Facility: CLINIC | Age: 71
End: 2021-05-11
Payer: MEDICARE

## 2021-05-11 VITALS — WEIGHT: 120 LBS | BODY MASS INDEX: 19.97 KG/M2

## 2021-05-11 DIAGNOSIS — D48.5 NEOPLASM OF UNCERTAIN BEHAVIOR OF SKIN: Primary | ICD-10-CM

## 2021-05-11 DIAGNOSIS — D22.9 NEVUS OF MULTIPLE SITES: ICD-10-CM

## 2021-05-11 DIAGNOSIS — L82.1 SEBORRHEIC KERATOSES: ICD-10-CM

## 2021-05-11 DIAGNOSIS — Z85.828 HISTORY OF SKIN CANCER: ICD-10-CM

## 2021-05-11 DIAGNOSIS — L81.4 LENTIGINES: ICD-10-CM

## 2021-05-11 PROCEDURE — 3288F FALL RISK ASSESSMENT DOCD: CPT | Mod: CPTII,S$GLB,, | Performed by: DERMATOLOGY

## 2021-05-11 PROCEDURE — 1101F PR PT FALLS ASSESS DOC 0-1 FALLS W/OUT INJ PAST YR: ICD-10-PCS | Mod: CPTII,S$GLB,, | Performed by: DERMATOLOGY

## 2021-05-11 PROCEDURE — 1126F AMNT PAIN NOTED NONE PRSNT: CPT | Mod: S$GLB,,, | Performed by: DERMATOLOGY

## 2021-05-11 PROCEDURE — 88305 TISSUE EXAM BY PATHOLOGIST: ICD-10-PCS | Mod: 26,,, | Performed by: PATHOLOGY

## 2021-05-11 PROCEDURE — 3008F BODY MASS INDEX DOCD: CPT | Mod: CPTII,S$GLB,, | Performed by: DERMATOLOGY

## 2021-05-11 PROCEDURE — 88305 TISSUE EXAM BY PATHOLOGIST: CPT | Performed by: PATHOLOGY

## 2021-05-11 PROCEDURE — 11102 TANGNTL BX SKIN SINGLE LES: CPT | Mod: S$GLB,,, | Performed by: DERMATOLOGY

## 2021-05-11 PROCEDURE — 1101F PT FALLS ASSESS-DOCD LE1/YR: CPT | Mod: CPTII,S$GLB,, | Performed by: DERMATOLOGY

## 2021-05-11 PROCEDURE — 99213 OFFICE O/P EST LOW 20 MIN: CPT | Mod: 25,S$GLB,, | Performed by: DERMATOLOGY

## 2021-05-11 PROCEDURE — 99999 PR PBB SHADOW E&M-EST. PATIENT-LVL III: CPT | Mod: PBBFAC,,, | Performed by: DERMATOLOGY

## 2021-05-11 PROCEDURE — 1159F PR MEDICATION LIST DOCUMENTED IN MEDICAL RECORD: ICD-10-PCS | Mod: S$GLB,,, | Performed by: DERMATOLOGY

## 2021-05-11 PROCEDURE — 3288F PR FALLS RISK ASSESSMENT DOCUMENTED: ICD-10-PCS | Mod: CPTII,S$GLB,, | Performed by: DERMATOLOGY

## 2021-05-11 PROCEDURE — 11102 PR TANGENTIAL BIOPSY, SKIN, SINGLE LESION: ICD-10-PCS | Mod: S$GLB,,, | Performed by: DERMATOLOGY

## 2021-05-11 PROCEDURE — 99999 PR PBB SHADOW E&M-EST. PATIENT-LVL III: ICD-10-PCS | Mod: PBBFAC,,, | Performed by: DERMATOLOGY

## 2021-05-11 PROCEDURE — 99213 PR OFFICE/OUTPT VISIT, EST, LEVL III, 20-29 MIN: ICD-10-PCS | Mod: 25,S$GLB,, | Performed by: DERMATOLOGY

## 2021-05-11 PROCEDURE — 1126F PR PAIN SEVERITY QUANTIFIED, NO PAIN PRESENT: ICD-10-PCS | Mod: S$GLB,,, | Performed by: DERMATOLOGY

## 2021-05-11 PROCEDURE — 3008F PR BODY MASS INDEX (BMI) DOCUMENTED: ICD-10-PCS | Mod: CPTII,S$GLB,, | Performed by: DERMATOLOGY

## 2021-05-11 PROCEDURE — 88305 TISSUE EXAM BY PATHOLOGIST: CPT | Mod: 26,,, | Performed by: PATHOLOGY

## 2021-05-11 PROCEDURE — 1159F MED LIST DOCD IN RCRD: CPT | Mod: S$GLB,,, | Performed by: DERMATOLOGY

## 2021-05-14 ENCOUNTER — PATIENT MESSAGE (OUTPATIENT)
Dept: ELECTROPHYSIOLOGY | Facility: CLINIC | Age: 71
End: 2021-05-14

## 2021-05-14 DIAGNOSIS — I10 ESSENTIAL HYPERTENSION: ICD-10-CM

## 2021-05-14 RX ORDER — CARVEDILOL 25 MG/1
25 TABLET ORAL 2 TIMES DAILY WITH MEALS
Qty: 180 TABLET | Refills: 3 | Status: SHIPPED | OUTPATIENT
Start: 2021-05-14 | End: 2022-05-09

## 2021-05-16 LAB
FINAL PATHOLOGIC DIAGNOSIS: NORMAL
GROSS: NORMAL
Lab: NORMAL
MICROSCOPIC EXAM: NORMAL

## 2021-05-18 ENCOUNTER — OFFICE VISIT (OUTPATIENT)
Dept: INTERNAL MEDICINE | Facility: CLINIC | Age: 71
End: 2021-05-18
Payer: MEDICARE

## 2021-05-18 VITALS
DIASTOLIC BLOOD PRESSURE: 84 MMHG | WEIGHT: 121.13 LBS | SYSTOLIC BLOOD PRESSURE: 148 MMHG | BODY MASS INDEX: 19.47 KG/M2 | HEART RATE: 60 BPM | HEIGHT: 66 IN

## 2021-05-18 DIAGNOSIS — I10 BENIGN ESSENTIAL HYPERTENSION: ICD-10-CM

## 2021-05-18 DIAGNOSIS — D69.6 THROMBOCYTOPENIA: ICD-10-CM

## 2021-05-18 DIAGNOSIS — Z00.00 ENCOUNTER FOR PREVENTIVE HEALTH EXAMINATION: Primary | ICD-10-CM

## 2021-05-18 DIAGNOSIS — I27.9 CHRONIC PULMONARY HEART DISEASE: ICD-10-CM

## 2021-05-18 DIAGNOSIS — I49.1 ATRIAL PREMATURE CONTRACTIONS: Chronic | ICD-10-CM

## 2021-05-18 DIAGNOSIS — H81.11 BPPV (BENIGN PAROXYSMAL POSITIONAL VERTIGO), RIGHT: ICD-10-CM

## 2021-05-18 DIAGNOSIS — C44.91 BASAL CELL CARCINOMA (BCC), UNSPECIFIED SITE: ICD-10-CM

## 2021-05-18 PROCEDURE — 3288F PR FALLS RISK ASSESSMENT DOCUMENTED: ICD-10-PCS | Mod: CPTII,S$GLB,, | Performed by: NURSE PRACTITIONER

## 2021-05-18 PROCEDURE — 3288F FALL RISK ASSESSMENT DOCD: CPT | Mod: CPTII,S$GLB,, | Performed by: NURSE PRACTITIONER

## 2021-05-18 PROCEDURE — 99499 RISK ADDL DX/OHS AUDIT: ICD-10-PCS | Mod: HCNC,S$GLB,, | Performed by: NURSE PRACTITIONER

## 2021-05-18 PROCEDURE — G0439 PR MEDICARE ANNUAL WELLNESS SUBSEQUENT VISIT: ICD-10-PCS | Mod: S$GLB,,, | Performed by: NURSE PRACTITIONER

## 2021-05-18 PROCEDURE — 99499 UNLISTED E&M SERVICE: CPT | Mod: HCNC,S$GLB,, | Performed by: NURSE PRACTITIONER

## 2021-05-18 PROCEDURE — 1126F AMNT PAIN NOTED NONE PRSNT: CPT | Mod: S$GLB,,, | Performed by: NURSE PRACTITIONER

## 2021-05-18 PROCEDURE — 1101F PT FALLS ASSESS-DOCD LE1/YR: CPT | Mod: CPTII,S$GLB,, | Performed by: NURSE PRACTITIONER

## 2021-05-18 PROCEDURE — 99999 PR PBB SHADOW E&M-EST. PATIENT-LVL IV: CPT | Mod: PBBFAC,,, | Performed by: NURSE PRACTITIONER

## 2021-05-18 PROCEDURE — 1158F PR ADVANCE CARE PLANNING DISCUSS DOCUMENTED IN MEDICAL RECORD: ICD-10-PCS | Mod: S$GLB,,, | Performed by: NURSE PRACTITIONER

## 2021-05-18 PROCEDURE — G0439 PPPS, SUBSEQ VISIT: HCPCS | Mod: S$GLB,,, | Performed by: NURSE PRACTITIONER

## 2021-05-18 PROCEDURE — 1158F ADVNC CARE PLAN TLK DOCD: CPT | Mod: S$GLB,,, | Performed by: NURSE PRACTITIONER

## 2021-05-18 PROCEDURE — 3008F BODY MASS INDEX DOCD: CPT | Mod: CPTII,S$GLB,, | Performed by: NURSE PRACTITIONER

## 2021-05-18 PROCEDURE — 1101F PR PT FALLS ASSESS DOC 0-1 FALLS W/OUT INJ PAST YR: ICD-10-PCS | Mod: CPTII,S$GLB,, | Performed by: NURSE PRACTITIONER

## 2021-05-18 PROCEDURE — 1126F PR PAIN SEVERITY QUANTIFIED, NO PAIN PRESENT: ICD-10-PCS | Mod: S$GLB,,, | Performed by: NURSE PRACTITIONER

## 2021-05-18 PROCEDURE — 3008F PR BODY MASS INDEX (BMI) DOCUMENTED: ICD-10-PCS | Mod: CPTII,S$GLB,, | Performed by: NURSE PRACTITIONER

## 2021-05-18 PROCEDURE — 99999 PR PBB SHADOW E&M-EST. PATIENT-LVL IV: ICD-10-PCS | Mod: PBBFAC,,, | Performed by: NURSE PRACTITIONER

## 2021-05-18 RX ORDER — IBUPROFEN 100 MG/5ML
1000 SUSPENSION, ORAL (FINAL DOSE FORM) ORAL DAILY
COMMUNITY
End: 2023-11-14

## 2021-05-26 ENCOUNTER — LAB VISIT (OUTPATIENT)
Dept: LAB | Facility: HOSPITAL | Age: 71
End: 2021-05-26
Attending: INTERNAL MEDICINE
Payer: MEDICARE

## 2021-05-26 DIAGNOSIS — Z12.11 ENCOUNTER FOR FIT (FECAL IMMUNOCHEMICAL TEST) SCREENING: ICD-10-CM

## 2021-05-26 PROCEDURE — 82274 ASSAY TEST FOR BLOOD FECAL: CPT | Performed by: INTERNAL MEDICINE

## 2021-05-28 ENCOUNTER — PATIENT MESSAGE (OUTPATIENT)
Dept: DERMATOLOGY | Facility: CLINIC | Age: 71
End: 2021-05-28

## 2021-06-01 ENCOUNTER — TELEPHONE (OUTPATIENT)
Dept: DERMATOLOGY | Facility: CLINIC | Age: 71
End: 2021-06-01

## 2021-06-02 ENCOUNTER — PROCEDURE VISIT (OUTPATIENT)
Dept: DERMATOLOGY | Facility: CLINIC | Age: 71
End: 2021-06-02
Payer: MEDICARE

## 2021-06-02 VITALS
SYSTOLIC BLOOD PRESSURE: 171 MMHG | BODY MASS INDEX: 19.44 KG/M2 | WEIGHT: 121 LBS | DIASTOLIC BLOOD PRESSURE: 72 MMHG | HEART RATE: 46 BPM | HEIGHT: 66 IN

## 2021-06-02 DIAGNOSIS — C44.311 BASAL CELL CARCINOMA OF LEFT ALA NASI: Primary | ICD-10-CM

## 2021-06-02 DIAGNOSIS — C44.319 BASAL CELL CARCINOMA OF LEFT CHEEK: ICD-10-CM

## 2021-06-02 PROCEDURE — 17311: ICD-10-PCS | Mod: 51,S$GLB,, | Performed by: DERMATOLOGY

## 2021-06-02 PROCEDURE — 17311 MOHS 1 STAGE H/N/HF/G: CPT | Mod: 51,S$GLB,, | Performed by: DERMATOLOGY

## 2021-06-02 PROCEDURE — 99499 UNLISTED E&M SERVICE: CPT | Mod: S$GLB,,, | Performed by: DERMATOLOGY

## 2021-06-02 PROCEDURE — 15260 PR FULL THICK GRFT NOS,EAR,LID <20 SQCM: ICD-10-PCS | Mod: S$GLB,,, | Performed by: DERMATOLOGY

## 2021-06-02 PROCEDURE — 15260 FTH/GFT FR N/E/E/L 20 SQCM/<: CPT | Mod: S$GLB,,, | Performed by: DERMATOLOGY

## 2021-06-02 PROCEDURE — 17312: ICD-10-PCS | Mod: S$GLB,,, | Performed by: DERMATOLOGY

## 2021-06-02 PROCEDURE — 99499 NO LOS: ICD-10-PCS | Mod: S$GLB,,, | Performed by: DERMATOLOGY

## 2021-06-02 PROCEDURE — 17312 MOHS ADDL STAGE: CPT | Mod: S$GLB,,, | Performed by: DERMATOLOGY

## 2021-06-02 RX ORDER — CEPHALEXIN 500 MG/1
500 CAPSULE ORAL 3 TIMES DAILY
Qty: 21 CAPSULE | Refills: 0 | Status: SHIPPED | OUTPATIENT
Start: 2021-06-02 | End: 2021-06-09

## 2021-06-04 DIAGNOSIS — Z12.11 ENCOUNTER FOR FIT (FECAL IMMUNOCHEMICAL TEST) SCREENING: Primary | ICD-10-CM

## 2021-06-09 ENCOUNTER — OFFICE VISIT (OUTPATIENT)
Dept: DERMATOLOGY | Facility: CLINIC | Age: 71
End: 2021-06-09
Payer: MEDICARE

## 2021-06-09 DIAGNOSIS — Z09 POSTOP CHECK: Primary | ICD-10-CM

## 2021-06-09 LAB — HEMOCCULT STL QL IA: NEGATIVE

## 2021-06-09 PROCEDURE — 99999 PR PBB SHADOW E&M-EST. PATIENT-LVL III: ICD-10-PCS | Mod: PBBFAC,,, | Performed by: DERMATOLOGY

## 2021-06-09 PROCEDURE — 1126F PR PAIN SEVERITY QUANTIFIED, NO PAIN PRESENT: ICD-10-PCS | Mod: S$GLB,,, | Performed by: DERMATOLOGY

## 2021-06-09 PROCEDURE — 99999 PR PBB SHADOW E&M-EST. PATIENT-LVL III: CPT | Mod: PBBFAC,,, | Performed by: DERMATOLOGY

## 2021-06-09 PROCEDURE — 1101F PR PT FALLS ASSESS DOC 0-1 FALLS W/OUT INJ PAST YR: ICD-10-PCS | Mod: CPTII,S$GLB,, | Performed by: DERMATOLOGY

## 2021-06-09 PROCEDURE — 3288F PR FALLS RISK ASSESSMENT DOCUMENTED: ICD-10-PCS | Mod: CPTII,S$GLB,, | Performed by: DERMATOLOGY

## 2021-06-09 PROCEDURE — 99024 POSTOP FOLLOW-UP VISIT: CPT | Mod: S$GLB,,, | Performed by: DERMATOLOGY

## 2021-06-09 PROCEDURE — 1101F PT FALLS ASSESS-DOCD LE1/YR: CPT | Mod: CPTII,S$GLB,, | Performed by: DERMATOLOGY

## 2021-06-09 PROCEDURE — 3288F FALL RISK ASSESSMENT DOCD: CPT | Mod: CPTII,S$GLB,, | Performed by: DERMATOLOGY

## 2021-06-09 PROCEDURE — 1126F AMNT PAIN NOTED NONE PRSNT: CPT | Mod: S$GLB,,, | Performed by: DERMATOLOGY

## 2021-06-09 PROCEDURE — 99024 PR POST-OP FOLLOW-UP VISIT: ICD-10-PCS | Mod: S$GLB,,, | Performed by: DERMATOLOGY

## 2021-06-10 ENCOUNTER — PATIENT MESSAGE (OUTPATIENT)
Dept: DERMATOLOGY | Facility: CLINIC | Age: 71
End: 2021-06-10

## 2021-06-30 ENCOUNTER — OFFICE VISIT (OUTPATIENT)
Dept: DERMATOLOGY | Facility: CLINIC | Age: 71
End: 2021-06-30
Payer: MEDICARE

## 2021-06-30 DIAGNOSIS — Z09 POSTOP CHECK: Primary | ICD-10-CM

## 2021-06-30 PROCEDURE — 3288F PR FALLS RISK ASSESSMENT DOCUMENTED: ICD-10-PCS | Mod: CPTII,S$GLB,, | Performed by: DERMATOLOGY

## 2021-06-30 PROCEDURE — 99457 RPM TX MGMT 1ST 20 MIN: CPT | Mod: S$GLB,,, | Performed by: INTERNAL MEDICINE

## 2021-06-30 PROCEDURE — 99457 PR MONITORING, PHYSIOL PARAM, REMOTE, 1ST 20 MINS, PER MONTH: ICD-10-PCS | Mod: S$GLB,,, | Performed by: INTERNAL MEDICINE

## 2021-06-30 PROCEDURE — 1126F AMNT PAIN NOTED NONE PRSNT: CPT | Mod: S$GLB,,, | Performed by: DERMATOLOGY

## 2021-06-30 PROCEDURE — 1101F PT FALLS ASSESS-DOCD LE1/YR: CPT | Mod: CPTII,S$GLB,, | Performed by: DERMATOLOGY

## 2021-06-30 PROCEDURE — 99999 PR PBB SHADOW E&M-EST. PATIENT-LVL III: ICD-10-PCS | Mod: PBBFAC,,, | Performed by: DERMATOLOGY

## 2021-06-30 PROCEDURE — 99024 PR POST-OP FOLLOW-UP VISIT: ICD-10-PCS | Mod: S$GLB,,, | Performed by: DERMATOLOGY

## 2021-06-30 PROCEDURE — 99024 POSTOP FOLLOW-UP VISIT: CPT | Mod: S$GLB,,, | Performed by: DERMATOLOGY

## 2021-06-30 PROCEDURE — 1126F PR PAIN SEVERITY QUANTIFIED, NO PAIN PRESENT: ICD-10-PCS | Mod: S$GLB,,, | Performed by: DERMATOLOGY

## 2021-06-30 PROCEDURE — 3288F FALL RISK ASSESSMENT DOCD: CPT | Mod: CPTII,S$GLB,, | Performed by: DERMATOLOGY

## 2021-06-30 PROCEDURE — 1101F PR PT FALLS ASSESS DOC 0-1 FALLS W/OUT INJ PAST YR: ICD-10-PCS | Mod: CPTII,S$GLB,, | Performed by: DERMATOLOGY

## 2021-06-30 PROCEDURE — 99999 PR PBB SHADOW E&M-EST. PATIENT-LVL III: CPT | Mod: PBBFAC,,, | Performed by: DERMATOLOGY

## 2021-07-11 ENCOUNTER — PATIENT MESSAGE (OUTPATIENT)
Dept: DERMATOLOGY | Facility: CLINIC | Age: 71
End: 2021-07-11

## 2021-07-22 ENCOUNTER — TELEPHONE (OUTPATIENT)
Dept: ELECTROPHYSIOLOGY | Facility: CLINIC | Age: 71
End: 2021-07-22

## 2021-07-31 LAB — HEMOCCULT STL QL IA: NEGATIVE

## 2021-08-09 ENCOUNTER — PATIENT MESSAGE (OUTPATIENT)
Dept: INTERNAL MEDICINE | Facility: CLINIC | Age: 71
End: 2021-08-09

## 2021-08-09 ENCOUNTER — IMMUNIZATION (OUTPATIENT)
Dept: INTERNAL MEDICINE | Facility: CLINIC | Age: 71
End: 2021-08-09
Payer: MEDICARE

## 2021-08-09 DIAGNOSIS — Z23 NEED FOR VACCINATION: Primary | ICD-10-CM

## 2021-08-09 PROCEDURE — 91300 COVID-19, MRNA, LNP-S, PF, 30 MCG/0.3 ML DOSE VACCINE: CPT | Mod: ,,, | Performed by: INTERNAL MEDICINE

## 2021-08-09 PROCEDURE — 0001A COVID-19, MRNA, LNP-S, PF, 30 MCG/0.3 ML DOSE VACCINE: ICD-10-PCS | Mod: CV19,,, | Performed by: INTERNAL MEDICINE

## 2021-08-09 PROCEDURE — 91300 COVID-19, MRNA, LNP-S, PF, 30 MCG/0.3 ML DOSE VACCINE: ICD-10-PCS | Mod: ,,, | Performed by: INTERNAL MEDICINE

## 2021-08-09 PROCEDURE — 0001A COVID-19, MRNA, LNP-S, PF, 30 MCG/0.3 ML DOSE VACCINE: CPT | Mod: CV19,,, | Performed by: INTERNAL MEDICINE

## 2021-08-23 ENCOUNTER — TELEPHONE (OUTPATIENT)
Dept: INTERNAL MEDICINE | Facility: CLINIC | Age: 71
End: 2021-08-23

## 2021-08-26 ENCOUNTER — PATIENT MESSAGE (OUTPATIENT)
Dept: INTERNAL MEDICINE | Facility: CLINIC | Age: 71
End: 2021-08-26

## 2021-09-03 ENCOUNTER — PATIENT MESSAGE (OUTPATIENT)
Dept: INTERNAL MEDICINE | Facility: CLINIC | Age: 71
End: 2021-09-03

## 2021-09-04 ENCOUNTER — PATIENT MESSAGE (OUTPATIENT)
Dept: ADMINISTRATIVE | Facility: OTHER | Age: 71
End: 2021-09-04

## 2021-10-01 ENCOUNTER — IMMUNIZATION (OUTPATIENT)
Dept: INTERNAL MEDICINE | Facility: CLINIC | Age: 71
End: 2021-10-01
Payer: MEDICARE

## 2021-10-01 DIAGNOSIS — Z23 NEED FOR VACCINATION: Primary | ICD-10-CM

## 2021-10-01 PROCEDURE — 91300 COVID-19, MRNA, LNP-S, PF, 30 MCG/0.3 ML DOSE VACCINE: CPT | Mod: HCNC,PBBFAC | Performed by: INTERNAL MEDICINE

## 2021-10-01 PROCEDURE — 0002A COVID-19, MRNA, LNP-S, PF, 30 MCG/0.3 ML DOSE VACCINE: CPT | Mod: HCNC,CV19,PBBFAC | Performed by: INTERNAL MEDICINE

## 2021-10-25 ENCOUNTER — PATIENT MESSAGE (OUTPATIENT)
Dept: OBSTETRICS AND GYNECOLOGY | Facility: CLINIC | Age: 71
End: 2021-10-25

## 2021-10-25 ENCOUNTER — PATIENT MESSAGE (OUTPATIENT)
Dept: INTERNAL MEDICINE | Facility: CLINIC | Age: 71
End: 2021-10-25

## 2021-10-25 ENCOUNTER — PATIENT OUTREACH (OUTPATIENT)
Dept: ADMINISTRATIVE | Facility: OTHER | Age: 71
End: 2021-10-25

## 2021-10-25 DIAGNOSIS — Z12.31 ENCOUNTER FOR SCREENING MAMMOGRAM FOR BREAST CANCER: Primary | ICD-10-CM

## 2021-10-26 ENCOUNTER — PATIENT MESSAGE (OUTPATIENT)
Dept: INTERNAL MEDICINE | Facility: CLINIC | Age: 71
End: 2021-10-26

## 2021-10-26 ENCOUNTER — OFFICE VISIT (OUTPATIENT)
Dept: DERMATOLOGY | Facility: CLINIC | Age: 71
End: 2021-10-26
Payer: MEDICARE

## 2021-10-26 VITALS — WEIGHT: 121 LBS | BODY MASS INDEX: 19.53 KG/M2

## 2021-10-26 DIAGNOSIS — L81.4 LENTIGINES: ICD-10-CM

## 2021-10-26 DIAGNOSIS — D22.9 NEVUS OF MULTIPLE SITES: ICD-10-CM

## 2021-10-26 DIAGNOSIS — M25.562 CHRONIC PAIN OF LEFT KNEE: Primary | ICD-10-CM

## 2021-10-26 DIAGNOSIS — L82.1 SEBORRHEIC KERATOSES: Primary | ICD-10-CM

## 2021-10-26 DIAGNOSIS — G89.29 CHRONIC PAIN OF LEFT KNEE: Primary | ICD-10-CM

## 2021-10-26 DIAGNOSIS — Z85.828 HISTORY OF SKIN CANCER: ICD-10-CM

## 2021-10-26 PROCEDURE — 3288F FALL RISK ASSESSMENT DOCD: CPT | Mod: HCNC,CPTII,S$GLB, | Performed by: DERMATOLOGY

## 2021-10-26 PROCEDURE — 1160F PR REVIEW ALL MEDS BY PRESCRIBER/CLIN PHARMACIST DOCUMENTED: ICD-10-PCS | Mod: HCNC,CPTII,S$GLB, | Performed by: DERMATOLOGY

## 2021-10-26 PROCEDURE — 1160F RVW MEDS BY RX/DR IN RCRD: CPT | Mod: HCNC,CPTII,S$GLB, | Performed by: DERMATOLOGY

## 2021-10-26 PROCEDURE — 1159F PR MEDICATION LIST DOCUMENTED IN MEDICAL RECORD: ICD-10-PCS | Mod: HCNC,CPTII,S$GLB, | Performed by: DERMATOLOGY

## 2021-10-26 PROCEDURE — 1101F PT FALLS ASSESS-DOCD LE1/YR: CPT | Mod: HCNC,CPTII,S$GLB, | Performed by: DERMATOLOGY

## 2021-10-26 PROCEDURE — 99213 PR OFFICE/OUTPT VISIT, EST, LEVL III, 20-29 MIN: ICD-10-PCS | Mod: HCNC,S$GLB,, | Performed by: DERMATOLOGY

## 2021-10-26 PROCEDURE — 99213 OFFICE O/P EST LOW 20 MIN: CPT | Mod: HCNC,S$GLB,, | Performed by: DERMATOLOGY

## 2021-10-26 PROCEDURE — 3288F PR FALLS RISK ASSESSMENT DOCUMENTED: ICD-10-PCS | Mod: HCNC,CPTII,S$GLB, | Performed by: DERMATOLOGY

## 2021-10-26 PROCEDURE — 99999 PR PBB SHADOW E&M-EST. PATIENT-LVL III: CPT | Mod: PBBFAC,HCNC,, | Performed by: DERMATOLOGY

## 2021-10-26 PROCEDURE — 1101F PR PT FALLS ASSESS DOC 0-1 FALLS W/OUT INJ PAST YR: ICD-10-PCS | Mod: HCNC,CPTII,S$GLB, | Performed by: DERMATOLOGY

## 2021-10-26 PROCEDURE — 1159F MED LIST DOCD IN RCRD: CPT | Mod: HCNC,CPTII,S$GLB, | Performed by: DERMATOLOGY

## 2021-10-26 PROCEDURE — 1126F PR PAIN SEVERITY QUANTIFIED, NO PAIN PRESENT: ICD-10-PCS | Mod: HCNC,CPTII,S$GLB, | Performed by: DERMATOLOGY

## 2021-10-26 PROCEDURE — 1126F AMNT PAIN NOTED NONE PRSNT: CPT | Mod: HCNC,CPTII,S$GLB, | Performed by: DERMATOLOGY

## 2021-10-26 PROCEDURE — 3008F PR BODY MASS INDEX (BMI) DOCUMENTED: ICD-10-PCS | Mod: HCNC,CPTII,S$GLB, | Performed by: DERMATOLOGY

## 2021-10-26 PROCEDURE — 4010F ACE/ARB THERAPY RXD/TAKEN: CPT | Mod: HCNC,CPTII,S$GLB, | Performed by: DERMATOLOGY

## 2021-10-26 PROCEDURE — 99999 PR PBB SHADOW E&M-EST. PATIENT-LVL III: ICD-10-PCS | Mod: PBBFAC,HCNC,, | Performed by: DERMATOLOGY

## 2021-10-26 PROCEDURE — 4010F PR ACE/ARB THEARPY RXD/TAKEN: ICD-10-PCS | Mod: HCNC,CPTII,S$GLB, | Performed by: DERMATOLOGY

## 2021-10-26 PROCEDURE — 3008F BODY MASS INDEX DOCD: CPT | Mod: HCNC,CPTII,S$GLB, | Performed by: DERMATOLOGY

## 2021-10-27 ENCOUNTER — PATIENT MESSAGE (OUTPATIENT)
Dept: INTERNAL MEDICINE | Facility: CLINIC | Age: 71
End: 2021-10-27

## 2021-10-29 ENCOUNTER — OFFICE VISIT (OUTPATIENT)
Dept: INTERNAL MEDICINE | Facility: CLINIC | Age: 71
End: 2021-10-29
Payer: MEDICARE

## 2021-10-29 ENCOUNTER — HOSPITAL ENCOUNTER (OUTPATIENT)
Dept: RADIOLOGY | Facility: HOSPITAL | Age: 71
Discharge: HOME | End: 2021-10-29
Attending: INTERNAL MEDICINE
Payer: MEDICARE

## 2021-10-29 VITALS
BODY MASS INDEX: 19.67 KG/M2 | HEIGHT: 66 IN | DIASTOLIC BLOOD PRESSURE: 60 MMHG | WEIGHT: 122.38 LBS | SYSTOLIC BLOOD PRESSURE: 118 MMHG | OXYGEN SATURATION: 99 % | HEART RATE: 64 BPM

## 2021-10-29 DIAGNOSIS — I27.20 PULMONARY HYPERTENSION: ICD-10-CM

## 2021-10-29 DIAGNOSIS — G89.29 CHRONIC PAIN OF LEFT KNEE: ICD-10-CM

## 2021-10-29 DIAGNOSIS — M25.562 CHRONIC PAIN OF LEFT KNEE: ICD-10-CM

## 2021-10-29 DIAGNOSIS — H61.23 BILATERAL IMPACTED CERUMEN: ICD-10-CM

## 2021-10-29 DIAGNOSIS — M81.0 OSTEOPOROSIS, UNSPECIFIED OSTEOPOROSIS TYPE, UNSPECIFIED PATHOLOGICAL FRACTURE PRESENCE: ICD-10-CM

## 2021-10-29 DIAGNOSIS — R06.09 DYSPNEA ON EXERTION: ICD-10-CM

## 2021-10-29 DIAGNOSIS — I10 BENIGN ESSENTIAL HYPERTENSION: Primary | ICD-10-CM

## 2021-10-29 DIAGNOSIS — M89.9 DISORDER OF BONE, UNSPECIFIED: ICD-10-CM

## 2021-10-29 DIAGNOSIS — D69.6 THROMBOCYTOPENIA: ICD-10-CM

## 2021-10-29 DIAGNOSIS — M17.0 PRIMARY OSTEOARTHRITIS OF BOTH KNEES: ICD-10-CM

## 2021-10-29 PROCEDURE — 1125F PR PAIN SEVERITY QUANTIFIED, PAIN PRESENT: ICD-10-PCS | Mod: HCNC,CPTII,S$GLB, | Performed by: INTERNAL MEDICINE

## 2021-10-29 PROCEDURE — 3288F FALL RISK ASSESSMENT DOCD: CPT | Mod: HCNC,CPTII,S$GLB, | Performed by: INTERNAL MEDICINE

## 2021-10-29 PROCEDURE — 73562 XR KNEE ORTHO BILAT: ICD-10-PCS | Mod: 26,50,HCNC, | Performed by: RADIOLOGY

## 2021-10-29 PROCEDURE — 3078F DIAST BP <80 MM HG: CPT | Mod: HCNC,CPTII,S$GLB, | Performed by: INTERNAL MEDICINE

## 2021-10-29 PROCEDURE — 1101F PT FALLS ASSESS-DOCD LE1/YR: CPT | Mod: HCNC,CPTII,S$GLB, | Performed by: INTERNAL MEDICINE

## 2021-10-29 PROCEDURE — 3288F PR FALLS RISK ASSESSMENT DOCUMENTED: ICD-10-PCS | Mod: HCNC,CPTII,S$GLB, | Performed by: INTERNAL MEDICINE

## 2021-10-29 PROCEDURE — 99999 PR PBB SHADOW E&M-EST. PATIENT-LVL V: CPT | Mod: PBBFAC,HCNC,, | Performed by: INTERNAL MEDICINE

## 2021-10-29 PROCEDURE — 1101F PR PT FALLS ASSESS DOC 0-1 FALLS W/OUT INJ PAST YR: ICD-10-PCS | Mod: HCNC,CPTII,S$GLB, | Performed by: INTERNAL MEDICINE

## 2021-10-29 PROCEDURE — 1160F PR REVIEW ALL MEDS BY PRESCRIBER/CLIN PHARMACIST DOCUMENTED: ICD-10-PCS | Mod: HCNC,CPTII,S$GLB, | Performed by: INTERNAL MEDICINE

## 2021-10-29 PROCEDURE — 3008F BODY MASS INDEX DOCD: CPT | Mod: HCNC,CPTII,S$GLB, | Performed by: INTERNAL MEDICINE

## 2021-10-29 PROCEDURE — 99454 REM MNTR PHYSIOL PARAM 16-30: CPT | Mod: S$GLB,,, | Performed by: INTERNAL MEDICINE

## 2021-10-29 PROCEDURE — 3008F PR BODY MASS INDEX (BMI) DOCUMENTED: ICD-10-PCS | Mod: HCNC,CPTII,S$GLB, | Performed by: INTERNAL MEDICINE

## 2021-10-29 PROCEDURE — 1159F MED LIST DOCD IN RCRD: CPT | Mod: HCNC,CPTII,S$GLB, | Performed by: INTERNAL MEDICINE

## 2021-10-29 PROCEDURE — 3074F PR MOST RECENT SYSTOLIC BLOOD PRESSURE < 130 MM HG: ICD-10-PCS | Mod: HCNC,CPTII,S$GLB, | Performed by: INTERNAL MEDICINE

## 2021-10-29 PROCEDURE — 73562 X-RAY EXAM OF KNEE 3: CPT | Mod: 26,50,HCNC, | Performed by: RADIOLOGY

## 2021-10-29 PROCEDURE — 1160F RVW MEDS BY RX/DR IN RCRD: CPT | Mod: HCNC,CPTII,S$GLB, | Performed by: INTERNAL MEDICINE

## 2021-10-29 PROCEDURE — 1159F PR MEDICATION LIST DOCUMENTED IN MEDICAL RECORD: ICD-10-PCS | Mod: HCNC,CPTII,S$GLB, | Performed by: INTERNAL MEDICINE

## 2021-10-29 PROCEDURE — 99999 PR PBB SHADOW E&M-EST. PATIENT-LVL V: ICD-10-PCS | Mod: PBBFAC,HCNC,, | Performed by: INTERNAL MEDICINE

## 2021-10-29 PROCEDURE — 1125F AMNT PAIN NOTED PAIN PRSNT: CPT | Mod: HCNC,CPTII,S$GLB, | Performed by: INTERNAL MEDICINE

## 2021-10-29 PROCEDURE — 4010F ACE/ARB THERAPY RXD/TAKEN: CPT | Mod: HCNC,CPTII,S$GLB, | Performed by: INTERNAL MEDICINE

## 2021-10-29 PROCEDURE — 3074F SYST BP LT 130 MM HG: CPT | Mod: HCNC,CPTII,S$GLB, | Performed by: INTERNAL MEDICINE

## 2021-10-29 PROCEDURE — 99214 OFFICE O/P EST MOD 30 MIN: CPT | Mod: HCNC,S$GLB,, | Performed by: INTERNAL MEDICINE

## 2021-10-29 PROCEDURE — 4010F PR ACE/ARB THEARPY RXD/TAKEN: ICD-10-PCS | Mod: HCNC,CPTII,S$GLB, | Performed by: INTERNAL MEDICINE

## 2021-10-29 PROCEDURE — 99454 PR REMOTE MNTR, PHYS PARAM, INITIAL, EA 30 DAYS: ICD-10-PCS | Mod: S$GLB,,, | Performed by: INTERNAL MEDICINE

## 2021-10-29 PROCEDURE — 99214 PR OFFICE/OUTPT VISIT, EST, LEVL IV, 30-39 MIN: ICD-10-PCS | Mod: HCNC,S$GLB,, | Performed by: INTERNAL MEDICINE

## 2021-10-29 PROCEDURE — 73562 X-RAY EXAM OF KNEE 3: CPT | Mod: TC,50,HCNC

## 2021-10-29 PROCEDURE — 3078F PR MOST RECENT DIASTOLIC BLOOD PRESSURE < 80 MM HG: ICD-10-PCS | Mod: HCNC,CPTII,S$GLB, | Performed by: INTERNAL MEDICINE

## 2021-11-01 ENCOUNTER — OFFICE VISIT (OUTPATIENT)
Dept: ORTHOPEDICS | Facility: CLINIC | Age: 71
End: 2021-11-01
Payer: MEDICARE

## 2021-11-01 VITALS — BODY MASS INDEX: 19.67 KG/M2 | HEIGHT: 66 IN | WEIGHT: 122.38 LBS

## 2021-11-01 DIAGNOSIS — M17.0 PRIMARY OSTEOARTHRITIS OF BOTH KNEES: ICD-10-CM

## 2021-11-01 PROCEDURE — 99203 PR OFFICE/OUTPT VISIT, NEW, LEVL III, 30-44 MIN: ICD-10-PCS | Mod: HCNC,S$GLB,, | Performed by: PHYSICIAN ASSISTANT

## 2021-11-01 PROCEDURE — 1159F MED LIST DOCD IN RCRD: CPT | Mod: HCNC,CPTII,S$GLB, | Performed by: PHYSICIAN ASSISTANT

## 2021-11-01 PROCEDURE — 1125F AMNT PAIN NOTED PAIN PRSNT: CPT | Mod: HCNC,CPTII,S$GLB, | Performed by: PHYSICIAN ASSISTANT

## 2021-11-01 PROCEDURE — 4010F ACE/ARB THERAPY RXD/TAKEN: CPT | Mod: HCNC,CPTII,S$GLB, | Performed by: PHYSICIAN ASSISTANT

## 2021-11-01 PROCEDURE — 1160F RVW MEDS BY RX/DR IN RCRD: CPT | Mod: HCNC,CPTII,S$GLB, | Performed by: PHYSICIAN ASSISTANT

## 2021-11-01 PROCEDURE — 3008F BODY MASS INDEX DOCD: CPT | Mod: HCNC,CPTII,S$GLB, | Performed by: PHYSICIAN ASSISTANT

## 2021-11-01 PROCEDURE — 1159F PR MEDICATION LIST DOCUMENTED IN MEDICAL RECORD: ICD-10-PCS | Mod: HCNC,CPTII,S$GLB, | Performed by: PHYSICIAN ASSISTANT

## 2021-11-01 PROCEDURE — 99203 OFFICE O/P NEW LOW 30 MIN: CPT | Mod: HCNC,S$GLB,, | Performed by: PHYSICIAN ASSISTANT

## 2021-11-01 PROCEDURE — 1160F PR REVIEW ALL MEDS BY PRESCRIBER/CLIN PHARMACIST DOCUMENTED: ICD-10-PCS | Mod: HCNC,CPTII,S$GLB, | Performed by: PHYSICIAN ASSISTANT

## 2021-11-01 PROCEDURE — 3288F PR FALLS RISK ASSESSMENT DOCUMENTED: ICD-10-PCS | Mod: HCNC,CPTII,S$GLB, | Performed by: PHYSICIAN ASSISTANT

## 2021-11-01 PROCEDURE — 3288F FALL RISK ASSESSMENT DOCD: CPT | Mod: HCNC,CPTII,S$GLB, | Performed by: PHYSICIAN ASSISTANT

## 2021-11-01 PROCEDURE — 1125F PR PAIN SEVERITY QUANTIFIED, PAIN PRESENT: ICD-10-PCS | Mod: HCNC,CPTII,S$GLB, | Performed by: PHYSICIAN ASSISTANT

## 2021-11-01 PROCEDURE — 99999 PR PBB SHADOW E&M-EST. PATIENT-LVL III: ICD-10-PCS | Mod: PBBFAC,HCNC,, | Performed by: PHYSICIAN ASSISTANT

## 2021-11-01 PROCEDURE — 1101F PT FALLS ASSESS-DOCD LE1/YR: CPT | Mod: HCNC,CPTII,S$GLB, | Performed by: PHYSICIAN ASSISTANT

## 2021-11-01 PROCEDURE — 4010F PR ACE/ARB THEARPY RXD/TAKEN: ICD-10-PCS | Mod: HCNC,CPTII,S$GLB, | Performed by: PHYSICIAN ASSISTANT

## 2021-11-01 PROCEDURE — 3008F PR BODY MASS INDEX (BMI) DOCUMENTED: ICD-10-PCS | Mod: HCNC,CPTII,S$GLB, | Performed by: PHYSICIAN ASSISTANT

## 2021-11-01 PROCEDURE — 1101F PR PT FALLS ASSESS DOC 0-1 FALLS W/OUT INJ PAST YR: ICD-10-PCS | Mod: HCNC,CPTII,S$GLB, | Performed by: PHYSICIAN ASSISTANT

## 2021-11-01 PROCEDURE — 99999 PR PBB SHADOW E&M-EST. PATIENT-LVL III: CPT | Mod: PBBFAC,HCNC,, | Performed by: PHYSICIAN ASSISTANT

## 2021-11-09 ENCOUNTER — HOSPITAL ENCOUNTER (OUTPATIENT)
Dept: RADIOLOGY | Facility: HOSPITAL | Age: 71
Discharge: HOME | End: 2021-11-09
Attending: INTERNAL MEDICINE
Payer: MEDICARE

## 2021-11-09 DIAGNOSIS — I27.20 PULMONARY HYPERTENSION: ICD-10-CM

## 2021-11-09 DIAGNOSIS — R06.09 DYSPNEA ON EXERTION: ICD-10-CM

## 2021-11-09 PROCEDURE — 71250 CT CHEST WITHOUT CONTRAST: ICD-10-PCS | Mod: 26,HCNC,, | Performed by: RADIOLOGY

## 2021-11-09 PROCEDURE — 71250 CT THORAX DX C-: CPT | Mod: 26,HCNC,, | Performed by: RADIOLOGY

## 2021-11-09 PROCEDURE — 71250 CT THORAX DX C-: CPT | Mod: TC,HCNC

## 2021-11-30 ENCOUNTER — HOSPITAL ENCOUNTER (OUTPATIENT)
Dept: RADIOLOGY | Facility: HOSPITAL | Age: 71
Discharge: HOME | End: 2021-11-30
Attending: PHYSICIAN ASSISTANT
Payer: MEDICARE

## 2021-11-30 DIAGNOSIS — Z12.31 ENCOUNTER FOR SCREENING MAMMOGRAM FOR BREAST CANCER: ICD-10-CM

## 2021-11-30 PROCEDURE — 77063 MAMMO DIGITAL SCREENING BILAT WITH TOMO: ICD-10-PCS | Mod: 26,HCNC,, | Performed by: RADIOLOGY

## 2021-11-30 PROCEDURE — 77067 SCR MAMMO BI INCL CAD: CPT | Mod: 26,HCNC,, | Performed by: RADIOLOGY

## 2021-11-30 PROCEDURE — 77067 MAMMO DIGITAL SCREENING BILAT WITH TOMO: ICD-10-PCS | Mod: 26,HCNC,, | Performed by: RADIOLOGY

## 2021-11-30 PROCEDURE — 77067 SCR MAMMO BI INCL CAD: CPT | Mod: TC,HCNC

## 2021-11-30 PROCEDURE — 77063 BREAST TOMOSYNTHESIS BI: CPT | Mod: 26,HCNC,, | Performed by: RADIOLOGY

## 2021-12-01 ENCOUNTER — PATIENT MESSAGE (OUTPATIENT)
Dept: OBSTETRICS AND GYNECOLOGY | Facility: CLINIC | Age: 71
End: 2021-12-01

## 2021-12-01 DIAGNOSIS — R92.8 ABNORMAL MAMMOGRAM OF LEFT BREAST: Primary | ICD-10-CM

## 2021-12-06 ENCOUNTER — PATIENT MESSAGE (OUTPATIENT)
Dept: ORTHOPEDICS | Facility: CLINIC | Age: 71
End: 2021-12-06

## 2021-12-06 ENCOUNTER — PATIENT MESSAGE (OUTPATIENT)
Dept: INTERNAL MEDICINE | Facility: CLINIC | Age: 71
End: 2021-12-06

## 2021-12-19 ENCOUNTER — PATIENT MESSAGE (OUTPATIENT)
Dept: ADMINISTRATIVE | Facility: OTHER | Age: 71
End: 2021-12-19

## 2021-12-20 ENCOUNTER — PATIENT MESSAGE (OUTPATIENT)
Dept: INTERNAL MEDICINE | Facility: CLINIC | Age: 71
End: 2021-12-20

## 2021-12-22 ENCOUNTER — PATIENT OUTREACH (OUTPATIENT)
Dept: ADMINISTRATIVE | Facility: HOSPITAL | Age: 71
End: 2021-12-22

## 2021-12-27 ENCOUNTER — HOSPITAL ENCOUNTER (OUTPATIENT)
Dept: RADIOLOGY | Facility: HOSPITAL | Age: 71
Discharge: HOME | End: 2021-12-27
Attending: PHYSICIAN ASSISTANT
Payer: MEDICARE

## 2021-12-27 DIAGNOSIS — R92.8 ABNORMAL MAMMOGRAM OF LEFT BREAST: ICD-10-CM

## 2021-12-27 PROCEDURE — 77065 DX MAMMO INCL CAD UNI: CPT | Mod: 26,HCNC,LT, | Performed by: RADIOLOGY

## 2021-12-27 PROCEDURE — 76642 ULTRASOUND BREAST LIMITED: CPT | Mod: 26,HCNC,LT, | Performed by: RADIOLOGY

## 2021-12-27 PROCEDURE — 76642 ULTRASOUND BREAST LIMITED: CPT | Mod: TC,HCNC,LT

## 2021-12-27 PROCEDURE — 77065 MAMMO DIGITAL DIAGNOSTIC LEFT WITH TOMO: ICD-10-PCS | Mod: 26,HCNC,LT, | Performed by: RADIOLOGY

## 2021-12-27 PROCEDURE — 76642 US BREAST LEFT LIMITED: ICD-10-PCS | Mod: 26,HCNC,LT, | Performed by: RADIOLOGY

## 2021-12-27 PROCEDURE — 77061 BREAST TOMOSYNTHESIS UNI: CPT | Mod: 26,HCNC,LT, | Performed by: RADIOLOGY

## 2021-12-27 PROCEDURE — 77061 MAMMO DIGITAL DIAGNOSTIC LEFT WITH TOMO: ICD-10-PCS | Mod: 26,HCNC,LT, | Performed by: RADIOLOGY

## 2021-12-27 PROCEDURE — 77061 BREAST TOMOSYNTHESIS UNI: CPT | Mod: TC,HCNC,LT

## 2021-12-28 ENCOUNTER — OFFICE VISIT (OUTPATIENT)
Dept: OTOLARYNGOLOGY | Facility: CLINIC | Age: 71
End: 2021-12-28
Payer: MEDICARE

## 2021-12-28 ENCOUNTER — PATIENT MESSAGE (OUTPATIENT)
Dept: OBSTETRICS AND GYNECOLOGY | Facility: CLINIC | Age: 71
End: 2021-12-28

## 2021-12-28 VITALS
SYSTOLIC BLOOD PRESSURE: 142 MMHG | DIASTOLIC BLOOD PRESSURE: 71 MMHG | BODY MASS INDEX: 20.34 KG/M2 | WEIGHT: 124.13 LBS | HEART RATE: 55 BPM

## 2021-12-28 DIAGNOSIS — H61.23 BILATERAL IMPACTED CERUMEN: ICD-10-CM

## 2021-12-28 DIAGNOSIS — H93.8X3 EAR FULLNESS, BILATERAL: Primary | ICD-10-CM

## 2021-12-28 PROCEDURE — 1101F PT FALLS ASSESS-DOCD LE1/YR: CPT | Mod: HCNC,CPTII,S$GLB, | Performed by: OTOLARYNGOLOGY

## 2021-12-28 PROCEDURE — 3077F PR MOST RECENT SYSTOLIC BLOOD PRESSURE >= 140 MM HG: ICD-10-PCS | Mod: HCNC,CPTII,S$GLB, | Performed by: OTOLARYNGOLOGY

## 2021-12-28 PROCEDURE — 99213 PR OFFICE/OUTPT VISIT, EST, LEVL III, 20-29 MIN: ICD-10-PCS | Mod: 25,HCNC,S$GLB, | Performed by: OTOLARYNGOLOGY

## 2021-12-28 PROCEDURE — 3008F PR BODY MASS INDEX (BMI) DOCUMENTED: ICD-10-PCS | Mod: HCNC,CPTII,S$GLB, | Performed by: OTOLARYNGOLOGY

## 2021-12-28 PROCEDURE — 4010F PR ACE/ARB THEARPY RXD/TAKEN: ICD-10-PCS | Mod: HCNC,CPTII,S$GLB, | Performed by: OTOLARYNGOLOGY

## 2021-12-28 PROCEDURE — 1159F PR MEDICATION LIST DOCUMENTED IN MEDICAL RECORD: ICD-10-PCS | Mod: HCNC,CPTII,S$GLB, | Performed by: OTOLARYNGOLOGY

## 2021-12-28 PROCEDURE — 99999 PR PBB SHADOW E&M-EST. PATIENT-LVL III: ICD-10-PCS | Mod: PBBFAC,HCNC,, | Performed by: OTOLARYNGOLOGY

## 2021-12-28 PROCEDURE — 1126F PR PAIN SEVERITY QUANTIFIED, NO PAIN PRESENT: ICD-10-PCS | Mod: HCNC,CPTII,S$GLB, | Performed by: OTOLARYNGOLOGY

## 2021-12-28 PROCEDURE — 69210 REMOVE IMPACTED EAR WAX UNI: CPT | Mod: HCNC,S$GLB,, | Performed by: OTOLARYNGOLOGY

## 2021-12-28 PROCEDURE — 99213 OFFICE O/P EST LOW 20 MIN: CPT | Mod: 25,HCNC,S$GLB, | Performed by: OTOLARYNGOLOGY

## 2021-12-28 PROCEDURE — 3078F DIAST BP <80 MM HG: CPT | Mod: HCNC,CPTII,S$GLB, | Performed by: OTOLARYNGOLOGY

## 2021-12-28 PROCEDURE — 1126F AMNT PAIN NOTED NONE PRSNT: CPT | Mod: HCNC,CPTII,S$GLB, | Performed by: OTOLARYNGOLOGY

## 2021-12-28 PROCEDURE — 3288F FALL RISK ASSESSMENT DOCD: CPT | Mod: HCNC,CPTII,S$GLB, | Performed by: OTOLARYNGOLOGY

## 2021-12-28 PROCEDURE — 3008F BODY MASS INDEX DOCD: CPT | Mod: HCNC,CPTII,S$GLB, | Performed by: OTOLARYNGOLOGY

## 2021-12-28 PROCEDURE — 69210 PR REMOVAL IMPACTED CERUMEN REQUIRING INSTRUMENTATION, UNILATERAL: ICD-10-PCS | Mod: HCNC,S$GLB,, | Performed by: OTOLARYNGOLOGY

## 2021-12-28 PROCEDURE — 1159F MED LIST DOCD IN RCRD: CPT | Mod: HCNC,CPTII,S$GLB, | Performed by: OTOLARYNGOLOGY

## 2021-12-28 PROCEDURE — 3288F PR FALLS RISK ASSESSMENT DOCUMENTED: ICD-10-PCS | Mod: HCNC,CPTII,S$GLB, | Performed by: OTOLARYNGOLOGY

## 2021-12-28 PROCEDURE — 3078F PR MOST RECENT DIASTOLIC BLOOD PRESSURE < 80 MM HG: ICD-10-PCS | Mod: HCNC,CPTII,S$GLB, | Performed by: OTOLARYNGOLOGY

## 2021-12-28 PROCEDURE — 99999 PR PBB SHADOW E&M-EST. PATIENT-LVL III: CPT | Mod: PBBFAC,HCNC,, | Performed by: OTOLARYNGOLOGY

## 2021-12-28 PROCEDURE — 1101F PR PT FALLS ASSESS DOC 0-1 FALLS W/OUT INJ PAST YR: ICD-10-PCS | Mod: HCNC,CPTII,S$GLB, | Performed by: OTOLARYNGOLOGY

## 2021-12-28 PROCEDURE — 3077F SYST BP >= 140 MM HG: CPT | Mod: HCNC,CPTII,S$GLB, | Performed by: OTOLARYNGOLOGY

## 2021-12-28 PROCEDURE — 4010F ACE/ARB THERAPY RXD/TAKEN: CPT | Mod: HCNC,CPTII,S$GLB, | Performed by: OTOLARYNGOLOGY

## 2022-02-11 NOTE — TELEPHONE ENCOUNTER
May increase to whole tab (5mg olmesartan).. Continue frequent BP monitoring with digital med.   
Please clarify which dose she is taking.   
Pt informed.  
Pt takes 1/2 tab a day. Pt states, the 5mg tab is not scored with that she does have difficulty with cutting the tab in half. Pt states, medication is helping her .    
No

## 2022-03-04 ENCOUNTER — PATIENT MESSAGE (OUTPATIENT)
Dept: INTERNAL MEDICINE | Facility: CLINIC | Age: 72
End: 2022-03-04
Payer: MEDICARE

## 2022-04-07 ENCOUNTER — PATIENT MESSAGE (OUTPATIENT)
Dept: INTERNAL MEDICINE | Facility: CLINIC | Age: 72
End: 2022-04-07
Payer: MEDICARE

## 2022-04-22 ENCOUNTER — IMMUNIZATION (OUTPATIENT)
Dept: INTERNAL MEDICINE | Facility: CLINIC | Age: 72
End: 2022-04-22
Payer: MEDICARE

## 2022-04-22 DIAGNOSIS — Z23 NEED FOR VACCINATION: Primary | ICD-10-CM

## 2022-04-22 PROCEDURE — 91300 COVID-19, MRNA, LNP-S, PF, 30 MCG/0.3 ML DOSE VACCINE: CPT | Mod: PBBFAC | Performed by: INTERNAL MEDICINE

## 2022-05-07 DIAGNOSIS — I10 ESSENTIAL HYPERTENSION: ICD-10-CM

## 2022-05-07 NOTE — TELEPHONE ENCOUNTER
Refill Routing Note   Medication(s) are not appropriate for processing by [facility] for the following reason(s):      - Required vitals are abnormal    [facility] action(s):  Defer          Medication reconciliation completed: No     Appointments  past 12m or future 3m with PCP    Date Provider   Last Visit   10/29/2021 Elva Tripathi MD   Next Visit   6/10/2022 Elva Tripathi MD   ED visits in past 90 days: 0        Note composed:9:33 AM 05/07/2022

## 2022-05-07 NOTE — TELEPHONE ENCOUNTER
No new care gaps identified.  [facility] Embedded Care Gaps. Reference number: [id number]. 5/07/2022   8:52:32 AM CDT

## 2022-05-08 ENCOUNTER — PATIENT MESSAGE (OUTPATIENT)
Dept: ORTHOPEDICS | Facility: CLINIC | Age: 72
End: 2022-05-08
Payer: MEDICARE

## 2022-05-09 ENCOUNTER — OFFICE VISIT (OUTPATIENT)
Dept: ORTHOPEDICS | Facility: CLINIC | Age: 72
End: 2022-05-09
Payer: MEDICARE

## 2022-05-09 VITALS — WEIGHT: 124.13 LBS | HEIGHT: 66 IN | BODY MASS INDEX: 19.95 KG/M2

## 2022-05-09 DIAGNOSIS — M17.12 PRIMARY OSTEOARTHRITIS OF LEFT KNEE: ICD-10-CM

## 2022-05-09 DIAGNOSIS — M25.562 LEFT KNEE PAIN, UNSPECIFIED CHRONICITY: Primary | ICD-10-CM

## 2022-05-09 PROCEDURE — 1101F PT FALLS ASSESS-DOCD LE1/YR: CPT | Mod: CPTII,S$GLB,, | Performed by: PHYSICIAN ASSISTANT

## 2022-05-09 PROCEDURE — 99213 PR OFFICE/OUTPT VISIT, EST, LEVL III, 20-29 MIN: ICD-10-PCS | Mod: 25,S$GLB,, | Performed by: PHYSICIAN ASSISTANT

## 2022-05-09 PROCEDURE — 1125F PR PAIN SEVERITY QUANTIFIED, PAIN PRESENT: ICD-10-PCS | Mod: CPTII,S$GLB,, | Performed by: PHYSICIAN ASSISTANT

## 2022-05-09 PROCEDURE — 4010F ACE/ARB THERAPY RXD/TAKEN: CPT | Mod: CPTII,S$GLB,, | Performed by: PHYSICIAN ASSISTANT

## 2022-05-09 PROCEDURE — 3008F PR BODY MASS INDEX (BMI) DOCUMENTED: ICD-10-PCS | Mod: CPTII,S$GLB,, | Performed by: PHYSICIAN ASSISTANT

## 2022-05-09 PROCEDURE — 1125F AMNT PAIN NOTED PAIN PRSNT: CPT | Mod: CPTII,S$GLB,, | Performed by: PHYSICIAN ASSISTANT

## 2022-05-09 PROCEDURE — 3288F PR FALLS RISK ASSESSMENT DOCUMENTED: ICD-10-PCS | Mod: CPTII,S$GLB,, | Performed by: PHYSICIAN ASSISTANT

## 2022-05-09 PROCEDURE — 1101F PR PT FALLS ASSESS DOC 0-1 FALLS W/OUT INJ PAST YR: ICD-10-PCS | Mod: CPTII,S$GLB,, | Performed by: PHYSICIAN ASSISTANT

## 2022-05-09 PROCEDURE — 99999 PR PBB SHADOW E&M-EST. PATIENT-LVL III: ICD-10-PCS | Mod: PBBFAC,,, | Performed by: PHYSICIAN ASSISTANT

## 2022-05-09 PROCEDURE — 1160F PR REVIEW ALL MEDS BY PRESCRIBER/CLIN PHARMACIST DOCUMENTED: ICD-10-PCS | Mod: CPTII,S$GLB,, | Performed by: PHYSICIAN ASSISTANT

## 2022-05-09 PROCEDURE — 1159F PR MEDICATION LIST DOCUMENTED IN MEDICAL RECORD: ICD-10-PCS | Mod: CPTII,S$GLB,, | Performed by: PHYSICIAN ASSISTANT

## 2022-05-09 PROCEDURE — 99213 OFFICE O/P EST LOW 20 MIN: CPT | Mod: 25,S$GLB,, | Performed by: PHYSICIAN ASSISTANT

## 2022-05-09 PROCEDURE — 1159F MED LIST DOCD IN RCRD: CPT | Mod: CPTII,S$GLB,, | Performed by: PHYSICIAN ASSISTANT

## 2022-05-09 PROCEDURE — 20610 DRAIN/INJ JOINT/BURSA W/O US: CPT | Mod: LT,S$GLB,, | Performed by: PHYSICIAN ASSISTANT

## 2022-05-09 PROCEDURE — 3008F BODY MASS INDEX DOCD: CPT | Mod: CPTII,S$GLB,, | Performed by: PHYSICIAN ASSISTANT

## 2022-05-09 PROCEDURE — 1160F RVW MEDS BY RX/DR IN RCRD: CPT | Mod: CPTII,S$GLB,, | Performed by: PHYSICIAN ASSISTANT

## 2022-05-09 PROCEDURE — 3288F FALL RISK ASSESSMENT DOCD: CPT | Mod: CPTII,S$GLB,, | Performed by: PHYSICIAN ASSISTANT

## 2022-05-09 PROCEDURE — 99999 PR PBB SHADOW E&M-EST. PATIENT-LVL III: CPT | Mod: PBBFAC,,, | Performed by: PHYSICIAN ASSISTANT

## 2022-05-09 PROCEDURE — 20610 PR DRAIN/INJECT LARGE JOINT/BURSA: ICD-10-PCS | Mod: LT,S$GLB,, | Performed by: PHYSICIAN ASSISTANT

## 2022-05-09 PROCEDURE — 4010F PR ACE/ARB THEARPY RXD/TAKEN: ICD-10-PCS | Mod: CPTII,S$GLB,, | Performed by: PHYSICIAN ASSISTANT

## 2022-05-09 RX ORDER — CARVEDILOL 25 MG/1
TABLET ORAL
Qty: 180 TABLET | Refills: 0 | Status: SHIPPED | OUTPATIENT
Start: 2022-05-09 | End: 2022-08-09

## 2022-05-09 RX ORDER — TRIAMCINOLONE ACETONIDE 40 MG/ML
40 INJECTION, SUSPENSION INTRA-ARTICULAR; INTRAMUSCULAR
Status: COMPLETED | OUTPATIENT
Start: 2022-05-09 | End: 2022-05-09

## 2022-05-09 RX ADMIN — TRIAMCINOLONE ACETONIDE 40 MG: 40 INJECTION, SUSPENSION INTRA-ARTICULAR; INTRAMUSCULAR at 03:05

## 2022-05-09 NOTE — PROGRESS NOTES
Subjective:      Patient ID: Chelsea Ford Child is a 72 y.o. female.    Chief Complaint: Pain of the Left Knee    HPI  Patient returns with chief complaint of left knee pain. She was seen in November and x-rays showed mild OA. Her pain increased last week after she resumed walking for exercise. She also gardens. Pain is medial. She has pain with walking. Rest, ice, and tylenol arthritis give some relief. She reports swelling. She denies mechanical symptoms. She does HEP. She does not use assistive devices.   Review of Systems   Constitutional: Negative for chills, fever and night sweats.   Cardiovascular: Negative for chest pain.   Respiratory: Negative for cough and shortness of breath.    Hematologic/Lymphatic: Does not bruise/bleed easily.   Skin: Negative for color change.   Gastrointestinal: Negative for heartburn.   Genitourinary: Negative for dysuria.   Neurological: Negative for numbness and paresthesias.   Psychiatric/Behavioral: Negative for altered mental status.   Allergic/Immunologic: Negative for persistent infections.         Objective:            General    Vitals reviewed.  Constitutional: She is oriented to person, place, and time. She appears well-developed and well-nourished.   Cardiovascular: Normal rate.    Neurological: She is alert and oriented to person, place, and time.             Left Knee Exam:  ROM 0-110 degrees  No effusion  No warmth or erythema  TTP medial joint line        Assessment:       Encounter Diagnoses   Name Primary?    Left knee pain, unspecified chronicity Yes    Primary osteoarthritis of left knee           Plan:       Patient would like to try CSI. RTC prn.     PROCEDURE:  I have explained the risks, benefits, and alternatives of the procedure in detail.  The patient voices understanding and all questions have been answered.  The patient agrees to proceed as planned. So after I performed a sterile prep of the skin in the normal fashion the left knee is injected using  a 22 gauge needle from the anteromedial approach with a combination of 4cc 1% plain lidocaine and 40 mg of kenalog.  The patient is cautioned and immediate relief of pain is secondary to the local anesthetic and will be temporary.  After the anesthetic wears off there may be a increase in pain that may last for a few hours or a few days and they should use ice to help alleviate this flair up of pain.

## 2022-05-31 ENCOUNTER — PATIENT MESSAGE (OUTPATIENT)
Dept: ADMINISTRATIVE | Facility: OTHER | Age: 72
End: 2022-05-31
Payer: MEDICARE

## 2022-06-03 ENCOUNTER — LAB VISIT (OUTPATIENT)
Dept: LAB | Facility: HOSPITAL | Age: 72
End: 2022-06-03
Attending: INTERNAL MEDICINE
Payer: MEDICARE

## 2022-06-03 DIAGNOSIS — I10 BENIGN ESSENTIAL HYPERTENSION: ICD-10-CM

## 2022-06-03 DIAGNOSIS — M89.9 DISORDER OF BONE, UNSPECIFIED: ICD-10-CM

## 2022-06-03 LAB
25(OH)D3+25(OH)D2 SERPL-MCNC: 85 NG/ML (ref 30–96)
ALBUMIN SERPL BCP-MCNC: 4.1 G/DL (ref 3.5–5.2)
ALP SERPL-CCNC: 74 U/L (ref 55–135)
ALT SERPL W/O P-5'-P-CCNC: 12 U/L (ref 10–44)
ANION GAP SERPL CALC-SCNC: 7 MMOL/L (ref 8–16)
AST SERPL-CCNC: 18 U/L (ref 10–40)
BASOPHILS # BLD AUTO: 0.03 K/UL (ref 0–0.2)
BASOPHILS NFR BLD: 0.7 % (ref 0–1.9)
BILIRUB SERPL-MCNC: 0.4 MG/DL (ref 0.1–1)
BUN SERPL-MCNC: 12 MG/DL (ref 8–23)
CALCIUM SERPL-MCNC: 10.4 MG/DL (ref 8.7–10.5)
CHLORIDE SERPL-SCNC: 101 MMOL/L (ref 95–110)
CHOLEST SERPL-MCNC: 180 MG/DL (ref 120–199)
CHOLEST/HDLC SERPL: 2.4 {RATIO} (ref 2–5)
CO2 SERPL-SCNC: 27 MMOL/L (ref 23–29)
CREAT SERPL-MCNC: 0.9 MG/DL (ref 0.5–1.4)
DIFFERENTIAL METHOD: ABNORMAL
EOSINOPHIL # BLD AUTO: 0.1 K/UL (ref 0–0.5)
EOSINOPHIL NFR BLD: 1.7 % (ref 0–8)
ERYTHROCYTE [DISTWIDTH] IN BLOOD BY AUTOMATED COUNT: 12.7 % (ref 11.5–14.5)
EST. GFR  (AFRICAN AMERICAN): >60 ML/MIN/1.73 M^2
EST. GFR  (NON AFRICAN AMERICAN): >60 ML/MIN/1.73 M^2
GLUCOSE SERPL-MCNC: 97 MG/DL (ref 70–110)
HCT VFR BLD AUTO: 36.8 % (ref 37–48.5)
HDLC SERPL-MCNC: 74 MG/DL (ref 40–75)
HDLC SERPL: 41.1 % (ref 20–50)
HGB BLD-MCNC: 11.8 G/DL (ref 12–16)
IMM GRANULOCYTES # BLD AUTO: 0.01 K/UL (ref 0–0.04)
IMM GRANULOCYTES NFR BLD AUTO: 0.2 % (ref 0–0.5)
LDLC SERPL CALC-MCNC: 97.4 MG/DL (ref 63–159)
LYMPHOCYTES # BLD AUTO: 1.6 K/UL (ref 1–4.8)
LYMPHOCYTES NFR BLD: 37 % (ref 18–48)
MCH RBC QN AUTO: 31.9 PG (ref 27–31)
MCHC RBC AUTO-ENTMCNC: 32.1 G/DL (ref 32–36)
MCV RBC AUTO: 100 FL (ref 82–98)
MONOCYTES # BLD AUTO: 0.4 K/UL (ref 0.3–1)
MONOCYTES NFR BLD: 10 % (ref 4–15)
NEUTROPHILS # BLD AUTO: 2.1 K/UL (ref 1.8–7.7)
NEUTROPHILS NFR BLD: 50.4 % (ref 38–73)
NONHDLC SERPL-MCNC: 106 MG/DL
NRBC BLD-RTO: 0 /100 WBC
PLATELET # BLD AUTO: 163 K/UL (ref 150–450)
PMV BLD AUTO: 10.9 FL (ref 9.2–12.9)
POTASSIUM SERPL-SCNC: 4.2 MMOL/L (ref 3.5–5.1)
PROT SERPL-MCNC: 7.2 G/DL (ref 6–8.4)
RBC # BLD AUTO: 3.7 M/UL (ref 4–5.4)
SODIUM SERPL-SCNC: 135 MMOL/L (ref 136–145)
TRIGL SERPL-MCNC: 43 MG/DL (ref 30–150)
TSH SERPL DL<=0.005 MIU/L-ACNC: 3.01 UIU/ML (ref 0.4–4)
WBC # BLD AUTO: 4.22 K/UL (ref 3.9–12.7)

## 2022-06-03 PROCEDURE — 82306 VITAMIN D 25 HYDROXY: CPT | Performed by: INTERNAL MEDICINE

## 2022-06-03 PROCEDURE — 80061 LIPID PANEL: CPT | Performed by: INTERNAL MEDICINE

## 2022-06-03 PROCEDURE — 80053 COMPREHEN METABOLIC PANEL: CPT | Performed by: INTERNAL MEDICINE

## 2022-06-03 PROCEDURE — 84443 ASSAY THYROID STIM HORMONE: CPT | Performed by: INTERNAL MEDICINE

## 2022-06-03 PROCEDURE — 36415 COLL VENOUS BLD VENIPUNCTURE: CPT | Performed by: INTERNAL MEDICINE

## 2022-06-03 PROCEDURE — 85025 COMPLETE CBC W/AUTO DIFF WBC: CPT | Performed by: INTERNAL MEDICINE

## 2022-06-09 ENCOUNTER — TELEPHONE (OUTPATIENT)
Dept: ADMINISTRATIVE | Facility: CLINIC | Age: 72
End: 2022-06-09
Payer: MEDICARE

## 2022-06-10 ENCOUNTER — OFFICE VISIT (OUTPATIENT)
Dept: INTERNAL MEDICINE | Facility: CLINIC | Age: 72
End: 2022-06-10
Payer: MEDICARE

## 2022-06-10 VITALS
OXYGEN SATURATION: 97 % | BODY MASS INDEX: 19.63 KG/M2 | DIASTOLIC BLOOD PRESSURE: 68 MMHG | SYSTOLIC BLOOD PRESSURE: 120 MMHG | HEART RATE: 61 BPM | WEIGHT: 117.81 LBS | HEIGHT: 65 IN

## 2022-06-10 VITALS
DIASTOLIC BLOOD PRESSURE: 68 MMHG | HEART RATE: 60 BPM | SYSTOLIC BLOOD PRESSURE: 120 MMHG | WEIGHT: 117.75 LBS | HEIGHT: 64 IN | BODY MASS INDEX: 20.1 KG/M2 | OXYGEN SATURATION: 99 %

## 2022-06-10 DIAGNOSIS — C44.91 BASAL CELL CARCINOMA (BCC), UNSPECIFIED SITE: ICD-10-CM

## 2022-06-10 DIAGNOSIS — I27.20 PULMONARY HYPERTENSION: ICD-10-CM

## 2022-06-10 DIAGNOSIS — R19.7 INTERMITTENT DIARRHEA: ICD-10-CM

## 2022-06-10 DIAGNOSIS — M81.0 OSTEOPOROSIS, UNSPECIFIED OSTEOPOROSIS TYPE, UNSPECIFIED PATHOLOGICAL FRACTURE PRESENCE: Primary | ICD-10-CM

## 2022-06-10 DIAGNOSIS — Z12.11 COLON CANCER SCREENING: ICD-10-CM

## 2022-06-10 DIAGNOSIS — I27.9 CHRONIC PULMONARY HEART DISEASE: ICD-10-CM

## 2022-06-10 DIAGNOSIS — I10 BENIGN ESSENTIAL HYPERTENSION: ICD-10-CM

## 2022-06-10 DIAGNOSIS — Z78.0 POSTMENOPAUSAL ESTROGEN DEFICIENCY: ICD-10-CM

## 2022-06-10 DIAGNOSIS — Z23 NEED FOR SHINGLES VACCINE: ICD-10-CM

## 2022-06-10 DIAGNOSIS — Z00.00 ENCOUNTER FOR PREVENTIVE HEALTH EXAMINATION: Primary | ICD-10-CM

## 2022-06-10 PROBLEM — D69.6 THROMBOCYTOPENIA: Status: RESOLVED | Noted: 2021-05-18 | Resolved: 2022-06-10

## 2022-06-10 PROCEDURE — 3288F FALL RISK ASSESSMENT DOCD: CPT | Mod: CPTII,S$GLB,, | Performed by: NURSE PRACTITIONER

## 2022-06-10 PROCEDURE — 3078F PR MOST RECENT DIASTOLIC BLOOD PRESSURE < 80 MM HG: ICD-10-PCS | Mod: CPTII,S$GLB,, | Performed by: INTERNAL MEDICINE

## 2022-06-10 PROCEDURE — 99999 PR PBB SHADOW E&M-EST. PATIENT-LVL III: ICD-10-PCS | Mod: PBBFAC,,, | Performed by: NURSE PRACTITIONER

## 2022-06-10 PROCEDURE — 3074F PR MOST RECENT SYSTOLIC BLOOD PRESSURE < 130 MM HG: ICD-10-PCS | Mod: CPTII,S$GLB,, | Performed by: NURSE PRACTITIONER

## 2022-06-10 PROCEDURE — 3288F FALL RISK ASSESSMENT DOCD: CPT | Mod: CPTII,S$GLB,, | Performed by: INTERNAL MEDICINE

## 2022-06-10 PROCEDURE — 3008F BODY MASS INDEX DOCD: CPT | Mod: CPTII,S$GLB,, | Performed by: NURSE PRACTITIONER

## 2022-06-10 PROCEDURE — 3078F PR MOST RECENT DIASTOLIC BLOOD PRESSURE < 80 MM HG: ICD-10-PCS | Mod: CPTII,S$GLB,, | Performed by: NURSE PRACTITIONER

## 2022-06-10 PROCEDURE — 1125F AMNT PAIN NOTED PAIN PRSNT: CPT | Mod: CPTII,S$GLB,, | Performed by: INTERNAL MEDICINE

## 2022-06-10 PROCEDURE — 3008F PR BODY MASS INDEX (BMI) DOCUMENTED: ICD-10-PCS | Mod: CPTII,S$GLB,, | Performed by: NURSE PRACTITIONER

## 2022-06-10 PROCEDURE — 3078F DIAST BP <80 MM HG: CPT | Mod: CPTII,S$GLB,, | Performed by: NURSE PRACTITIONER

## 2022-06-10 PROCEDURE — 1159F PR MEDICATION LIST DOCUMENTED IN MEDICAL RECORD: ICD-10-PCS | Mod: CPTII,S$GLB,, | Performed by: INTERNAL MEDICINE

## 2022-06-10 PROCEDURE — 3074F SYST BP LT 130 MM HG: CPT | Mod: CPTII,S$GLB,, | Performed by: INTERNAL MEDICINE

## 2022-06-10 PROCEDURE — 4010F ACE/ARB THERAPY RXD/TAKEN: CPT | Mod: CPTII,S$GLB,, | Performed by: INTERNAL MEDICINE

## 2022-06-10 PROCEDURE — 4010F ACE/ARB THERAPY RXD/TAKEN: CPT | Mod: CPTII,S$GLB,, | Performed by: NURSE PRACTITIONER

## 2022-06-10 PROCEDURE — 1101F PR PT FALLS ASSESS DOC 0-1 FALLS W/OUT INJ PAST YR: ICD-10-PCS | Mod: CPTII,S$GLB,, | Performed by: INTERNAL MEDICINE

## 2022-06-10 PROCEDURE — 3078F DIAST BP <80 MM HG: CPT | Mod: CPTII,S$GLB,, | Performed by: INTERNAL MEDICINE

## 2022-06-10 PROCEDURE — 4010F PR ACE/ARB THEARPY RXD/TAKEN: ICD-10-PCS | Mod: CPTII,S$GLB,, | Performed by: INTERNAL MEDICINE

## 2022-06-10 PROCEDURE — 1126F PR PAIN SEVERITY QUANTIFIED, NO PAIN PRESENT: ICD-10-PCS | Mod: CPTII,S$GLB,, | Performed by: NURSE PRACTITIONER

## 2022-06-10 PROCEDURE — 99999 PR PBB SHADOW E&M-EST. PATIENT-LVL IV: ICD-10-PCS | Mod: PBBFAC,,, | Performed by: INTERNAL MEDICINE

## 2022-06-10 PROCEDURE — 4010F PR ACE/ARB THEARPY RXD/TAKEN: ICD-10-PCS | Mod: CPTII,S$GLB,, | Performed by: NURSE PRACTITIONER

## 2022-06-10 PROCEDURE — 3288F PR FALLS RISK ASSESSMENT DOCUMENTED: ICD-10-PCS | Mod: CPTII,S$GLB,, | Performed by: NURSE PRACTITIONER

## 2022-06-10 PROCEDURE — 1101F PT FALLS ASSESS-DOCD LE1/YR: CPT | Mod: CPTII,S$GLB,, | Performed by: INTERNAL MEDICINE

## 2022-06-10 PROCEDURE — 99999 PR PBB SHADOW E&M-EST. PATIENT-LVL III: CPT | Mod: PBBFAC,,, | Performed by: NURSE PRACTITIONER

## 2022-06-10 PROCEDURE — 3008F BODY MASS INDEX DOCD: CPT | Mod: CPTII,S$GLB,, | Performed by: INTERNAL MEDICINE

## 2022-06-10 PROCEDURE — 3008F PR BODY MASS INDEX (BMI) DOCUMENTED: ICD-10-PCS | Mod: CPTII,S$GLB,, | Performed by: INTERNAL MEDICINE

## 2022-06-10 PROCEDURE — G0439 PPPS, SUBSEQ VISIT: HCPCS | Mod: S$GLB,,, | Performed by: NURSE PRACTITIONER

## 2022-06-10 PROCEDURE — 1160F PR REVIEW ALL MEDS BY PRESCRIBER/CLIN PHARMACIST DOCUMENTED: ICD-10-PCS | Mod: CPTII,S$GLB,, | Performed by: INTERNAL MEDICINE

## 2022-06-10 PROCEDURE — 1126F AMNT PAIN NOTED NONE PRSNT: CPT | Mod: CPTII,S$GLB,, | Performed by: NURSE PRACTITIONER

## 2022-06-10 PROCEDURE — 99999 PR PBB SHADOW E&M-EST. PATIENT-LVL IV: CPT | Mod: PBBFAC,,, | Performed by: INTERNAL MEDICINE

## 2022-06-10 PROCEDURE — 99214 PR OFFICE/OUTPT VISIT, EST, LEVL IV, 30-39 MIN: ICD-10-PCS | Mod: S$GLB,,, | Performed by: INTERNAL MEDICINE

## 2022-06-10 PROCEDURE — 1125F PR PAIN SEVERITY QUANTIFIED, PAIN PRESENT: ICD-10-PCS | Mod: CPTII,S$GLB,, | Performed by: INTERNAL MEDICINE

## 2022-06-10 PROCEDURE — 1170F FXNL STATUS ASSESSED: CPT | Mod: CPTII,S$GLB,, | Performed by: NURSE PRACTITIONER

## 2022-06-10 PROCEDURE — 1101F PR PT FALLS ASSESS DOC 0-1 FALLS W/OUT INJ PAST YR: ICD-10-PCS | Mod: CPTII,S$GLB,, | Performed by: NURSE PRACTITIONER

## 2022-06-10 PROCEDURE — 1160F RVW MEDS BY RX/DR IN RCRD: CPT | Mod: CPTII,S$GLB,, | Performed by: INTERNAL MEDICINE

## 2022-06-10 PROCEDURE — 1159F MED LIST DOCD IN RCRD: CPT | Mod: CPTII,S$GLB,, | Performed by: INTERNAL MEDICINE

## 2022-06-10 PROCEDURE — 3074F SYST BP LT 130 MM HG: CPT | Mod: CPTII,S$GLB,, | Performed by: NURSE PRACTITIONER

## 2022-06-10 PROCEDURE — 3288F PR FALLS RISK ASSESSMENT DOCUMENTED: ICD-10-PCS | Mod: CPTII,S$GLB,, | Performed by: INTERNAL MEDICINE

## 2022-06-10 PROCEDURE — 3074F PR MOST RECENT SYSTOLIC BLOOD PRESSURE < 130 MM HG: ICD-10-PCS | Mod: CPTII,S$GLB,, | Performed by: INTERNAL MEDICINE

## 2022-06-10 PROCEDURE — 1101F PT FALLS ASSESS-DOCD LE1/YR: CPT | Mod: CPTII,S$GLB,, | Performed by: NURSE PRACTITIONER

## 2022-06-10 PROCEDURE — 1170F PR FUNCTIONAL STATUS ASSESSED: ICD-10-PCS | Mod: CPTII,S$GLB,, | Performed by: NURSE PRACTITIONER

## 2022-06-10 PROCEDURE — G0439 PR MEDICARE ANNUAL WELLNESS SUBSEQUENT VISIT: ICD-10-PCS | Mod: S$GLB,,, | Performed by: NURSE PRACTITIONER

## 2022-06-10 PROCEDURE — 99214 OFFICE O/P EST MOD 30 MIN: CPT | Mod: S$GLB,,, | Performed by: INTERNAL MEDICINE

## 2022-06-10 NOTE — PROGRESS NOTES
"Subjective:       Patient ID: Chelsea Ford Child is a 72 y.o. female.    Chief Complaint: Follow-up (6 mth) and Annual Exam    HPI   72 y.o. female here for follow up of     Episodes of loud GI noises and unformed stool.  Also recently with some congestion and took covid test negative.  Stopped milk, ate rice and plain chicken. Increased her probiotic to bid and used pedialyte.  Has happened intermittently over last year - at [place] 2021, at time of sinus infection few months ago.  Sx all seemed to start when she had severe diarrhea with Omnicef.    HTN; hx PACs  Asa, carvedilol 25 bid, losartan 12.5mg  recent tsh wnl    Hx carpal tunnel  Hx ear wax obstruction  [place]     Osteoporosis on dexa 11/2019  Recommended bisphosphonate (declined) and 2 year repeat  Repeat ordered; not done yet due to covid concern.   Has lost about 1/2 inch of height.    Pulmonary htn seen on echo and dyspnea on exertion  PFT: not done due to covid surge  cT chest: tiny pulm nodules; largest 4mm  Her shortness of breath is better than previously.     Anemia  hgb 11.8; mcv 100  Previous thrombocytopenia has resolved on most recent labs: platelet count 163 k    OA left knee followed by ortho; tac injection done 5/9/22. Uses voltaren gel, only occasionally needs tyelnol.      Review of Systems   Constitutional: Negative for fever.   Respiratory: Negative for shortness of breath.    Cardiovascular: Negative for chest pain.   Musculoskeletal: Negative.    Skin: Negative.        Objective:   /68 (BP Location: Right arm, Patient Position: Sitting, BP Method: Medium (Manual))   Pulse 61   Ht 5' 4.5" (1.638 m)   Wt 53.4 kg (117 lb 13.4 oz)   LMP  (LMP Unknown)   SpO2 97%   BMI 19.91 kg/m²      Physical Exam  Constitutional:       General: She is not in acute distress.     Appearance: She is well-developed. She is not diaphoretic.   HENT:      Head: Normocephalic and atraumatic.   Cardiovascular:      Rate and Rhythm: Normal rate and " regular rhythm.   Pulmonary:      Effort: Pulmonary effort is normal. No respiratory distress.      Breath sounds: No wheezing or rales.   Skin:     General: Skin is warm and dry.   Neurological:      Mental Status: She is alert and oriented to person, place, and time.   Psychiatric:         Behavior: Behavior normal.         Left >right leg swelling; this is chronic  Assessment:       1. Osteoporosis, unspecified osteoporosis type, unspecified pathological fracture presence    2. Pulmonary hypertension    3. Intermittent diarrhea        Plan:       Chelsea was seen today for follow-up and annual exam.    Diagnoses and all orders for this visit:    Osteoporosis, unspecified osteoporosis type, unspecified pathological fracture presence  declines repeat dxa at thsi time, declines bisphosphonate  Calcium in diet, vitamin d supplement; exercise    Pulmonary hypertension  -     Complete PFT with bronchodilator    Intermittent diarrhea   Food journal to wtach for correlation in food/gi symptoms.   Consider GI if symptoms worsen. Hesitant to have colonoscopy etc at this time.      shingrx encouraged  Fit due in august

## 2022-06-10 NOTE — PROGRESS NOTES
"Chelsea Child presented for a  Medicare AWV and comprehensive Health Risk Assessment today. The following components were reviewed and updated:    · Medical history  · Family History  · Social history  · Allergies and Current Medications  · Health Risk Assessment  · Health Maintenance  · Care Team         ** See Completed Assessments for Annual Wellness Visit within the encounter summary.**         The following assessments were completed:  · Living Situation  · CAGE  · Depression Screening  · Timed Get Up and Go  · Whisper Test  · Cognitive Function Screening        · Nutrition Screening  · ADL Screening  · PAQ Screening    Vitals:    06/10/22 1030   BP: 120/68   BP Location: Right arm   Patient Position: Sitting   BP Method: Medium (Manual)   Pulse: 60   SpO2: 99%   Weight: 53.4 kg (117 lb 11.6 oz)   Height: 5' 4" (1.626 m)     Body mass index is 20.21 kg/m².  Physical Exam  Vitals and nursing note reviewed.   Constitutional:       Appearance: Normal appearance. She is normal weight.   HENT:      Head: Normocephalic.      Nose: Nose normal.      Mouth/Throat:      Mouth: Mucous membranes are moist.   Eyes:      Conjunctiva/sclera: Conjunctivae normal.   Cardiovascular:      Rate and Rhythm: Normal rate.   Pulmonary:      Effort: Pulmonary effort is normal.   Musculoskeletal:         General: Normal range of motion.      Cervical back: Normal range of motion.   Neurological:      General: No focal deficit present.      Mental Status: She is alert and oriented to person, place, and time.   Psychiatric:         Mood and Affect: Mood normal.         Behavior: Behavior normal.         Thought Content: Thought content normal.         Judgment: Judgment normal.       Diagnoses and health risks identified today and associated recommendations/orders:    1. Encounter for preventive health examination  Exam done    Health Maintenance updated    Records reviewed    2. Chronic pulmonary heart disease  Chronic, followed by " PCP    3. Benign essential hypertension  Chronic, followed by PCP    Take medications as prescribed.    Monitor BP at home, goal BP < or = 140/80, call office if consistently above this range.    Follow low salt DASH diet and exercise.    BMI reviewed.    Go to ED if Headaches, blurred vision, chest pain, or SOB occurs along with elevated readings > or = 160/90.    4. Basal cell carcinoma (BCC), unspecified site  Chronic, followed by Dermatology    5. Need for shingles vaccine  Pt holding off til she checks with her insurance to see if covered in clinic or at pharmacy    6. Colon cancer screening  FITKIT due in late July/August will order then    7. Postmenopausal estrogen deficiency  Pt declined tx and DEXA for now     8. BMI 20.0-20.9, adult  BMI reviewed      Provided Chelsea with a 5-10 year written screening schedule and personal prevention plan. Recommendations were developed using the USPSTF age appropriate recommendations. Education, counseling, and referrals were provided as needed. After Visit Summary printed and given to patient which includes a list of additional screenings\tests needed.    Follow up in about 6 months (around 12/12/2022) for with PCP Dr. Tripathi as scheduled.    Maria Guadalupe Roca, MIRNA  I offered to discuss advanced care planning, including how to pick a person who would make decisions for you if you were unable to make them for yourself, called a health care power of , and what kind of decisions you might make such as use of life sustaining treatments such as ventilators and tube feeding when faced with a life limiting illness recorded on a living will that they will need to know. (How you want to be cared for as you near the end of your natural life)     X Patient is interested in learning more about how to make advanced directives.  I provided them paperwork and offered to discuss this with them.

## 2022-06-10 NOTE — PATIENT INSTRUCTIONS
Counseling and Referral of Other Preventative  (Italic type indicates deductible and co-insurance are waived)    Patient Name: Chelsea Child  Today's Date: 6/10/2022    Health Maintenance         Date Due Completion Date    Shingles Vaccine (1 of 2) Pt declined declined    Colorectal Cancer Screening FITKIT due in August will order then 7/31/2021    DEXA Scan Pt declined 11/22/2019    TETANUS VACCINE 06/10/2023 (Originally 1/6/1968) ---Insurance does not cover Tetanus vaccine if given through our clinic, however you may check with your local pharmacy to run vaccine and it may be covered if given there      COVID-19 Vaccine (4 - Booster for Pfizer series) 08/22/2022 4/22/2022    Mammogram 12/27/2022 12/27/2021    Lipid Panel 06/03/2027 6/3/2022          No orders of the defined types were placed in this encounter.    The following information is provided to all patients.  This information is to help you find resources for any of the problems found today that may be affecting your health:                Living healthy guide: www.[facility].[place].gov      Understanding Diabetes: www.diabetes.org      Eating healthy: www.cdc.gov/healthyweight      CDC home safety checklist: www.cdc.gov/steadi/patient.html      Agency on Aging: www.goea.[place].gov      Alcoholics anonymous (AA): www.aa.org      Physical Activity: www.ben.nih.gov/ev3lacs      Tobacco use: www.quitwithusla.org

## 2022-06-14 ENCOUNTER — HOSPITAL ENCOUNTER (OUTPATIENT)
Dept: PULMONOLOGY | Facility: CLINIC | Age: 72
Discharge: HOME | End: 2022-06-14
Payer: MEDICARE

## 2022-06-14 DIAGNOSIS — I27.20 PULMONARY HYPERTENSION: ICD-10-CM

## 2022-06-14 PROCEDURE — 94729 DIFFUSING CAPACITY: CPT | Mod: S$GLB,,, | Performed by: INTERNAL MEDICINE

## 2022-06-14 PROCEDURE — 94060 EVALUATION OF WHEEZING: CPT | Mod: S$GLB,,, | Performed by: INTERNAL MEDICINE

## 2022-06-14 PROCEDURE — 94727 GAS DIL/WSHOT DETER LNG VOL: CPT | Mod: S$GLB,,, | Performed by: INTERNAL MEDICINE

## 2022-06-14 PROCEDURE — 94060 PR EVAL OF BRONCHOSPASM: ICD-10-PCS | Mod: S$GLB,,, | Performed by: INTERNAL MEDICINE

## 2022-06-14 PROCEDURE — 94729 PR C02/MEMBANE DIFFUSE CAPACITY: ICD-10-PCS | Mod: S$GLB,,, | Performed by: INTERNAL MEDICINE

## 2022-06-14 PROCEDURE — 94727 PR PULM FUNCTION TEST BY GAS: ICD-10-PCS | Mod: S$GLB,,, | Performed by: INTERNAL MEDICINE

## 2022-07-01 ENCOUNTER — PATIENT MESSAGE (OUTPATIENT)
Dept: ADMINISTRATIVE | Facility: HOSPITAL | Age: 72
End: 2022-07-01
Payer: MEDICARE

## 2022-07-01 DIAGNOSIS — Z12.11 SCREENING FOR COLON CANCER: ICD-10-CM

## 2022-08-31 DIAGNOSIS — Z78.0 MENOPAUSE: ICD-10-CM

## 2022-09-14 ENCOUNTER — OFFICE VISIT (OUTPATIENT)
Dept: OPTOMETRY | Facility: CLINIC | Age: 72
End: 2022-09-14
Payer: MEDICARE

## 2022-09-14 ENCOUNTER — PATIENT MESSAGE (OUTPATIENT)
Dept: INTERNAL MEDICINE | Facility: CLINIC | Age: 72
End: 2022-09-14
Payer: MEDICARE

## 2022-09-14 DIAGNOSIS — H52.03 HYPEROPIA WITH PRESBYOPIA OF BOTH EYES: ICD-10-CM

## 2022-09-14 DIAGNOSIS — H25.13 NUCLEAR SCLEROSIS OF BOTH EYES: Primary | ICD-10-CM

## 2022-09-14 DIAGNOSIS — H53.002 AMBLYOPIA OF LEFT EYE: ICD-10-CM

## 2022-09-14 DIAGNOSIS — Z01.00 ROUTINE EYE EXAM: ICD-10-CM

## 2022-09-14 DIAGNOSIS — R60.0 LEG EDEMA, LEFT: Primary | ICD-10-CM

## 2022-09-14 DIAGNOSIS — H04.123 DRY EYE SYNDROME OF BOTH EYES: ICD-10-CM

## 2022-09-14 DIAGNOSIS — H52.4 HYPEROPIA WITH PRESBYOPIA OF BOTH EYES: ICD-10-CM

## 2022-09-14 PROCEDURE — 92014 PR EYE EXAM, EST PATIENT,COMPREHESV: ICD-10-PCS | Mod: S$GLB,,, | Performed by: OPTOMETRIST

## 2022-09-14 PROCEDURE — 92015 DETERMINE REFRACTIVE STATE: CPT | Mod: S$GLB,,, | Performed by: OPTOMETRIST

## 2022-09-14 PROCEDURE — 92014 COMPRE OPH EXAM EST PT 1/>: CPT | Mod: S$GLB,,, | Performed by: OPTOMETRIST

## 2022-09-14 PROCEDURE — 92015 PR REFRACTION: ICD-10-PCS | Mod: S$GLB,,, | Performed by: OPTOMETRIST

## 2022-09-14 PROCEDURE — 99999 PR PBB SHADOW E&M-EST. PATIENT-LVL II: CPT | Mod: PBBFAC,,, | Performed by: OPTOMETRIST

## 2022-09-14 PROCEDURE — 99999 PR PBB SHADOW E&M-EST. PATIENT-LVL II: ICD-10-PCS | Mod: PBBFAC,,, | Performed by: OPTOMETRIST

## 2022-09-14 NOTE — PROGRESS NOTES
HPI    Ocular health exam   LDEE- 2020 Dr. Cottrell     Pt sts vision overall has gotten worse both distance and near vision.   Does not wear any RX correction at this time but is aware she needs them   since she almost failed her DMV vision screening.   Denies pain   (-)Flashes (-)Floaters  (-)Itch, (-)tear, (-)burn, (+)Dryness.  (+) OTC Drops Blink AT's PRN &   Pataday does not use as much   (-)Photophobia  (-)Glare    POHX:  Strabismus sx OS with she was 9 years old     FamOhx :     None   Last edited by Kate Carolina on 9/14/2022 12:27 PM.        ROS    Negative for: Constitutional, Gastrointestinal, Neurological, Skin,   Genitourinary, Musculoskeletal, HENT, Endocrine, Cardiovascular, Eyes,   Respiratory, Psychiatric, Allergic/Imm, Heme/Lymph  Last edited by Lisbeth Canales, OD on 9/14/2022  1:07 PM.        Assessment /Plan     For exam results, see Encounter Report.    Nuclear sclerosis of both eyes    Hyperopia with presbyopia of both eyes    Routine eye exam    Amblyopia of left eye    Dry eye syndrome of both eyes    MONITOR. ED PT ON ALL EXAM FINDINGS  RX FINAL SPECS   MILD NS OU; UV PROTECTION; MONITOR.   RTC 1 YR//PRN FOR REE/DFE  AT'S PRN

## 2022-09-23 ENCOUNTER — HOSPITAL ENCOUNTER (OUTPATIENT)
Dept: CARDIOLOGY | Facility: HOSPITAL | Age: 72
Discharge: HOME | End: 2022-09-23
Attending: INTERNAL MEDICINE
Payer: MEDICARE

## 2022-09-23 DIAGNOSIS — R60.0 LEG EDEMA, LEFT: ICD-10-CM

## 2022-09-23 PROCEDURE — 93971 EXTREMITY STUDY: CPT | Mod: 26,LT,, | Performed by: INTERNAL MEDICINE

## 2022-09-23 PROCEDURE — 93971 CV US DOPPLER VENOUS LEG LEFT (CUPID ONLY): ICD-10-PCS | Mod: 26,LT,, | Performed by: INTERNAL MEDICINE

## 2022-09-23 PROCEDURE — 93971 EXTREMITY STUDY: CPT | Mod: LT

## 2022-09-26 DIAGNOSIS — M71.20 SYNOVIAL CYST OF POPLITEAL SPACE, UNSPECIFIED LATERALITY: Primary | ICD-10-CM

## 2022-10-03 ENCOUNTER — PATIENT MESSAGE (OUTPATIENT)
Dept: ADMINISTRATIVE | Facility: HOSPITAL | Age: 72
End: 2022-10-03
Payer: MEDICARE

## 2022-10-05 ENCOUNTER — OFFICE VISIT (OUTPATIENT)
Dept: DERMATOLOGY | Facility: CLINIC | Age: 72
End: 2022-10-05
Payer: MEDICARE

## 2022-10-05 VITALS — WEIGHT: 117 LBS | BODY MASS INDEX: 20.08 KG/M2

## 2022-10-05 DIAGNOSIS — B08.1 BATEMAN'S DISEASE: ICD-10-CM

## 2022-10-05 DIAGNOSIS — L81.4 LENTIGINES: ICD-10-CM

## 2022-10-05 DIAGNOSIS — L82.1 SEBORRHEIC KERATOSES: ICD-10-CM

## 2022-10-05 DIAGNOSIS — Z85.828 HISTORY OF SKIN CANCER: ICD-10-CM

## 2022-10-05 DIAGNOSIS — D22.9 NEVUS OF MULTIPLE SITES: ICD-10-CM

## 2022-10-05 DIAGNOSIS — L57.0 ACTINIC KERATOSIS: Primary | ICD-10-CM

## 2022-10-05 PROCEDURE — 17000 PR DESTRUCTION(LASER SURGERY,CRYOSURGERY,CHEMOSURGERY),PREMALIGNANT LESIONS,FIRST LESION: ICD-10-PCS | Mod: S$GLB,,, | Performed by: DERMATOLOGY

## 2022-10-05 PROCEDURE — 1160F RVW MEDS BY RX/DR IN RCRD: CPT | Mod: CPTII,S$GLB,, | Performed by: DERMATOLOGY

## 2022-10-05 PROCEDURE — 99214 PR OFFICE/OUTPT VISIT, EST, LEVL IV, 30-39 MIN: ICD-10-PCS | Mod: 25,S$GLB,, | Performed by: DERMATOLOGY

## 2022-10-05 PROCEDURE — 4010F PR ACE/ARB THEARPY RXD/TAKEN: ICD-10-PCS | Mod: CPTII,S$GLB,, | Performed by: DERMATOLOGY

## 2022-10-05 PROCEDURE — 1101F PT FALLS ASSESS-DOCD LE1/YR: CPT | Mod: CPTII,S$GLB,, | Performed by: DERMATOLOGY

## 2022-10-05 PROCEDURE — 17000 DESTRUCT PREMALG LESION: CPT | Mod: S$GLB,,, | Performed by: DERMATOLOGY

## 2022-10-05 PROCEDURE — 1159F MED LIST DOCD IN RCRD: CPT | Mod: CPTII,S$GLB,, | Performed by: DERMATOLOGY

## 2022-10-05 PROCEDURE — 99214 OFFICE O/P EST MOD 30 MIN: CPT | Mod: 25,S$GLB,, | Performed by: DERMATOLOGY

## 2022-10-05 PROCEDURE — 3288F FALL RISK ASSESSMENT DOCD: CPT | Mod: CPTII,S$GLB,, | Performed by: DERMATOLOGY

## 2022-10-05 PROCEDURE — 1126F PR PAIN SEVERITY QUANTIFIED, NO PAIN PRESENT: ICD-10-PCS | Mod: CPTII,S$GLB,, | Performed by: DERMATOLOGY

## 2022-10-05 PROCEDURE — 99999 PR PBB SHADOW E&M-EST. PATIENT-LVL III: CPT | Mod: PBBFAC,,, | Performed by: DERMATOLOGY

## 2022-10-05 PROCEDURE — 1101F PR PT FALLS ASSESS DOC 0-1 FALLS W/OUT INJ PAST YR: ICD-10-PCS | Mod: CPTII,S$GLB,, | Performed by: DERMATOLOGY

## 2022-10-05 PROCEDURE — 1126F AMNT PAIN NOTED NONE PRSNT: CPT | Mod: CPTII,S$GLB,, | Performed by: DERMATOLOGY

## 2022-10-05 PROCEDURE — 3008F PR BODY MASS INDEX (BMI) DOCUMENTED: ICD-10-PCS | Mod: CPTII,S$GLB,, | Performed by: DERMATOLOGY

## 2022-10-05 PROCEDURE — 3008F BODY MASS INDEX DOCD: CPT | Mod: CPTII,S$GLB,, | Performed by: DERMATOLOGY

## 2022-10-05 PROCEDURE — 1160F PR REVIEW ALL MEDS BY PRESCRIBER/CLIN PHARMACIST DOCUMENTED: ICD-10-PCS | Mod: CPTII,S$GLB,, | Performed by: DERMATOLOGY

## 2022-10-05 PROCEDURE — 4010F ACE/ARB THERAPY RXD/TAKEN: CPT | Mod: CPTII,S$GLB,, | Performed by: DERMATOLOGY

## 2022-10-05 PROCEDURE — 3288F PR FALLS RISK ASSESSMENT DOCUMENTED: ICD-10-PCS | Mod: CPTII,S$GLB,, | Performed by: DERMATOLOGY

## 2022-10-05 PROCEDURE — 99999 PR PBB SHADOW E&M-EST. PATIENT-LVL III: ICD-10-PCS | Mod: PBBFAC,,, | Performed by: DERMATOLOGY

## 2022-10-05 PROCEDURE — 1159F PR MEDICATION LIST DOCUMENTED IN MEDICAL RECORD: ICD-10-PCS | Mod: CPTII,S$GLB,, | Performed by: DERMATOLOGY

## 2022-10-05 RX ORDER — FLUOROURACIL 50 MG/G
CREAM TOPICAL
Qty: 40 G | Refills: 3 | Status: SHIPPED | OUTPATIENT
Start: 2022-10-05 | End: 2022-10-11 | Stop reason: SDUPTHER

## 2022-10-05 NOTE — PROGRESS NOTES
Subjective:       Patient ID:  Chelsea Ford Child is a 72 y.o. female who presents for   Chief Complaint   Patient presents with    Skin Check     UBSE     This is a high risk patient here to check for the development of new lesions., new spot on left temple      Review of Systems   Constitutional:  Negative for fever, chills, weight loss, weight gain, fatigue, night sweats and malaise.   Skin:  Negative for daily sunscreen use, activity-related sunscreen use and wears hat.   Hematologic/Lymphatic: Bruises/bleeds easily.      Objective:    Physical Exam   Constitutional: She appears well-developed and well-nourished. No distress.   Neurological: She is alert and oriented to person, place, and time. She is not disoriented.   Psychiatric: She has a normal mood and affect.   Skin:   Areas Examined (abnormalities noted in diagram):   Head / Face Inspection Performed  Neck Inspection Performed  Chest / Axilla Inspection Performed  Abdomen Inspection Performed  Back Inspection Performed  RUE Inspected  LUE Inspection Performed                 Diagram Legend     Erythematous scaling macule/papule c/w actinic keratosis       Vascular papule c/w angioma      Pigmented verrucoid papule/plaque c/w seborrheic keratosis      Yellow umbilicated papule c/w sebaceous hyperplasia      Irregularly shaped tan macule c/w lentigo     1-2 mm smooth white papules consistent with Milia      Movable subcutaneous cyst with punctum c/w epidermal inclusion cyst      Subcutaneous movable cyst c/w pilar cyst      Firm pink to brown papule c/w dermatofibroma      Pedunculated fleshy papule(s) c/w skin tag(s)      Evenly pigmented macule c/w junctional nevus     Mildly variegated pigmented, slightly irregular-bordered macule c/w mildly atypical nevus      Flesh colored to evenly pigmented papule c/w intradermal nevus       Pink pearly papule/plaque c/w basal cell carcinoma      Erythematous hyperkeratotic cursted plaque c/w SCC      Surgical  "scar with no sign of skin cancer recurrence      Open and closed comedones      Inflammatory papules and pustules      Verrucoid papule consistent consistent with wart     Erythematous eczematous patches and plaques     Dystrophic onycholytic nail with subungual debris c/w onychomycosis     Umbilicated papule    Erythematous-base heme-crusted tan verrucoid plaque consistent with inflamed seborrheic keratosis     Erythematous Silvery Scaling Plaque c/w Psoriasis     See annotation      Assessment / Plan:        Actinic keratosis   Cryosurgery Procedure Note    Verbal consent from the patient is obtained and the patient is aware of the precancerous quality and need for treatment of these lesions. Liquid nitrogen cryosurgery is applied to the 1 actinic keratoses, as detailed in the physical exam, to produce a freeze injury.    -     fluorouraciL (EFUDEX) 5 % cream; Use hs for 2 weeks  Dispense: 40 g; Refill: 3    Seborrheic keratoses  reassurance      Lentigines/Michel's disease  The "ABCD" rules to observe pigmented lesions were reviewed.  sunscreen      Nevus of multiple sites  The "ABCD" rules to observe pigmented lesions were reviewed.          History of skin cancer  Comments:  bcc x 3   Area(s) of previous NMSC evaluated with no signs of recurrence.     No lesions suspicious for malignancy noted.    Recommend daily sun protection/avoidance and use of at least SPF 30, broad spectrum sunscreen (OTC drug).              Follow up in about 6 months (around 4/5/2023).  "

## 2022-10-07 ENCOUNTER — PATIENT MESSAGE (OUTPATIENT)
Dept: INTERNAL MEDICINE | Facility: CLINIC | Age: 72
End: 2022-10-07
Payer: MEDICARE

## 2022-10-07 DIAGNOSIS — Z12.31 ENCOUNTER FOR SCREENING MAMMOGRAM FOR BREAST CANCER: Primary | ICD-10-CM

## 2022-10-10 ENCOUNTER — PATIENT MESSAGE (OUTPATIENT)
Dept: DERMATOLOGY | Facility: CLINIC | Age: 72
End: 2022-10-10
Payer: MEDICARE

## 2022-10-11 ENCOUNTER — PATIENT MESSAGE (OUTPATIENT)
Dept: DERMATOLOGY | Facility: CLINIC | Age: 72
End: 2022-10-11
Payer: MEDICARE

## 2022-10-11 DIAGNOSIS — L57.0 ACTINIC KERATOSIS: ICD-10-CM

## 2022-10-11 RX ORDER — FLUOROURACIL 50 MG/G
CREAM TOPICAL
Qty: 40 G | Refills: 3 | Status: SHIPPED | OUTPATIENT
Start: 2022-10-11 | End: 2023-11-14 | Stop reason: ALTCHOICE

## 2022-10-14 ENCOUNTER — HOSPITAL ENCOUNTER (OUTPATIENT)
Dept: RADIOLOGY | Facility: HOSPITAL | Age: 72
Discharge: HOME | End: 2022-10-14
Attending: PHYSICIAN ASSISTANT
Payer: MEDICARE

## 2022-10-14 ENCOUNTER — OFFICE VISIT (OUTPATIENT)
Dept: ORTHOPEDICS | Facility: CLINIC | Age: 72
End: 2022-10-14
Payer: MEDICARE

## 2022-10-14 VITALS
HEART RATE: 64 BPM | WEIGHT: 117.06 LBS | SYSTOLIC BLOOD PRESSURE: 108 MMHG | DIASTOLIC BLOOD PRESSURE: 50 MMHG | RESPIRATION RATE: 18 BRPM | BODY MASS INDEX: 19.99 KG/M2 | HEIGHT: 64 IN

## 2022-10-14 DIAGNOSIS — M71.20 SYNOVIAL CYST OF POPLITEAL SPACE, UNSPECIFIED LATERALITY: ICD-10-CM

## 2022-10-14 PROCEDURE — 1126F PR PAIN SEVERITY QUANTIFIED, NO PAIN PRESENT: ICD-10-PCS | Mod: CPTII,S$GLB,, | Performed by: PHYSICIAN ASSISTANT

## 2022-10-14 PROCEDURE — 73562 XR KNEE ORTHO LEFT WITH FLEXION: ICD-10-PCS | Mod: 26,RT,, | Performed by: RADIOLOGY

## 2022-10-14 PROCEDURE — 99999 PR PBB SHADOW E&M-EST. PATIENT-LVL IV: ICD-10-PCS | Mod: PBBFAC,,, | Performed by: PHYSICIAN ASSISTANT

## 2022-10-14 PROCEDURE — 73564 X-RAY EXAM KNEE 4 OR MORE: CPT | Mod: 26,LT,, | Performed by: RADIOLOGY

## 2022-10-14 PROCEDURE — 99213 PR OFFICE/OUTPT VISIT, EST, LEVL III, 20-29 MIN: ICD-10-PCS | Mod: 25,S$GLB,, | Performed by: PHYSICIAN ASSISTANT

## 2022-10-14 PROCEDURE — 20610 PR DRAIN/INJECT LARGE JOINT/BURSA: ICD-10-PCS | Mod: LT,S$GLB,, | Performed by: PHYSICIAN ASSISTANT

## 2022-10-14 PROCEDURE — 73564 XR KNEE ORTHO LEFT WITH FLEXION: ICD-10-PCS | Mod: 26,LT,, | Performed by: RADIOLOGY

## 2022-10-14 PROCEDURE — 99999 PR PBB SHADOW E&M-EST. PATIENT-LVL IV: CPT | Mod: PBBFAC,,, | Performed by: PHYSICIAN ASSISTANT

## 2022-10-14 PROCEDURE — 3078F DIAST BP <80 MM HG: CPT | Mod: CPTII,S$GLB,, | Performed by: PHYSICIAN ASSISTANT

## 2022-10-14 PROCEDURE — 4010F ACE/ARB THERAPY RXD/TAKEN: CPT | Mod: CPTII,S$GLB,, | Performed by: PHYSICIAN ASSISTANT

## 2022-10-14 PROCEDURE — 20610 DRAIN/INJ JOINT/BURSA W/O US: CPT | Mod: LT,S$GLB,, | Performed by: PHYSICIAN ASSISTANT

## 2022-10-14 PROCEDURE — 73562 X-RAY EXAM OF KNEE 3: CPT | Mod: 26,RT,, | Performed by: RADIOLOGY

## 2022-10-14 PROCEDURE — 1159F PR MEDICATION LIST DOCUMENTED IN MEDICAL RECORD: ICD-10-PCS | Mod: CPTII,S$GLB,, | Performed by: PHYSICIAN ASSISTANT

## 2022-10-14 PROCEDURE — 1159F MED LIST DOCD IN RCRD: CPT | Mod: CPTII,S$GLB,, | Performed by: PHYSICIAN ASSISTANT

## 2022-10-14 PROCEDURE — 4010F PR ACE/ARB THEARPY RXD/TAKEN: ICD-10-PCS | Mod: CPTII,S$GLB,, | Performed by: PHYSICIAN ASSISTANT

## 2022-10-14 PROCEDURE — 3078F PR MOST RECENT DIASTOLIC BLOOD PRESSURE < 80 MM HG: ICD-10-PCS | Mod: CPTII,S$GLB,, | Performed by: PHYSICIAN ASSISTANT

## 2022-10-14 PROCEDURE — 1126F AMNT PAIN NOTED NONE PRSNT: CPT | Mod: CPTII,S$GLB,, | Performed by: PHYSICIAN ASSISTANT

## 2022-10-14 PROCEDURE — 3074F SYST BP LT 130 MM HG: CPT | Mod: CPTII,S$GLB,, | Performed by: PHYSICIAN ASSISTANT

## 2022-10-14 PROCEDURE — 99213 OFFICE O/P EST LOW 20 MIN: CPT | Mod: 25,S$GLB,, | Performed by: PHYSICIAN ASSISTANT

## 2022-10-14 PROCEDURE — 3074F PR MOST RECENT SYSTOLIC BLOOD PRESSURE < 130 MM HG: ICD-10-PCS | Mod: CPTII,S$GLB,, | Performed by: PHYSICIAN ASSISTANT

## 2022-10-14 PROCEDURE — 73562 X-RAY EXAM OF KNEE 3: CPT | Mod: TC,RT

## 2022-10-14 RX ORDER — TRIAMCINOLONE ACETONIDE 40 MG/ML
40 INJECTION, SUSPENSION INTRA-ARTICULAR; INTRAMUSCULAR
Status: COMPLETED | OUTPATIENT
Start: 2022-10-14 | End: 2022-10-14

## 2022-10-14 RX ADMIN — TRIAMCINOLONE ACETONIDE 40 MG: 40 INJECTION, SUSPENSION INTRA-ARTICULAR; INTRAMUSCULAR at 11:10

## 2022-10-14 NOTE — PROGRESS NOTES
Subjective:      Patient ID: Chelsea Ford Child is a 72 y.o. female.    Chief Complaint: Pain of the Left Knee    HPI  Patient returns with chief complaint of left knee pain. She was seen in 05/2022 and x-rays showed mild OA. Treated with steroid injection which helped. Her pain increased last week after she resumed walking for exercise. She also gardens. Pain is medial. She has pain with walking. Rest, ice, and tylenol arthritis give some relief. She reports swelling. She denies mechanical symptoms. She does HEP. She does not use assistive devices.     Review of Systems   Constitutional: Negative for chills, fever and night sweats.   Cardiovascular:  Negative for chest pain.   Respiratory:  Negative for cough and shortness of breath.    Hematologic/Lymphatic: Does not bruise/bleed easily.   Skin:  Negative for color change.   Gastrointestinal:  Negative for heartburn.   Genitourinary:  Negative for dysuria.   Neurological:  Negative for numbness and paresthesias.   Psychiatric/Behavioral:  Negative for altered mental status.    Allergic/Immunologic: Negative for persistent infections.       Objective:            General    Vitals reviewed.  Constitutional: She is oriented to person, place, and time. She appears well-developed and well-nourished.   Cardiovascular:  Normal rate.            Neurological: She is alert and oriented to person, place, and time.           Left Knee Exam:  ROM 0-110 degrees  No effusion  No warmth or erythema  TTP medial joint line        Assessment:       Encounter Diagnosis   Name Primary?    Synovial cyst of popliteal space, unspecified laterality           Plan:       Patient would like to try CSI. RTC prn.     PROCEDURE:  I have explained the risks, benefits, and alternatives of the procedure in detail.  The patient voices understanding and all questions have been answered.  The patient agrees to proceed as planned. So after I performed a sterile prep of the skin in the normal fashion  the left knee is injected using a 22 gauge needle from the anteromedial approach with a combination of 4cc 1% plain lidocaine and 40 mg of kenalog.  The patient is cautioned and immediate relief of pain is secondary to the local anesthetic and will be temporary.  After the anesthetic wears off there may be a increase in pain that may last for a few hours or a few days and they should use ice to help alleviate this flair up of pain.

## 2022-10-27 ENCOUNTER — IMMUNIZATION (OUTPATIENT)
Dept: INTERNAL MEDICINE | Facility: CLINIC | Age: 72
End: 2022-10-27
Payer: MEDICARE

## 2022-10-27 DIAGNOSIS — Z23 NEED FOR VACCINATION: Primary | ICD-10-CM

## 2022-10-27 PROCEDURE — 91312 COVID-19, MRNA, LNP-S, BIVALENT BOOSTER, PF, 30 MCG/0.3 ML DOSE: ICD-10-PCS | Mod: S$GLB,,, | Performed by: INTERNAL MEDICINE

## 2022-10-27 PROCEDURE — 0124A COVID-19, MRNA, LNP-S, BIVALENT BOOSTER, PF, 30 MCG/0.3 ML DOSE: CPT | Mod: CV19,PBBFAC | Performed by: INTERNAL MEDICINE

## 2022-10-27 PROCEDURE — 91312 COVID-19, MRNA, LNP-S, BIVALENT BOOSTER, PF, 30 MCG/0.3 ML DOSE: CPT | Mod: S$GLB,,, | Performed by: INTERNAL MEDICINE

## 2022-11-26 ENCOUNTER — PATIENT MESSAGE (OUTPATIENT)
Dept: ADMINISTRATIVE | Facility: OTHER | Age: 72
End: 2022-11-26
Payer: MEDICARE

## 2022-12-07 ENCOUNTER — PATIENT MESSAGE (OUTPATIENT)
Dept: ADMINISTRATIVE | Facility: OTHER | Age: 72
End: 2022-12-07
Payer: MEDICARE

## 2022-12-12 ENCOUNTER — OFFICE VISIT (OUTPATIENT)
Dept: INTERNAL MEDICINE | Facility: CLINIC | Age: 72
End: 2022-12-12
Payer: MEDICARE

## 2022-12-12 ENCOUNTER — LAB VISIT (OUTPATIENT)
Dept: LAB | Facility: HOSPITAL | Age: 72
End: 2022-12-12
Attending: INTERNAL MEDICINE
Payer: MEDICARE

## 2022-12-12 VITALS
WEIGHT: 117.5 LBS | HEART RATE: 61 BPM | BODY MASS INDEX: 20.06 KG/M2 | OXYGEN SATURATION: 98 % | SYSTOLIC BLOOD PRESSURE: 122 MMHG | DIASTOLIC BLOOD PRESSURE: 68 MMHG | HEIGHT: 64 IN

## 2022-12-12 DIAGNOSIS — D53.9 NUTRITIONAL ANEMIA, UNSPECIFIED: ICD-10-CM

## 2022-12-12 DIAGNOSIS — Z12.11 COLON CANCER SCREENING: ICD-10-CM

## 2022-12-12 DIAGNOSIS — R82.90 ABNORMAL URINE ODOR: ICD-10-CM

## 2022-12-12 DIAGNOSIS — I49.1 ATRIAL PREMATURE CONTRACTIONS: Chronic | ICD-10-CM

## 2022-12-12 DIAGNOSIS — D64.9 ANEMIA, UNSPECIFIED TYPE: ICD-10-CM

## 2022-12-12 DIAGNOSIS — I10 BENIGN ESSENTIAL HYPERTENSION: Primary | ICD-10-CM

## 2022-12-12 DIAGNOSIS — I10 BENIGN ESSENTIAL HYPERTENSION: ICD-10-CM

## 2022-12-12 LAB
ALBUMIN SERPL BCP-MCNC: 4 G/DL (ref 3.5–5.2)
ALP SERPL-CCNC: 95 U/L (ref 55–135)
ALT SERPL W/O P-5'-P-CCNC: 15 U/L (ref 10–44)
ANION GAP SERPL CALC-SCNC: 9 MMOL/L (ref 8–16)
AST SERPL-CCNC: 23 U/L (ref 10–40)
BASOPHILS # BLD AUTO: 0.03 K/UL (ref 0–0.2)
BASOPHILS NFR BLD: 0.6 % (ref 0–1.9)
BILIRUB SERPL-MCNC: 0.4 MG/DL (ref 0.1–1)
BILIRUB UR QL STRIP: NEGATIVE
BUN SERPL-MCNC: 9 MG/DL (ref 8–23)
CALCIUM SERPL-MCNC: 10.4 MG/DL (ref 8.7–10.5)
CHLORIDE SERPL-SCNC: 101 MMOL/L (ref 95–110)
CLARITY UR REFRACT.AUTO: CLEAR
CO2 SERPL-SCNC: 25 MMOL/L (ref 23–29)
COLOR UR AUTO: ABNORMAL
CREAT SERPL-MCNC: 0.9 MG/DL (ref 0.5–1.4)
DIFFERENTIAL METHOD: ABNORMAL
EOSINOPHIL # BLD AUTO: 0.1 K/UL (ref 0–0.5)
EOSINOPHIL NFR BLD: 1.2 % (ref 0–8)
ERYTHROCYTE [DISTWIDTH] IN BLOOD BY AUTOMATED COUNT: 13.4 % (ref 11.5–14.5)
EST. GFR  (NO RACE VARIABLE): >60 ML/MIN/1.73 M^2
FOLATE SERPL-MCNC: 15.6 NG/ML (ref 4–24)
GLUCOSE SERPL-MCNC: 97 MG/DL (ref 70–110)
GLUCOSE UR QL STRIP: NEGATIVE
HCT VFR BLD AUTO: 34 % (ref 37–48.5)
HGB BLD-MCNC: 11 G/DL (ref 12–16)
HGB UR QL STRIP: NEGATIVE
IMM GRANULOCYTES # BLD AUTO: 0.01 K/UL (ref 0–0.04)
IMM GRANULOCYTES NFR BLD AUTO: 0.2 % (ref 0–0.5)
KETONES UR QL STRIP: NEGATIVE
LEUKOCYTE ESTERASE UR QL STRIP: ABNORMAL
LYMPHOCYTES # BLD AUTO: 1.7 K/UL (ref 1–4.8)
LYMPHOCYTES NFR BLD: 32.7 % (ref 18–48)
MCH RBC QN AUTO: 31.5 PG (ref 27–31)
MCHC RBC AUTO-ENTMCNC: 32.4 G/DL (ref 32–36)
MCV RBC AUTO: 97 FL (ref 82–98)
MICROSCOPIC COMMENT: NORMAL
MONOCYTES # BLD AUTO: 0.5 K/UL (ref 0.3–1)
MONOCYTES NFR BLD: 9.1 % (ref 4–15)
NEUTROPHILS # BLD AUTO: 2.9 K/UL (ref 1.8–7.7)
NEUTROPHILS NFR BLD: 56.2 % (ref 38–73)
NITRITE UR QL STRIP: NEGATIVE
NRBC BLD-RTO: 0 /100 WBC
PH UR STRIP: 7 [PH] (ref 5–8)
PLATELET # BLD AUTO: 160 K/UL (ref 150–450)
PMV BLD AUTO: 10.4 FL (ref 9.2–12.9)
POTASSIUM SERPL-SCNC: 4 MMOL/L (ref 3.5–5.1)
PROT SERPL-MCNC: 7.1 G/DL (ref 6–8.4)
PROT UR QL STRIP: NEGATIVE
RBC # BLD AUTO: 3.49 M/UL (ref 4–5.4)
RBC #/AREA URNS AUTO: 1 /HPF (ref 0–4)
SODIUM SERPL-SCNC: 135 MMOL/L (ref 136–145)
SP GR UR STRIP: 1 (ref 1–1.03)
URN SPEC COLLECT METH UR: ABNORMAL
VIT B12 SERPL-MCNC: 611 PG/ML (ref 210–950)
WBC # BLD AUTO: 5.07 K/UL (ref 3.9–12.7)
WBC #/AREA URNS AUTO: 1 /HPF (ref 0–5)

## 2022-12-12 PROCEDURE — 1159F PR MEDICATION LIST DOCUMENTED IN MEDICAL RECORD: ICD-10-PCS | Mod: CPTII,S$GLB,, | Performed by: INTERNAL MEDICINE

## 2022-12-12 PROCEDURE — 3288F FALL RISK ASSESSMENT DOCD: CPT | Mod: CPTII,S$GLB,, | Performed by: INTERNAL MEDICINE

## 2022-12-12 PROCEDURE — 3074F SYST BP LT 130 MM HG: CPT | Mod: CPTII,S$GLB,, | Performed by: INTERNAL MEDICINE

## 2022-12-12 PROCEDURE — 99999 PR PBB SHADOW E&M-EST. PATIENT-LVL IV: ICD-10-PCS | Mod: PBBFAC,,, | Performed by: INTERNAL MEDICINE

## 2022-12-12 PROCEDURE — 1126F AMNT PAIN NOTED NONE PRSNT: CPT | Mod: CPTII,S$GLB,, | Performed by: INTERNAL MEDICINE

## 2022-12-12 PROCEDURE — 3008F PR BODY MASS INDEX (BMI) DOCUMENTED: ICD-10-PCS | Mod: CPTII,S$GLB,, | Performed by: INTERNAL MEDICINE

## 2022-12-12 PROCEDURE — 80053 COMPREHEN METABOLIC PANEL: CPT | Performed by: INTERNAL MEDICINE

## 2022-12-12 PROCEDURE — 99214 PR OFFICE/OUTPT VISIT, EST, LEVL IV, 30-39 MIN: ICD-10-PCS | Mod: S$GLB,,, | Performed by: INTERNAL MEDICINE

## 2022-12-12 PROCEDURE — 85025 COMPLETE CBC W/AUTO DIFF WBC: CPT | Performed by: INTERNAL MEDICINE

## 2022-12-12 PROCEDURE — 1160F RVW MEDS BY RX/DR IN RCRD: CPT | Mod: CPTII,S$GLB,, | Performed by: INTERNAL MEDICINE

## 2022-12-12 PROCEDURE — 82746 ASSAY OF FOLIC ACID SERUM: CPT | Performed by: INTERNAL MEDICINE

## 2022-12-12 PROCEDURE — 1160F PR REVIEW ALL MEDS BY PRESCRIBER/CLIN PHARMACIST DOCUMENTED: ICD-10-PCS | Mod: CPTII,S$GLB,, | Performed by: INTERNAL MEDICINE

## 2022-12-12 PROCEDURE — 36415 COLL VENOUS BLD VENIPUNCTURE: CPT | Performed by: INTERNAL MEDICINE

## 2022-12-12 PROCEDURE — 3078F DIAST BP <80 MM HG: CPT | Mod: CPTII,S$GLB,, | Performed by: INTERNAL MEDICINE

## 2022-12-12 PROCEDURE — 3008F BODY MASS INDEX DOCD: CPT | Mod: CPTII,S$GLB,, | Performed by: INTERNAL MEDICINE

## 2022-12-12 PROCEDURE — 99214 OFFICE O/P EST MOD 30 MIN: CPT | Mod: S$GLB,,, | Performed by: INTERNAL MEDICINE

## 2022-12-12 PROCEDURE — 1159F MED LIST DOCD IN RCRD: CPT | Mod: CPTII,S$GLB,, | Performed by: INTERNAL MEDICINE

## 2022-12-12 PROCEDURE — 3074F PR MOST RECENT SYSTOLIC BLOOD PRESSURE < 130 MM HG: ICD-10-PCS | Mod: CPTII,S$GLB,, | Performed by: INTERNAL MEDICINE

## 2022-12-12 PROCEDURE — 99999 PR PBB SHADOW E&M-EST. PATIENT-LVL IV: CPT | Mod: PBBFAC,,, | Performed by: INTERNAL MEDICINE

## 2022-12-12 PROCEDURE — 82607 VITAMIN B-12: CPT | Performed by: INTERNAL MEDICINE

## 2022-12-12 PROCEDURE — 81001 URINALYSIS AUTO W/SCOPE: CPT | Performed by: INTERNAL MEDICINE

## 2022-12-12 PROCEDURE — 99499 UNLISTED E&M SERVICE: CPT | Mod: HCNC,S$GLB,, | Performed by: INTERNAL MEDICINE

## 2022-12-12 PROCEDURE — 3288F PR FALLS RISK ASSESSMENT DOCUMENTED: ICD-10-PCS | Mod: CPTII,S$GLB,, | Performed by: INTERNAL MEDICINE

## 2022-12-12 PROCEDURE — 1101F PT FALLS ASSESS-DOCD LE1/YR: CPT | Mod: CPTII,S$GLB,, | Performed by: INTERNAL MEDICINE

## 2022-12-12 PROCEDURE — 4010F PR ACE/ARB THEARPY RXD/TAKEN: ICD-10-PCS | Mod: CPTII,S$GLB,, | Performed by: INTERNAL MEDICINE

## 2022-12-12 PROCEDURE — 1101F PR PT FALLS ASSESS DOC 0-1 FALLS W/OUT INJ PAST YR: ICD-10-PCS | Mod: CPTII,S$GLB,, | Performed by: INTERNAL MEDICINE

## 2022-12-12 PROCEDURE — 4010F ACE/ARB THERAPY RXD/TAKEN: CPT | Mod: CPTII,S$GLB,, | Performed by: INTERNAL MEDICINE

## 2022-12-12 PROCEDURE — 1126F PR PAIN SEVERITY QUANTIFIED, NO PAIN PRESENT: ICD-10-PCS | Mod: CPTII,S$GLB,, | Performed by: INTERNAL MEDICINE

## 2022-12-12 PROCEDURE — 3078F PR MOST RECENT DIASTOLIC BLOOD PRESSURE < 80 MM HG: ICD-10-PCS | Mod: CPTII,S$GLB,, | Performed by: INTERNAL MEDICINE

## 2022-12-12 PROCEDURE — 99499 RISK ADDL DX/OHS AUDIT: ICD-10-PCS | Mod: HCNC,S$GLB,, | Performed by: INTERNAL MEDICINE

## 2022-12-12 NOTE — PROGRESS NOTES
"Subjective:       Patient ID: Chelsea Ford Child is a 72 y.o. female.    Chief Complaint: Follow-up (6 mth follow up )    HPI  72 y.o. female here for follow up of    HTN; hx PACs  Asa, carvedilol 25 bid, losartan recently increased to 25mg just a few days ago.   recent tsh wnl     Osteoporosis on dexa 11/2019  Recommended bisphosphonate (declined) and 2 year repeat  Has declined repeat dexa at this time.   Exercising with free weights and stretching.     Pulmonary htn seen on echo and dyspnea on exertion  PFT 6/2022: normal spirometry; DLCO decreased due to hgb  CT chest: tiny pulm nodules; largest 4mm     Anemia  hgb 11.8; mcv 100  Previous thrombocytopenia has resolved on most recent labs: platelet count 163 k  She does take some b vitamins OTC.     OA left knee followed by ortho; tac injection done 5/9/22. Uses voltaren gel, only occasionally needs tyelnol.    She was very sick in November - unsure of dx but she suspects covid, flu or rsv. Still with some congestion but overall back to normal now. She has had some GI upset, loose stool since the infection. She is taking probiotic. She uses almond milk. She does have an odd urine odor but no burning nor pain.    Review of Systems   Constitutional:  Negative for fever.   Respiratory:  Negative for shortness of breath.    Cardiovascular:  Negative for chest pain.   Musculoskeletal: Negative.    Skin: Negative.      Objective:   /68 (BP Location: Right arm, Patient Position: Sitting, BP Method: Medium (Manual))   Pulse 61   Ht 5' 4" (1.626 m)   Wt 53.3 kg (117 lb 8.1 oz)   LMP  (LMP Unknown)   SpO2 98%   BMI 20.17 kg/m²      Physical Exam  Constitutional:       General: She is not in acute distress.     Appearance: She is well-developed. She is not diaphoretic.   HENT:      Head: Normocephalic and atraumatic.   Cardiovascular:      Rate and Rhythm: Normal rate and regular rhythm.   Pulmonary:      Effort: Pulmonary effort is normal. No respiratory " distress.      Breath sounds: No wheezing or rales.   Skin:     General: Skin is warm and dry.   Neurological:      Mental Status: She is alert and oriented to person, place, and time.   Psychiatric:         Behavior: Behavior normal.       Assessment:       1. Benign essential hypertension    2. Atrial premature contractions    3. Anemia, unspecified type    4. Colon cancer screening    5. Abnormal urine odor    6. Nutritional anemia, unspecified        Plan:       Chelsea was seen today for follow-up.    Diagnoses and all orders for this visit:    Benign essential hypertension  -     Comprehensive Metabolic Panel; Future    Atrial premature contractions  Stable, monitor    Anemia, unspecified type  -     CBC Auto Differential; Future  -     Vitamin B12; Future  -     FOLATE; Future  -     Comprehensive Metabolic Panel; Future    Colon cancer screening  -     Fecal Immunochemical Test (iFOBT); Future    Abnormal urine odor  -     Urinalysis, Reflex to Urine Culture Urine, Clean Catch; Future    Nutritional anemia, possible? Macrocytic anemia  -     Vitamin B12; Future  -     FOLATE; Future          Dxa - declines at this time  Shingrx  Fit - already has at home

## 2022-12-17 ENCOUNTER — PATIENT MESSAGE (OUTPATIENT)
Dept: ADMINISTRATIVE | Facility: OTHER | Age: 72
End: 2022-12-17
Payer: MEDICARE

## 2022-12-27 ENCOUNTER — HOSPITAL ENCOUNTER (OUTPATIENT)
Dept: RADIOLOGY | Facility: HOSPITAL | Age: 72
Discharge: HOME | End: 2022-12-27
Attending: INTERNAL MEDICINE
Payer: MEDICARE

## 2022-12-27 DIAGNOSIS — Z12.31 ENCOUNTER FOR SCREENING MAMMOGRAM FOR BREAST CANCER: ICD-10-CM

## 2022-12-27 PROCEDURE — 77067 SCR MAMMO BI INCL CAD: CPT | Mod: TC

## 2022-12-27 PROCEDURE — 77063 BREAST TOMOSYNTHESIS BI: CPT | Mod: 26,,, | Performed by: RADIOLOGY

## 2022-12-27 PROCEDURE — 77063 MAMMO DIGITAL SCREENING BILAT WITH TOMO: ICD-10-PCS | Mod: 26,,, | Performed by: RADIOLOGY

## 2022-12-27 PROCEDURE — 77067 MAMMO DIGITAL SCREENING BILAT WITH TOMO: ICD-10-PCS | Mod: 26,,, | Performed by: RADIOLOGY

## 2022-12-27 PROCEDURE — 77063 BREAST TOMOSYNTHESIS BI: CPT | Mod: TC

## 2022-12-27 PROCEDURE — 77067 SCR MAMMO BI INCL CAD: CPT | Mod: 26,,, | Performed by: RADIOLOGY

## 2023-01-02 ENCOUNTER — PATIENT MESSAGE (OUTPATIENT)
Dept: INTERNAL MEDICINE | Facility: CLINIC | Age: 73
End: 2023-01-02
Payer: MEDICARE

## 2023-01-14 LAB — HEMOCCULT STL QL IA: NORMAL

## 2023-01-17 ENCOUNTER — OFFICE VISIT (OUTPATIENT)
Dept: INTERNAL MEDICINE | Facility: CLINIC | Age: 73
End: 2023-01-17
Payer: MEDICARE

## 2023-01-17 VITALS
DIASTOLIC BLOOD PRESSURE: 70 MMHG | WEIGHT: 119.5 LBS | OXYGEN SATURATION: 98 % | HEART RATE: 68 BPM | SYSTOLIC BLOOD PRESSURE: 140 MMHG | BODY MASS INDEX: 19.2 KG/M2 | HEIGHT: 66 IN

## 2023-01-17 DIAGNOSIS — I10 HYPERTENSION, ESSENTIAL: Primary | ICD-10-CM

## 2023-01-17 DIAGNOSIS — Z12.11 SCREENING FOR COLON CANCER: ICD-10-CM

## 2023-01-17 DIAGNOSIS — N95.2 VAGINAL ATROPHY: ICD-10-CM

## 2023-01-17 PROCEDURE — 4010F ACE/ARB THERAPY RXD/TAKEN: CPT | Mod: HCNC,CPTII,S$GLB, | Performed by: PHYSICIAN ASSISTANT

## 2023-01-17 PROCEDURE — 3008F BODY MASS INDEX DOCD: CPT | Mod: HCNC,CPTII,S$GLB, | Performed by: PHYSICIAN ASSISTANT

## 2023-01-17 PROCEDURE — 1101F PR PT FALLS ASSESS DOC 0-1 FALLS W/OUT INJ PAST YR: ICD-10-PCS | Mod: HCNC,CPTII,S$GLB, | Performed by: PHYSICIAN ASSISTANT

## 2023-01-17 PROCEDURE — 3008F PR BODY MASS INDEX (BMI) DOCUMENTED: ICD-10-PCS | Mod: HCNC,CPTII,S$GLB, | Performed by: PHYSICIAN ASSISTANT

## 2023-01-17 PROCEDURE — 1125F PR PAIN SEVERITY QUANTIFIED, PAIN PRESENT: ICD-10-PCS | Mod: HCNC,CPTII,S$GLB, | Performed by: PHYSICIAN ASSISTANT

## 2023-01-17 PROCEDURE — 3288F FALL RISK ASSESSMENT DOCD: CPT | Mod: HCNC,CPTII,S$GLB, | Performed by: PHYSICIAN ASSISTANT

## 2023-01-17 PROCEDURE — 1125F AMNT PAIN NOTED PAIN PRSNT: CPT | Mod: HCNC,CPTII,S$GLB, | Performed by: PHYSICIAN ASSISTANT

## 2023-01-17 PROCEDURE — 99999 PR PBB SHADOW E&M-EST. PATIENT-LVL V: CPT | Mod: PBBFAC,HCNC,, | Performed by: PHYSICIAN ASSISTANT

## 2023-01-17 PROCEDURE — 1159F MED LIST DOCD IN RCRD: CPT | Mod: HCNC,CPTII,S$GLB, | Performed by: PHYSICIAN ASSISTANT

## 2023-01-17 PROCEDURE — 3077F PR MOST RECENT SYSTOLIC BLOOD PRESSURE >= 140 MM HG: ICD-10-PCS | Mod: HCNC,CPTII,S$GLB, | Performed by: PHYSICIAN ASSISTANT

## 2023-01-17 PROCEDURE — 1160F RVW MEDS BY RX/DR IN RCRD: CPT | Mod: HCNC,CPTII,S$GLB, | Performed by: PHYSICIAN ASSISTANT

## 2023-01-17 PROCEDURE — 1159F PR MEDICATION LIST DOCUMENTED IN MEDICAL RECORD: ICD-10-PCS | Mod: HCNC,CPTII,S$GLB, | Performed by: PHYSICIAN ASSISTANT

## 2023-01-17 PROCEDURE — 3078F DIAST BP <80 MM HG: CPT | Mod: HCNC,CPTII,S$GLB, | Performed by: PHYSICIAN ASSISTANT

## 2023-01-17 PROCEDURE — 1160F PR REVIEW ALL MEDS BY PRESCRIBER/CLIN PHARMACIST DOCUMENTED: ICD-10-PCS | Mod: HCNC,CPTII,S$GLB, | Performed by: PHYSICIAN ASSISTANT

## 2023-01-17 PROCEDURE — 99214 PR OFFICE/OUTPT VISIT, EST, LEVL IV, 30-39 MIN: ICD-10-PCS | Mod: HCNC,S$GLB,, | Performed by: PHYSICIAN ASSISTANT

## 2023-01-17 PROCEDURE — 99999 PR PBB SHADOW E&M-EST. PATIENT-LVL V: ICD-10-PCS | Mod: PBBFAC,HCNC,, | Performed by: PHYSICIAN ASSISTANT

## 2023-01-17 PROCEDURE — 3077F SYST BP >= 140 MM HG: CPT | Mod: HCNC,CPTII,S$GLB, | Performed by: PHYSICIAN ASSISTANT

## 2023-01-17 PROCEDURE — 3288F PR FALLS RISK ASSESSMENT DOCUMENTED: ICD-10-PCS | Mod: HCNC,CPTII,S$GLB, | Performed by: PHYSICIAN ASSISTANT

## 2023-01-17 PROCEDURE — 4010F PR ACE/ARB THEARPY RXD/TAKEN: ICD-10-PCS | Mod: HCNC,CPTII,S$GLB, | Performed by: PHYSICIAN ASSISTANT

## 2023-01-17 PROCEDURE — 99214 OFFICE O/P EST MOD 30 MIN: CPT | Mod: HCNC,S$GLB,, | Performed by: PHYSICIAN ASSISTANT

## 2023-01-17 PROCEDURE — 3078F PR MOST RECENT DIASTOLIC BLOOD PRESSURE < 80 MM HG: ICD-10-PCS | Mod: HCNC,CPTII,S$GLB, | Performed by: PHYSICIAN ASSISTANT

## 2023-01-17 PROCEDURE — 1101F PT FALLS ASSESS-DOCD LE1/YR: CPT | Mod: HCNC,CPTII,S$GLB, | Performed by: PHYSICIAN ASSISTANT

## 2023-01-17 RX ORDER — LOSARTAN POTASSIUM 50 MG/1
50 TABLET ORAL DAILY
Qty: 90 TABLET | Refills: 3 | Status: SHIPPED | OUTPATIENT
Start: 2023-01-17 | End: 2023-04-04 | Stop reason: SDUPTHER

## 2023-01-17 RX ORDER — ESTRADIOL 0.1 MG/G
CREAM VAGINAL
Qty: 42.5 G | Refills: 2 | Status: SHIPPED | OUTPATIENT
Start: 2023-01-17

## 2023-01-17 NOTE — PROGRESS NOTES
Subjective:       Patient ID: Chelsea Ford Child is a 73 y.o. female.        Chief Complaint: Hypertension    Chelsea Ford Child is an established patient of Elva Tripathi MD here today for follow up visit.    Retired RN    HTN -   Losartan inc from 12.5 --> 25 mg 12/2022, takes around 9:30 PM  Coreg 25 mg BID 6 AM and PM    Morning - 147/79, 146/77, 143/78  Evening - 128/67, 127/73, 115/67    Morning readings have been high    H/o PAC's    Osteoporosis -   Last DEXA 11/2019  Declines repeat DEXA     Pulmonary HTN -   Seen on echo  PFT 6/2022 normal spirometry, DLCO dec due to hemoglobin  CT chest tiny pulmonary nodules largest 4 mm     Anemia   H/o thrombocytopenia but resolved on most recent labs            Review of Systems   Constitutional:  Negative for chills, diaphoresis, fatigue and fever.   HENT:  Negative for congestion and sore throat.    Eyes:  Negative for visual disturbance.   Respiratory:  Negative for cough, chest tightness and shortness of breath.    Cardiovascular:  Negative for chest pain, palpitations and leg swelling.   Gastrointestinal:  Negative for abdominal pain, blood in stool, constipation, diarrhea, nausea and vomiting.   Genitourinary:  Negative for dysuria, frequency, hematuria and urgency.   Musculoskeletal:  Negative for arthralgias and back pain.   Skin:  Negative for rash.   Neurological:  Negative for dizziness, syncope, weakness and headaches.   Psychiatric/Behavioral:  Negative for dysphoric mood and sleep disturbance. The patient is not nervous/anxious.      Objective:      Physical Exam  Vitals and nursing note reviewed.   Constitutional:       Appearance: Normal appearance. She is well-developed.   HENT:      Head: Normocephalic.      Right Ear: External ear normal.      Left Ear: External ear normal.   Eyes:      Pupils: Pupils are equal, round, and reactive to light.   Cardiovascular:      Rate and Rhythm: Normal rate and regular rhythm.      Heart sounds: Normal heart  "sounds. No murmur heard.    No friction rub. No gallop.   Pulmonary:      Effort: Pulmonary effort is normal. No respiratory distress.      Breath sounds: Normal breath sounds.   Abdominal:      Palpations: Abdomen is soft.      Tenderness: There is no abdominal tenderness.   Skin:     General: Skin is warm and dry.   Neurological:      Mental Status: She is alert.       Assessment:       1. Hypertension, essential    2. Vaginal atrophy    3. Screening for colon cancer        Plan:       Chelsea was seen today for hypertension.    Diagnoses and all orders for this visit:    Hypertension, essential - f/u in 3 months  -     INCREASE: losartan (COZAAR) 50 MG tablet; Take 1 tablet (50 mg total) by mouth once daily.    Vaginal atrophy  -     REFILL: estradioL (ESTRACE) 0.01 % (0.1 mg/gram) vaginal cream; Rub dime sized amount of cream to the urethra nightly    Screening for colon cancer  -     Fecal Immunochemical Test (iFOBT); Future    Previously turned in fitkit but test was ...  F/u in 3 months    Pt has been given instructions populated from patient instructions database and has verbalized understanding of the after visit summary and information contained wherein.    Follow up with a primary care provider. May go to ER for acute shortness of breath, lightheadedness, fever, or any other emergent complaints or changes in condition.    "This note will be shared with the patient"    Future Appointments   Date Time Provider Department Center   3/28/2023 10:10 AM Cony Waters MD [facility] DERM [place]   2023  8:30 AM Lexis Sommers PA-C [facility] IM [place]   2023 10:00 AM Elva Tripathi MD [facility] [place]                 "

## 2023-02-07 DIAGNOSIS — Z00.00 ENCOUNTER FOR MEDICARE ANNUAL WELLNESS EXAM: ICD-10-CM

## 2023-02-09 DIAGNOSIS — Z00.00 ENCOUNTER FOR MEDICARE ANNUAL WELLNESS EXAM: ICD-10-CM

## 2023-02-10 ENCOUNTER — LAB VISIT (OUTPATIENT)
Dept: LAB | Facility: HOSPITAL | Age: 73
End: 2023-02-10
Payer: MEDICARE

## 2023-02-10 DIAGNOSIS — Z12.11 SCREENING FOR COLON CANCER: ICD-10-CM

## 2023-02-10 PROCEDURE — 82274 ASSAY TEST FOR BLOOD FECAL: CPT | Mod: HCNC | Performed by: PHYSICIAN ASSISTANT

## 2023-02-14 ENCOUNTER — PATIENT OUTREACH (OUTPATIENT)
Dept: ADMINISTRATIVE | Facility: HOSPITAL | Age: 73
End: 2023-02-14
Payer: MEDICARE

## 2023-02-14 ENCOUNTER — TELEPHONE (OUTPATIENT)
Dept: INTERNAL MEDICINE | Facility: CLINIC | Age: 73
End: 2023-02-14
Payer: MEDICARE

## 2023-02-14 ENCOUNTER — PATIENT MESSAGE (OUTPATIENT)
Dept: INTERNAL MEDICINE | Facility: CLINIC | Age: 73
End: 2023-02-14
Payer: MEDICARE

## 2023-02-14 DIAGNOSIS — R19.5 POSITIVE FIT (FECAL IMMUNOCHEMICAL TEST): Primary | ICD-10-CM

## 2023-02-14 LAB — HEMOCCULT STL QL IA: POSITIVE

## 2023-02-14 NOTE — PROGRESS NOTES
Health Maintenance Due   Topic Date Due    Shingles Vaccine (1 of 2) Never done    DEXA Scan  11/22/2021    Colorectal Cancer Screening  02/15/2023        updated. Triggered LINKS and Care Everywhere. Fitkit 1.7.23 scanned into chart.     Barbara Lester LPN   Clinical Care Coordinator  Primary Care and Wellness

## 2023-02-15 NOTE — TELEPHONE ENCOUNTER
----- Message from Lexis Sommers PA-C sent at 2/14/2023 12:02 PM CST -----  Please call patient  Fitkit positive so needs f/u colonoscopy  Ordered  
Called and discussed test results and recommendations with the pt. The pt expressed understanding.     Pt informed me that the fitkit that Ryann does was negative. She would like to do another Fitkit before having the Colonoscopy done.    Please Advise,  
LVM for pt to return phone call to office.   
Only 1 fitkit is covered within a year  Even if 1 was positive and 1 was negative, still recommend colonoscopy  
Tony Quintero

## 2023-03-28 ENCOUNTER — OFFICE VISIT (OUTPATIENT)
Dept: DERMATOLOGY | Facility: CLINIC | Age: 73
End: 2023-03-28
Payer: MEDICARE

## 2023-03-28 VITALS — BODY MASS INDEX: 19.21 KG/M2 | WEIGHT: 119 LBS

## 2023-03-28 DIAGNOSIS — D22.9 NEVUS OF MULTIPLE SITES: ICD-10-CM

## 2023-03-28 DIAGNOSIS — Z85.828 HISTORY OF SKIN CANCER: Primary | ICD-10-CM

## 2023-03-28 DIAGNOSIS — Z87.2 HISTORY OF ACTINIC KERATOSES: ICD-10-CM

## 2023-03-28 DIAGNOSIS — L81.4 LENTIGINES: ICD-10-CM

## 2023-03-28 DIAGNOSIS — L82.1 SEBORRHEIC KERATOSES: ICD-10-CM

## 2023-03-28 PROCEDURE — 1160F RVW MEDS BY RX/DR IN RCRD: CPT | Mod: HCNC,CPTII,S$GLB, | Performed by: DERMATOLOGY

## 2023-03-28 PROCEDURE — 4010F PR ACE/ARB THEARPY RXD/TAKEN: ICD-10-PCS | Mod: HCNC,CPTII,S$GLB, | Performed by: DERMATOLOGY

## 2023-03-28 PROCEDURE — 99999 PR PBB SHADOW E&M-EST. PATIENT-LVL III: ICD-10-PCS | Mod: PBBFAC,HCNC,, | Performed by: DERMATOLOGY

## 2023-03-28 PROCEDURE — 99999 PR PBB SHADOW E&M-EST. PATIENT-LVL III: CPT | Mod: PBBFAC,HCNC,, | Performed by: DERMATOLOGY

## 2023-03-28 PROCEDURE — 3008F BODY MASS INDEX DOCD: CPT | Mod: HCNC,CPTII,S$GLB, | Performed by: DERMATOLOGY

## 2023-03-28 PROCEDURE — 1159F PR MEDICATION LIST DOCUMENTED IN MEDICAL RECORD: ICD-10-PCS | Mod: HCNC,CPTII,S$GLB, | Performed by: DERMATOLOGY

## 2023-03-28 PROCEDURE — 1160F PR REVIEW ALL MEDS BY PRESCRIBER/CLIN PHARMACIST DOCUMENTED: ICD-10-PCS | Mod: HCNC,CPTII,S$GLB, | Performed by: DERMATOLOGY

## 2023-03-28 PROCEDURE — 3288F PR FALLS RISK ASSESSMENT DOCUMENTED: ICD-10-PCS | Mod: HCNC,CPTII,S$GLB, | Performed by: DERMATOLOGY

## 2023-03-28 PROCEDURE — 1159F MED LIST DOCD IN RCRD: CPT | Mod: HCNC,CPTII,S$GLB, | Performed by: DERMATOLOGY

## 2023-03-28 PROCEDURE — 99213 PR OFFICE/OUTPT VISIT, EST, LEVL III, 20-29 MIN: ICD-10-PCS | Mod: HCNC,S$GLB,, | Performed by: DERMATOLOGY

## 2023-03-28 PROCEDURE — 1126F AMNT PAIN NOTED NONE PRSNT: CPT | Mod: HCNC,CPTII,S$GLB, | Performed by: DERMATOLOGY

## 2023-03-28 PROCEDURE — 1126F PR PAIN SEVERITY QUANTIFIED, NO PAIN PRESENT: ICD-10-PCS | Mod: HCNC,CPTII,S$GLB, | Performed by: DERMATOLOGY

## 2023-03-28 PROCEDURE — 4010F ACE/ARB THERAPY RXD/TAKEN: CPT | Mod: HCNC,CPTII,S$GLB, | Performed by: DERMATOLOGY

## 2023-03-28 PROCEDURE — 1101F PT FALLS ASSESS-DOCD LE1/YR: CPT | Mod: HCNC,CPTII,S$GLB, | Performed by: DERMATOLOGY

## 2023-03-28 PROCEDURE — 3288F FALL RISK ASSESSMENT DOCD: CPT | Mod: HCNC,CPTII,S$GLB, | Performed by: DERMATOLOGY

## 2023-03-28 PROCEDURE — 1101F PR PT FALLS ASSESS DOC 0-1 FALLS W/OUT INJ PAST YR: ICD-10-PCS | Mod: HCNC,CPTII,S$GLB, | Performed by: DERMATOLOGY

## 2023-03-28 PROCEDURE — 99213 OFFICE O/P EST LOW 20 MIN: CPT | Mod: HCNC,S$GLB,, | Performed by: DERMATOLOGY

## 2023-03-28 PROCEDURE — 3008F PR BODY MASS INDEX (BMI) DOCUMENTED: ICD-10-PCS | Mod: HCNC,CPTII,S$GLB, | Performed by: DERMATOLOGY

## 2023-03-28 NOTE — PROGRESS NOTES
Subjective:      Patient ID:  Chelsea Ford Child is a 73 y.o. female who presents for   Chief Complaint   Patient presents with    Follow-up     UBSE     Would like skin check no lesions of concern.     Follow-up - Follow-up  Affected locations: face, left arm, right arm, chest, torso and abdomen  Signs / symptoms: asymptomatic    Review of Systems   Constitutional:  Negative for fever, chills, weight loss, weight gain, fatigue, night sweats and malaise.   Skin:  Positive for wears hat. Negative for daily sunscreen use and activity-related sunscreen use.   Hematologic/Lymphatic: Bruises/bleeds easily.     Objective:   Physical Exam   Constitutional: She appears well-developed and well-nourished. No distress.   Neurological: She is alert and oriented to person, place, and time. She is not disoriented.   Psychiatric: She has a normal mood and affect.   Skin:   Areas Examined (abnormalities noted in diagram):   Head / Face Inspection Performed  Neck Inspection Performed  Chest / Axilla Inspection Performed  Abdomen Inspection Performed  Back Inspection Performed  RUE Inspected  LUE Inspection Performed               Diagram Legend     Erythematous scaling macule/papule c/w actinic keratosis       Vascular papule c/w angioma      Pigmented verrucoid papule/plaque c/w seborrheic keratosis      Yellow umbilicated papule c/w sebaceous hyperplasia      Irregularly shaped tan macule c/w lentigo     1-2 mm smooth white papules consistent with Milia      Movable subcutaneous cyst with punctum c/w epidermal inclusion cyst      Subcutaneous movable cyst c/w pilar cyst      Firm pink to brown papule c/w dermatofibroma      Pedunculated fleshy papule(s) c/w skin tag(s)      Evenly pigmented macule c/w junctional nevus     Mildly variegated pigmented, slightly irregular-bordered macule c/w mildly atypical nevus      Flesh colored to evenly pigmented papule c/w intradermal nevus       Pink pearly papule/plaque c/w basal cell  "carcinoma      Erythematous hyperkeratotic cursted plaque c/w SCC      Surgical scar with no sign of skin cancer recurrence      Open and closed comedones      Inflammatory papules and pustules      Verrucoid papule consistent consistent with wart     Erythematous eczematous patches and plaques     Dystrophic onycholytic nail with subungual debris c/w onychomycosis     Umbilicated papule    Erythematous-base heme-crusted tan verrucoid plaque consistent with inflamed seborrheic keratosis     Erythematous Silvery Scaling Plaque c/w Psoriasis     See annotation      Assessment / Plan:        History of skin cancer/History of actinic keratoses  Area(s) of previous NMSC evaluated with no signs of recurrence.    Upper body skin examination performed today including at least 6 points as noted in physical examination. No lesions suspicious for malignancy noted.    Recommend daily sun protection/avoidance and use of at least SPF 30, broad spectrum sunscreen (OTC drug).     Seborrheic keratoses  Seborrheic keratosis scattered, told benign no treatment needed.  Brochure provided.      Lentigines  The "ABCD" rules to observe pigmented lesions were reviewed.      Nevus of multiple sites  The "ABCD" rules to observe pigmented lesions were reviewed.               Follow up in about 6 months (around 9/28/2023).  "

## 2023-04-01 ENCOUNTER — OFFICE VISIT (OUTPATIENT)
Dept: URGENT CARE | Facility: CLINIC | Age: 73
End: 2023-04-01
Payer: MEDICARE

## 2023-04-01 VITALS
HEIGHT: 66 IN | RESPIRATION RATE: 18 BRPM | BODY MASS INDEX: 19.13 KG/M2 | TEMPERATURE: 98 F | WEIGHT: 119 LBS | OXYGEN SATURATION: 96 % | DIASTOLIC BLOOD PRESSURE: 63 MMHG | HEART RATE: 60 BPM | SYSTOLIC BLOOD PRESSURE: 164 MMHG

## 2023-04-01 DIAGNOSIS — I10 HYPERTENSION, UNSPECIFIED TYPE: Primary | ICD-10-CM

## 2023-04-01 PROCEDURE — 99203 PR OFFICE/OUTPT VISIT, NEW, LEVL III, 30-44 MIN: ICD-10-PCS | Mod: S$GLB,,, | Performed by: NURSE PRACTITIONER

## 2023-04-01 PROCEDURE — 99203 OFFICE O/P NEW LOW 30 MIN: CPT | Mod: S$GLB,,, | Performed by: NURSE PRACTITIONER

## 2023-04-01 NOTE — PROGRESS NOTES
"Subjective:      Patient ID: Chelsea Ford Child is a 73 y.o. female.    Vitals:  height is 5' 6" (1.676 m) and weight is 54 kg (119 lb). Her oral temperature is 98.1 °F (36.7 °C). Her blood pressure is 164/63 (abnormal) and her pulse is 60. Her respiration is 18 and oxygen saturation is 96%.     Chief Complaint: Hypertension    This is a 73 y.o. female who presents today with a chief complaint of  hypertension. Pt stated she feels tired now Pt also felt lightheaded throughout the day   BP was elevated  @7:23AM 133/75 @3:49PM 183/98 @3:59 204/102 @ 4:31 174/95 @ 4:49 167/85  Otherwise, no report of fever, chills, abdominal pain, N/V/D, chest pain, palpitations, HA. She took her evening BB early and BP came down.       Hypertension  This is a new problem. The current episode started today. Associated symptoms include anxiety. Pertinent negatives include no blurred vision, chest pain, headaches, palpitations or shortness of breath.     Constitution: Negative for chills, sweating, fatigue and fever.   Neck: Negative for painful lymph nodes.   Cardiovascular:  Negative for chest pain, leg swelling, palpitations and sob on exertion.   Eyes:  Negative for blurred vision.   Respiratory:  Negative for chest tightness, cough and shortness of breath.    Gastrointestinal:  Negative for abdominal pain, nausea, vomiting, constipation, diarrhea, bright red blood in stool, dark colored stools and rectal bleeding.   Genitourinary:  Negative for dysuria, frequency, urgency, flank pain, hematuria, vaginal pain, vaginal discharge, vaginal bleeding, vaginal odor, genital sore and pelvic pain.   Musculoskeletal:  Negative for muscle ache.   Skin:  Negative for color change, pale and rash.   Neurological:  Negative for headaches.   Hematologic/Lymphatic: Negative for swollen lymph nodes.    Objective:     Physical Exam   Constitutional: She is oriented to person, place, and time. She appears well-developed. She is cooperative.  Non-toxic " appearance. She does not appear ill. No distress.   HENT:   Head: Normocephalic and atraumatic.   Ears:   Right Ear: Hearing and external ear normal.   Left Ear: Hearing and external ear normal.   Nose: Nose normal. No mucosal edema, rhinorrhea or nasal deformity. No epistaxis. Right sinus exhibits no maxillary sinus tenderness and no frontal sinus tenderness. Left sinus exhibits no maxillary sinus tenderness and no frontal sinus tenderness.   Mouth/Throat: Uvula is midline, oropharynx is clear and moist and mucous membranes are normal. No trismus in the jaw. Normal dentition. No uvula swelling. No posterior oropharyngeal erythema.   Eyes: Conjunctivae and lids are normal. Right eye exhibits no discharge. Left eye exhibits no discharge. No scleral icterus.   Neck: Trachea normal and phonation normal. Neck supple.   Cardiovascular: Normal rate, regular rhythm and normal heart sounds.   Pulmonary/Chest: Effort normal and breath sounds normal. No stridor. No respiratory distress. She has no wheezes. She has no rhonchi. She has no rales. She exhibits no tenderness.   Abdominal: Normal appearance and bowel sounds are normal. She exhibits no distension.   Musculoskeletal: Normal range of motion.         General: No deformity. Normal range of motion.      Right lower leg: No edema.      Left lower leg: No edema.   Neurological: She is alert and oriented to person, place, and time. She has normal strength and normal reflexes. No sensory deficit. She exhibits normal muscle tone. Coordination normal.   Skin: Skin is warm, dry, intact, not diaphoretic and not pale.   Psychiatric: Her speech is normal and behavior is normal. Judgment and thought content normal.   Nursing note and vitals reviewed.    Assessment:     1. Hypertension, unspecified type        Plan:       Hypertension, unspecified type                -advised to keep BP diary, discussed lifestyle, follow up with PCP on Monday    Patient Instructions   See additional  patient Instructions provided    No added salt diet  Low fat, low cholesterol diet  Avoid smoking and/or tobacco  Avoid caffeine  Exercise when blood pressure controlled  Maintain appropriate weight  If you are currently prescribed medication for your blood pressure, continue to take as directed  Check BP at home regularly, keep daily diary, notify your PCP for persistent elevation >130/90  Follow up with PCP for routine care    Patient Instructions   - You must understand that you have received an Urgent Care treatment only and that you may be released before all of your medical problems are known or treated.   - You, the patient, will arrange for follow up care as instructed.   - If your condition worsens or fails to improve we recommend that you receive another evaluation at the ER immediately or contact your PCP to discuss your concerns or return here.     Advised on return/follow-up precautions. Advised on ER precautions. Answered all patient questions. Patient verbalized understanding and voiced agreement with current treatment plan.

## 2023-04-01 NOTE — PATIENT INSTRUCTIONS
See additional patient Instructions provided    No added salt diet  Low fat, low cholesterol diet  Avoid smoking and/or tobacco  Avoid caffeine  Exercise when blood pressure controlled  Maintain appropriate weight  If you are currently prescribed medication for your blood pressure, continue to take as directed  Check BP at home regularly, keep daily diary, notify your PCP for persistent elevation >130/90  Follow up with PCP for routine care    Patient Instructions   - You must understand that you have received an Urgent Care treatment only and that you may be released before all of your medical problems are known or treated.   - You, the patient, will arrange for follow up care as instructed.   - If your condition worsens or fails to improve we recommend that you receive another evaluation at the ER immediately or contact your PCP to discuss your concerns or return here.     Advised on return/follow-up precautions. Advised on ER precautions. Answered all patient questions. Patient verbalized understanding and voiced agreement with current treatment plan.

## 2023-04-03 ENCOUNTER — TELEPHONE (OUTPATIENT)
Dept: INTERNAL MEDICINE | Facility: CLINIC | Age: 73
End: 2023-04-03
Payer: MEDICARE

## 2023-04-03 ENCOUNTER — NURSE TRIAGE (OUTPATIENT)
Dept: ADMINISTRATIVE | Facility: CLINIC | Age: 73
End: 2023-04-03
Payer: MEDICARE

## 2023-04-03 ENCOUNTER — PATIENT MESSAGE (OUTPATIENT)
Dept: INTERNAL MEDICINE | Facility: CLINIC | Age: 73
End: 2023-04-03
Payer: MEDICARE

## 2023-04-03 NOTE — TELEPHONE ENCOUNTER
Spoke with pt who reports BP is 170/91 Denies symptoms. States she was seen in UC Saturday who advised for pt to be seen in ED. Pt declined advise. Pt asking if adjustment to medication is needed for BP control. Advised to be seen in office today. No appointments available with PCP. Pt advised to be seen in ED/UC. States sif she develops symptoms will go to ED      Reason for Disposition   Patient wants to be seen    Additional Information   Negative: Sounds like a life-threatening emergency to the triager   Negative: Systolic BP >= 160 OR Diastolic >= 100, and any cardiac or neurologic symptoms (e.g., chest pain, difficulty breathing, unsteady gait, blurred vision)   Negative: Pregnant 20 or more weeks (or postpartum < 6 weeks) with new hand or face swelling   Negative: Pregnant 20 or more weeks (or postpartum < 6 weeks) and Systolic BP >= 160 OR Diastolic >= 100   Negative: Patient sounds very sick or weak to the triager   Negative: Systolic BP >= 200 OR Diastolic >= 120 and having NO cardiac or neurologic symptoms   Negative: Pregnant 20 or more weeks (or postpartum < 6 weeks) with Systolic BP >= 140 OR Diastolic >= 90   Negative: Systolic BP >= 180 OR Diastolic >= 110, and missed most recent dose of blood pressure medication   Negative: Systolic BP >= 180 OR Diastolic >= 110    Protocols used: Blood Pressure - High-A-OH

## 2023-04-03 NOTE — TELEPHONE ENCOUNTER
See if patient okay to come tomorrow at 1:30 - appointment booked    Increase losartan to 100 mg - take two 50 mg tablets

## 2023-04-03 NOTE — TELEPHONE ENCOUNTER
----- Message from Theresa Alfaro MA sent at 4/3/2023  3:10 PM CDT -----  Contact: [phone]    ----- Message -----  From: Marybeth Lind  Sent: 4/3/2023   8:31 AM CDT  To: Deuce Stevenson Staff    Pt was in urgent care this weekend for her BP. She states she was advised by urgent care to go to the ER and pt states she was not going to the ER on a Saturday for BP. She states she is not having any other symptoms then high readings. Reading currently 172/87

## 2023-04-04 ENCOUNTER — OFFICE VISIT (OUTPATIENT)
Dept: INTERNAL MEDICINE | Facility: CLINIC | Age: 73
End: 2023-04-04
Payer: MEDICARE

## 2023-04-04 VITALS
DIASTOLIC BLOOD PRESSURE: 60 MMHG | HEIGHT: 66 IN | WEIGHT: 117.31 LBS | HEART RATE: 71 BPM | SYSTOLIC BLOOD PRESSURE: 134 MMHG | OXYGEN SATURATION: 97 % | BODY MASS INDEX: 18.85 KG/M2

## 2023-04-04 DIAGNOSIS — R19.5 POSITIVE FIT (FECAL IMMUNOCHEMICAL TEST): Primary | ICD-10-CM

## 2023-04-04 DIAGNOSIS — I10 HYPERTENSION, ESSENTIAL: ICD-10-CM

## 2023-04-04 PROCEDURE — 3288F PR FALLS RISK ASSESSMENT DOCUMENTED: ICD-10-PCS | Mod: HCNC,CPTII,S$GLB, | Performed by: PHYSICIAN ASSISTANT

## 2023-04-04 PROCEDURE — 3008F BODY MASS INDEX DOCD: CPT | Mod: HCNC,CPTII,S$GLB, | Performed by: PHYSICIAN ASSISTANT

## 2023-04-04 PROCEDURE — 3075F PR MOST RECENT SYSTOLIC BLOOD PRESS GE 130-139MM HG: ICD-10-PCS | Mod: HCNC,CPTII,S$GLB, | Performed by: PHYSICIAN ASSISTANT

## 2023-04-04 PROCEDURE — 1101F PR PT FALLS ASSESS DOC 0-1 FALLS W/OUT INJ PAST YR: ICD-10-PCS | Mod: HCNC,CPTII,S$GLB, | Performed by: PHYSICIAN ASSISTANT

## 2023-04-04 PROCEDURE — 3078F PR MOST RECENT DIASTOLIC BLOOD PRESSURE < 80 MM HG: ICD-10-PCS | Mod: HCNC,CPTII,S$GLB, | Performed by: PHYSICIAN ASSISTANT

## 2023-04-04 PROCEDURE — 1101F PT FALLS ASSESS-DOCD LE1/YR: CPT | Mod: HCNC,CPTII,S$GLB, | Performed by: PHYSICIAN ASSISTANT

## 2023-04-04 PROCEDURE — 4010F ACE/ARB THERAPY RXD/TAKEN: CPT | Mod: HCNC,CPTII,S$GLB, | Performed by: PHYSICIAN ASSISTANT

## 2023-04-04 PROCEDURE — 99214 OFFICE O/P EST MOD 30 MIN: CPT | Mod: HCNC,S$GLB,, | Performed by: PHYSICIAN ASSISTANT

## 2023-04-04 PROCEDURE — 99999 PR PBB SHADOW E&M-EST. PATIENT-LVL IV: ICD-10-PCS | Mod: PBBFAC,HCNC,, | Performed by: PHYSICIAN ASSISTANT

## 2023-04-04 PROCEDURE — 1160F PR REVIEW ALL MEDS BY PRESCRIBER/CLIN PHARMACIST DOCUMENTED: ICD-10-PCS | Mod: HCNC,CPTII,S$GLB, | Performed by: PHYSICIAN ASSISTANT

## 2023-04-04 PROCEDURE — 1159F MED LIST DOCD IN RCRD: CPT | Mod: HCNC,CPTII,S$GLB, | Performed by: PHYSICIAN ASSISTANT

## 2023-04-04 PROCEDURE — 3288F FALL RISK ASSESSMENT DOCD: CPT | Mod: HCNC,CPTII,S$GLB, | Performed by: PHYSICIAN ASSISTANT

## 2023-04-04 PROCEDURE — 3075F SYST BP GE 130 - 139MM HG: CPT | Mod: HCNC,CPTII,S$GLB, | Performed by: PHYSICIAN ASSISTANT

## 2023-04-04 PROCEDURE — 1126F AMNT PAIN NOTED NONE PRSNT: CPT | Mod: HCNC,CPTII,S$GLB, | Performed by: PHYSICIAN ASSISTANT

## 2023-04-04 PROCEDURE — 3078F DIAST BP <80 MM HG: CPT | Mod: HCNC,CPTII,S$GLB, | Performed by: PHYSICIAN ASSISTANT

## 2023-04-04 PROCEDURE — 4010F PR ACE/ARB THEARPY RXD/TAKEN: ICD-10-PCS | Mod: HCNC,CPTII,S$GLB, | Performed by: PHYSICIAN ASSISTANT

## 2023-04-04 PROCEDURE — 3008F PR BODY MASS INDEX (BMI) DOCUMENTED: ICD-10-PCS | Mod: HCNC,CPTII,S$GLB, | Performed by: PHYSICIAN ASSISTANT

## 2023-04-04 PROCEDURE — 99214 PR OFFICE/OUTPT VISIT, EST, LEVL IV, 30-39 MIN: ICD-10-PCS | Mod: HCNC,S$GLB,, | Performed by: PHYSICIAN ASSISTANT

## 2023-04-04 PROCEDURE — 1160F RVW MEDS BY RX/DR IN RCRD: CPT | Mod: HCNC,CPTII,S$GLB, | Performed by: PHYSICIAN ASSISTANT

## 2023-04-04 PROCEDURE — 1159F PR MEDICATION LIST DOCUMENTED IN MEDICAL RECORD: ICD-10-PCS | Mod: HCNC,CPTII,S$GLB, | Performed by: PHYSICIAN ASSISTANT

## 2023-04-04 PROCEDURE — 1126F PR PAIN SEVERITY QUANTIFIED, NO PAIN PRESENT: ICD-10-PCS | Mod: HCNC,CPTII,S$GLB, | Performed by: PHYSICIAN ASSISTANT

## 2023-04-04 PROCEDURE — 99999 PR PBB SHADOW E&M-EST. PATIENT-LVL IV: CPT | Mod: PBBFAC,HCNC,, | Performed by: PHYSICIAN ASSISTANT

## 2023-04-04 RX ORDER — LOSARTAN POTASSIUM 50 MG/1
100 TABLET ORAL DAILY
Qty: 180 TABLET | Refills: 3 | Status: SHIPPED | OUTPATIENT
Start: 2023-04-04 | End: 2024-04-03

## 2023-04-04 NOTE — PROGRESS NOTES
Subjective:       Patient ID: Chelsea Ford Child is a 73 y.o. female.        Chief Complaint: Hypertension    Chelsea Cavazos is an established patient of Elva Tripathi MD here today for urgent care visit.    HTN -   Followed by digital hypertension    BP high 4/1-4/3  BP low a few times in past few weeks but correlates with times of hard work in the garden and dehydration     Felt fine when BP was high, no sx    Losartan 50 mg daily (gradual inc in dose over past few months)  Coreg 25 mg BID 6 AM and PM     H/o PAC's     Osteoporosis -   Last DEXA 11/2019  Declines repeat DEXA     Pulmonary HTN -   Seen on echo  PFT 6/2022 normal spirometry, DLCO dec due to hemoglobin  CT chest tiny pulmonary nodules largest 4 mm     Anemia   H/o thrombocytopenia but resolved on most recent labs          Review of Systems   Constitutional:  Negative for chills, diaphoresis, fatigue and fever.   HENT:  Negative for congestion and sore throat.    Eyes:  Negative for visual disturbance.   Respiratory:  Negative for cough, chest tightness and shortness of breath.    Cardiovascular:  Negative for chest pain, palpitations and leg swelling.   Gastrointestinal:  Negative for abdominal pain, blood in stool, constipation, diarrhea, nausea and vomiting.   Genitourinary:  Negative for dysuria, frequency, hematuria and urgency.   Musculoskeletal:  Negative for arthralgias and back pain.   Skin:  Negative for rash.   Neurological:  Negative for dizziness, syncope, weakness and headaches.   Psychiatric/Behavioral:  Negative for dysphoric mood and sleep disturbance. The patient is not nervous/anxious.      Objective:      Physical Exam  Vitals and nursing note reviewed.   Constitutional:       Appearance: Normal appearance. She is well-developed.   HENT:      Head: Normocephalic.      Right Ear: External ear normal.      Left Ear: External ear normal.   Eyes:      Pupils: Pupils are equal, round, and reactive to light.   Cardiovascular:  "     Rate and Rhythm: Normal rate and regular rhythm.      Heart sounds: Normal heart sounds. No murmur heard.    No friction rub. No gallop.   Pulmonary:      Effort: Pulmonary effort is normal. No respiratory distress.      Breath sounds: Normal breath sounds.   Abdominal:      Palpations: Abdomen is soft.      Tenderness: There is no abdominal tenderness.   Skin:     General: Skin is warm and dry.   Neurological:      Mental Status: She is alert.       Assessment:       1. Positive FIT (fecal immunochemical test)    2. Hypertension, essential        Plan:       Chelsea was seen today for hypertension.    Diagnoses and all orders for this visit:    Positive FIT (fecal immunochemical test) - she has colonoscopy consult upcoming    Hypertension, essential  -     INCREASE: losartan (COZAAR) 50 MG tablet; Take 2 tablets (100 mg total) by mouth once daily.    F/u in 4 weeks to evaluate BP  >30 minutes spent on patient encounter    Pt has been given instructions populated from patient instructions database and has verbalized understanding of the after visit summary and information contained wherein.    Follow up with a primary care provider. May go to ER for acute shortness of breath, lightheadedness, fever, or any other emergent complaints or changes in condition.    "This note will be shared with the patient"    Future Appointments   Date Time Provider Department Center   4/21/2023 10:00 AM Madison Mansfield NP [facility] Matt julio c [facility]   5/12/2023  9:30 AM PRE-ADMIT, ENDO -[facility] ENDOPP4 [facility]   5/18/2023  1:30 PM Lexis Sommers PA-C [facility] Matt julio [facility]   7/14/2023 10:00 AM Elva Tripathi MD [facility] Matt julio [facility]                 "

## 2023-04-06 ENCOUNTER — PATIENT MESSAGE (OUTPATIENT)
Dept: INTERNAL MEDICINE | Facility: CLINIC | Age: 73
End: 2023-04-06
Payer: MEDICARE

## 2023-04-06 ENCOUNTER — TELEPHONE (OUTPATIENT)
Dept: INTERNAL MEDICINE | Facility: CLINIC | Age: 73
End: 2023-04-06
Payer: MEDICARE

## 2023-04-06 DIAGNOSIS — I27.20 PULMONARY HYPERTENSION: Primary | ICD-10-CM

## 2023-04-06 NOTE — TELEPHONE ENCOUNTER
----- Message from Elva Tripathi MD sent at 4/5/2023 12:47 PM CDT -----  I see an echo from 2021. Did she have one more recently? If not should get an updated one.   ----- Message -----  From: Lexis Sommers PA-C  Sent: 4/5/2023   6:46 AM CDT  To: Elva Tripathi MD    I saw Ms. Child yesterday for her blood pressure - she had questions about her echo ordered by Dr. Sharma - showed pulmonary hypertension - she wonders if she should have f/u.  Thanks!    Lexis

## 2023-04-10 ENCOUNTER — HOSPITAL ENCOUNTER (OUTPATIENT)
Dept: CARDIOLOGY | Facility: HOSPITAL | Age: 73
Discharge: HOME | End: 2023-04-10
Attending: PHYSICIAN ASSISTANT
Payer: MEDICARE

## 2023-04-10 VITALS
SYSTOLIC BLOOD PRESSURE: 150 MMHG | HEIGHT: 66 IN | WEIGHT: 117 LBS | HEART RATE: 50 BPM | DIASTOLIC BLOOD PRESSURE: 70 MMHG | BODY MASS INDEX: 18.8 KG/M2

## 2023-04-10 DIAGNOSIS — I27.20 PULMONARY HYPERTENSION: ICD-10-CM

## 2023-04-10 LAB
ASCENDING AORTA: 2.98 CM
AV INDEX (PROSTH): 0.89
AV MEAN GRADIENT: 3 MMHG
AV PEAK GRADIENT: 5 MMHG
AV VALVE AREA: 2.8 CM2
AV VELOCITY RATIO: 0.96
BSA FOR ECHO PROCEDURE: 1.57 M2
CV ECHO LV RWT: 0.26 CM
DOP CALC AO PEAK VEL: 1.12 M/S
DOP CALC AO VTI: 31.04 CM
DOP CALC LVOT AREA: 3.1 CM2
DOP CALC LVOT DIAMETER: 2 CM
DOP CALC LVOT PEAK VEL: 1.08 M/S
DOP CALC LVOT STROKE VOLUME: 86.85 CM3
DOP CALCLVOT PEAK VEL VTI: 27.66 CM
E WAVE DECELERATION TIME: 203.15 MSEC
E/A RATIO: 1.22
E/E' RATIO: 8.2 M/S
ECHO LV POSTERIOR WALL: 0.6 CM (ref 0.6–1.1)
EJECTION FRACTION: 65 %
FRACTIONAL SHORTENING: 37 % (ref 28–44)
INTERVENTRICULAR SEPTUM: 0.75 CM (ref 0.6–1.1)
LA MAJOR: 4.24 CM
LA MINOR: 4.71 CM
LA WIDTH: 3.16 CM
LEFT ATRIUM SIZE: 3.75 CM
LEFT ATRIUM VOLUME INDEX MOD: 25.1 ML/M2
LEFT ATRIUM VOLUME INDEX: 28.3 ML/M2
LEFT ATRIUM VOLUME MOD: 39.95 CM3
LEFT ATRIUM VOLUME: 44.95 CM3
LEFT INTERNAL DIMENSION IN SYSTOLE: 2.92 CM (ref 2.1–4)
LEFT VENTRICLE DIASTOLIC VOLUME INDEX: 61.13 ML/M2
LEFT VENTRICLE DIASTOLIC VOLUME: 97.2 ML
LEFT VENTRICLE MASS INDEX: 60 G/M2
LEFT VENTRICLE SYSTOLIC VOLUME INDEX: 20.6 ML/M2
LEFT VENTRICLE SYSTOLIC VOLUME: 32.71 ML
LEFT VENTRICULAR INTERNAL DIMENSION IN DIASTOLE: 4.6 CM (ref 3.5–6)
LEFT VENTRICULAR MASS: 94.87 G
LV LATERAL E/E' RATIO: 8.2 M/S
LV SEPTAL E/E' RATIO: 8.2 M/S
MV A" WAVE DURATION": 13.7 MSEC
MV PEAK A VEL: 0.67 M/S
MV PEAK E VEL: 0.82 M/S
PISA TR MAX VEL: 2.99 M/S
PULM VEIN S/D RATIO: 1.35
PV PEAK D VEL: 0.4 M/S
PV PEAK S VEL: 0.54 M/S
RA MAJOR: 4.36 CM
RA PRESSURE: 3 MMHG
RA WIDTH: 3.4 CM
RIGHT VENTRICULAR END-DIASTOLIC DIMENSION: 2.85 CM
SINUS: 2.8 CM
STJ: 2.68 CM
TDI LATERAL: 0.1 M/S
TDI SEPTAL: 0.1 M/S
TDI: 0.1 M/S
TR MAX PG: 36 MMHG
TRICUSPID ANNULAR PLANE SYSTOLIC EXCURSION: 2.02 CM
TV REST PULMONARY ARTERY PRESSURE: 39 MMHG

## 2023-04-10 PROCEDURE — 93306 TTE W/DOPPLER COMPLETE: CPT

## 2023-04-10 PROCEDURE — 93306 TTE W/DOPPLER COMPLETE: CPT | Mod: 26,,, | Performed by: INTERNAL MEDICINE

## 2023-04-10 PROCEDURE — 93306 ECHO (CUPID ONLY): ICD-10-PCS | Mod: 26,,, | Performed by: INTERNAL MEDICINE

## 2023-04-18 ENCOUNTER — PATIENT MESSAGE (OUTPATIENT)
Dept: INTERNAL MEDICINE | Facility: CLINIC | Age: 73
End: 2023-04-18
Payer: MEDICARE

## 2023-04-18 ENCOUNTER — NURSE TRIAGE (OUTPATIENT)
Dept: ADMINISTRATIVE | Facility: CLINIC | Age: 73
End: 2023-04-18
Payer: MEDICARE

## 2023-04-18 ENCOUNTER — TELEPHONE (OUTPATIENT)
Dept: INTERNAL MEDICINE | Facility: CLINIC | Age: 73
End: 2023-04-18
Payer: MEDICARE

## 2023-04-18 VITALS — DIASTOLIC BLOOD PRESSURE: 75 MMHG | SYSTOLIC BLOOD PRESSURE: 150 MMHG

## 2023-04-18 NOTE — TELEPHONE ENCOUNTER
Called and discussed test results and recommendations with the pt. The pt expressed understanding.     1yr recall placed.

## 2023-04-18 NOTE — TELEPHONE ENCOUNTER
Called pt.   Losartan 50 mg daily increased to 100mg on 4/4/23.  Coreg 25 mg BID 6 AM and PM    Lower BP on 4/5 and 4/5 - 90s/50s. Felt fine.  Now up since 4/17. Most recent 209/107.  Readings are all on same machine.   No chest pain, no blurry vision, no shortness of breath. Does feel very cold.     150/75 on repeat while on phone.     Nurse visit to verify machine and check BP.   Wednesday (tomorrow) 2:30pm

## 2023-04-18 NOTE — TELEPHONE ENCOUNTER
----- Message from Elva Tripathi MD sent at 4/17/2023 12:47 PM CDT -----  If she is asymptomatic I am ok with monitoring for symptoms and repeating echo in a year or PRN dyspnea/other symptoms.   ----- Message -----  From: Lexis Sommers PA-C  Sent: 4/17/2023   6:39 AM CDT  To: Elva Tripathi MD    PA pressure 43 --> 39  She questions if she needs f/u on this  Let me know your thoughts    Best,  Lexis

## 2023-04-18 NOTE — TELEPHONE ENCOUNTER
Please call patient regarding echo    Dr. Tripathi reviewed and is fine with monitoring for sx like dyspnea and repeating echo yearly    Please let her know

## 2023-04-18 NOTE — TELEPHONE ENCOUNTER
Took it 2 pm,  Right arm, 194/96, Left arm 209/107.  Taken BP medication this am, second time this happened. Did go to UC for one episode. Triage done- go to office now. Advised UC or ED. Declined at this time. Will send high priority message to provider. I can see MA answering her message. Instructed to call back for any further questions or concerns or worsening S/S.  Reason for Disposition   Systolic BP >= 200 OR Diastolic >= 120 and having NO cardiac or neurologic symptoms    Additional Information   Negative: Sounds like a life-threatening emergency to the triager   Negative: Symptom is main concern (e.g., headache, chest pain)   Negative: Low blood pressure is main concern   Negative: Systolic BP >= 160 OR Diastolic >= 100, and any cardiac or neurologic symptoms (e.g., chest pain, difficulty breathing, unsteady gait, blurred vision)   Negative: Pregnant 20 or more weeks (or postpartum < 6 weeks) with new hand or face swelling   Negative: Pregnant 20 or more weeks (or postpartum < 6 weeks) and Systolic BP >= 160 OR Diastolic >= 100   Negative: Patient sounds very sick or weak to the triager    Protocols used: Blood Pressure - High-A-OH

## 2023-04-19 ENCOUNTER — CLINICAL SUPPORT (OUTPATIENT)
Dept: INTERNAL MEDICINE | Facility: CLINIC | Age: 73
End: 2023-04-19
Payer: MEDICARE

## 2023-04-19 ENCOUNTER — PATIENT MESSAGE (OUTPATIENT)
Dept: ADMINISTRATIVE | Facility: OTHER | Age: 73
End: 2023-04-19
Payer: MEDICARE

## 2023-04-19 VITALS — SYSTOLIC BLOOD PRESSURE: 172 MMHG | HEART RATE: 58 BPM | DIASTOLIC BLOOD PRESSURE: 90 MMHG

## 2023-04-19 PROCEDURE — 99999 PR PBB SHADOW E&M-EST. PATIENT-LVL III: ICD-10-PCS | Mod: PBBFAC,HCNC,,

## 2023-04-19 PROCEDURE — 99999 PR PBB SHADOW E&M-EST. PATIENT-LVL III: CPT | Mod: PBBFAC,HCNC,,

## 2023-04-19 NOTE — PROGRESS NOTES
Chelsea Ford Child 73 y.o. female is here for Blood Pressure check. in person    Manual Blood pressure reading was  172/90, Pulse 58. (Checked at the beginning of the visit)    If high, was it repeated after 15 minutes? yes    Pt's Home blood pressure machine read in office 192/89, Pulse 59.     Diagnosed with Hypertension yes.    Patient took blood pressure medication today yes.     All Medications and OTC medication updated yes    Does patient have record of home blood pressure readings / Blood Pressure Log on digital hypertension.     Does the pt have any complaints today in regards to their blood pressure medication? no.     Were you sitting still for 5-10 minutes prior to taking your Blood pressure? yes     Has your blood pressure monitor ever been checked?  04/19/23  When was last time we checked your blood pressure monitor? In office    Updated vitals yes        Creatinine   Date Value Ref Range Status   12/12/2022 0.9 0.5 - 1.4 mg/dL Final     Sodium   Date Value Ref Range Status   12/12/2022 135 (L) 136 - 145 mmol/L Final     Potassium   Date Value Ref Range Status   12/12/2022 4.0 3.5 - 5.1 mmol/L Final      Orthostatics was performed on the pt.  Sitting:3:48p  178/76, 52    Standing:3:51p  167/78, 55    Laying:3:54p  170/77, 61    Follow up date is 04/26/23 for BP check with new home BP cuff    Dr. Tripathi notified.

## 2023-04-21 ENCOUNTER — OFFICE VISIT (OUTPATIENT)
Dept: INTERNAL MEDICINE | Facility: CLINIC | Age: 73
End: 2023-04-21
Payer: MEDICARE

## 2023-04-21 VITALS
RESPIRATION RATE: 16 BRPM | HEIGHT: 66 IN | SYSTOLIC BLOOD PRESSURE: 130 MMHG | HEART RATE: 75 BPM | DIASTOLIC BLOOD PRESSURE: 58 MMHG | BODY MASS INDEX: 19.07 KG/M2 | WEIGHT: 118.63 LBS

## 2023-04-21 DIAGNOSIS — Z00.00 ENCOUNTER FOR PREVENTIVE HEALTH EXAMINATION: Primary | ICD-10-CM

## 2023-04-21 DIAGNOSIS — M79.89 LEG SWELLING: Chronic | ICD-10-CM

## 2023-04-21 DIAGNOSIS — I10 BENIGN ESSENTIAL HYPERTENSION: ICD-10-CM

## 2023-04-21 DIAGNOSIS — H90.3 SENSORINEURAL HEARING LOSS, BILATERAL: ICD-10-CM

## 2023-04-21 DIAGNOSIS — H81.11 BPPV (BENIGN PAROXYSMAL POSITIONAL VERTIGO), RIGHT: ICD-10-CM

## 2023-04-21 DIAGNOSIS — R00.2 PALPITATIONS: ICD-10-CM

## 2023-04-21 DIAGNOSIS — I27.20 PULMONARY HYPERTENSION: ICD-10-CM

## 2023-04-21 DIAGNOSIS — Z85.828 HISTORY OF BASAL CELL CARCINOMA (BCC) OF SKIN: ICD-10-CM

## 2023-04-21 DIAGNOSIS — D69.2 SENILE PURPURA: ICD-10-CM

## 2023-04-21 DIAGNOSIS — I49.1 ATRIAL PREMATURE CONTRACTIONS: Chronic | ICD-10-CM

## 2023-04-21 DIAGNOSIS — I27.9 CHRONIC PULMONARY HEART DISEASE: ICD-10-CM

## 2023-04-21 PROCEDURE — 1170F FXNL STATUS ASSESSED: CPT | Mod: HCNC,CPTII,S$GLB, | Performed by: NURSE PRACTITIONER

## 2023-04-21 PROCEDURE — G0439 PPPS, SUBSEQ VISIT: HCPCS | Mod: HCNC,S$GLB,, | Performed by: NURSE PRACTITIONER

## 2023-04-21 PROCEDURE — 1170F PR FUNCTIONAL STATUS ASSESSED: ICD-10-PCS | Mod: HCNC,CPTII,S$GLB, | Performed by: NURSE PRACTITIONER

## 2023-04-21 PROCEDURE — 1101F PT FALLS ASSESS-DOCD LE1/YR: CPT | Mod: HCNC,CPTII,S$GLB, | Performed by: NURSE PRACTITIONER

## 2023-04-21 PROCEDURE — 1126F AMNT PAIN NOTED NONE PRSNT: CPT | Mod: HCNC,CPTII,S$GLB, | Performed by: NURSE PRACTITIONER

## 2023-04-21 PROCEDURE — 1101F PR PT FALLS ASSESS DOC 0-1 FALLS W/OUT INJ PAST YR: ICD-10-PCS | Mod: HCNC,CPTII,S$GLB, | Performed by: NURSE PRACTITIONER

## 2023-04-21 PROCEDURE — 3008F BODY MASS INDEX DOCD: CPT | Mod: HCNC,CPTII,S$GLB, | Performed by: NURSE PRACTITIONER

## 2023-04-21 PROCEDURE — 1159F MED LIST DOCD IN RCRD: CPT | Mod: HCNC,CPTII,S$GLB, | Performed by: NURSE PRACTITIONER

## 2023-04-21 PROCEDURE — 1159F PR MEDICATION LIST DOCUMENTED IN MEDICAL RECORD: ICD-10-PCS | Mod: HCNC,CPTII,S$GLB, | Performed by: NURSE PRACTITIONER

## 2023-04-21 PROCEDURE — 3078F DIAST BP <80 MM HG: CPT | Mod: HCNC,CPTII,S$GLB, | Performed by: NURSE PRACTITIONER

## 2023-04-21 PROCEDURE — 3008F PR BODY MASS INDEX (BMI) DOCUMENTED: ICD-10-PCS | Mod: HCNC,CPTII,S$GLB, | Performed by: NURSE PRACTITIONER

## 2023-04-21 PROCEDURE — 3075F SYST BP GE 130 - 139MM HG: CPT | Mod: HCNC,CPTII,S$GLB, | Performed by: NURSE PRACTITIONER

## 2023-04-21 PROCEDURE — 99999 PR PBB SHADOW E&M-EST. PATIENT-LVL V: CPT | Mod: PBBFAC,HCNC,, | Performed by: NURSE PRACTITIONER

## 2023-04-21 PROCEDURE — 4010F ACE/ARB THERAPY RXD/TAKEN: CPT | Mod: HCNC,CPTII,S$GLB, | Performed by: NURSE PRACTITIONER

## 2023-04-21 PROCEDURE — 1160F RVW MEDS BY RX/DR IN RCRD: CPT | Mod: HCNC,CPTII,S$GLB, | Performed by: NURSE PRACTITIONER

## 2023-04-21 PROCEDURE — 3288F PR FALLS RISK ASSESSMENT DOCUMENTED: ICD-10-PCS | Mod: HCNC,CPTII,S$GLB, | Performed by: NURSE PRACTITIONER

## 2023-04-21 PROCEDURE — 3288F FALL RISK ASSESSMENT DOCD: CPT | Mod: HCNC,CPTII,S$GLB, | Performed by: NURSE PRACTITIONER

## 2023-04-21 PROCEDURE — 1160F PR REVIEW ALL MEDS BY PRESCRIBER/CLIN PHARMACIST DOCUMENTED: ICD-10-PCS | Mod: HCNC,CPTII,S$GLB, | Performed by: NURSE PRACTITIONER

## 2023-04-21 PROCEDURE — 99999 PR PBB SHADOW E&M-EST. PATIENT-LVL V: ICD-10-PCS | Mod: PBBFAC,HCNC,, | Performed by: NURSE PRACTITIONER

## 2023-04-21 PROCEDURE — G0439 PR MEDICARE ANNUAL WELLNESS SUBSEQUENT VISIT: ICD-10-PCS | Mod: HCNC,S$GLB,, | Performed by: NURSE PRACTITIONER

## 2023-04-21 PROCEDURE — 3078F PR MOST RECENT DIASTOLIC BLOOD PRESSURE < 80 MM HG: ICD-10-PCS | Mod: HCNC,CPTII,S$GLB, | Performed by: NURSE PRACTITIONER

## 2023-04-21 PROCEDURE — 3075F PR MOST RECENT SYSTOLIC BLOOD PRESS GE 130-139MM HG: ICD-10-PCS | Mod: HCNC,CPTII,S$GLB, | Performed by: NURSE PRACTITIONER

## 2023-04-21 PROCEDURE — 1126F PR PAIN SEVERITY QUANTIFIED, NO PAIN PRESENT: ICD-10-PCS | Mod: HCNC,CPTII,S$GLB, | Performed by: NURSE PRACTITIONER

## 2023-04-21 PROCEDURE — 4010F PR ACE/ARB THEARPY RXD/TAKEN: ICD-10-PCS | Mod: HCNC,CPTII,S$GLB, | Performed by: NURSE PRACTITIONER

## 2023-04-21 RX ORDER — ACETAMINOPHEN 325 MG/1
325 TABLET ORAL EVERY 6 HOURS PRN
COMMUNITY

## 2023-04-21 RX ORDER — LANOLIN ALCOHOL/MO/W.PET/CERES
100 CREAM (GRAM) TOPICAL DAILY
COMMUNITY
End: 2024-01-02

## 2023-04-21 NOTE — PROGRESS NOTES
"Chelsea Child presented for a  Medicare AWV and comprehensive Health Risk Assessment today. The following components were reviewed and updated:    Medical history  Family History  Social history  Allergies and Current Medications  Health Risk Assessment  Health Maintenance  Care Team         ** See Completed Assessments for Annual Wellness Visit within the encounter summary.**         The following assessments were completed:  Living Situation  CAGE  Depression Screening  Timed Get Up and Go  Whisper Test - N/A hearing impairment  Cognitive Function Screening    Nutrition Screening  ADL Screening  PAQ Screening  Review for Opioid Screening: Pt does not have Rx for Opioids.  Review for Substance Use Disorders: Patient does not use substances.        Vitals:    04/21/23 1026   BP: (!) 130/58   BP Location: Right arm   Patient Position: Sitting   BP Method: Medium (Manual)   Pulse: 75   Resp: 16   Weight: 53.8 kg (118 lb 9.7 oz)   Height: 5' 6" (1.676 m)     Body mass index is 19.14 kg/m².    Physical Exam  Vitals reviewed.   Constitutional:       Appearance: Normal appearance.   HENT:      Head: Normocephalic.   Cardiovascular:      Rate and Rhythm: Normal rate.   Pulmonary:      Effort: Pulmonary effort is normal.   Abdominal:      General: Bowel sounds are normal.   Musculoskeletal:         General: Normal range of motion.      Right lower leg: No edema.      Left lower leg: Edema present.   Skin:     General: Skin is warm and dry.      Capillary Refill: Capillary refill takes less than 2 seconds.      Comments: Senile purpura   Neurological:      Mental Status: She is alert and oriented to person, place, and time.   Psychiatric:         Behavior: Behavior normal.         Thought Content: Thought content normal.         Judgment: Judgment normal.             Diagnoses and health risks identified today and associated recommendations/orders:    1. Encounter for preventive health examination  Assessments completed.   " recommendations reviewed. Discussed shingles vaccines. Declines dexa. Colonoscopy as scheduled.  F/u with PCP as scheduled.    2. Chronic pulmonary heart disease  Chronic, stable on current regimen. Followed by PCP.    3. Pulmonary hypertension  Chronic, stable on current regimen. Followed by PCP.    4. Senile purpura  Chronic, stable on current regimen. Followed by PCP / derm.    5. Benign essential hypertension  Chronic, stable on current regimen. Followed by PCP.     6. Palpitations  Chronic, stable on current regimen. Followed by PCP.    7. Atrial premature contractions  Chronic, stable on current regimen. Followed by PCP.    8. BPPV (benign paroxysmal positional vertigo), right  Chronic, stable on current regimen. Followed by ENT.    9. Leg swelling  Chronic, stable on current regimen. Followed by PCP.    10. Sensorineural hearing loss, bilateral  Chronic, stable on current regimen. Followed by audiology.  - Ambulatory referral/consult to Audiology; Future    11. History of basal cell carcinoma (BCC) of skin  Chronic, stable on current regimen. Followed by derm. Skin check UTD.      Provided Chelsea with a 5-10 year written screening schedule and personal prevention plan. Recommendations were developed using the USPSTF age appropriate recommendations. Education, counseling, and referrals were provided as needed. After Visit Summary printed and given to patient which includes a list of additional screenings\tests needed.    Follow up in about 1 year (around 4/21/2024) for Medicare AWV and with PCP as scheduled.       Madison Mansfield NP    I offered to discuss advanced care planning, including how to pick a person who would make decisions for you if you were unable to make them for yourself, called a health care power of , and what kind of decisions you might make such as use of life sustaining treatments such as ventilators and tube feeding when faced with a life limiting illness recorded on a living will  that they will need to know. (How you want to be cared for as you near the end of your natural life)     X Patient is interested in learning more about how to make advanced directives.  I provided them paperwork and offered to discuss this with them.

## 2023-04-21 NOTE — PATIENT INSTRUCTIONS
1. Follow up with Dr. Elva Tripathi MD as scheduled.    2. Shingles vaccines (SHINGRIX) should now be available with no out pocket cost for medicare patients. Price at pharmacy if interested.    3. Colonoscopy call as scheduled.    4. Next derm skin check around 9/28/2023.    5. Can try adding fiber to diet or supplement, I like benefiber, and increase hydration.     6. Schedule audiology.    Counseling and Referral of Other Preventative  (Italic type indicates deductible and co-insurance are waived)    Patient Name: Chelsea Cavazos  Today's Date: 4/21/2023    Health Maintenance         Date Due Completion Date    Shingles Vaccine (1 of 2) Never done ---    DEXA Scan 11/22/2021 11/22/2019    Colorectal Cancer Screening 02/15/2023 2/14/2023    TETANUS VACCINE 06/10/2023 (Originally 1/6/1968) ---    Mammogram 12/27/2023 12/27/2022    Lipid Panel 06/03/2027 6/3/2022          Orders Placed This Encounter   Procedures    Ambulatory referral/consult to Audiology     The following information is provided to all patients.  This information is to help you find resources for any of the problems found today that may be affecting your health:                Living healthy guide: www.[facility].[place].gov      Understanding Diabetes: www.diabetes.org      Eating healthy: www.cdc.gov/healthyweight      CDC home safety checklist: www.cdc.gov/steadi/patient.html      Agency on Aging: www.goea.[place].[facility]      Alcoholics anonymous (AA): www.aa.org      Physical Activity: www.ben.nih.gov/qv0xdrq      Tobacco use: www.quitwithusla.org

## 2023-04-24 ENCOUNTER — CLINICAL SUPPORT (OUTPATIENT)
Dept: AUDIOLOGY | Facility: CLINIC | Age: 73
End: 2023-04-24
Payer: MEDICARE

## 2023-04-24 ENCOUNTER — OFFICE VISIT (OUTPATIENT)
Dept: OTOLARYNGOLOGY | Facility: CLINIC | Age: 73
End: 2023-04-24
Payer: MEDICARE

## 2023-04-24 DIAGNOSIS — H90.3 SENSORINEURAL HEARING LOSS, BILATERAL: Primary | ICD-10-CM

## 2023-04-24 DIAGNOSIS — H61.23 BILATERAL IMPACTED CERUMEN: Primary | ICD-10-CM

## 2023-04-24 DIAGNOSIS — H93.13 TINNITUS, BILATERAL: ICD-10-CM

## 2023-04-24 DIAGNOSIS — R42 DIZZINESS: ICD-10-CM

## 2023-04-24 PROCEDURE — 1160F PR REVIEW ALL MEDS BY PRESCRIBER/CLIN PHARMACIST DOCUMENTED: ICD-10-PCS | Mod: HCNC,CPTII,S$GLB,

## 2023-04-24 PROCEDURE — 92567 TYMPANOMETRY: CPT | Mod: HCNC,S$GLB,,

## 2023-04-24 PROCEDURE — 92567 PR TYMPA2METRY: ICD-10-PCS | Mod: HCNC,S$GLB,,

## 2023-04-24 PROCEDURE — 1101F PT FALLS ASSESS-DOCD LE1/YR: CPT | Mod: HCNC,CPTII,S$GLB,

## 2023-04-24 PROCEDURE — 1159F MED LIST DOCD IN RCRD: CPT | Mod: HCNC,CPTII,S$GLB,

## 2023-04-24 PROCEDURE — 99999 PR PBB SHADOW E&M-EST. PATIENT-LVL II: CPT | Mod: PBBFAC,HCNC,,

## 2023-04-24 PROCEDURE — 1101F PR PT FALLS ASSESS DOC 0-1 FALLS W/OUT INJ PAST YR: ICD-10-PCS | Mod: HCNC,CPTII,S$GLB,

## 2023-04-24 PROCEDURE — 4010F ACE/ARB THERAPY RXD/TAKEN: CPT | Mod: HCNC,CPTII,S$GLB,

## 2023-04-24 PROCEDURE — 4010F PR ACE/ARB THEARPY RXD/TAKEN: ICD-10-PCS | Mod: HCNC,CPTII,S$GLB,

## 2023-04-24 PROCEDURE — 1126F AMNT PAIN NOTED NONE PRSNT: CPT | Mod: HCNC,CPTII,S$GLB,

## 2023-04-24 PROCEDURE — 69210 PR REMOVAL IMPACTED CERUMEN REQUIRING INSTRUMENTATION, UNILATERAL: ICD-10-PCS | Mod: HCNC,S$GLB,,

## 2023-04-24 PROCEDURE — 92557 PR COMPREHENSIVE HEARING TEST: ICD-10-PCS | Mod: HCNC,S$GLB,,

## 2023-04-24 PROCEDURE — 99214 OFFICE O/P EST MOD 30 MIN: CPT | Mod: 25,HCNC,S$GLB,

## 2023-04-24 PROCEDURE — 1160F RVW MEDS BY RX/DR IN RCRD: CPT | Mod: HCNC,CPTII,S$GLB,

## 2023-04-24 PROCEDURE — 99999 PR PBB SHADOW E&M-EST. PATIENT-LVL III: ICD-10-PCS | Mod: PBBFAC,HCNC,,

## 2023-04-24 PROCEDURE — 1159F PR MEDICATION LIST DOCUMENTED IN MEDICAL RECORD: ICD-10-PCS | Mod: HCNC,CPTII,S$GLB,

## 2023-04-24 PROCEDURE — 99999 PR PBB SHADOW E&M-EST. PATIENT-LVL II: ICD-10-PCS | Mod: PBBFAC,HCNC,,

## 2023-04-24 PROCEDURE — 99214 PR OFFICE/OUTPT VISIT, EST, LEVL IV, 30-39 MIN: ICD-10-PCS | Mod: 25,HCNC,S$GLB,

## 2023-04-24 PROCEDURE — 99999 PR PBB SHADOW E&M-EST. PATIENT-LVL III: CPT | Mod: PBBFAC,HCNC,,

## 2023-04-24 PROCEDURE — 3288F PR FALLS RISK ASSESSMENT DOCUMENTED: ICD-10-PCS | Mod: HCNC,CPTII,S$GLB,

## 2023-04-24 PROCEDURE — 69210 REMOVE IMPACTED EAR WAX UNI: CPT | Mod: HCNC,S$GLB,,

## 2023-04-24 PROCEDURE — 3288F FALL RISK ASSESSMENT DOCD: CPT | Mod: HCNC,CPTII,S$GLB,

## 2023-04-24 PROCEDURE — 92557 COMPREHENSIVE HEARING TEST: CPT | Mod: HCNC,S$GLB,,

## 2023-04-24 PROCEDURE — 1126F PR PAIN SEVERITY QUANTIFIED, NO PAIN PRESENT: ICD-10-PCS | Mod: HCNC,CPTII,S$GLB,

## 2023-04-24 NOTE — PROGRESS NOTES
Bilateral cerumen impaction     Patient denies any otalgia or drainage. Patient reports she has recurrent cerumen impaction secondary to her narrow ear canals. She has been using Debrox.    Procedure Note:    The patient was brought to the minor procedure room and placed under the operating microscope. Using a combination of suction, curettes and cup forceps the patient's cerumen impaction was removed from left ear canal. The tympanic membrane was evaluated and was unremarkable. The patient tolerated the procedure well. There were no complications.    Procedure Note:    Patient was brought to the minor procedure room and using the operating microscope the right ear canal  was cleaned of ceruminous debris. There was a significant cerumen impaction.  The forceps and suction were both used to perform this. Tympanic membrane intact. Pt tolerated well. There were no complications.    RTC as needed.

## 2023-04-24 NOTE — Clinical Note
Moustapha Wallace,   Here are the results from Ms. Child's hearing evaluation. Please let me know if you need any further information or have any questions. Thanks!  Kendrick Arteaga

## 2023-04-24 NOTE — PROGRESS NOTES
Chelsea Cavazos, a 73 y.o. female, was seen today in the clinic for an audiologic evaluation.  The patient has a history of a bilateral sensorineural hearing loss (SNHL).  Ms. Cavazos reported difficulty hearing.  She reported she has constant bilateral tinnitus.  She also reported she has balance issues.  She stated she experiences an off-balance dizziness with certain position changes.  She also stated it sometimes takes her time to be able to walk in a straight line. There were no reports of aural fullness or otalgia.     Tympanometry revealed Type A in the right ear and Type A in the left ear.  Audiogram results revealed normal hearing sensitivity from 250-1500 Hz sloping from a mild to moderately-severe SNHL from 6135-9663 Hz in the right ear and normal hearing sensitivity from 250-1500 Hz sloping from a mild to severe SNHL from 7685-3217 Hz in the left ear.  Speech reception thresholds were noted at 20 dB in the right ear and 20 dB in the left ear.  Speech discrimination scores were 88% in the right ear and 92% in the left ear.    Ms. Cavazos was advised to contact her insurance to inquire if she has hearing aid coverage and where she can use it if she does since we do not accept insurance for hearing aids at our clinic.  I also advised Ms. Cavazos that she is more than welcome to come to our clinic for a hearing aid consultation.     Recommendations:  Otologic evaluation  Hearing protection when in noise  Hearing aid consultation  Annual audiogram

## 2023-04-27 ENCOUNTER — TELEPHONE (OUTPATIENT)
Dept: INTERNAL MEDICINE | Facility: CLINIC | Age: 73
End: 2023-04-27
Payer: MEDICARE

## 2023-04-27 NOTE — TELEPHONE ENCOUNTER
----- Message from Dunia Menendez sent at 4/27/2023  1:12 PM CDT -----  Contact: [phone]  Patient called, stated that unable to come today,would like for nurse to rescheduled her nurse visit appointment. Did not specify for what date.  please advise. Thank you

## 2023-05-01 ENCOUNTER — PATIENT MESSAGE (OUTPATIENT)
Dept: INTERNAL MEDICINE | Facility: CLINIC | Age: 73
End: 2023-05-01

## 2023-05-01 ENCOUNTER — CLINICAL SUPPORT (OUTPATIENT)
Dept: INTERNAL MEDICINE | Facility: CLINIC | Age: 73
End: 2023-05-01
Payer: MEDICARE

## 2023-05-01 ENCOUNTER — TELEPHONE (OUTPATIENT)
Dept: INTERNAL MEDICINE | Facility: CLINIC | Age: 73
End: 2023-05-01

## 2023-05-01 VITALS — SYSTOLIC BLOOD PRESSURE: 138 MMHG | DIASTOLIC BLOOD PRESSURE: 72 MMHG

## 2023-05-01 DIAGNOSIS — I10 ESSENTIAL HYPERTENSION: ICD-10-CM

## 2023-05-01 PROCEDURE — 99999 PR PBB SHADOW E&M-EST. PATIENT-LVL I: CPT | Mod: PBBFAC,HCNC,,

## 2023-05-01 PROCEDURE — 99999 PR PBB SHADOW E&M-EST. PATIENT-LVL I: ICD-10-PCS | Mod: PBBFAC,HCNC,,

## 2023-05-01 RX ORDER — CARVEDILOL 12.5 MG/1
12.5 TABLET ORAL 2 TIMES DAILY WITH MEALS
Qty: 180 TABLET | Refills: 3 | Status: SHIPPED | OUTPATIENT
Start: 2023-05-01 | End: 2023-05-02 | Stop reason: SDUPTHER

## 2023-05-01 NOTE — PROGRESS NOTES
Chelsea Ford Child 73 y.o. female is here for Blood Pressure check. in person    Manual Blood pressure reading was  138/72  (Checked at the beginning of the visit)    If high, was it repeated after 15 minutes? no    Diagnosed with Hypertension yes.    Patient took blood pressure medication today yes.  Last dose of blood pressure medication was taken at 9am. Patient took carvedilol @ 7 am and losartan @ 9 am.     All Medications and OTC medication updated yes    Does patient have record of home blood pressure readings / Blood Pressure Log yes.     Does the pt have any complaints today in regards to their blood pressure medication? yes. Complains of still having lightheadedness after getting her ears cleaned. X 2 months of lightheadedness. Patient is symptomatic.     Were you sitting still for 5-10 minutes prior to taking your Blood pressure? yes     Has your blood pressure monitor ever been checked? yes When was last time we checked your blood pressure monitor? Daily    Updated vitals yes  Pcp notified.         Creatinine   Date Value Ref Range Status   12/12/2022 0.9 0.5 - 1.4 mg/dL Final     Sodium   Date Value Ref Range Status   12/12/2022 135 (L) 136 - 145 mmol/L Final     Potassium   Date Value Ref Range Status   12/12/2022 4.0 3.5 - 5.1 mmol/L Final

## 2023-05-01 NOTE — TELEPHONE ENCOUNTER
Recent home readings are in normal range and pulse is low normal. C/o dizziness.   Let's decrease carvedilol to 12.5mg twice a day (OK to halve the 25mgs she has. New rx sent in.)  Keep upcoming appointment with Lexis for BP follow up.

## 2023-05-01 NOTE — TELEPHONE ENCOUNTER
My Note     2:55 PM     Edit  Delete  Copy     Chelsea Ford Child 73 y.o. female is here for Blood Pressure check. in person     Manual Blood pressure reading was  138/72  (Checked at the beginning of the visit)     If high, was it repeated after 15 minutes? no     Diagnosed with Hypertension yes.     Patient took blood pressure medication today yes.  Last dose of blood pressure medication was taken at 9am. Patient took carvedilol @ 7 am and losartan @ 9 am.      All Medications and OTC medication updated yes     Does patient have record of home blood pressure readings / Blood Pressure Log yes.      Does the pt have any complaints today in regards to their blood pressure medication? yes. Complains of still having lightheadedness after getting her ears cleaned. X 2 months of lightheadedness. Patient is symptomatic.      Were you sitting still for 5-10 minutes prior to taking your Blood pressure? yes      Has your blood pressure monitor ever been checked? yes When was last time we checked your blood pressure monitor? Daily     Updated vitals yes  Pcp notified.                  Creatinine   Date Value Ref Range Status   12/12/2022 0.9 0.5 - 1.4 mg/dL Final            Sodium   Date Value Ref Range Status   12/12/2022 135 (L) 136 - 145 mmol/L Final            Potassium   Date Value Ref Range Status   12/12/2022 4.0 3.5 - 5.1 mmol/L Final

## 2023-05-02 RX ORDER — CARVEDILOL 25 MG/1
25 TABLET ORAL 2 TIMES DAILY WITH MEALS
Qty: 180 TABLET | Refills: 3 | Status: SHIPPED | OUTPATIENT
Start: 2023-05-02

## 2023-05-03 ENCOUNTER — TELEPHONE (OUTPATIENT)
Dept: INTERNAL MEDICINE | Facility: CLINIC | Age: 73
End: 2023-05-03

## 2023-05-12 ENCOUNTER — CLINICAL SUPPORT (OUTPATIENT)
Dept: ENDOSCOPY | Facility: HOSPITAL | Age: 73
End: 2023-05-12
Payer: MEDICARE

## 2023-05-12 VITALS — BODY MASS INDEX: 19.13 KG/M2 | WEIGHT: 119 LBS | HEIGHT: 66 IN

## 2023-05-12 DIAGNOSIS — Z12.11 SPECIAL SCREENING FOR MALIGNANT NEOPLASMS, COLON: Primary | ICD-10-CM

## 2023-05-12 DIAGNOSIS — R19.5 POSITIVE FIT (FECAL IMMUNOCHEMICAL TEST): ICD-10-CM

## 2023-05-12 RX ORDER — POLYETHYLENE GLYCOL 3350, SODIUM SULFATE ANHYDROUS, SODIUM BICARBONATE, SODIUM CHLORIDE, POTASSIUM CHLORIDE 236; 22.74; 6.74; 5.86; 2.97 G/4L; G/4L; G/4L; G/4L; G/4L
4 POWDER, FOR SOLUTION ORAL ONCE
Qty: 4000 ML | Refills: 0 | Status: SHIPPED | OUTPATIENT
Start: 2023-05-12 | End: 2023-05-12

## 2023-05-18 ENCOUNTER — OFFICE VISIT (OUTPATIENT)
Dept: INTERNAL MEDICINE | Facility: CLINIC | Age: 73
End: 2023-05-18
Payer: MEDICARE

## 2023-05-18 VITALS
WEIGHT: 115.94 LBS | BODY MASS INDEX: 18.63 KG/M2 | HEIGHT: 66 IN | OXYGEN SATURATION: 96 % | DIASTOLIC BLOOD PRESSURE: 60 MMHG | SYSTOLIC BLOOD PRESSURE: 120 MMHG | HEART RATE: 64 BPM

## 2023-05-18 DIAGNOSIS — I10 BENIGN ESSENTIAL HYPERTENSION: ICD-10-CM

## 2023-05-18 DIAGNOSIS — M25.562 LEFT KNEE PAIN, UNSPECIFIED CHRONICITY: Primary | ICD-10-CM

## 2023-05-18 DIAGNOSIS — I27.20 PULMONARY HYPERTENSION: ICD-10-CM

## 2023-05-18 PROCEDURE — 1160F RVW MEDS BY RX/DR IN RCRD: CPT | Mod: HCNC,CPTII,, | Performed by: PHYSICIAN ASSISTANT

## 2023-05-18 PROCEDURE — 3008F BODY MASS INDEX DOCD: CPT | Mod: HCNC,CPTII,, | Performed by: PHYSICIAN ASSISTANT

## 2023-05-18 PROCEDURE — 1159F MED LIST DOCD IN RCRD: CPT | Mod: HCNC,CPTII,, | Performed by: PHYSICIAN ASSISTANT

## 2023-05-18 PROCEDURE — 99999 PR PBB SHADOW E&M-EST. PATIENT-LVL V: ICD-10-PCS | Mod: PBBFAC,HCNC,, | Performed by: PHYSICIAN ASSISTANT

## 2023-05-18 PROCEDURE — 1101F PT FALLS ASSESS-DOCD LE1/YR: CPT | Mod: HCNC,CPTII,, | Performed by: PHYSICIAN ASSISTANT

## 2023-05-18 PROCEDURE — 3078F DIAST BP <80 MM HG: CPT | Mod: HCNC,CPTII,, | Performed by: PHYSICIAN ASSISTANT

## 2023-05-18 PROCEDURE — 1160F PR REVIEW ALL MEDS BY PRESCRIBER/CLIN PHARMACIST DOCUMENTED: ICD-10-PCS | Mod: HCNC,CPTII,, | Performed by: PHYSICIAN ASSISTANT

## 2023-05-18 PROCEDURE — 1125F PR PAIN SEVERITY QUANTIFIED, PAIN PRESENT: ICD-10-PCS | Mod: HCNC,CPTII,, | Performed by: PHYSICIAN ASSISTANT

## 2023-05-18 PROCEDURE — 3074F PR MOST RECENT SYSTOLIC BLOOD PRESSURE < 130 MM HG: ICD-10-PCS | Mod: HCNC,CPTII,, | Performed by: PHYSICIAN ASSISTANT

## 2023-05-18 PROCEDURE — 3008F PR BODY MASS INDEX (BMI) DOCUMENTED: ICD-10-PCS | Mod: HCNC,CPTII,, | Performed by: PHYSICIAN ASSISTANT

## 2023-05-18 PROCEDURE — 99214 PR OFFICE/OUTPT VISIT, EST, LEVL IV, 30-39 MIN: ICD-10-PCS | Mod: HCNC,,, | Performed by: PHYSICIAN ASSISTANT

## 2023-05-18 PROCEDURE — 4010F PR ACE/ARB THEARPY RXD/TAKEN: ICD-10-PCS | Mod: HCNC,CPTII,, | Performed by: PHYSICIAN ASSISTANT

## 2023-05-18 PROCEDURE — 3078F PR MOST RECENT DIASTOLIC BLOOD PRESSURE < 80 MM HG: ICD-10-PCS | Mod: HCNC,CPTII,, | Performed by: PHYSICIAN ASSISTANT

## 2023-05-18 PROCEDURE — 1159F PR MEDICATION LIST DOCUMENTED IN MEDICAL RECORD: ICD-10-PCS | Mod: HCNC,CPTII,, | Performed by: PHYSICIAN ASSISTANT

## 2023-05-18 PROCEDURE — 1101F PR PT FALLS ASSESS DOC 0-1 FALLS W/OUT INJ PAST YR: ICD-10-PCS | Mod: HCNC,CPTII,, | Performed by: PHYSICIAN ASSISTANT

## 2023-05-18 PROCEDURE — 3288F PR FALLS RISK ASSESSMENT DOCUMENTED: ICD-10-PCS | Mod: HCNC,CPTII,, | Performed by: PHYSICIAN ASSISTANT

## 2023-05-18 PROCEDURE — 99215 OFFICE O/P EST HI 40 MIN: CPT | Mod: HCNC | Performed by: PHYSICIAN ASSISTANT

## 2023-05-18 PROCEDURE — 1125F AMNT PAIN NOTED PAIN PRSNT: CPT | Mod: HCNC,CPTII,, | Performed by: PHYSICIAN ASSISTANT

## 2023-05-18 PROCEDURE — 4010F ACE/ARB THERAPY RXD/TAKEN: CPT | Mod: HCNC,CPTII,, | Performed by: PHYSICIAN ASSISTANT

## 2023-05-18 PROCEDURE — 3288F FALL RISK ASSESSMENT DOCD: CPT | Mod: HCNC,CPTII,, | Performed by: PHYSICIAN ASSISTANT

## 2023-05-18 PROCEDURE — 3074F SYST BP LT 130 MM HG: CPT | Mod: HCNC,CPTII,, | Performed by: PHYSICIAN ASSISTANT

## 2023-05-18 PROCEDURE — 99999 PR PBB SHADOW E&M-EST. PATIENT-LVL V: CPT | Mod: PBBFAC,HCNC,, | Performed by: PHYSICIAN ASSISTANT

## 2023-05-18 PROCEDURE — 99214 OFFICE O/P EST MOD 30 MIN: CPT | Mod: HCNC,,, | Performed by: PHYSICIAN ASSISTANT

## 2023-05-18 RX ORDER — DICLOFENAC SODIUM 10 MG/G
2 GEL TOPICAL 2 TIMES DAILY
Qty: 100 G | Refills: 2 | Status: SHIPPED | OUTPATIENT
Start: 2023-05-18 | End: 2023-07-14 | Stop reason: SDUPTHER

## 2023-05-18 NOTE — PROGRESS NOTES
Subjective:       Patient ID: Chelsea Ford Child is a 73 y.o. female.        Chief Complaint: Follow-up    Chelsea Cvaazos is an established patient of Elva Tripathi MD here today for follow up visit.    HTN -   Followed by digital hypertension  Losartan 50 mg 2 tablets daily (gradual inc in dose over past few months)  Coreg 25 mg BID   BP is trending down though only 42% in range, previously running much higher, now usually only running up to 140, sometimes BP low as well though she feels fine     H/o PAC's     Osteoporosis -   Last DEXA 11/2019  Declines repeat DEXA     Pulmonary HTN -   Seen on echo  PFT 6/2022 normal spirometry, DLCO dec due to hemoglobin  CT chest tiny pulmonary nodules largest 4 mm     Anemia   H/o thrombocytopenia but resolved on most recent labs    Left knee pain -   Recurrent issue  Saw ortho 5/2022 and 10/2022 for steroid injection  Has not done PT  X-rays reviewed showing DJD         Review of Systems   Constitutional:  Negative for chills, diaphoresis, fatigue and fever.   HENT:  Negative for congestion and sore throat.    Eyes:  Negative for visual disturbance.   Respiratory:  Negative for cough, chest tightness and shortness of breath.    Cardiovascular:  Negative for chest pain, palpitations and leg swelling.   Gastrointestinal:  Negative for abdominal pain, blood in stool, constipation, diarrhea, nausea and vomiting.   Genitourinary:  Negative for dysuria, frequency, hematuria and urgency.   Musculoskeletal:  Positive for arthralgias. Negative for back pain.   Skin:  Negative for rash.   Neurological:  Negative for dizziness, syncope, weakness and headaches.   Psychiatric/Behavioral:  Negative for dysphoric mood and sleep disturbance. The patient is not nervous/anxious.      Objective:      Physical Exam  Vitals and nursing note reviewed.   Constitutional:       Appearance: Normal appearance. She is well-developed.   HENT:      Head: Normocephalic.      Right Ear: External  "ear normal.      Left Ear: External ear normal.   Eyes:      Pupils: Pupils are equal, round, and reactive to light.   Cardiovascular:      Rate and Rhythm: Normal rate and regular rhythm.      Heart sounds: Normal heart sounds. No murmur heard.    No friction rub. No gallop.   Pulmonary:      Effort: Pulmonary effort is normal. No respiratory distress.      Breath sounds: Normal breath sounds.   Abdominal:      Palpations: Abdomen is soft.      Tenderness: There is no abdominal tenderness.   Skin:     General: Skin is warm and dry.   Neurological:      Mental Status: She is alert.       Assessment:       1. Left knee pain, unspecified chronicity    2. Pulmonary hypertension    3. Benign essential hypertension        Plan:       Chelsea was seen today for follow-up.    Diagnoses and all orders for this visit:    Left knee pain, unspecified chronicity - will try PT and voltaren gel as she has had two steroid injections within the past year  -     Ambulatory referral/consult to Physical/Occupational Therapy; Future  -     diclofenac sodium (VOLTAREN) 1 % Gel; Apply 2 g topically 2 (two) times daily.    Pulmonary hypertension - echo 4/10/23, will check yearly    Benign essential hypertension - better controlled, continue to monitor through digital hypertension, will keep medication regiment the same for now  BP Readings from Last 5 Encounters:   05/18/23 120/60   05/01/23 138/72   04/21/23 (!) 130/58   04/19/23 (!) 172/90   04/18/23 (!) 150/75      Pt has been given instructions populated from patient instructions database and has verbalized understanding of the after visit summary and information contained wherein.    Follow up with a primary care provider. May go to ER for acute shortness of breath, lightheadedness, fever, or any other emergent complaints or changes in condition.    "This note will be shared with the patient"    Future Appointments   Date Time Provider Department Center   7/14/2023 10:00 AM Elva " STEPHANIE Tripathi MD [facility] [address] [facility]   9/29/2023 10:10 AM Cony Waters MD [facility]

## 2023-06-19 ENCOUNTER — TELEPHONE (OUTPATIENT)
Dept: ENDOSCOPY | Facility: HOSPITAL | Age: 73
End: 2023-06-19
Payer: MEDICARE

## 2023-06-19 DIAGNOSIS — Z12.11 SPECIAL SCREENING FOR MALIGNANT NEOPLASMS, COLON: Primary | ICD-10-CM

## 2023-06-19 RX ORDER — POLYETHYLENE GLYCOL 3350, SODIUM SULFATE ANHYDROUS, SODIUM BICARBONATE, SODIUM CHLORIDE, POTASSIUM CHLORIDE 236; 22.74; 6.74; 5.86; 2.97 G/4L; G/4L; G/4L; G/4L; G/4L
4 POWDER, FOR SOLUTION ORAL ONCE
Qty: 4000 ML | Refills: 0 | Status: SHIPPED | OUTPATIENT
Start: 2023-06-19 | End: 2023-06-19

## 2023-06-19 NOTE — TELEPHONE ENCOUNTER
Returning patient's call to endoscopy scheduling main line about needing prep sent to pharmacy again. Called patient. Patent did not answer. [facility] prep will be sent to [facility] again.

## 2023-06-20 ENCOUNTER — PATIENT MESSAGE (OUTPATIENT)
Dept: INTERNAL MEDICINE | Facility: CLINIC | Age: 73
End: 2023-06-20
Payer: MEDICARE

## 2023-06-20 NOTE — TELEPHONE ENCOUNTER
Called pt and she is unpleased with the service over at endo department.   Stated that it is unorganized and unclear as to what is needed.  Pt may or may not cancel if she doesn't get a callback from them regarding what is needed to be done before her procedure date.

## 2023-06-23 ENCOUNTER — PATIENT MESSAGE (OUTPATIENT)
Dept: ENDOSCOPY | Facility: HOSPITAL | Age: 73
End: 2023-06-23
Payer: MEDICARE

## 2023-06-26 RX ORDER — SODIUM CHLORIDE 9 MG/ML
INJECTION, SOLUTION INTRAVENOUS CONTINUOUS
Status: CANCELLED | OUTPATIENT
Start: 2023-06-26

## 2023-06-26 RX ORDER — SODIUM CHLORIDE 0.9 % (FLUSH) 0.9 %
10 SYRINGE (ML) INJECTION
Status: CANCELLED | OUTPATIENT
Start: 2023-06-26

## 2023-06-27 ENCOUNTER — HOSPITAL ENCOUNTER (OUTPATIENT)
Facility: HOSPITAL | Age: 73
Discharge: HOME | End: 2023-06-27
Attending: INTERNAL MEDICINE | Admitting: INTERNAL MEDICINE
Payer: MEDICARE

## 2023-06-27 ENCOUNTER — ANESTHESIA (OUTPATIENT)
Dept: ENDOSCOPY | Facility: HOSPITAL | Age: 73
End: 2023-06-27
Payer: MEDICARE

## 2023-06-27 ENCOUNTER — ANESTHESIA EVENT (OUTPATIENT)
Dept: ENDOSCOPY | Facility: HOSPITAL | Age: 73
End: 2023-06-27
Payer: MEDICARE

## 2023-06-27 VITALS
WEIGHT: 115 LBS | BODY MASS INDEX: 19.16 KG/M2 | OXYGEN SATURATION: 97 % | HEART RATE: 68 BPM | TEMPERATURE: 97 F | DIASTOLIC BLOOD PRESSURE: 65 MMHG | SYSTOLIC BLOOD PRESSURE: 149 MMHG | HEIGHT: 65 IN | RESPIRATION RATE: 16 BRPM

## 2023-06-27 DIAGNOSIS — R19.5 POSITIVE FIT (FECAL IMMUNOCHEMICAL TEST): Primary | ICD-10-CM

## 2023-06-27 PROCEDURE — 88305 TISSUE EXAM BY PATHOLOGIST: CPT | Mod: 26,,, | Performed by: PATHOLOGY

## 2023-06-27 PROCEDURE — 25000003 PHARM REV CODE 250: Performed by: INTERNAL MEDICINE

## 2023-06-27 PROCEDURE — 63600175 PHARM REV CODE 636 W HCPCS: Performed by: NURSE ANESTHETIST, CERTIFIED REGISTERED

## 2023-06-27 PROCEDURE — 88305 TISSUE EXAM BY PATHOLOGIST: CPT | Performed by: PATHOLOGY

## 2023-06-27 PROCEDURE — 45380 COLONOSCOPY AND BIOPSY: CPT | Mod: PT,HCNC | Performed by: INTERNAL MEDICINE

## 2023-06-27 PROCEDURE — 37000008 HC ANESTHESIA 1ST 15 MINUTES: Performed by: INTERNAL MEDICINE

## 2023-06-27 PROCEDURE — E9220 PRA ENDO ANESTHESIA: HCPCS | Mod: PT,,, | Performed by: NURSE ANESTHETIST, CERTIFIED REGISTERED

## 2023-06-27 PROCEDURE — 88305 TISSUE EXAM BY PATHOLOGIST: ICD-10-PCS | Mod: 26,,, | Performed by: PATHOLOGY

## 2023-06-27 PROCEDURE — 45380 PR COLONOSCOPY,BIOPSY: ICD-10-PCS | Mod: PT,HCNC,, | Performed by: INTERNAL MEDICINE

## 2023-06-27 PROCEDURE — E9220 PRA ENDO ANESTHESIA: ICD-10-PCS | Mod: PT,,, | Performed by: NURSE ANESTHETIST, CERTIFIED REGISTERED

## 2023-06-27 PROCEDURE — 25000003 PHARM REV CODE 250: Performed by: NURSE ANESTHETIST, CERTIFIED REGISTERED

## 2023-06-27 PROCEDURE — 45380 COLONOSCOPY AND BIOPSY: CPT | Mod: PT,HCNC,, | Performed by: INTERNAL MEDICINE

## 2023-06-27 PROCEDURE — 37000009 HC ANESTHESIA EA ADD 15 MINS: Performed by: INTERNAL MEDICINE

## 2023-06-27 PROCEDURE — 27201012 HC FORCEPS, HOT/COLD, DISP: Performed by: INTERNAL MEDICINE

## 2023-06-27 RX ORDER — PROPOFOL 10 MG/ML
VIAL (ML) INTRAVENOUS CONTINUOUS PRN
Status: DISCONTINUED | OUTPATIENT
Start: 2023-06-27 | End: 2023-06-27

## 2023-06-27 RX ORDER — PROPOFOL 10 MG/ML
VIAL (ML) INTRAVENOUS
Status: DISCONTINUED | OUTPATIENT
Start: 2023-06-27 | End: 2023-06-27

## 2023-06-27 RX ORDER — SODIUM CHLORIDE 9 MG/ML
INJECTION, SOLUTION INTRAVENOUS CONTINUOUS
Status: DISCONTINUED | OUTPATIENT
Start: 2023-06-27 | End: 2023-06-27 | Stop reason: HOSPADM

## 2023-06-27 RX ORDER — LIDOCAINE HYDROCHLORIDE 20 MG/ML
INJECTION INTRAVENOUS
Status: DISCONTINUED | OUTPATIENT
Start: 2023-06-27 | End: 2023-06-27

## 2023-06-27 RX ADMIN — LIDOCAINE HYDROCHLORIDE 50 MG: 20 INJECTION INTRAVENOUS at 07:06

## 2023-06-27 RX ADMIN — PROPOFOL 80 MG: 10 INJECTION, EMULSION INTRAVENOUS at 07:06

## 2023-06-27 RX ADMIN — Medication 175 MCG/KG/MIN: at 07:06

## 2023-06-27 RX ADMIN — PROPOFOL 30 MG: 10 INJECTION, EMULSION INTRAVENOUS at 07:06

## 2023-06-27 RX ADMIN — SODIUM CHLORIDE: 9 INJECTION, SOLUTION INTRAVENOUS at 07:06

## 2023-06-27 NOTE — ANESTHESIA POSTPROCEDURE EVALUATION
Anesthesia Post Evaluation    Patient: Chelsea Ford Child    Procedure(s) Performed: Procedure(s) (LRB):  COLONOSCOPY (N/A)    Final Anesthesia Type: general      Patient location during evaluation: GI PACU  Patient participation: Yes- Able to Participate  Level of consciousness: awake and alert  Post-procedure vital signs: reviewed and stable  Pain management: adequate  Airway patency: patent    PONV status at discharge: No PONV  Anesthetic complications: no      Cardiovascular status: blood pressure returned to baseline and stable  Respiratory status: unassisted, spontaneous ventilation and room air  Hydration status: euvolemic  Follow-up not needed.          Vitals Value Taken Time   /65 06/27/23 0855   Temp 36.2 °C (97.2 °F) 06/27/23 0825   Pulse 68 06/27/23 0855   Resp 16 06/27/23 0855   SpO2 97 % 06/27/23 0855         Event Time   Out of Recovery 09:06:27         Pain/Curt Score: Curt Score: 10 (6/27/2023  8:55 AM)

## 2023-06-27 NOTE — PROVATION PATIENT INSTRUCTIONS
Discharge Summary/Instructions after an Endoscopic Procedure  Patient Name: Chelsea Cavazos  Patient MRN: [id number]  Patient YOB: 1950 Tuesday, June 27, 2023  Serafin Murrell MD  Dear patient,  As a result of recent federal legislation (The Federal Cures Act), you may   receive lab or pathology results from your procedure in your [facility]   account before your physician is able to contact you. Your physician or   their representative will relay the results to you with their   recommendations at their soonest availability.  Thank you,  RESTRICTIONS:  During your procedure today, you received medications for sedation.  These   medications may affect your judgment, balance and coordination.  Therefore,   for 24 hours, you have the following restrictions:   - DO NOT drive a car, operate machinery, make legal/financial decisions,   sign important papers or drink alcohol.    ACTIVITY:  Today: no heavy lifting, straining or running due to procedural   sedation/anesthesia.  The following day: return to full activity including work.  DIET:  Eat and drink normally unless instructed otherwise.     TREATMENT FOR COMMON SIDE EFFECTS:  - Mild abdominal pain, nausea, belching, bloating or excessive gas:  rest,   eat lightly and use a heating pad.  - Sore Throat: treat with throat lozenges and/or gargle with warm salt   water.  - Because air was used during the procedure, expelling large amounts of air   from your rectum or belching is normal.  - If a bowel prep was taken, you may not have a bowel movement for 1-3 days.    This is normal.  SYMPTOMS TO WATCH FOR AND REPORT TO YOUR PHYSICIAN:  1. Abdominal pain or bloating, other than gas cramps.  2. Chest pain.  3. Back pain.  4. Signs of infection such as: chills or fever occurring within 24 hours   after the procedure.  5. Rectal bleeding, which would show as bright red, maroon, or black stools.   (A tablespoon of blood from the rectum is not serious, especially if    hemorrhoids are present.)  6. Vomiting.  7. Weakness or dizziness.  GO DIRECTLY TO THE NEAREST EMERGENCY ROOM IF YOU HAVE ANY OF THE FOLLOWING:      Difficulty breathing              Chills and/or fever over 101 F   Persistent vomiting and/or vomiting blood   Severe abdominal pain   Severe chest pain   Black, tarry stools   Bleeding- more than one tablespoon   Any other symptom or condition that you feel may need urgent attention  Your doctor recommends these additional instructions:  If any biopsies were taken, your doctors clinic will contact you in 1 to 2   weeks with any results.  - Discharge patient to home (ambulatory).   - Patient has a contact number available for emergencies.  The signs and   symptoms of potential delayed complications were discussed with the   patient.  Return to normal activities tomorrow.  Written discharge   instructions were provided to the patient.   - Resume previous diet.   - Continue present medications.   - Return to primary care physician as previously scheduled.   - Repeat colonoscopy in 7 years for surveillance based on pathology   results.  For questions, problems or results please call your physician - Serafin Murrell MD at Work:  [phone].  [facility], EMERGENCY ROOM PHONE NUMBER: [phone]  IF A COMPLICATION OR EMERGENCY SITUATION ARISES AND YOU ARE UNABLE TO REACH   YOUR PHYSICIAN - GO DIRECTLY TO THE EMERGENCY ROOM.  Serafin Murrell MD  6/27/2023 8:21:26 AM  This report has been verified and signed electronically.  Dear patient,  As a result of recent federal legislation (The Federal Cures Act), you may   receive lab or pathology results from your procedure in your [facility]   account before your physician is able to contact you. Your physician or   their representative will relay the results to you with their   recommendations at their soonest availability.  Thank you,  PROVATION

## 2023-06-27 NOTE — TRANSFER OF CARE
"Anesthesia Transfer of Care Note    Patient: Chelsea Cavazos    Procedure(s) Performed: Procedure(s) (LRB):  COLONOSCOPY (N/A)    Patient location: PACU    Anesthesia Type: general    Transport from OR: Transported from OR on room air with adequate spontaneous ventilation    Post pain: adequate analgesia    Post assessment: no apparent anesthetic complications    Post vital signs: stable    Level of consciousness: awake    Nausea/Vomiting: no nausea/vomiting    Complications: none    Transfer of care protocol was followed      Last vitals:   Visit Vitals  /61   Pulse 65   Temp 36.2 °C (97.2 °F) (Temporal)   Resp 16   Ht 5' 5" (1.651 m)   Wt 52.2 kg (115 lb)   LMP  (LMP Unknown)   SpO2 96%   Breastfeeding No   BMI 19.14 kg/m²     "

## 2023-06-27 NOTE — ANESTHESIA PREPROCEDURE EVALUATION
2023  Chelsea Ford Child is a 73 y.o., female.  Past Medical History:   Diagnosis Date    Amblyopia of left eye     BCC (basal cell carcinoma)     Cataract     Exotropia of left eye     Hypertension     Osteoporosis     Palpitations 2017    Vertigo 2017     Past Surgical History:   Procedure Laterality Date     SECTION      COSMETIC SURGERY      Basal cell carcinoma    EYE SURGERY      Lazy eye    Mohs      BCC    STRABISMUS SURGERY Left        Pre-op Assessment    I have reviewed the Patient Summary Reports.     I have reviewed the Nursing Notes. I have reviewed the NPO Status.   I have reviewed the Medications.     Review of Systems  Anesthesia Hx:  No problems with previous Anesthesia    Social:  Former Smoker, No Alcohol Use    Cardiovascular:   Hypertension        Physical Exam  General: Well nourished, Cooperative, Alert and Oriented    Airway:  Mallampati: II / II  Mouth Opening: Normal  TM Distance: Normal  Tongue: Normal  Neck ROM: Normal ROM    Dental:  Intact    Chest/Lungs:  Clear to auscultation, Normal Respiratory Rate    Heart:  Rate: Normal  Rhythm: Regular Rhythm        Anesthesia Plan  Type of Anesthesia, risks & benefits discussed:    Anesthesia Type: Gen Natural Airway  Intra-op Monitoring Plan: Standard ASA Monitors  Post Op Pain Control Plan: multimodal analgesia  Induction:  IV  Informed Consent: Informed consent signed with the Patient and all parties understand the risks and agree with anesthesia plan.  All questions answered.   ASA Score: 2  Day of Surgery Review of History & Physical: H&P Update referred to the surgeon/provider.    Ready For Surgery From Anesthesia Perspective.     .

## 2023-06-27 NOTE — H&P
Short Stay Endoscopy History and Physical    PCP - Elva Tripathi MD    Procedure - Colonoscopy  ASA - per anesthesia  Mallampati - per anesthesia  History of Anesthesia problems - no  Family history Anesthesia problems -  no   Plan of anesthesia - General    HPI:  This is a 73 y.o. female here for evaluation of :  CRC screening, positive iFOBT. No prior colonoscopies or family hx of CRC. Denies rectal bleeding. Does endorse intermittent constipation.     ROS:  Constitutional: No fevers, chills  CV: No chest pain  Pulm: No shortness of breath  GI: see HPI  Derm: No rash    Medical History:  has a past medical history of Amblyopia of left eye, BCC (basal cell carcinoma), Cataract, Exotropia of left eye, Hypertension, Osteoporosis, Palpitations (2017), and Vertigo (2017).    Surgical History:  has a past surgical history that includes Mohs;  section; Strabismus surgery (Left); Eye surgery (); and Cosmetic surgery ().    Family History: family history includes Breast cancer in her mother; COPD in her father; Cancer in her maternal aunt, maternal uncle, and mother; Hypertension in her father and mother; No Known Problems in her son; Osteoporosis in her mother; Stroke (age of onset: 72) in her father.. Otherwise no colon cancer, inflammatory bowel disease, or GI malignancies.    Social History:  reports that she quit smoking about 39 years ago. Her smoking use included cigarettes. She started smoking about 45 years ago. She has a 5.00 pack-year smoking history. She has never used smokeless tobacco. She reports that she does not drink alcohol and does not use drugs.    Review of patient's allergies indicates:   Allergen Reactions    Omnicef [cefdinir] Diarrhea    Adhesive Dermatitis    Nickel sutures [surgical stainless steel]      Rash with nickel       Medications:   Medications Prior to Admission   Medication Sig Dispense Refill Last Dose    acetaminophen (TYLENOL) 325 MG tablet Take  325 mg by mouth every 6 (six) hours as needed for Pain.       ascorbic acid, vitamin C, (VITAMIN C) 1000 MG tablet Take 1,000 mg by mouth once daily. Takes a powder form (about 1 teaspoon) daily, has calcium in it as well       aspirin (ECOTRIN) 81 MG EC tablet Take 81 mg by mouth once daily.       calcium carbonate (OS-DAVID) 600 mg calcium (1,500 mg) Tab Take 1,200 mg by mouth once.       CALCIUM ORAL Take by mouth.       carvediloL (COREG) 25 MG tablet Take 1 tablet (25 mg total) by mouth 2 (two) times daily with meals. 180 tablet 3     cyanocobalamin (VITAMIN B-12) 1000 MCG tablet Take 100 mcg by mouth once daily.       diclofenac sodium (VOLTAREN) 1 % Gel Apply 2 g topically 2 (two) times daily. 100 g 2     enzymes,digestive (DIGESTIVE ENZYMES ORAL) Take by mouth.       estradioL (ESTRACE) 0.01 % (0.1 mg/gram) vaginal cream Rub dime sized amount of cream to the urethra nightly 42.5 g 2     fluorouraciL (EFUDEX) 5 % cream Use hs for 2 weeks 40 g 3     iron 18 mg Tab Take by mouth. 1/2 Tablet       L. acidophilus-B. animalis-D2 1 billion cell- 20 mcg Chew Take by mouth.       loratadine 10 mg Cap Take 1 tablet by mouth once daily.       losartan (COZAAR) 50 MG tablet Take 2 tablets (100 mg total) by mouth once daily. 180 tablet 3     vitamin D (VITAMIN D3) 1000 units Tab Take 2,000 Units by mouth once daily.            Physical Exam:    Vital Signs: There were no vitals filed for this visit.    General Appearance: Well appearing in no acute distress  Eyes:    No scleral icterus  ENT: lips and tongue normal  Lungs: no use of accessory muscles  Heart:  normal rate, regular rhythm  Abdomen: Soft, non tender, non distended   Extremities: no edema  Skin: No rash      Labs:  Lab Results   Component Value Date    WBC 5.07 12/12/2022    HGB 11.0 (L) 12/12/2022    HCT 34.0 (L) 12/12/2022     12/12/2022    CHOL 180 06/03/2022    TRIG 43 06/03/2022    HDL 74 06/03/2022    ALT 15 12/12/2022    AST 23 12/12/2022    NA  135 (L) 12/12/2022    K 4.0 12/12/2022     12/12/2022    CREATININE 0.9 12/12/2022    BUN 9 12/12/2022    CO2 25 12/12/2022    TSH 3.008 06/03/2022    HGBA1C 5.6 08/03/2017       I have explained the risks and benefits of endoscopy procedures to the patient including but not limited to bleeding, perforation, infection, and death.  The patient was asked if they understand and allowed to ask any further questions to their satisfaction.    Jovanni Carbone MD

## 2023-06-30 LAB
FINAL PATHOLOGIC DIAGNOSIS: NORMAL
GROSS: NORMAL
Lab: NORMAL

## 2023-07-14 ENCOUNTER — OFFICE VISIT (OUTPATIENT)
Dept: INTERNAL MEDICINE | Facility: CLINIC | Age: 73
End: 2023-07-14
Payer: MEDICARE

## 2023-07-14 VITALS
SYSTOLIC BLOOD PRESSURE: 112 MMHG | OXYGEN SATURATION: 96 % | HEIGHT: 66 IN | BODY MASS INDEX: 18.63 KG/M2 | WEIGHT: 115.94 LBS | DIASTOLIC BLOOD PRESSURE: 58 MMHG | HEART RATE: 66 BPM

## 2023-07-14 DIAGNOSIS — I10 BENIGN ESSENTIAL HYPERTENSION: Primary | ICD-10-CM

## 2023-07-14 DIAGNOSIS — D64.9 ANEMIA, UNSPECIFIED TYPE: ICD-10-CM

## 2023-07-14 DIAGNOSIS — R25.2 MUSCLE CRAMP: ICD-10-CM

## 2023-07-14 DIAGNOSIS — Z13.6 SCREENING FOR CARDIOVASCULAR CONDITION: ICD-10-CM

## 2023-07-14 DIAGNOSIS — M81.0 OSTEOPOROSIS, UNSPECIFIED OSTEOPOROSIS TYPE, UNSPECIFIED PATHOLOGICAL FRACTURE PRESENCE: ICD-10-CM

## 2023-07-14 DIAGNOSIS — M25.562 LEFT KNEE PAIN, UNSPECIFIED CHRONICITY: ICD-10-CM

## 2023-07-14 PROCEDURE — 3288F FALL RISK ASSESSMENT DOCD: CPT | Mod: HCNC,CPTII,S$GLB, | Performed by: INTERNAL MEDICINE

## 2023-07-14 PROCEDURE — 4010F ACE/ARB THERAPY RXD/TAKEN: CPT | Mod: HCNC,CPTII,S$GLB, | Performed by: INTERNAL MEDICINE

## 2023-07-14 PROCEDURE — 99999 PR PBB SHADOW E&M-EST. PATIENT-LVL IV: ICD-10-PCS | Mod: PBBFAC,HCNC,, | Performed by: INTERNAL MEDICINE

## 2023-07-14 PROCEDURE — 3008F BODY MASS INDEX DOCD: CPT | Mod: HCNC,CPTII,S$GLB, | Performed by: INTERNAL MEDICINE

## 2023-07-14 PROCEDURE — 1159F MED LIST DOCD IN RCRD: CPT | Mod: HCNC,CPTII,S$GLB, | Performed by: INTERNAL MEDICINE

## 2023-07-14 PROCEDURE — 1159F PR MEDICATION LIST DOCUMENTED IN MEDICAL RECORD: ICD-10-PCS | Mod: HCNC,CPTII,S$GLB, | Performed by: INTERNAL MEDICINE

## 2023-07-14 PROCEDURE — 99999 PR PBB SHADOW E&M-EST. PATIENT-LVL IV: CPT | Mod: PBBFAC,HCNC,, | Performed by: INTERNAL MEDICINE

## 2023-07-14 PROCEDURE — 1126F AMNT PAIN NOTED NONE PRSNT: CPT | Mod: HCNC,CPTII,S$GLB, | Performed by: INTERNAL MEDICINE

## 2023-07-14 PROCEDURE — 1160F RVW MEDS BY RX/DR IN RCRD: CPT | Mod: HCNC,CPTII,S$GLB, | Performed by: INTERNAL MEDICINE

## 2023-07-14 PROCEDURE — 99214 PR OFFICE/OUTPT VISIT, EST, LEVL IV, 30-39 MIN: ICD-10-PCS | Mod: HCNC,S$GLB,, | Performed by: INTERNAL MEDICINE

## 2023-07-14 PROCEDURE — 1126F PR PAIN SEVERITY QUANTIFIED, NO PAIN PRESENT: ICD-10-PCS | Mod: HCNC,CPTII,S$GLB, | Performed by: INTERNAL MEDICINE

## 2023-07-14 PROCEDURE — 3074F SYST BP LT 130 MM HG: CPT | Mod: HCNC,CPTII,S$GLB, | Performed by: INTERNAL MEDICINE

## 2023-07-14 PROCEDURE — 3078F PR MOST RECENT DIASTOLIC BLOOD PRESSURE < 80 MM HG: ICD-10-PCS | Mod: HCNC,CPTII,S$GLB, | Performed by: INTERNAL MEDICINE

## 2023-07-14 PROCEDURE — 3078F DIAST BP <80 MM HG: CPT | Mod: HCNC,CPTII,S$GLB, | Performed by: INTERNAL MEDICINE

## 2023-07-14 PROCEDURE — 3288F PR FALLS RISK ASSESSMENT DOCUMENTED: ICD-10-PCS | Mod: HCNC,CPTII,S$GLB, | Performed by: INTERNAL MEDICINE

## 2023-07-14 PROCEDURE — 1160F PR REVIEW ALL MEDS BY PRESCRIBER/CLIN PHARMACIST DOCUMENTED: ICD-10-PCS | Mod: HCNC,CPTII,S$GLB, | Performed by: INTERNAL MEDICINE

## 2023-07-14 PROCEDURE — 1101F PR PT FALLS ASSESS DOC 0-1 FALLS W/OUT INJ PAST YR: ICD-10-PCS | Mod: HCNC,CPTII,S$GLB, | Performed by: INTERNAL MEDICINE

## 2023-07-14 PROCEDURE — 4010F PR ACE/ARB THEARPY RXD/TAKEN: ICD-10-PCS | Mod: HCNC,CPTII,S$GLB, | Performed by: INTERNAL MEDICINE

## 2023-07-14 PROCEDURE — 3008F PR BODY MASS INDEX (BMI) DOCUMENTED: ICD-10-PCS | Mod: HCNC,CPTII,S$GLB, | Performed by: INTERNAL MEDICINE

## 2023-07-14 PROCEDURE — 3074F PR MOST RECENT SYSTOLIC BLOOD PRESSURE < 130 MM HG: ICD-10-PCS | Mod: HCNC,CPTII,S$GLB, | Performed by: INTERNAL MEDICINE

## 2023-07-14 PROCEDURE — 99214 OFFICE O/P EST MOD 30 MIN: CPT | Mod: HCNC,S$GLB,, | Performed by: INTERNAL MEDICINE

## 2023-07-14 PROCEDURE — 1101F PT FALLS ASSESS-DOCD LE1/YR: CPT | Mod: HCNC,CPTII,S$GLB, | Performed by: INTERNAL MEDICINE

## 2023-07-14 RX ORDER — DICLOFENAC SODIUM 10 MG/G
2 GEL TOPICAL 2 TIMES DAILY
Qty: 100 G | Refills: 2 | Status: SHIPPED | OUTPATIENT
Start: 2023-07-14

## 2023-07-14 NOTE — PROGRESS NOTES
"Subjective:       Patient ID: Chelsea Ford Child is a 73 y.o. female.    Chief Complaint: Follow-up (7 mth follow up )    HPI  73 y.o. female here for follow up of       HTN; hx PACs  Asa, carvedilol 25 bid, losartan 100mg.   recent tsh wnl   her ankles do swell at end of day and when bp higher.    Some dizziness with standing. No falls.   Osteoporosis on dexa 11/2019  Recommended bisphosphonate (declined) and 2 year repeat  Has declined repeat dexa at this time.   Exercising with free weights and stretching.      Leg cramps at night.  Quit taking her iron because her usual one was not available - will restart.  Pulmonary htn seen on echo and dyspnea on exertion  PFT 6/2022: normal spirometry; DLCO decreased due to hgb  CT chest: tiny pulm nodules; largest 4mm     Anemia  hgb 11.8; mcv 100  Previous thrombocytopenia has resolved on most recent labs: platelet count 163 k  She does take some b vitamins OTC.      OA left knee followed by ortho; tac injection done 5/9/22. Uses voltaren gel, only occasionally needs tyelnol.   Saw ortho 5/2022 and 10/2022 for steroid injection  Has not done PT- referred 5/18/23 but she cancelled because was doing better w voltaren.  X-rays reviewed showing MARQUES Lara helped  Review of Systems   Constitutional:  Negative for fever.   Respiratory:  Negative for shortness of breath.    Cardiovascular:  Negative for chest pain.   Musculoskeletal: Negative.    Skin: Negative.      Objective:   BP (!) 112/58 (BP Location: Right arm, Patient Position: Sitting, BP Method: Medium (Manual))   Pulse 66   Ht 5' 6" (1.676 m)   Wt 52.6 kg (115 lb 15.4 oz)   LMP  (LMP Unknown)   SpO2 96%   BMI 18.72 kg/m²      Physical Exam  Constitutional:       General: She is not in acute distress.     Appearance: She is well-developed. She is not diaphoretic.   HENT:      Head: Normocephalic and atraumatic.   Cardiovascular:      Rate and Rhythm: Normal rate and regular rhythm.   Pulmonary:      Effort: " Pulmonary effort is normal. No respiratory distress.      Breath sounds: No wheezing or rales.   Skin:     General: Skin is warm and dry.   Neurological:      Mental Status: She is alert and oriented to person, place, and time.   Psychiatric:         Behavior: Behavior normal.       Assessment:       1. Benign essential hypertension    2. Muscle cramp    3. Anemia, unspecified type    4. Osteoporosis, unspecified osteoporosis type, unspecified pathological fracture presence    5. Screening for cardiovascular condition    6. Left knee pain, unspecified chronicity        Plan:       1. Benign essential hypertension  -     COMPREHENSIVE METABOLIC PANEL; Future; Expected date: 07/14/2023  -     Magnesium; Future; Expected date: 07/14/2023    2. Muscle cramp  -     COMPREHENSIVE METABOLIC PANEL; Future; Expected date: 07/14/2023  -     Magnesium; Future; Expected date: 07/14/2023    3. Anemia, unspecified type  -     CBC Auto Differential; Future; Expected date: 07/14/2023  -     Ferritin; Future; Expected date: 07/14/2023  -     Iron and TIBC; Future; Expected date: 07/14/2023    4. Osteoporosis, unspecified osteoporosis type, unspecified pathological fracture presence  -     Vitamin D; Future; Expected date: 07/14/2023    5. Screening for cardiovascular condition  -     Lipid Panel; Future; Expected date: 07/14/2023    6. Left knee pain, unspecified chronicity  -     diclofenac sodium (VOLTAREN) 1 % Gel; Apply 2 g topically 2 (two) times daily.  Dispense: 100 g; Refill: 2           You are up to date for your primary preventive health care, and there are no reminders at this time.     Fastign labs today  Tdap thinks done in last 10 years, will update records  Shingrx recommended

## 2023-07-21 ENCOUNTER — LAB VISIT (OUTPATIENT)
Dept: LAB | Facility: HOSPITAL | Age: 73
End: 2023-07-21
Attending: INTERNAL MEDICINE
Payer: MEDICARE

## 2023-07-21 DIAGNOSIS — D64.9 ANEMIA, UNSPECIFIED TYPE: ICD-10-CM

## 2023-07-21 DIAGNOSIS — I10 BENIGN ESSENTIAL HYPERTENSION: ICD-10-CM

## 2023-07-21 DIAGNOSIS — R25.2 MUSCLE CRAMP: ICD-10-CM

## 2023-07-21 DIAGNOSIS — Z13.6 SCREENING FOR CARDIOVASCULAR CONDITION: ICD-10-CM

## 2023-07-21 DIAGNOSIS — M81.0 OSTEOPOROSIS, UNSPECIFIED OSTEOPOROSIS TYPE, UNSPECIFIED PATHOLOGICAL FRACTURE PRESENCE: ICD-10-CM

## 2023-07-21 LAB
25(OH)D3+25(OH)D2 SERPL-MCNC: 93 NG/ML (ref 30–96)
ALBUMIN SERPL BCP-MCNC: 3.9 G/DL (ref 3.5–5.2)
ALP SERPL-CCNC: 82 U/L (ref 55–135)
ALT SERPL W/O P-5'-P-CCNC: 10 U/L (ref 10–44)
ANION GAP SERPL CALC-SCNC: 6 MMOL/L (ref 8–16)
AST SERPL-CCNC: 19 U/L (ref 10–40)
BASOPHILS # BLD AUTO: 0.05 K/UL (ref 0–0.2)
BASOPHILS NFR BLD: 1.1 % (ref 0–1.9)
BILIRUB SERPL-MCNC: 0.7 MG/DL (ref 0.1–1)
BUN SERPL-MCNC: 13 MG/DL (ref 8–23)
CALCIUM SERPL-MCNC: 10 MG/DL (ref 8.7–10.5)
CHLORIDE SERPL-SCNC: 104 MMOL/L (ref 95–110)
CHOLEST SERPL-MCNC: 214 MG/DL (ref 120–199)
CHOLEST/HDLC SERPL: 2.7 {RATIO} (ref 2–5)
CO2 SERPL-SCNC: 28 MMOL/L (ref 23–29)
CREAT SERPL-MCNC: 1.1 MG/DL (ref 0.5–1.4)
DIFFERENTIAL METHOD: ABNORMAL
EOSINOPHIL # BLD AUTO: 0.1 K/UL (ref 0–0.5)
EOSINOPHIL NFR BLD: 2.2 % (ref 0–8)
ERYTHROCYTE [DISTWIDTH] IN BLOOD BY AUTOMATED COUNT: 12.6 % (ref 11.5–14.5)
EST. GFR  (NO RACE VARIABLE): 52.7 ML/MIN/1.73 M^2
FERRITIN SERPL-MCNC: 200 NG/ML (ref 20–300)
GLUCOSE SERPL-MCNC: 86 MG/DL (ref 70–110)
HCT VFR BLD AUTO: 35.4 % (ref 37–48.5)
HDLC SERPL-MCNC: 80 MG/DL (ref 40–75)
HDLC SERPL: 37.4 % (ref 20–50)
HGB BLD-MCNC: 11.1 G/DL (ref 12–16)
IMM GRANULOCYTES # BLD AUTO: 0 K/UL (ref 0–0.04)
IMM GRANULOCYTES NFR BLD AUTO: 0 % (ref 0–0.5)
IRON SERPL-MCNC: 121 UG/DL (ref 30–160)
LDLC SERPL CALC-MCNC: 125.2 MG/DL (ref 63–159)
LYMPHOCYTES # BLD AUTO: 1.8 K/UL (ref 1–4.8)
LYMPHOCYTES NFR BLD: 40.7 % (ref 18–48)
MAGNESIUM SERPL-MCNC: 2.1 MG/DL (ref 1.6–2.6)
MCH RBC QN AUTO: 31.2 PG (ref 27–31)
MCHC RBC AUTO-ENTMCNC: 31.4 G/DL (ref 32–36)
MCV RBC AUTO: 99 FL (ref 82–98)
MONOCYTES # BLD AUTO: 0.5 K/UL (ref 0.3–1)
MONOCYTES NFR BLD: 10.3 % (ref 4–15)
NEUTROPHILS # BLD AUTO: 2 K/UL (ref 1.8–7.7)
NEUTROPHILS NFR BLD: 45.7 % (ref 38–73)
NONHDLC SERPL-MCNC: 134 MG/DL
NRBC BLD-RTO: 0 /100 WBC
PLATELET # BLD AUTO: 145 K/UL (ref 150–450)
PMV BLD AUTO: 10.4 FL (ref 9.2–12.9)
POTASSIUM SERPL-SCNC: 4 MMOL/L (ref 3.5–5.1)
PROT SERPL-MCNC: 6.8 G/DL (ref 6–8.4)
RBC # BLD AUTO: 3.56 M/UL (ref 4–5.4)
SATURATED IRON: 33 % (ref 20–50)
SODIUM SERPL-SCNC: 138 MMOL/L (ref 136–145)
TOTAL IRON BINDING CAPACITY: 369 UG/DL (ref 250–450)
TRANSFERRIN SERPL-MCNC: 249 MG/DL (ref 200–375)
TRIGL SERPL-MCNC: 44 MG/DL (ref 30–150)
WBC # BLD AUTO: 4.45 K/UL (ref 3.9–12.7)

## 2023-07-21 PROCEDURE — 80061 LIPID PANEL: CPT | Mod: HCNC | Performed by: INTERNAL MEDICINE

## 2023-07-21 PROCEDURE — 36415 COLL VENOUS BLD VENIPUNCTURE: CPT | Performed by: INTERNAL MEDICINE

## 2023-07-21 PROCEDURE — 83735 ASSAY OF MAGNESIUM: CPT | Mod: HCNC | Performed by: INTERNAL MEDICINE

## 2023-07-21 PROCEDURE — 82306 VITAMIN D 25 HYDROXY: CPT | Mod: HCNC | Performed by: INTERNAL MEDICINE

## 2023-07-21 PROCEDURE — 80053 COMPREHEN METABOLIC PANEL: CPT | Mod: HCNC | Performed by: INTERNAL MEDICINE

## 2023-07-21 PROCEDURE — 82728 ASSAY OF FERRITIN: CPT | Mod: HCNC | Performed by: INTERNAL MEDICINE

## 2023-07-21 PROCEDURE — 84466 ASSAY OF TRANSFERRIN: CPT | Mod: HCNC | Performed by: INTERNAL MEDICINE

## 2023-07-21 PROCEDURE — 85025 COMPLETE CBC W/AUTO DIFF WBC: CPT | Mod: HCNC | Performed by: INTERNAL MEDICINE

## 2023-07-26 ENCOUNTER — PATIENT MESSAGE (OUTPATIENT)
Dept: INTERNAL MEDICINE | Facility: CLINIC | Age: 73
End: 2023-07-26
Payer: MEDICARE

## 2023-08-16 ENCOUNTER — OFFICE VISIT (OUTPATIENT)
Dept: DERMATOLOGY | Facility: CLINIC | Age: 73
End: 2023-08-16
Payer: MEDICARE

## 2023-08-16 VITALS — BODY MASS INDEX: 18.56 KG/M2 | WEIGHT: 115 LBS

## 2023-08-16 DIAGNOSIS — D22.9 NEVUS OF MULTIPLE SITES: ICD-10-CM

## 2023-08-16 DIAGNOSIS — L81.4 LENTIGINES: ICD-10-CM

## 2023-08-16 DIAGNOSIS — B08.1 BATEMAN'S DISEASE: ICD-10-CM

## 2023-08-16 DIAGNOSIS — Z85.828 HISTORY OF SKIN CANCER: ICD-10-CM

## 2023-08-16 DIAGNOSIS — L82.1 SEBORRHEIC KERATOSES: Primary | ICD-10-CM

## 2023-08-16 PROCEDURE — 3008F PR BODY MASS INDEX (BMI) DOCUMENTED: ICD-10-PCS | Mod: HCNC,CPTII,S$GLB, | Performed by: DERMATOLOGY

## 2023-08-16 PROCEDURE — 1159F MED LIST DOCD IN RCRD: CPT | Mod: HCNC,CPTII,S$GLB, | Performed by: DERMATOLOGY

## 2023-08-16 PROCEDURE — 3008F BODY MASS INDEX DOCD: CPT | Mod: HCNC,CPTII,S$GLB, | Performed by: DERMATOLOGY

## 2023-08-16 PROCEDURE — 99213 OFFICE O/P EST LOW 20 MIN: CPT | Mod: HCNC,S$GLB,, | Performed by: DERMATOLOGY

## 2023-08-16 PROCEDURE — 1126F AMNT PAIN NOTED NONE PRSNT: CPT | Mod: HCNC,CPTII,S$GLB, | Performed by: DERMATOLOGY

## 2023-08-16 PROCEDURE — 1101F PR PT FALLS ASSESS DOC 0-1 FALLS W/OUT INJ PAST YR: ICD-10-PCS | Mod: HCNC,CPTII,S$GLB, | Performed by: DERMATOLOGY

## 2023-08-16 PROCEDURE — 4010F ACE/ARB THERAPY RXD/TAKEN: CPT | Mod: HCNC,CPTII,S$GLB, | Performed by: DERMATOLOGY

## 2023-08-16 PROCEDURE — 3288F FALL RISK ASSESSMENT DOCD: CPT | Mod: HCNC,CPTII,S$GLB, | Performed by: DERMATOLOGY

## 2023-08-16 PROCEDURE — 99213 PR OFFICE/OUTPT VISIT, EST, LEVL III, 20-29 MIN: ICD-10-PCS | Mod: HCNC,S$GLB,, | Performed by: DERMATOLOGY

## 2023-08-16 PROCEDURE — 3288F PR FALLS RISK ASSESSMENT DOCUMENTED: ICD-10-PCS | Mod: HCNC,CPTII,S$GLB, | Performed by: DERMATOLOGY

## 2023-08-16 PROCEDURE — 1126F PR PAIN SEVERITY QUANTIFIED, NO PAIN PRESENT: ICD-10-PCS | Mod: HCNC,CPTII,S$GLB, | Performed by: DERMATOLOGY

## 2023-08-16 PROCEDURE — 1160F RVW MEDS BY RX/DR IN RCRD: CPT | Mod: HCNC,CPTII,S$GLB, | Performed by: DERMATOLOGY

## 2023-08-16 PROCEDURE — 4010F PR ACE/ARB THEARPY RXD/TAKEN: ICD-10-PCS | Mod: HCNC,CPTII,S$GLB, | Performed by: DERMATOLOGY

## 2023-08-16 PROCEDURE — 99999 PR PBB SHADOW E&M-EST. PATIENT-LVL IV: ICD-10-PCS | Mod: PBBFAC,HCNC,, | Performed by: DERMATOLOGY

## 2023-08-16 PROCEDURE — 99999 PR PBB SHADOW E&M-EST. PATIENT-LVL IV: CPT | Mod: PBBFAC,HCNC,, | Performed by: DERMATOLOGY

## 2023-08-16 PROCEDURE — 1101F PT FALLS ASSESS-DOCD LE1/YR: CPT | Mod: HCNC,CPTII,S$GLB, | Performed by: DERMATOLOGY

## 2023-08-16 PROCEDURE — 1159F PR MEDICATION LIST DOCUMENTED IN MEDICAL RECORD: ICD-10-PCS | Mod: HCNC,CPTII,S$GLB, | Performed by: DERMATOLOGY

## 2023-08-16 PROCEDURE — 1160F PR REVIEW ALL MEDS BY PRESCRIBER/CLIN PHARMACIST DOCUMENTED: ICD-10-PCS | Mod: HCNC,CPTII,S$GLB, | Performed by: DERMATOLOGY

## 2023-08-16 NOTE — PROGRESS NOTES
Subjective:      Patient ID:  Chelsea Ford Child is a 73 y.o. female who presents for   Chief Complaint   Patient presents with    Follow-up     UBSE     Would like skin check no lesions of concern.     Follow-up - Follow-up  Affected locations: face, left arm, right arm, chest, back, abdomen and torso  Signs / symptoms: irritated      Review of Systems   Constitutional:  Negative for fever, chills, weight loss, weight gain, fatigue and malaise.   Skin:  Positive for wears hat. Negative for daily sunscreen use and activity-related sunscreen use.   Hematologic/Lymphatic: Bruises/bleeds easily.       Objective:   Physical Exam   Constitutional: She appears well-developed and well-nourished. No distress.   Neurological: She is alert and oriented to person, place, and time. She is not disoriented.   Psychiatric: She has a normal mood and affect.   Skin:   Areas Examined (abnormalities noted in diagram):   Head / Face Inspection Performed  Neck Inspection Performed  Chest / Axilla Inspection Performed  Abdomen Inspection Performed  Back Inspection Performed  RUE Inspected  LUE Inspection Performed                 Diagram Legend     Erythematous scaling macule/papule c/w actinic keratosis       Vascular papule c/w angioma      Pigmented verrucoid papule/plaque c/w seborrheic keratosis      Yellow umbilicated papule c/w sebaceous hyperplasia      Irregularly shaped tan macule c/w lentigo     1-2 mm smooth white papules consistent with Milia      Movable subcutaneous cyst with punctum c/w epidermal inclusion cyst      Subcutaneous movable cyst c/w pilar cyst      Firm pink to brown papule c/w dermatofibroma      Pedunculated fleshy papule(s) c/w skin tag(s)      Evenly pigmented macule c/w junctional nevus     Mildly variegated pigmented, slightly irregular-bordered macule c/w mildly atypical nevus      Flesh colored to evenly pigmented papule c/w intradermal nevus       Pink pearly papule/plaque c/w basal cell carcinoma  "     Erythematous hyperkeratotic cursted plaque c/w SCC      Surgical scar with no sign of skin cancer recurrence      Open and closed comedones      Inflammatory papules and pustules      Verrucoid papule consistent consistent with wart     Erythematous eczematous patches and plaques     Dystrophic onycholytic nail with subungual debris c/w onychomycosis     Umbilicated papule    Erythematous-base heme-crusted tan verrucoid plaque consistent with inflamed seborrheic keratosis     Erythematous Silvery Scaling Plaque c/w Psoriasis     See annotation      Assessment / Plan:        Seborrheic keratoses  Seborrheic keratosis scattered, told benign no treatment needed.        Michel's disease  Reassurance      History of skin cancers  Area(s) of previous NMSC evaluated with no signs of recurrence.    Upper body skin examination performed today including at least 6 points as noted in physical examination. No lesions suspicious for malignancy noted.    Recommend daily sun protection/avoidance and use of at least SPF 30, broad spectrum sunscreen (OTC drug).       Lentigines  The "ABCD" rules to observe pigmented lesions were reviewed.      Nevus of multiple sites  The "ABCD" rules to observe pigmented lesions were reviewed.               Follow up in about 6 months (around 2/16/2024).  "

## 2023-08-30 ENCOUNTER — PATIENT MESSAGE (OUTPATIENT)
Dept: OPTOMETRY | Facility: CLINIC | Age: 73
End: 2023-08-30
Payer: MEDICARE

## 2023-09-20 DIAGNOSIS — Z78.0 MENOPAUSE: ICD-10-CM

## 2023-10-25 ENCOUNTER — PATIENT MESSAGE (OUTPATIENT)
Dept: INTERNAL MEDICINE | Facility: CLINIC | Age: 73
End: 2023-10-25
Payer: MEDICARE

## 2023-10-25 DIAGNOSIS — H81.11 BPPV (BENIGN PAROXYSMAL POSITIONAL VERTIGO), RIGHT: Primary | ICD-10-CM

## 2023-11-06 ENCOUNTER — CLINICAL SUPPORT (OUTPATIENT)
Dept: REHABILITATION | Facility: HOSPITAL | Age: 73
End: 2023-11-06
Attending: INTERNAL MEDICINE
Payer: MEDICARE

## 2023-11-06 DIAGNOSIS — R26.89 IMPAIRMENT OF BALANCE: ICD-10-CM

## 2023-11-06 DIAGNOSIS — R42 DIZZINESS: ICD-10-CM

## 2023-11-06 DIAGNOSIS — H81.11 BPPV (BENIGN PAROXYSMAL POSITIONAL VERTIGO), RIGHT: ICD-10-CM

## 2023-11-06 PROBLEM — R29.3 POOR POSTURE: Status: RESOLVED | Noted: 2020-12-29 | Resolved: 2023-11-06

## 2023-11-06 PROCEDURE — 97161 PT EVAL LOW COMPLEX 20 MIN: CPT | Mod: HCNC,PO

## 2023-11-06 NOTE — PLAN OF CARE
"[facility]  Physical Therapy Neurological Rehabilitation Initial Evaluation    Name: Chelsea Ford Child  Clinic Number: [id number]    Therapy Diagnosis:   Encounter Diagnoses   Name Primary?    BPPV (benign paroxysmal positional vertigo), right     Impairment of balance     Dizziness      Physician: Elva Tripathi MD    Physician Orders: PT Eval and Treat   Medical Diagnosis from Referral: BPPV (benign paroxysmal positional vertigo), right  Evaluation Date: 2023  Authorization Period Expiration: 10/24/23  Plan of Care Expiration: 24  Progress Note Due: 23  Visit # / Visits authorized:     FOTO: 1/3    Time In: 08:00  Time Out: 08:55  Total Billable Time: 55 minutes    Precautions: Standard and h/o cancer    Subjective   Date of onset: progressive decline over past year    History of current condition - Chelsea reports: that recently, she feels like her balance has gotten worse. Mainly, when she is sitting for more than a few minutes, when she goes to stand up, she is unsteady at first. Denies spinning vertigo, but would still like to be assessed for BPPV since she has had it in the past. Endorses dizziness upon initially sitting up in the morning, described as "woozy, off" feeling. Denies recent falls but endorses near falls.     History of migraines: No formal diagnosis on file, but endorses that she may have had a migraine ~30 years ago  Blood Pressure: takes medication for hypertension- currently at highest dose; meeting with cardiologist next week  History of Heart Condition: h/o palpitations     Medical History:   Past Medical History:   Diagnosis Date    Amblyopia of left eye     BCC (basal cell carcinoma)     Cataract     Exotropia of left eye     Hypertension     Osteoporosis     Palpitations 2017    Vertigo 2017       Surgical History:   Chelsea Ford Child  has a past surgical history that includes Mohs;  section; Strabismus surgery " (Left); Eye surgery (1959); Cosmetic surgery (2002); and Colonoscopy (N/A, 6/27/2023).    Medications:   Chelsea has a current medication list which includes the following prescription(s): acetaminophen, ascorbic acid (vitamin c), aspirin, calcium carbonate, calcium, carvedilol, cyanocobalamin, diclofenac sodium, enzymes,digestive, estradiol, fluorouracil, iron, l. acidophilus-b. animalis-d2, loratadine, losartan, and vitamin d.    Allergies:   Review of patient's allergies indicates:   Allergen Reactions    Omnicef [cefdinir] Diarrhea    Adhesive Dermatitis    Nickel sutures [surgical stainless steel]      Rash with nickel        Imaging: imaging in Epic reviewed prior to evaluation  VNG: none recent; followed up with ENT in early 2023, but more for ear cleaning  Audiogram: 04/24/23: Audiogram results revealed normal hearing sensitivity from 250-1500 Hz sloping from a mild to moderately-severe SNHL from 2256-1102 Hz in the right ear and normal hearing sensitivity from 250-1500 Hz sloping from a mild to severe SNHL from 4989-8959 Hz in the left ear.    Prior Therapy: vestibular therapy in 2021  Social History: lives alone; but is getting a roommate in a few weeks  Falls: no falls, but endorses near falls   DME: none  Home Environment: [facility], [address] [facility]   Exercise Routine / History: lightweights for her legs but has knee pain bilaterally, general light weight training  Family Present at time of Eval: none present   Occupation: retired  Prior Level of Function: (I) with functional mobility/ADL, driving, active with her [employer]  Current Level of Function: (I) with functional mobility/ADL, driving, active with her [employer]    Pain:  Current 3/10, worst 8/10, best 1/10   Location: bilateral knees but more so the right side  Description: Aching and Burning  Aggravating Factors: Walking, gardening when she is bent   Easing Factors: ice and Tylenol, Voltaren gel    Patient's goals: to improve balance and stability    Objective   -  "Follows commands: 100% of time   - Speech: no deficits       Mental status: alert, oriented to person, place, and time, normal mood, behavior, speech, dress, motor activity, and thought processes  Appearance: Casually dressed  Behavior:  calm and cooperative  Attention Span and Concentration:  Normal    Posture Alignment in sitting/standing:   Head: forward head   Scapulae: rounded shoulders   Trunk: WFL   Pelvis: NT   Legs: WFL     Sensation: Light Touch: Intact          Proprioception: NT, Kinesthesia NT    Auditory: endorses bilateral hearing loss         Visual/Oculomotor Screen: denies changes; h/o surgery on left eye as child  Ocular range of motion: WFL  Spontaneous nystagmus (fixation present): none present  Gaze-evoked nystagmus (fixation present): none present  Tracking/Smooth Pursuits:Intact  Saccades: WFL- slight hypermetria when looking right, but no increase in symptoms  Convergence: WFL- left eye does not converge due to PMH of surgery  VOR cancellation: NT    Acuity:Intact- wears glasses  Visual field: Intact  VCR: NT (head remains still, body moves)    VOR 1: Impaired: appropriate speed, no visual slippage, mild increase in concordant dizziness  Head Thrust Test: NT  DVA: NT      ROM:   CERVICAL SPINE  Flexion: no dizziness  Extension: no dizziness  L rotation: no dizziness in position, but dizziness while moving through range;  R rotation: no dizziness in position, but dizziness while moving through range  Are concurrent symptoms present with any of these movements: yes, with rotation; limited cervical motion with rotation bilaterally      Modified VAS (Vertebral Artery Screen), in sitting (rotation, then extension):  R: (-)  L: (-)          SOL SENSORY TESTING:  (P= Pass, F= Fail; note any sway; hold each position for 30")  Condition 1: (firm surface/feet together/eyes open) P  Condition 2: (firm surface/feet together/eyes closed) P, mild sway  Condition 3: (firm surface/feet in tandem/eyes open) " "P, each lower extremity in front  Condition 4: (firm surface/feet in tandem/eyes closed) F, 15 sec with RLE in front; 13 sec c/ LLE in front  Condition 5: (soft surface/feet together/eyes open) P  Condition 6: (soft surface/feet together/eyes closed) P, mod sway  Condition 7: (Fukuda step test), measure distance varied from center starting position, > 30 deg deviation to either side indicates hypofunction of biased side NT this date      Functional Gait Assessment: to be performed at first follow up session      Gait Assessment:  - AD used: none  - Assistance: (I)  - Distance: community distances    Endurance Deficit: none noted    POSITIONAL CANAL TESTING  Looking for nystagmus (slow phase followed by quick phase to the affected side for BPPV)    Supported John Hallpike (posterior / CL anterior)   Right : (-) vertigo, (-) dizziness, (-) nystagmus   Left: (-) vertigo, (-) dizziness, (-) nystagmus  Horizontal Canals- supine roll test   Right:  (-) vertigo, (-) dizziness, (-) nystagmus   Left:  (-) vertigo, (-) dizziness, (-) nystagmus  Treatment Performed: not indicated this date      CMS Impairment/Limitation/Restriction for FOTO for Vestibular Survey    Therapist reviewed FOTO scores for Chelsea Ford Child on 11/6/2023.   FOTO documents entered into [employer] - see Media section.    Limitation Score: 52%         TREATMENT   No treatment provided this date. Entire time spent on evaluation.     Home Exercises and Patient Education Provided    Education provided:   - plan of care (POC), scheduling    Written Home Exercises Provided: to be provided at first follow up session    Assessment   Chelsea is a 73 y.o. female referred to outpatient Physical Therapy with a medical diagnosis of BPPV (benign paroxysmal positional vertigo), right. Patient presents with chief complaints of progressive increase in "off" feeling mainly with positional changes and head movements, along with decline in overall balance. PMH significant for " hypertension for which patient is following up with cardiology next week to discuss current medication dosage. During oculomotor screen, mild impairment mainly noted with VOR 1. No visual slippage noted, but patient endorses increase in dizziness. During cervical AROM screen, limited ROM noted with bilateral rotation; additionally, concordant dizziness reported during movement of cervical rotation bilaterally. VAS test (-) bilaterally. During GST, difficulty mainly noted with vision eliminated conditions. Functional Gait Assessment deferred due to time constraints, but to be performed next follow up session. CRTs (-) vertigo, nystagmus, and dizziness, indicating (-) BPPV this date. At this time, patient's presentation appears most consistent with decompensated peripheral vestibulopathy. Current symptom presentation likely compounded by BP issues, knee pain, and neck pain. Patient is appropriate for continued PT intervention to further address gaze stabilization, balance, and motion tolerance deficits.     Patient prognosis is Good.   Patient will benefit from skilled outpatient Physical Therapy to address the deficits stated above and in the chart below, provide patient/family education, and to maximize patient's level of independence.     Plan of care discussed with patient: Yes  Patient's spiritual, cultural and educational needs considered and patient is agreeable to the plan of care and goals as stated below:     Anticipated Barriers for therapy: h/o vestibular system weakness, bilateral knee pain, neck pain/AROM restrictions    Medical Necessity is demonstrated by the following  History  Co-morbidities and personal factors that may impact the plan of care Co-morbidities:   H/o left eye surgery, h/o vestibular system weakness, knee pain, hypertension, heart palpitations, chronic pulmonary heart disease, h/o basal cell carcinoma, osteoporosis    Personal Factors:   no deficits     high   Examination  Body  Structures and Functions, activity limitations and participation restrictions that may impact the plan of care Body Regions:   head  lower extremities  trunk    Body Systems:    gross coordinated movement  balance  gait  transfers  transitions  motor control  motor learning  Vestibular function    Participation Restrictions:   None noted    Activity limitations:   Learning and applying knowledge  no deficits    General Tasks and Commands  no deficits    Communication  no deficits    Mobility  walking    Self care  no deficits    Domestic Life  no deficits    Interactions/Relationships  no deficits    Life Areas  no deficits    Community and Social Life  community life  recreation and leisure         low   Clinical Presentation stable and uncomplicated low   Decision Making/ Complexity Score: low     Goals:  Short Term Goals: 4 weeks   Patient to be (I) with established home exercise program.   Patient to perform VOR 1 WFL for improved gaze stabilization.   Patient to pass GST condition 2 with no additional sway for improved balance in low vision environments.   Patient to pass GST condition 6 with no more than min additional sway for improved balance in low vision environments.   PT to formally assess Functional Gait Assessment and establish appropriate goal.     Long Term Goals: 8 weeks   Patient to be (I) with advanced home exercise program.   Patient to pass GST condition 4 to at least 18 seconds for improved balance in low vision environments.    Patient to pass GST condition 6 with no more than slight additional sway for improved balance in low vision environments.   TBD: Functional Gait Assessment goal    Plan   Plan of care Certification: 11/6/2023 to 01/05/24.    Outpatient Physical Therapy 2 times weekly for 8 weeks to include the following interventions: Gait Training, Manual Therapy, Moist Heat/ Ice, Neuromuscular Re-ed, Orthotic Management and Training, Patient Education, Self Care, Therapeutic  Activities, Therapeutic Exercise, and Canalith Repositioning Procedures.     Annamaria Ferrer, PT

## 2023-11-09 ENCOUNTER — CLINICAL SUPPORT (OUTPATIENT)
Dept: REHABILITATION | Facility: HOSPITAL | Age: 73
End: 2023-11-09
Payer: MEDICARE

## 2023-11-09 DIAGNOSIS — R26.89 IMPAIRMENT OF BALANCE: Primary | ICD-10-CM

## 2023-11-09 DIAGNOSIS — R42 DIZZINESS: ICD-10-CM

## 2023-11-09 PROCEDURE — 97530 THERAPEUTIC ACTIVITIES: CPT | Mod: HCNC,PO

## 2023-11-09 PROCEDURE — 97112 NEUROMUSCULAR REEDUCATION: CPT | Mod: HCNC,PO

## 2023-11-09 NOTE — PROGRESS NOTES
[facility]PAT[facility]   Physical Therapy Treatment Note     Name: Chelsea Ford Child  Clinic Number: [id number]    Therapy Diagnosis:   Encounter Diagnoses   Name Primary?    Impairment of balance Yes    Dizziness      Physician: Elva Tripathi MD    Visit Date: 11/9/2023    Physician Orders: PT Eval and Treat   Medical Diagnosis from Referral: BPPV (benign paroxysmal positional vertigo), right  Evaluation Date: 11/6/2023  Authorization Period Expiration: 12/31/23  Plan of Care Expiration: 01/05/24  Progress Note Due: 12/06/23  Visit # / Visits authorized: 01/ 20    PTA Visit #: 0/5     Time In: 1410  Time Out: 1453  Total Billable Time: 43 minutes    Subjective     Pt reports: Doing well this afternoon.She is a little physically tired from switching furniture in one of her rooms, otherwise well.     She was provided with home exercise program today.  Response to previous treatment: felt fine after evaluation  Functional change: ongoing    Pain: 0/10  Location: n/a    OBJECTIVE     Objective Measures updated at progress report unless specified.     Treatment     Chelsea received the treatments listed below:      therapeutic exercises to develop strength, endurance, ROM, flexibility, posture, and core stabilization for 0 minutes including:      neuromuscular re-education activities to improve: Balance, Coordination, Kinesthetic, Sense, Proprioception, and Posture for 33 minutes. The following activities were included:  2 x 30 sec VOR1 horizontal, slight increase in dizziness  2 x 30 sec VOR1 vertical, no increase in dizziness   2 x 100 feet ambulation with L/R head turns, slight path deviation  2 x 100 feet ambulation with up/down head turns, slight path deviation    On Foam at ballet bar:   2 x 30 sec tandem stance with ea LE lead, CGA, no UE support  2 x 30 sec static stance with vision eliminated  2 x 30 sec static stance with L/R head turns  2 x 30 sec static stance with up/down head  turns    2 x 30 sec tandem stance with ea LE lead, L/R head turns    therapeutic activities to improve functional performance for 10  minutes, including:    Functional Gait Assessment:   1. Gait on level surface =  2   (3) Normal: less than 5.5 sec, no A.D., no imbalance, normal gait pattern, deviates< 6in   (2) Mild impairment: 7-5.6 sec, uses A.D., mild gait deviations, or deviates 6-10 in   (1) Moderate impairment: > 7 sec, slow speed, imbalance, deviates 10-15 in.   (0) Severe impairment: needs assist, deviates >15 in, reach/touch wall  2. Change in Gait Speed = 3   (3) Normal: smooth change w/o loss of balance or gait deviation, deviates < 6 in, significant difference between speeds   (2) Mild impairment: changes speed, but demonstrates mild gait deviations, deviates 6-10 in, OR no deviations but unable to significantly speed, OR uses A.D.   (1) Moderate impairment: minor changes to speed, OR changes speed w/ significant deviations, deviates 10-15 in, OR  Changes speed , but loses balance & recovers   (0) Severe impairment: cannot change speed, deviates >15 in, or loses balance & needs assist  3. Gait with horizontal head turns  = 2   (3) Normal: no change in gait, deviates <6 in   (2) Mild impairment: slight change in speed, deviates 6-10 in, OR uses A.D.   (1) Moderate impairment: moderate change in speed, deviates 10-15 in   (0) Severe impairment: severe disruption of gait, deviates >15in  4. Gait with vertical head turns = 3   (3) Normal: no change in gait, deviates <6 in   (2) Mild impairment: slight change in speed, deviates 6-10 in OR uses A.D.   (1) Moderate impairment: moderate change in speed, deviates 10-15 in   (0) Severe impairment: severe disruption of gait, deviates >15 in  5. Gait with pivot turns = 2   (3) Normal: performs safely in 3 sec, no LOB   (2) Mild impairment: performs in >3 sec & no LOB, OR turns safely & requires several steps to regain LOB   (1) Moderate impairment: turns slow, OR  requires several small steps for balance following turn & stop   (0) Severe impairment: cannot turn safely, needs assist  6. Step over obstacle = 2   (3) Normal: steps over 2 stacked boxes w/o change in speed or LOB   (2) Mild impairment: able to step over 1 box w/o change in speed or LOB   (1) Moderate impairment: steps over 1 box but must slow down, may require VC   (0) Severe impairment: cannot perform w/o assist  7. Gait with Narrow JEFF = 2   (3) Normal: 10 steps no staggering   (2) Mild impairment: 7-9 steps   (1) Moderate impairment: 4-7 steps   (0) Severe impairment: < 4 steps or cannot perform w/o assist  8. Gait with eyes closed = 3   (3) Normal: < 7 sec, no A.D., no LOB, normal gait pattern, deviates <6 in   (2) Mild impairment: 7.1-9 sec, mild gait deviations, deviates 6-10 in   (1) Moderate impairment: > 9 sec, abnormal pattern, LOB, deviates 10-15 in   (0) Severe impairment: cannot perform w/o assist, LOB, deviates >15in  9. Ambulating Backwards = 2   (3) Normal: no A.D., no LOB, normal gait pattern, deviates <6in   (2) Mild impairment: uses A.D., slower speed, mild gait deviations, deviates 6-10 in   (1) Moderate impairment: slow speed, abnormal gait pattern, LOB, deviates 10-15 in   (0) Severe impairment: severe gait deviations or LOB, deviates >15in  10. Steps = 2   (3) Normal: alternating feet, no rail   (2) Mild Impairment: alternating feet, uses rail   (1) Moderate impairment: step-to, uses rail   (0) Severe impairment: cannot perform safely    Score 23/30     Score:   <22/30 fall risk   <20/30 fall risk in older adults   <18/30 fall risk in Parkinsons         Home Exercises Provided and Patient Education Provided     Education provided:   - home exercise program provided    Written Home Exercises Provided: yes.  Exercises were reviewed and Chelsea was able to demonstrate them prior to the end of the session.  Chelsea demonstrated good  understanding of the education provided.     See EMR under  Patient Instructions for exercises provided 11/9/2023.    Assessment     Chelsea did well in her first session since evaluation. Patient reports that she was previously given some of the exercises provided today for home exercise program during a previous PT episode. Overall she demonstrates most difficulty with tasks when head is moving in L/R directions. She is eager to work on all tasks provided today, goal updated for FGA due to administration of that outcome measure at this visit. Pt remains appropriate and motivated to continue with therapy sessions.     Chelsea Is progressing well towards her goals.   Pt prognosis is Good.     Pt will continue to benefit from skilled outpatient physical therapy to address the deficits listed in the problem list box on initial evaluation, provide pt/family education and to maximize pt's level of independence in the home and community environment.     Pt's spiritual, cultural and educational needs considered and pt agreeable to plan of care and goals.     Anticipated barriers to physical therapy: h/o vestibular system weakness, bilateral knee pain, neck pain/AROM restrictions    Goals:  Short Term Goals: 4 weeks   Patient to be (I) with established home exercise program. ongoing  Patient to perform VOR 1 WFL for improved gaze stabilization. ongoing  Patient to pass GST condition 2 with no additional sway for improved balance in low vision environments. ongoing  Patient to pass GST condition 6 with no more than min additional sway for improved balance in low vision environments. ongoing  PT to formally assess Functional Gait Assessment and establish appropriate goal. Met 11/9/2023     Long Term Goals: 8 weeks   Patient to be (I) with advanced home exercise program. ongoing  Patient to pass GST condition 4 to at least 18 seconds for improved balance in low vision environments.  ongoing  Patient to pass GST condition 6 with no more than slight additional sway for improved balance in  low vision environments. ongoing  TBD: Functional Gait Assessment goal   9a. Pt will improve FGA score to at least 27/30 in order to demonstrate improved ambulatory balance.     Plan   Plan of care Certification: 11/6/2023 to 01/05/24.    Continue to work on oculomotor exercises, ambulatory and static balance challenges, habituation tasks as needed.    Rene Baker, PT

## 2023-11-10 NOTE — PROGRESS NOTES
"[facility] OUTPATIENT THERAPY AND WELLNESS   Physical Therapy Treatment Note     Name: Chelsea Ford Child  Clinic Number: [id number]    Therapy Diagnosis:   Encounter Diagnoses   Name Primary?    Impairment of balance Yes    Dizziness        Physician: Elva Tripathi MD    Visit Date: 11/13/2023    Physician Orders: PT Eval and Treat   Medical Diagnosis from Referral: BPPV (benign paroxysmal positional vertigo), right  Evaluation Date: 11/6/2023  Authorization Period Expiration: 12/31/23  Plan of Care Expiration: 01/05/24  Progress Note Due: 12/06/23  Visit # / Visits authorized: 02/ 20 + eval    PTA Visit #: 0/5     Time In: 08:45  Time Out: 09:26  Total Billable Time: 41 minutes    Subjective     Pt reports: that she is doing well this morning. No new complaints.      She was compliant with home exercise program.   Response to previous treatment: felt fine after last treatment session  Functional change: ongoing    Pain: 0/10  Location: n/a    OBJECTIVE     Objective Measures updated at progress report unless specified.     Treatment     Chelsea received the treatments listed below:      therapeutic exercises to develop strength, endurance, ROM, flexibility, posture, and core stabilization for 0 minutes including:  NP    neuromuscular re-education activities to improve: Balance, Coordination, Kinesthetic, Sense, Proprioception, and Posture for 26 minutes. The following activities were included:    Gaze Stabilization:  3 x 30 sec VOR1 horizontal, slight increase in dizziness      Balance Exercises:    Foam pad:  3 x 30" stance with narrow base of support, eyes closed  2 x 30" stance with narrow base of support, eyes open, right<>left head movements    X 4 laps tandem ambulation in // bars, very occ touchdown support, SBA  X 4 laps forward reciprocal stepping through 5 hurdles, verbal cues to get closer to starting mary, no upper extremity support, SBA    2 x 30" each lower extremity in front, tandem stance with " "eyes closed, no upper extremity support, CGA to occ Min A    2 x 30" each lower extremity single leg stance with alternate lower extremity on fitter board, eyes closed, CGA, no upper extremity support    2 x 10 reps, each lower extremity single leg stance with alternate lower extremity tapping 2 large cones in front, occ touchdown support, CGA      therapeutic activities to improve functional performance for 15  minutes, including:    Functional motion tolerance:    Ambulation with head movements:  4 x 100 feet ambulation with L/R head turns, slight path deviation  2 x 100 feet ambulation with up/down head turns, very slight path deviation    Standing cone transfer:  X 6 cones, transferring right<>left on plinth x 2 trials each direction, slight increase in dizziness      Home Exercises Provided and Patient Education Provided     Education provided:   - home exercise program provided, POC    Written Home Exercises Provided: yes.  Exercises were reviewed and Chelsea was able to demonstrate them prior to the end of the session.  Chelsea demonstrated good  understanding of the education provided.     See EMR under Patient Instructions for exercises provided 11/9/2023.    Assessment     Chelsea tolerated therapy session well this morning. Eliminated vertical VOR since performance with this motion WFL. Motion tolerance activities focused mainly on horizontal head/body movements since movement in this plane most provocative for dizziness/unsteadiness. Balance exercises performed today appeared appropriately challenging, but performance improved as each activity progressed. Pt remains appropriate and motivated to continue with therapy sessions.     Chelsea Is progressing well towards her goals.   Pt prognosis is Good.     Pt will continue to benefit from skilled outpatient physical therapy to address the deficits listed in the problem list box on initial evaluation, provide pt/family education and to maximize pt's level of " independence in the home and community environment.     Pt's spiritual, cultural and educational needs considered and pt agreeable to plan of care and goals.     Anticipated barriers to physical therapy: h/o vestibular system weakness, bilateral knee pain, neck pain/AROM restrictions    Goals:  Short Term Goals: 4 weeks   Patient to be (I) with established home exercise program. ongoing  Patient to perform VOR 1 WFL for improved gaze stabilization. ongoing  Patient to pass GST condition 2 with no additional sway for improved balance in low vision environments. ongoing  Patient to pass GST condition 6 with no more than min additional sway for improved balance in low vision environments. ongoing  PT to formally assess Functional Gait Assessment and establish appropriate goal. Met 11/9/2023     Long Term Goals: 8 weeks   Patient to be (I) with advanced home exercise program. ongoing  Patient to pass GST condition 4 to at least 18 seconds for improved balance in low vision environments.  ongoing  Patient to pass GST condition 6 with no more than slight additional sway for improved balance in low vision environments. ongoing  TBD: Functional Gait Assessment goal   9a. Pt will improve FGA score to at least 27/30 in order to demonstrate improved ambulatory balance.     Plan   Plan of care Certification: 11/6/2023 to 01/05/24.    Continue to work on oculomotor exercises, ambulatory and static balance challenges, habituation tasks as needed.    Annamaria Ferrer, PT

## 2023-11-13 ENCOUNTER — CLINICAL SUPPORT (OUTPATIENT)
Dept: REHABILITATION | Facility: HOSPITAL | Age: 73
End: 2023-11-13
Payer: MEDICARE

## 2023-11-13 DIAGNOSIS — R26.89 IMPAIRMENT OF BALANCE: Primary | ICD-10-CM

## 2023-11-13 DIAGNOSIS — R42 DIZZINESS: ICD-10-CM

## 2023-11-13 PROCEDURE — 97112 NEUROMUSCULAR REEDUCATION: CPT | Mod: HCNC,PO

## 2023-11-13 PROCEDURE — 97530 THERAPEUTIC ACTIVITIES: CPT | Mod: HCNC,PO

## 2023-11-14 ENCOUNTER — OFFICE VISIT (OUTPATIENT)
Dept: CARDIOLOGY | Facility: CLINIC | Age: 73
End: 2023-11-14
Payer: MEDICARE

## 2023-11-14 ENCOUNTER — LAB VISIT (OUTPATIENT)
Dept: LAB | Facility: HOSPITAL | Age: 73
End: 2023-11-14
Attending: INTERNAL MEDICINE
Payer: MEDICARE

## 2023-11-14 VITALS
HEART RATE: 56 BPM | DIASTOLIC BLOOD PRESSURE: 70 MMHG | BODY MASS INDEX: 19.27 KG/M2 | SYSTOLIC BLOOD PRESSURE: 146 MMHG | WEIGHT: 119.94 LBS | HEIGHT: 66 IN

## 2023-11-14 DIAGNOSIS — R06.02 SHORT OF BREATH ON EXERTION: Primary | ICD-10-CM

## 2023-11-14 DIAGNOSIS — I10 PRIMARY HYPERTENSION: ICD-10-CM

## 2023-11-14 DIAGNOSIS — I27.20 PULMONARY HYPERTENSION: ICD-10-CM

## 2023-11-14 DIAGNOSIS — R06.02 SHORT OF BREATH ON EXERTION: ICD-10-CM

## 2023-11-14 LAB — BNP SERPL-MCNC: 182 PG/ML (ref 0–99)

## 2023-11-14 PROCEDURE — 3008F BODY MASS INDEX DOCD: CPT | Mod: HCNC,CPTII,S$GLB, | Performed by: INTERNAL MEDICINE

## 2023-11-14 PROCEDURE — 99999 PR PBB SHADOW E&M-EST. PATIENT-LVL IV: CPT | Mod: PBBFAC,HCNC,, | Performed by: INTERNAL MEDICINE

## 2023-11-14 PROCEDURE — 1160F PR REVIEW ALL MEDS BY PRESCRIBER/CLIN PHARMACIST DOCUMENTED: ICD-10-PCS | Mod: HCNC,CPTII,S$GLB, | Performed by: INTERNAL MEDICINE

## 2023-11-14 PROCEDURE — 1159F MED LIST DOCD IN RCRD: CPT | Mod: HCNC,CPTII,S$GLB, | Performed by: INTERNAL MEDICINE

## 2023-11-14 PROCEDURE — 3077F SYST BP >= 140 MM HG: CPT | Mod: HCNC,CPTII,S$GLB, | Performed by: INTERNAL MEDICINE

## 2023-11-14 PROCEDURE — 93010 ELECTROCARDIOGRAM REPORT: CPT | Mod: HCNC,S$GLB,, | Performed by: INTERNAL MEDICINE

## 2023-11-14 PROCEDURE — 3078F PR MOST RECENT DIASTOLIC BLOOD PRESSURE < 80 MM HG: ICD-10-PCS | Mod: HCNC,CPTII,S$GLB, | Performed by: INTERNAL MEDICINE

## 2023-11-14 PROCEDURE — 1125F PR PAIN SEVERITY QUANTIFIED, PAIN PRESENT: ICD-10-PCS | Mod: HCNC,CPTII,S$GLB, | Performed by: INTERNAL MEDICINE

## 2023-11-14 PROCEDURE — 93005 EKG 12-LEAD: ICD-10-PCS | Mod: HCNC,S$GLB,, | Performed by: INTERNAL MEDICINE

## 2023-11-14 PROCEDURE — 3078F DIAST BP <80 MM HG: CPT | Mod: HCNC,CPTII,S$GLB, | Performed by: INTERNAL MEDICINE

## 2023-11-14 PROCEDURE — 1160F RVW MEDS BY RX/DR IN RCRD: CPT | Mod: HCNC,CPTII,S$GLB, | Performed by: INTERNAL MEDICINE

## 2023-11-14 PROCEDURE — 3288F PR FALLS RISK ASSESSMENT DOCUMENTED: ICD-10-PCS | Mod: HCNC,CPTII,S$GLB, | Performed by: INTERNAL MEDICINE

## 2023-11-14 PROCEDURE — 99204 OFFICE O/P NEW MOD 45 MIN: CPT | Mod: HCNC,S$GLB,, | Performed by: INTERNAL MEDICINE

## 2023-11-14 PROCEDURE — 36415 COLL VENOUS BLD VENIPUNCTURE: CPT | Mod: HCNC,PO | Performed by: INTERNAL MEDICINE

## 2023-11-14 PROCEDURE — 4010F ACE/ARB THERAPY RXD/TAKEN: CPT | Mod: HCNC,CPTII,S$GLB, | Performed by: INTERNAL MEDICINE

## 2023-11-14 PROCEDURE — 1159F PR MEDICATION LIST DOCUMENTED IN MEDICAL RECORD: ICD-10-PCS | Mod: HCNC,CPTII,S$GLB, | Performed by: INTERNAL MEDICINE

## 2023-11-14 PROCEDURE — 99204 PR OFFICE/OUTPT VISIT, NEW, LEVL IV, 45-59 MIN: ICD-10-PCS | Mod: HCNC,S$GLB,, | Performed by: INTERNAL MEDICINE

## 2023-11-14 PROCEDURE — 3077F PR MOST RECENT SYSTOLIC BLOOD PRESSURE >= 140 MM HG: ICD-10-PCS | Mod: HCNC,CPTII,S$GLB, | Performed by: INTERNAL MEDICINE

## 2023-11-14 PROCEDURE — 93005 ELECTROCARDIOGRAM TRACING: CPT | Mod: HCNC,S$GLB,, | Performed by: INTERNAL MEDICINE

## 2023-11-14 PROCEDURE — 3008F PR BODY MASS INDEX (BMI) DOCUMENTED: ICD-10-PCS | Mod: HCNC,CPTII,S$GLB, | Performed by: INTERNAL MEDICINE

## 2023-11-14 PROCEDURE — 4010F PR ACE/ARB THEARPY RXD/TAKEN: ICD-10-PCS | Mod: HCNC,CPTII,S$GLB, | Performed by: INTERNAL MEDICINE

## 2023-11-14 PROCEDURE — 93010 EKG 12-LEAD: ICD-10-PCS | Mod: HCNC,S$GLB,, | Performed by: INTERNAL MEDICINE

## 2023-11-14 PROCEDURE — 3288F FALL RISK ASSESSMENT DOCD: CPT | Mod: HCNC,CPTII,S$GLB, | Performed by: INTERNAL MEDICINE

## 2023-11-14 PROCEDURE — 1101F PT FALLS ASSESS-DOCD LE1/YR: CPT | Mod: HCNC,CPTII,S$GLB, | Performed by: INTERNAL MEDICINE

## 2023-11-14 PROCEDURE — 83880 ASSAY OF NATRIURETIC PEPTIDE: CPT | Mod: HCNC | Performed by: INTERNAL MEDICINE

## 2023-11-14 PROCEDURE — 99999 PR PBB SHADOW E&M-EST. PATIENT-LVL IV: ICD-10-PCS | Mod: PBBFAC,HCNC,, | Performed by: INTERNAL MEDICINE

## 2023-11-14 PROCEDURE — 1125F AMNT PAIN NOTED PAIN PRSNT: CPT | Mod: HCNC,CPTII,S$GLB, | Performed by: INTERNAL MEDICINE

## 2023-11-14 PROCEDURE — 1101F PR PT FALLS ASSESS DOC 0-1 FALLS W/OUT INJ PAST YR: ICD-10-PCS | Mod: HCNC,CPTII,S$GLB, | Performed by: INTERNAL MEDICINE

## 2023-11-14 RX ORDER — FUROSEMIDE 20 MG/1
20 TABLET ORAL DAILY
Qty: 30 TABLET | Refills: 6 | Status: SHIPPED | OUTPATIENT
Start: 2023-11-14 | End: 2024-01-02

## 2023-11-14 RX ORDER — HYDRALAZINE HYDROCHLORIDE 25 MG/1
25 TABLET, FILM COATED ORAL DAILY PRN
Qty: 30 TABLET | Refills: 3 | Status: SHIPPED | OUTPATIENT
Start: 2023-11-14 | End: 2024-11-13

## 2023-11-14 NOTE — PROGRESS NOTES
Subjective:     Chief Complaint:  Chelsea Ford Child is a 73 y.o. female referred by Elva Tripathi MD for evaluation of Hypertension.    Problem List and HPI:   Hypertension   Pulmonary hypertension  Tobacco use quit in 1984    She reports shortness of breath on exertion for the past 6 months.  She finds it particularly hard to climb a flight of stairs is glad that there is an elevator at [facility].  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  She does not report orthopnea or PND.  She has mild left lower extremity swelling. She does not report angina.  She carries a diagnosis of pulmonary heart disease.  PA pressures are elevated w systolic as high as 43 mm Hg in 04/2021.  She had COVID like illness in 11/2022    In the digital hypertension program.  Her BPs have been fairly well controlled but she is concerned about a few times when the numbers were elevated.  SBP was 200 one day in April. Went to an Urgent Care    Review of patient's allergies indicates:   Allergen Reactions    Omnicef [cefdinir] Diarrhea    Adhesive Dermatitis    Nickel sutures [surgical stainless steel]      Rash with nickel        Current Outpatient Medications   Medication Sig    acetaminophen (TYLENOL) 325 MG tablet Take 325 mg by mouth every 6 (six) hours as needed for Pain.    aspirin (ECOTRIN) 81 MG EC tablet Take 81 mg by mouth.    CALCIUM ORAL Take 1 tablet by mouth once daily.    carvediloL (COREG) 25 MG tablet Take 1 tablet (25 mg total) by mouth 2 (two) times daily with meals.    cyanocobalamin (VITAMIN B-12) 1000 MCG tablet Take 100 mcg by mouth once daily.    diclofenac sodium (VOLTAREN) 1 % Gel Apply 2 g topically 2 (two) times daily.    enzymes,digestive (DIGESTIVE ENZYMES ORAL) Take 1 tablet by mouth 3 (three) times daily.    estradioL (ESTRACE) 0.01 % (0.1 mg/gram) vaginal cream Rub dime sized amount of cream to the urethra nightly    iron 18 mg Tab Take by mouth. 1/2 Tablet    L.  "acidophilus-B. animalis-D2 1 billion cell- 20 mcg Chew Take 1 tablet by mouth once daily.    loratadine 10 mg Cap Take 1 tablet by mouth once daily.    losartan (COZAAR) 50 MG tablet Take 2 tablets (100 mg total) by mouth once daily.    vitamin D (VITAMIN D3) 1000 units Tab Take 2,000 Units by mouth once daily.     No current facility-administered medications for this visit.         Past Medical and Surgical history:  Chelsea Ford Child  has a past medical history of Amblyopia of left eye, BCC (basal cell carcinoma), Cataract, Exotropia of left eye, Hypertension, Osteoporosis, Palpitations (2017), and Vertigo (2017).   Past Surgical History:   Procedure Laterality Date     SECTION      COLONOSCOPY N/A 2023    Procedure: COLONOSCOPY;  Surgeon: Serafin Murrell MD;  Location: [address]);  Service: Endoscopy;  Laterality: N/A;  Referred MACEY Sommers PA-C, peg prep, portal-ml   pre-call confirmed; needs Prep instructions sent to portal and Prep sent to pharmacy; MB   prep instr. resent to portal; prep resent to pharmacy on -st    COSMETIC SURGERY      Basal cell carcinoma    EYE SURGERY      Lazy eye    Mohs      BCC    STRABISMUS SURGERY Left        Social history:  Chelsea Ford Child  reports that she quit smoking about 39 years ago. Her smoking use included cigarettes. She started smoking about 45 years ago. She has a 6.0 pack-year smoking history. She has never used smokeless tobacco. She reports that she does not drink alcohol and does not use drugs.    Family history:  Chelsea's family history includes Breast cancer in her mother; COPD in her father; Cancer in her maternal aunt, maternal uncle, and mother; Hypertension in her father and mother; No Known Problems in her son; Osteoporosis in her mother; Stroke (age of onset: 72) in her father.      Objective:   BP (!) 146/70   Pulse (!) 56   Ht 5' 6" (1.676 m)   Wt 54.4 kg (119 lb 14.9 oz)   LMP  (LMP Unknown)   " BMI 19.36 kg/m²    Physical Exam  Constitutional:       Appearance: Normal appearance. She is well-developed.   Neck:      Vascular: No JVD.   Cardiovascular:      Rate and Rhythm: Normal rate and regular rhythm.      Pulses:           Radial pulses are 2+ on the right side and 2+ on the left side.      Heart sounds: S1 normal and S2 normal. No murmur heard.  Pulmonary:      Breath sounds: No decreased breath sounds, wheezing or rales.   Chest:      Chest wall: There is no dullness to percussion.   Abdominal:      Palpations: Abdomen is soft. There is no hepatomegaly or splenomegaly.      Tenderness: There is no abdominal tenderness.   Musculoskeletal:      Right lower leg: No swelling. No edema.      Left lower leg: Swelling present. No edema.   Skin:     General: Skin is warm.      Findings: No bruising.      Nails: There is no clubbing.   Neurological:      Mental Status: She is alert and oriented to person, place, and time.   Psychiatric:         Speech: Speech normal.         Behavior: Behavior normal.           Labs  Lipids:  Recent Labs   Lab 07/21/23  1037   LDL Cholesterol 125.2   HDL 80 H   Cholesterol 214 H      Renal:  Recent Labs   Lab 07/21/23  1037   Creatinine 1.1   Potassium 4.0   CO2 28   BUN 13     Liver:  Recent Labs   Lab 07/21/23  1037   AST 19   ALT 10     Cbc:    Lab Results   Component Value Date    WBC 4.45 07/21/2023    HGB 11.1 (L) 07/21/2023    HCT 35.4 (L) 07/21/2023    MCV 99 (H) 07/21/2023     (L) 07/21/2023           Results for orders placed during the hospital encounter of 04/10/23    Echo    Interpretation Summary  · The left ventricle is normal in size with normal systolic function.  · The estimated ejection fraction is 65%.  · Normal left ventricular diastolic function.  · The estimated PA systolic pressure is 39 mmHg.  · Normal right ventricular size with normal right ventricular systolic function.  · Normal central venous pressure (3 mmHg).  · Mild left atrial  enlargement.  · Mild right atrial enlargement.  · Mild mitral regurgitation.  · Mild tricuspid regurgitation.     Reviewed echo images performed recently.  Agree with the interpretation except the IVC appears mildly enlarged with > 50% collapse suggestive of a RA pressure of 8 mm Hg.  There is mild left atrial enlargement.  There are no other parameters suggestive of of pulmonary hypertension.  The right atrial area is normal measuring 14 cm² in the 4 chamber view and the average E/e' is normal measuring 8.        Assessment and Plan:       ICD-10-CM ICD-9-CM   1. Short of breath on exertion  R06.02 786.05   2. Pulmonary hypertension  I27.20 416.8   3. Primary hypertension  I10 401.9        Shortness of breath possibly from diastolic dysfunction.      Orders entered during this encounter:    Short of breath on exertion  -     IN OFFICE EKG 12-LEAD (to Muse)  -     hydrALAZINE (APRESOLINE) 25 MG tablet; Take 1 tablet (25 mg total) by mouth daily as needed (for SBP >180).  Dispense: 30 tablet; Refill: 3  -     BNP; Future; Expected date: 11/14/2023  -     furosemide (LASIX) 20 MG tablet; Take 1 tablet (20 mg total) by mouth once daily.  Dispense: 30 tablet; Refill: 6    Pulmonary hypertension    Primary hypertension         Follow up in about 4 weeks (around 12/12/2023).

## 2023-11-16 ENCOUNTER — CLINICAL SUPPORT (OUTPATIENT)
Dept: REHABILITATION | Facility: HOSPITAL | Age: 73
End: 2023-11-16
Payer: MEDICARE

## 2023-11-16 DIAGNOSIS — R42 DIZZINESS: ICD-10-CM

## 2023-11-16 DIAGNOSIS — R26.89 IMPAIRMENT OF BALANCE: Primary | ICD-10-CM

## 2023-11-16 PROCEDURE — 97530 THERAPEUTIC ACTIVITIES: CPT | Mod: HCNC,PO

## 2023-11-16 PROCEDURE — 97112 NEUROMUSCULAR REEDUCATION: CPT | Mod: HCNC,PO

## 2023-11-16 NOTE — PROGRESS NOTES
"[facility]   Physical Therapy Treatment Note     Name: Chelsea Ford Child  Clinic Number: [id number]    Therapy Diagnosis:   Encounter Diagnoses   Name Primary?    Impairment of balance Yes    Dizziness        Physician: Elva Tripathi MD    Visit Date: 11/16/2023    Physician Orders: PT Eval and Treat   Medical Diagnosis from Referral: BPPV (benign paroxysmal positional vertigo), right  Evaluation Date: 11/6/2023  Authorization Period Expiration: 12/31/23  Plan of Care Expiration: 01/05/24  Progress Note Due: 12/06/23  Visit # / Visits authorized: 02/ 20 + eval    PTA Visit #: 0/5     Time In: 1415  Time Out: 1458  Total Billable Time: 43 minutes    Subjective     Pt reports:  No new complaints, she tries to practice most activities that we do here at home to ensure that she makes progress.      She was compliant with home exercise program.   Response to previous treatment: felt fine after last treatment session  Functional change: ongoing    Pain: 0/10  Location: n/a    OBJECTIVE     Objective Measures updated at progress report unless specified.     Treatment     Chelsea received the treatments listed below:      therapeutic exercises to develop strength, endurance, ROM, flexibility, posture, and core stabilization for 0 minutes including:  NP    neuromuscular re-education activities to improve: Balance, Coordination, Kinesthetic, Sense, Proprioception, and Posture for 28 minutes. The following activities were included:    Gaze Stabilization:  3 x 30 sec VOR1 horizontal, slight increase in dizziness      Balance Exercises:    Foam pad:  3 x 30" stance with narrow base of support, eyes closed  2 x 30" stance with narrow base of support, eyes open, right<>left head movements    X 4 laps tandem ambulation in // bars, very occ touchdown support, SBA  X 4 laps forward reciprocal stepping through 5 hurdles, verbal cues to get closer to starting mary, no upper extremity support, " "SBA    2 x 30" each lower extremity in front, tandem stance with eyes closed, no upper extremity support, CGA     2 x 30" each lower extremity single leg stance on fitter board, eyes opened, CGA, no upper extremity support    2 x 10 reps, each lower extremity single leg stance with alternate lower extremity tapping 2 large cones in front, occ touchdown support, CGA      therapeutic activities to improve functional performance for 15 minutes, including:    Functional motion tolerance:    Ambulation with head movements:  4 x 100 feet ambulation with L/R head turns, slight path deviation  2 x 100 feet ambulation with up/down head turns, very slight path deviation  2 x 100 feet ambulation with diagonal head turns (LUQ<>RLQ), no path deviation noted  2 x 100 feet ambulation with diagonal head turns (RUQ<>LLQ), no path deviation noted    Standing cone transfer:  X 6 cones, transferring right<>left on plinth x 2 trials each direction, no increase in dizziness      Home Exercises Provided and Patient Education Provided     Education provided:   - home exercise program provided, POC    Written Home Exercises Provided: yes.  Exercises were reviewed and Chelsea was able to demonstrate them prior to the end of the session.  Chelsea demonstrated good  understanding of the education provided.     See EMR under Patient Instructions for exercises provided 11/9/2023.    Assessment     Chelsea tolerated therapy session well this afternoon. She was again able to progress balance activities today, demonstrating steady progress with each session. She continues to report most difficulty with horizontal movements. Pt remains appropriate and motivated to continue with therapy sessions.     Chelsea Is progressing well towards her goals.   Pt prognosis is Good.     Pt will continue to benefit from skilled outpatient physical therapy to address the deficits listed in the problem list box on initial evaluation, provide pt/family education and to " maximize pt's level of independence in the home and community environment.     Pt's spiritual, cultural and educational needs considered and pt agreeable to plan of care and goals.     Anticipated barriers to physical therapy: h/o vestibular system weakness, bilateral knee pain, neck pain/AROM restrictions    Goals:  Short Term Goals: 4 weeks   Patient to be (I) with established home exercise program. ongoing  Patient to perform VOR 1 WFL for improved gaze stabilization. ongoing  Patient to pass GST condition 2 with no additional sway for improved balance in low vision environments. ongoing  Patient to pass GST condition 6 with no more than min additional sway for improved balance in low vision environments. ongoing  PT to formally assess Functional Gait Assessment and establish appropriate goal. Met 11/9/2023     Long Term Goals: 8 weeks   Patient to be (I) with advanced home exercise program. ongoing  Patient to pass GST condition 4 to at least 18 seconds for improved balance in low vision environments.  ongoing  Patient to pass GST condition 6 with no more than slight additional sway for improved balance in low vision environments. ongoing  TBD: Functional Gait Assessment goal   9a. Pt will improve FGA score to at least 27/30 in order to demonstrate improved ambulatory balance.     Plan   Plan of care Certification: 11/6/2023 to 01/05/24.    Continue to work on oculomotor exercises, ambulatory and static balance challenges, habituation tasks as needed.    Rene Baker, PT

## 2023-11-16 NOTE — PROGRESS NOTES
"[facility]   Physical Therapy Treatment Note     Name: Chelsea Ford Child  Clinic Number: [id number]    Therapy Diagnosis:   Encounter Diagnoses   Name Primary?    Impairment of balance Yes    Dizziness          Physician: Elva Tripathi MD    Visit Date: 11/16/2023    Physician Orders: PT Eval and Treat   Medical Diagnosis from Referral: BPPV (benign paroxysmal positional vertigo), right  Evaluation Date: 11/6/2023  Authorization Period Expiration: 12/31/23  Plan of Care Expiration: 01/05/24  Progress Note Due: 12/06/23  Visit # / Visits authorized: 03/ 20 + eval    PTA Visit #: 0/5     Time In: ***  Time Out: 09:26  Total Billable Time: 41 minutes    Subjective     Pt reports: She thinks that her dizziness is getting better slowly. She has noticed that she is not feeling dizzy when turning over in bed anymore.     She was compliant with home exercise program.   Response to previous treatment: felt fine after last treatment session  Functional change: ongoing    Dizziness: 0/10  Location: n/a    OBJECTIVE     Objective Measures updated at progress report unless specified.     Treatment     Chelsea received the treatments listed below:      therapeutic exercises to develop strength, endurance, ROM, flexibility, posture, and core stabilization for 0 minutes including:  NP    neuromuscular re-education activities to improve: Balance, Coordination, Kinesthetic, Sense, Proprioception, and Posture for 26 minutes. The following activities were included:    Gaze Stabilization:  3 x 30 sec VOR1 horizontal, no increase in dizziness      Balance Exercises:    Foam pad:  3 x 30" stance with narrow base of support, eyes closed  2 x 30" stance with narrow base of support, eyes open, right<>left head movements    X 4 laps tandem ambulation in // bars, very occ touchdown support, SBA  X 4 laps forward reciprocal stepping through 5 hurdles, verbal cues to get closer to starting mary, no upper " "extremity support, SBA    2 x 30" each lower extremity in front, tandem stance with eyes closed, no upper extremity support, CGA    2 x 30" each lower extremity single leg stance with alternate lower extremity on fitter board, eyes closed, CGA, no upper extremity support    2 x 10 reps, each lower extremity single leg stance with alternate lower extremity tapping 2 large cones in front, occ touchdown support, CGA    2 x 10 reps alternating single leg step up onto sand dune stepper, CGA and occ UE support       therapeutic activities to improve functional performance for 15  minutes, including:    Functional motion tolerance:    Ambulation with head movements:  4 x 100 feet ambulation with L/R head turns, slight path deviation  2 x 100 feet ambulation with diagonal head turns RUQ<>LLQ, very slight path deviation  2 x 100 feet ambulation with diagonal head turns LUQ<>RLQ, very slight path deviation    Standing cone transfer:  X 6 cones, transferring right<>left on plinth x 2 trials each direction, no increase in dizziness      Home Exercises Provided and Patient Education Provided     Education provided:   - home exercise program provided, POC    Written Home Exercises Provided: yes.  Exercises were reviewed and Chelsea was able to demonstrate them prior to the end of the session.  Chelsea demonstrated good  understanding of the education provided.     See EMR under Patient Instructions for exercises provided 11/9/2023.    Assessment     Chelsea tolerated therapy session well this morning. Eliminated vertical VOR since performance with this motion WFL. Motion tolerance activities focused mainly on horizontal head/body movements since movement in this plane most provocative for dizziness/unsteadiness. Balance exercises performed today appeared appropriately challenging, but performance improved as each activity progressed. Pt remains appropriate and motivated to continue with therapy sessions.     Chelsea Is progressing " well towards her goals.   Pt prognosis is Good.     Pt will continue to benefit from skilled outpatient physical therapy to address the deficits listed in the problem list box on initial evaluation, provide pt/family education and to maximize pt's level of independence in the home and community environment.     Pt's spiritual, cultural and educational needs considered and pt agreeable to plan of care and goals.     Anticipated barriers to physical therapy: h/o vestibular system weakness, bilateral knee pain, neck pain/AROM restrictions    Goals:  Short Term Goals: 4 weeks   Patient to be (I) with established home exercise program. ongoing  Patient to perform VOR 1 WFL for improved gaze stabilization. ongoing  Patient to pass GST condition 2 with no additional sway for improved balance in low vision environments. ongoing  Patient to pass GST condition 6 with no more than min additional sway for improved balance in low vision environments. ongoing  PT to formally assess Functional Gait Assessment and establish appropriate goal. Met 11/9/2023     Long Term Goals: 8 weeks   Patient to be (I) with advanced home exercise program. ongoing  Patient to pass GST condition 4 to at least 18 seconds for improved balance in low vision environments.  ongoing  Patient to pass GST condition 6 with no more than slight additional sway for improved balance in low vision environments. ongoing  TBD: Functional Gait Assessment goal   9a. Pt will improve FGA score to at least 27/30 in order to demonstrate improved ambulatory balance.     Plan   Plan of care Certification: 11/6/2023 to 01/05/24.    Continue to work on oculomotor exercises, ambulatory and static balance challenges, habituation tasks as needed.    Rene Baker, PT

## 2023-11-17 NOTE — PROGRESS NOTES
"[facility]   Physical Therapy Treatment Note     Name: Chelsea Ford Child  Clinic Number: [id number]    Therapy Diagnosis:   Encounter Diagnoses   Name Primary?    Impairment of balance Yes    Dizziness          Physician: Elva Tripathi MD    Visit Date: 11/20/2023    Physician Orders: PT Eval and Treat   Medical Diagnosis from Referral: BPPV (benign paroxysmal positional vertigo), right  Evaluation Date: 11/6/2023  Authorization Period Expiration: 12/31/23  Plan of Care Expiration: 01/05/24  Progress Note Due: 12/06/23  Visit # / Visits authorized: 04/ 20 + eval    PTA Visit #: 0/5     Time In: 08:45  Time Out: 09:30  Total Billable Time: 45 minutes    Subjective     Pt reports: she feels like she's doing a lot better lately. She endorses that the more she does her exercises the better she feels.      She was compliant with home exercise program.   Response to previous treatment: felt fine after last treatment session  Functional change: ongoing    Pain: 0/10  Location: n/a    OBJECTIVE     Objective Measures updated at progress report unless specified.     Treatment     Chelsea received the treatments listed below:      therapeutic exercises to develop strength, endurance, ROM, flexibility, posture, and core stabilization for 0 minutes including:  NP    neuromuscular re-education activities to improve: Balance, Coordination, Kinesthetic, Sense, Proprioception, and Posture for 17 minutes. The following activities were included:    Gaze Stabilization:  3 x 30 sec VOR1 horizontal, reports no symptoms and that she feels better the more she does it       Balance Exercises:    Foam pad:  3 x 30" stance with narrow base of support, eyes closed  2 x 30" stance with narrow base of support, eyes open, right<>left head movements  2 x 30" each lower extremity in front, tandem stance with eyes open, no upper extremity support, CGA to occ Min A  1 x 30" each lower extremity single leg stance " with alternate lower extremity, eyes open, CGA, occasional touchdown support      X 6 laps tandem ambulation at counter, pivot turns at end of lap, very occ touchdown support, SBA    therapeutic activities to improve functional performance for 28  minutes, including:    Functional motion tolerance:    Ambulation with head movements:  2 x 100 feet ambulation with L/R head turns, slight path deviation  2 x 100 feet ambulation with up/down head turns, very slight path deviation  2 x 100 ft looking each direction. Diagonal head turns, slight path deviation, slight wooziness at end of trials     Standing cone transfer:  X 6 cones, transferring right<>left on plinth x 1 trials each direction, very slight increase in dizziness  X 6 cones, transferring right<>left from on plinth<> on shelf under plint x 1 trials each direction, slight increase in dizziness    X1 trial each direction, x12 beanbag toss quarter turn from side<>side    3 x30s static standing, tennis ball rainbow throws, throw uo and over from one hand<>other hand, eyes and head follow tennis ball the whole time      Home Exercises Provided and Patient Education Provided     Education provided:   - home exercise program provided, POC    Written Home Exercises Provided: yes.  Exercises were reviewed and Chelsea was able to demonstrate them prior to the end of the session.  Chelsea demonstrated good  understanding of the education provided.     See EMR under Patient Instructions for exercises provided 11/9/2023.    Assessment     Chelsea tolerated therapy session well. She reports increased sense of overall steadiness and feels a lot better lately compared to start of care. She reports greatest balance challenge and most increase in dizziness symptoms with rainbow tennis ball throw. Pt endorses feeling appropriately challenged with activities that require multiple steps to sequence and coordinate. Balance exercises performed today appeared appropriately challenging,  but performance improved as each activity progressed. Pt remains appropriate and motivated to continue with therapy sessions to address remaining deficits.     Chelsea Is progressing well towards her goals.   Pt prognosis is Good.     Pt will continue to benefit from skilled outpatient physical therapy to address the deficits listed in the problem list box on initial evaluation, provide pt/family education and to maximize pt's level of independence in the home and community environment.     Pt's spiritual, cultural and educational needs considered and pt agreeable to plan of care and goals.     Anticipated barriers to physical therapy: h/o vestibular system weakness, bilateral knee pain, neck pain/AROM restrictions    Goals:  Short Term Goals: 4 weeks   Patient to be (I) with established home exercise program. ongoing  Patient to perform VOR 1 WFL for improved gaze stabilization. ongoing  Patient to pass GST condition 2 with no additional sway for improved balance in low vision environments. ongoing  Patient to pass GST condition 6 with no more than min additional sway for improved balance in low vision environments. ongoing  PT to formally assess Functional Gait Assessment and establish appropriate goal. Met 11/9/2023     Long Term Goals: 8 weeks   Patient to be (I) with advanced home exercise program. ongoing  Patient to pass GST condition 4 to at least 18 seconds for improved balance in low vision environments.  ongoing  Patient to pass GST condition 6 with no more than slight additional sway for improved balance in low vision environments. ongoing  TBD: Functional Gait Assessment goal   9a. Pt will improve FGA score to at least 27/30 in order to demonstrate improved ambulatory balance.     Plan   Plan of care Certification: 11/6/2023 to 01/05/24.    Continue to work on oculomotor exercises, ambulatory and static balance challenges, habituation tasks as needed.    Alexandra Harris, SPT     I certify that I was present  in the room directing the student in service delivery and guiding them using my skilled judgment. As the co-signing therapist I have reviewed the students documentation and am responsible for the treatment, assessment, and plan.     Annamaria Ferrer, PT

## 2023-11-20 ENCOUNTER — CLINICAL SUPPORT (OUTPATIENT)
Dept: REHABILITATION | Facility: HOSPITAL | Age: 73
End: 2023-11-20
Payer: MEDICARE

## 2023-11-20 DIAGNOSIS — R26.89 IMPAIRMENT OF BALANCE: Primary | ICD-10-CM

## 2023-11-20 DIAGNOSIS — R42 DIZZINESS: ICD-10-CM

## 2023-11-20 PROCEDURE — 97112 NEUROMUSCULAR REEDUCATION: CPT | Mod: HCNC,PO

## 2023-11-20 PROCEDURE — 97530 THERAPEUTIC ACTIVITIES: CPT | Mod: HCNC,PO

## 2023-11-21 ENCOUNTER — HOSPITAL ENCOUNTER (OUTPATIENT)
Dept: RADIOLOGY | Facility: HOSPITAL | Age: 73
Discharge: HOME | End: 2023-11-21
Attending: INTERNAL MEDICINE
Payer: MEDICARE

## 2023-11-21 DIAGNOSIS — Z78.0 MENOPAUSE: ICD-10-CM

## 2023-11-21 PROCEDURE — 77080 DXA BONE DENSITY AXIAL: CPT | Mod: TC

## 2023-11-21 PROCEDURE — 77080 DXA BONE DENSITY AXIAL: CPT | Mod: 26,,, | Performed by: INTERNAL MEDICINE

## 2023-11-21 PROCEDURE — 77080 DXA BONE DENSITY AXIAL SKELETON 1 OR MORE SITES: ICD-10-PCS | Mod: 26,,, | Performed by: INTERNAL MEDICINE

## 2023-11-23 NOTE — PROGRESS NOTES
"[facility]   Physical Therapy Treatment Note     Name: Chelsea Ford Child  Clinic Number: [id number]    Therapy Diagnosis:   Encounter Diagnoses   Name Primary?    Impairment of balance Yes    Dizziness        Physician: Elva Tripathi MD    Visit Date: 11/24/2023    Physician Orders: PT Eval and Treat   Medical Diagnosis from Referral: BPPV (benign paroxysmal positional vertigo), right  Evaluation Date: 11/6/2023  Authorization Period Expiration: 12/31/23  Plan of Care Expiration: 01/05/24  Progress Note Due: 12/06/23  Visit # / Visits authorized: 05/ 20 + eval    PTA Visit #: 0/5     Time In: 0745  Time Out: 0830  Total Billable Time: 45 minutes    Subjective     Pt reports: that she is a little "lightheaded" but thinks it is because she has a cold       She was compliant with home exercise program.   Response to previous treatment: felt fine after last treatment session  Functional change: ongoing    Pain: 0/10  Location: n/a    OBJECTIVE     Objective Measures updated at progress report unless specified.     Treatment     Chelsea received the treatments listed below:      therapeutic exercises to develop strength, endurance, ROM, flexibility, posture, and core stabilization for 0 minutes including:  NP    neuromuscular re-education activities to improve: Balance, Coordination, Kinesthetic, Sense, Proprioception, and Posture for 25 minutes. The following activities were included:    Gaze Stabilization:  3 x 30 sec VOR1 horizontal, reports no symptoms       Balance Exercises:    Foam pad:  3 x 30" stance with narrow base of support, eyes closed  2 x 30" stance with narrow base of support, eyes open, right<>left head movements  2 x 30" stance with narrow base of support, eyes open, up<>down head movements  2 x 30" each lower extremity in front, tandem stance with eyes open, no upper extremity support, CGA to occ Min A  1 x 30" each lower extremity single leg stance with alternate " lower extremity, eyes open, CGA, occasional touchdown support      X 6 laps tandem ambulation at counter, pivot turns at end of lap, very occ touchdown support, SBA  X 4 laps marching with three second holds on foam strip, CGA    therapeutic activities to improve functional performance for 20 minutes, including:    Functional motion tolerance:    Ambulation with head movements:  2 x 100 feet ambulation with L/R head turns, slight path deviation  2 x 100 feet ambulation with up/down head turns, slight path deviation  2 x 100 ft looking each direction. Diagonal head turns, slight path deviation, slight increase in dizziness      Standing cone transfer:  X 6 cones, transferring right<>left from on plinth<> on shelf under plint x 1 trials each direction, slight increase in dizziness    Home Exercises Provided and Patient Education Provided     Education provided:   - home exercise program provided, POC    Written Home Exercises Provided: yes.  Exercises were reviewed and Chelsea was able to demonstrate them prior to the end of the session.  Chelsea demonstrated good  understanding of the education provided.     See EMR under Patient Instructions for exercises provided 11/9/2023.    Assessment     Chelsea tolerated therapy session well this morning. Patient reports slight increase in dizziness throughout session which she reports is likely due to having a sinus infection. She reports that she feels better following the session. Patient reports marching on foam strip is appropriately challenging, occ UE support required. Pt remains appropriate and motivated to continue with therapy sessions to address remaining deficits.      Chelsea Is progressing well towards her goals.   Pt prognosis is Good.     Pt will continue to benefit from skilled outpatient physical therapy to address the deficits listed in the problem list box on initial evaluation, provide pt/family education and to maximize pt's level of independence in the home  and community environment.     Pt's spiritual, cultural and educational needs considered and pt agreeable to plan of care and goals.     Anticipated barriers to physical therapy: h/o vestibular system weakness, bilateral knee pain, neck pain/AROM restrictions    Goals:  Short Term Goals: 4 weeks   Patient to be (I) with established home exercise program. ongoing  Patient to perform VOR 1 WFL for improved gaze stabilization. ongoing  Patient to pass GST condition 2 with no additional sway for improved balance in low vision environments. ongoing  Patient to pass GST condition 6 with no more than min additional sway for improved balance in low vision environments. ongoing  PT to formally assess Functional Gait Assessment and establish appropriate goal. Met 11/9/2023     Long Term Goals: 8 weeks   Patient to be (I) with advanced home exercise program. ongoing  Patient to pass GST condition 4 to at least 18 seconds for improved balance in low vision environments.  ongoing  Patient to pass GST condition 6 with no more than slight additional sway for improved balance in low vision environments. ongoing  TBD: Functional Gait Assessment goal   9a. Pt will improve FGA score to at least 27/30 in order to demonstrate improved ambulatory balance.     Plan   Plan of care Certification: 11/6/2023 to 01/05/24.    Continue to work on oculomotor exercises, ambulatory and static balance challenges, habituation tasks as needed.      Ellie Carpenter, PT

## 2023-11-24 ENCOUNTER — CLINICAL SUPPORT (OUTPATIENT)
Dept: REHABILITATION | Facility: HOSPITAL | Age: 73
End: 2023-11-24
Payer: MEDICARE

## 2023-11-24 DIAGNOSIS — R42 DIZZINESS: ICD-10-CM

## 2023-11-24 DIAGNOSIS — R26.89 IMPAIRMENT OF BALANCE: Primary | ICD-10-CM

## 2023-11-24 PROCEDURE — 97112 NEUROMUSCULAR REEDUCATION: CPT | Mod: HCNC,PO

## 2023-11-24 PROCEDURE — 97530 THERAPEUTIC ACTIVITIES: CPT | Mod: HCNC,PO

## 2023-11-28 ENCOUNTER — LAB VISIT (OUTPATIENT)
Dept: LAB | Facility: HOSPITAL | Age: 73
End: 2023-11-28
Attending: INTERNAL MEDICINE
Payer: MEDICARE

## 2023-11-28 ENCOUNTER — CLINICAL SUPPORT (OUTPATIENT)
Dept: REHABILITATION | Facility: HOSPITAL | Age: 73
End: 2023-11-28
Payer: MEDICARE

## 2023-11-28 DIAGNOSIS — R42 DIZZINESS: ICD-10-CM

## 2023-11-28 DIAGNOSIS — R26.89 IMPAIRMENT OF BALANCE: Primary | ICD-10-CM

## 2023-11-28 DIAGNOSIS — R06.02 SHORT OF BREATH ON EXERTION: ICD-10-CM

## 2023-11-28 DIAGNOSIS — I27.20 PULMONARY HYPERTENSION: ICD-10-CM

## 2023-11-28 LAB
ANION GAP SERPL CALC-SCNC: 9 MMOL/L (ref 8–16)
BUN SERPL-MCNC: 14 MG/DL (ref 8–23)
CALCIUM SERPL-MCNC: 10.2 MG/DL (ref 8.7–10.5)
CHLORIDE SERPL-SCNC: 95 MMOL/L (ref 95–110)
CO2 SERPL-SCNC: 26 MMOL/L (ref 23–29)
CREAT SERPL-MCNC: 1.1 MG/DL (ref 0.5–1.4)
EST. GFR  (NO RACE VARIABLE): 52.7 ML/MIN/1.73 M^2
GLUCOSE SERPL-MCNC: 110 MG/DL (ref 70–110)
POTASSIUM SERPL-SCNC: 4.6 MMOL/L (ref 3.5–5.1)
SODIUM SERPL-SCNC: 130 MMOL/L (ref 136–145)

## 2023-11-28 PROCEDURE — 97112 NEUROMUSCULAR REEDUCATION: CPT | Mod: HCNC,PO

## 2023-11-28 PROCEDURE — 97530 THERAPEUTIC ACTIVITIES: CPT | Mod: HCNC,PO

## 2023-11-28 PROCEDURE — 80048 BASIC METABOLIC PNL TOTAL CA: CPT | Mod: HCNC | Performed by: INTERNAL MEDICINE

## 2023-11-28 PROCEDURE — 36415 COLL VENOUS BLD VENIPUNCTURE: CPT | Mod: HCNC,PO | Performed by: INTERNAL MEDICINE

## 2023-11-28 NOTE — PROGRESS NOTES
"[facility]   Physical Therapy Treatment Note     Name: Chelsea Ford Child  Clinic Number: [id number]    Therapy Diagnosis:   Encounter Diagnoses   Name Primary?    Impairment of balance Yes    Dizziness      Physician: Elva Tripathi MD    Visit Date: 11/28/2023    Physician Orders: PT Eval and Treat   Medical Diagnosis from Referral: BPPV (benign paroxysmal positional vertigo), right  Evaluation Date: 11/6/2023  Authorization Period Expiration: 12/31/23  Plan of Care Expiration: 01/05/24  Progress Note Due: 12/06/23  Visit # / Visits authorized: 06/ 20 + eval    PTA Visit #: 0/5     Time In: 920  Time Out: 1005  Total Billable Time: 45 minutes    Subjective     Pt reports: She has noticed that her dizziness is worse in the morning than in the evening. She felt like it was a little worse over the weekend but that has since improved. Rolling in bed seems to slightly provoke her dizziness.      She was compliant with home exercise program.   Response to previous treatment: felt fine after last treatment session  Functional change: ongoing    Dizziness: 3/10    OBJECTIVE     Objective Measures updated at progress report unless specified.     Treatment     Chelsea received the treatments listed below:      therapeutic exercises to develop strength, endurance, ROM, flexibility, posture, and core stabilization for 0 minutes including:  NP    neuromuscular re-education activities to improve: Balance, Coordination, Kinesthetic, Sense, Proprioception, and Posture for 20 minutes. The following activities were included:    Gaze Stabilization:  3 x 30 sec VOR1 horizontal, reports no symptoms       Balance Exercises:    Foam pad:  2 x 30" stance with narrow base of support, eyes open, right<>left head movements  2 x 30" stance with narrow base of support, eyes open, up<>down head movements  2 x 30" each lower extremity in front, tandem stance with eyes open, no upper extremity support, CGA    X 6 " laps tandem ambulation at counter, pivot turns at end of lap, very occ touchdown support, SBA  X 4 laps marching with three second holds on foam strip, CGA    therapeutic activities to improve functional performance for 25 minutes, including:    Functional motion tolerance:    Ambulation with head movements:  2 x 100 feet ambulation with L/R head turns, slight path deviation  2 x 100 feet ambulation with up/down head turns, slight path deviation  2 x 100 ft looking each direction. Diagonal head turns, slight path deviation, slight increase in dizziness      2X5 reps ea direction supine<>half sit up toward each side, simulating reaching toward bedside to turn on/off lamp    Standing cone transfer:  X 6 cones, transferring right<>left from on plinth<> on shelf under plint x 1 trials each direction, slight increase in dizziness    Home Exercises Provided and Patient Education Provided     Education provided:   - home exercise program provided, POC    Written Home Exercises Provided: yes.  Exercises were reviewed and Chlesea was able to demonstrate them prior to the end of the session.  Chelsea demonstrated good  understanding of the education provided.     See EMR under Patient Instructions for exercises provided 11/9/2023.    Assessment     Chelsea tolerated therapy session well this morning. She reports a decrease in dizziness at conclusion of session to 2/10, despite reportedly feeling more off balance following her bed mobility task. PT and pt discuss that some of her dizziness may be caused by orthostatic hypotension, especially with moving seated to standing. PT and pt also discuss that she may continue to have this slight dizziness/off balance feeling but she is very highly functioning with it at this point. Pt remains appropriate and motivated to continue with therapy sessions to address remaining deficits.      Chelsea Is progressing well towards her goals.   Pt prognosis is Good.     Pt will continue to benefit  from skilled outpatient physical therapy to address the deficits listed in the problem list box on initial evaluation, provide pt/family education and to maximize pt's level of independence in the home and community environment.     Pt's spiritual, cultural and educational needs considered and pt agreeable to plan of care and goals.     Anticipated barriers to physical therapy: h/o vestibular system weakness, bilateral knee pain, neck pain/AROM restrictions    Goals:  Short Term Goals: 4 weeks   Patient to be (I) with established home exercise program. ongoing  Patient to perform VOR 1 WFL for improved gaze stabilization. ongoing  Patient to pass GST condition 2 with no additional sway for improved balance in low vision environments. ongoing  Patient to pass GST condition 6 with no more than min additional sway for improved balance in low vision environments. ongoing  PT to formally assess Functional Gait Assessment and establish appropriate goal. Met 11/9/2023     Long Term Goals: 8 weeks   Patient to be (I) with advanced home exercise program. ongoing  Patient to pass GST condition 4 to at least 18 seconds for improved balance in low vision environments.  ongoing  Patient to pass GST condition 6 with no more than slight additional sway for improved balance in low vision environments. ongoing  TBD: Functional Gait Assessment goal   9a. Pt will improve FGA score to at least 27/30 in order to demonstrate improved ambulatory balance.     Plan   Plan of care Certification: 11/6/2023 to 01/05/24.    Continue to work on oculomotor exercises, ambulatory and static balance challenges, habituation tasks as needed.      Rene Baker, PT

## 2023-11-30 NOTE — PROGRESS NOTES
"[facility]   Physical Therapy Treatment Note     Name: Chelsea Ford Child  Clinic Number: [id number]    Therapy Diagnosis:   Encounter Diagnoses   Name Primary?    Impairment of balance Yes    Dizziness        Physician: Elva Tripathi MD    Visit Date: 12/1/2023    Physician Orders: PT Eval and Treat   Medical Diagnosis from Referral: BPPV (benign paroxysmal positional vertigo), right  Evaluation Date: 11/6/2023  Authorization Period Expiration: 12/31/23  Plan of Care Expiration: 01/05/24  Progress Note Due: 12/06/23  Visit # / Visits authorized: 07/ 20 + eval    PTA Visit #: 0/5     Time In: 1000  Time Out: 1047  Total Billable Time: 47 minutes    Subjective     Pt reports: she feels okay today; endorses keeping up with HEP and notes that diagonals give her the most trouble. She reports performing the supine turning activity last session was a good challenge that provoked her symptoms.      She was compliant with home exercise program.   Response to previous treatment: felt fine after last treatment session  Functional change: ongoing    Dizziness: 3/10    OBJECTIVE     Objective Measures updated at progress report unless specified.     Treatment     Chelsea received the treatments listed below:      therapeutic exercises to develop strength, endurance, ROM, flexibility, posture, and core stabilization for 10 minutes including:   SCIFIT Stepper level 1 x 8 minutes for CV endurance    Includes time for seated rest break.    neuromuscular re-education activities to improve: Balance, Coordination, Kinesthetic, Sense, Proprioception, and Posture for 14 minutes. The following activities were included:    Gaze Stabilization:  3 x 30 sec VOR1 horizontal, reports no symptoms     Balance Exercises:    Foam pad:  2 x 30" stance with narrow base of support, eyes open, right<>left head movements  2 x 30" stance with narrow base of support, eyes open, up<>down head movements  2 x 30" each lower " extremity in front, tandem stance with eyes open, no upper extremity support, CGA    X 6 laps tandem ambulation in // bar, pivot turns at end of lap, very occ touchdown support, SBA  X 4 laps marching with three second holds on foam strip, CGA    Includes time for HEP education.    therapeutic activities to improve functional performance for 23 minutes, including:    Functional motion tolerance:    Ambulation with head movements:  2 x 100 feet ambulation with L/R head turns  2 x 100 feet ambulation with up/down head turns, slight path deviation  2 x 100 ft looking each direction. Diagonal head turns, slight path deviation, slight increase in dizziness     2X10 reps ea direction supine<>half sit up toward each side, simulating reaching toward bedside to turn on/off lamp     Includes time for rest breaks, HEP education (safety with activities at home), and contribution of BP to feelings of dizziness (orthostatic hypotension).     Home Exercises Provided and Patient Education Provided     Education provided:   - home exercise program provided, POC    Written Home Exercises Provided: yes.  Exercises were reviewed and Chelsea was able to demonstrate them prior to the end of the session.  Chelsea demonstrated good  understanding of the education provided.     See EMR under Patient Instructions for exercises provided 11/9/2023.    Assessment     Chelsea tolerated therapy session well this morning. She endorses feeling less dizzy now that her sinuses have cleared up. PT and pt revisited discussion that some of her dizziness may be caused by orthostatic hypotension, especially with moving seated to standing; discussed safety with transfers and anticipating dizziness before mobilizing. She continues to perform well with all activities, balance interventions were appropriately challenging this session. We also discussed appropriate HEP activities and safety with all exercises; PT recommend performing all balance exercises with  eyes open on firm surface in a corner or at a counter.She also displayed appropriate challenge on SCIFIT stepper and will likely benefit from continued CV endurance training to address remaining deficits. Pt remains appropriate and motivated to continue with therapy sessions to address remaining deficits.      Chelsea Is progressing well towards her goals.   Pt prognosis is Good.     Pt will continue to benefit from skilled outpatient physical therapy to address the deficits listed in the problem list box on initial evaluation, provide pt/family education and to maximize pt's level of independence in the home and community environment.     Pt's spiritual, cultural and educational needs considered and pt agreeable to plan of care and goals.     Anticipated barriers to physical therapy: h/o vestibular system weakness, bilateral knee pain, neck pain/AROM restrictions    Goals:  Short Term Goals: 4 weeks   Patient to be (I) with established home exercise program. ongoing  Patient to perform VOR 1 WFL for improved gaze stabilization. ongoing  Patient to pass GST condition 2 with no additional sway for improved balance in low vision environments. ongoing  Patient to pass GST condition 6 with no more than min additional sway for improved balance in low vision environments. ongoing  PT to formally assess Functional Gait Assessment and establish appropriate goal. Met 11/9/2023     Long Term Goals: 8 weeks   Patient to be (I) with advanced home exercise program. ongoing  Patient to pass GST condition 4 to at least 18 seconds for improved balance in low vision environments.  ongoing  Patient to pass GST condition 6 with no more than slight additional sway for improved balance in low vision environments. ongoing  TBD: Functional Gait Assessment goal   9a. Pt will improve FGA score to at least 27/30 in order to demonstrate improved ambulatory balance.     Plan   Plan of care Certification: 11/6/2023 to 01/05/24.    Continue to  work on oculomotor exercises, ambulatory and static balance challenges, habituation tasks as needed.    Alexandra Harris, SPT    I certify that I was present in the room directing the student in service delivery and guiding them using my skilled judgment. As the co-signing therapist I have reviewed the students documentation and am responsible for the treatment, assessment, and plan.       Annamaria Ferrer, PT

## 2023-12-01 ENCOUNTER — CLINICAL SUPPORT (OUTPATIENT)
Dept: REHABILITATION | Facility: HOSPITAL | Age: 73
End: 2023-12-01
Payer: MEDICARE

## 2023-12-01 DIAGNOSIS — R26.89 IMPAIRMENT OF BALANCE: Primary | ICD-10-CM

## 2023-12-01 DIAGNOSIS — R42 DIZZINESS: ICD-10-CM

## 2023-12-01 PROCEDURE — 97112 NEUROMUSCULAR REEDUCATION: CPT | Mod: HCNC,PO

## 2023-12-01 PROCEDURE — 97530 THERAPEUTIC ACTIVITIES: CPT | Mod: HCNC,PO

## 2023-12-01 NOTE — PROGRESS NOTES
"[facility]   Physical Therapy Progress and Treatment Note     Name: Chelsea Ford Child  Clinic Number: [id number]    Therapy Diagnosis:   Encounter Diagnoses   Name Primary?    Impairment of balance Yes    Dizziness          Physician: Elva Tripathi MD    Visit Date: 12/4/2023    Physician Orders: PT Eval and Treat   Medical Diagnosis from Referral: BPPV (benign paroxysmal positional vertigo), right  Evaluation Date: 11/6/2023  Authorization Period Expiration: 12/31/23  Plan of Care Expiration: 01/05/24  Progress Note Due: 12/06/23  Visit # / Visits authorized: 08/ 20 + eval    PTA Visit #: 0/5     Time In: 8:43  Time Out: 9:25  Total Billable Time: 42 minutes    Subjective     Pt reports: feels fine but a little congested today.      She was compliant with home exercise program.   Response to previous treatment: felt fine after last treatment session  Functional change: ongoing    Dizziness: 3/10    OBJECTIVE     Oculomotor:  1 x 30 sec VOR1 horizontal, appropriate speed, WNL, reports no symptoms     SOL SENSORY TESTING:  (P= Pass, F= Fail; note any sway; hold each position for 30")  Condition 1: (firm surface/feet together/eyes open) P  Condition 2: (firm surface/feet together/eyes closed) P, min sway  Condition 3: (firm surface/feet in tandem/eyes open) P, each lower extremity in front  Condition 4: (firm surface/feet in tandem/eyes closed) P with min sway RLE in front; P with min sway LLE in front  Condition 5: (soft surface/feet together/eyes open) P  Condition 6: (soft surface/feet together/eyes closed) P, min sway  Condition 7: (Fukuda step test), measure distance varied from center starting position, > 30 deg deviation to either side indicates hypofunction of biased side NT this date    Functional Gait Assessment:   1. Gait on level surface =  3              (3) Normal: less than 5.5 sec, no A.D., no imbalance, normal gait pattern, deviates< 6in              (2) Mild " impairment: 7-5.6 sec, uses A.D., mild gait deviations, or deviates 6-10 in              (1) Moderate impairment: > 7 sec, slow speed, imbalance, deviates 10-15 in.              (0) Severe impairment: needs assist, deviates >15 in, reach/touch wall  2. Change in Gait Speed = 2, slight LOB when slowing down              (3) Normal: smooth change w/o loss of balance or gait deviation, deviates < 6 in, significant difference between speeds              (2) Mild impairment: changes speed, but demonstrates mild gait deviations, deviates 6-10 in, OR no deviations but unable to significantly speed, OR uses A.D.              (1) Moderate impairment: minor changes to speed, OR changes speed w/ significant deviations, deviates 10-15 in, OR  Changes speed , but loses balance & recovers              (0) Severe impairment: cannot change speed, deviates >15 in, or loses balance & needs assist  3. Gait with horizontal head turns  = 2              (3) Normal: no change in gait, deviates <6 in              (2) Mild impairment: slight change in speed, deviates 6-10 in, OR uses A.D.              (1) Moderate impairment: moderate change in speed, deviates 10-15 in              (0) Severe impairment: severe disruption of gait, deviates >15in  4. Gait with vertical head turns = 3              (3) Normal: no change in gait, deviates <6 in              (2) Mild impairment: slight change in speed, deviates 6-10 in OR uses A.D.              (1) Moderate impairment: moderate change in speed, deviates 10-15 in              (0) Severe impairment: severe disruption of gait, deviates >15 in  5. Gait with pivot turns = 2,requires multiple steps               (3) Normal: performs safely in 3 sec, no LOB              (2) Mild impairment: performs in >3 sec & no LOB, OR turns safely & requires several steps to regain LOB              (1) Moderate impairment: turns slow, OR requires several small steps for balance following turn & stop               (0) Severe impairment: cannot turn safely, needs assist  6. Step over obstacle = 3              (3) Normal: steps over 2 stacked boxes w/o change in speed or LOB              (2) Mild impairment: able to step over 1 box w/o change in speed or LOB              (1) Moderate impairment: steps over 1 box but must slow down, may require VC              (0) Severe impairment: cannot perform w/o assist  7. Gait with Narrow JEFF = 3              (3) Normal: 10 steps no staggering              (2) Mild impairment: 7-9 steps              (1) Moderate impairment: 4-7 steps              (0) Severe impairment: < 4 steps or cannot perform w/o assist  8. Gait with eyes closed = 3              (3) Normal: < 7 sec, no A.D., no LOB, normal gait pattern, deviates <6 in              (2) Mild impairment: 7.1-9 sec, mild gait deviations, deviates 6-10 in              (1) Moderate impairment: > 9 sec, abnormal pattern, LOB, deviates 10-15 in              (0) Severe impairment: cannot perform w/o assist, LOB, deviates >15in  9. Ambulating Backwards = 2              (3) Normal: no A.D., no LOB, normal gait pattern, deviates <6in              (2) Mild impairment: uses A.D., slower speed, mild gait deviations, deviates 6-10 in              (1) Moderate impairment: slow speed, abnormal gait pattern, LOB, deviates 10-15 in              (0) Severe impairment: severe gait deviations or LOB, deviates >15in  10. Steps = 3              (3) Normal: alternating feet, no rail              (2) Mild Impairment: alternating feet, uses rail              (1) Moderate impairment: step-to, uses rail              (0) Severe impairment: cannot perform safely     Score 26/30     Score:   <22/30 fall risk   <20/30 fall risk in older adults   <18/30 fall risk in Parkinsons        CMS Impairment/Limitation/Restriction for FOTO for Vestibular Survey     Therapist reviewed FOTO scores for Chelsea Ford Child on 11/6/2023.   FOTO documents entered into [employer] - see Media  section.     Limitation Score: 48%           Treatment     Chelsea received the treatments listed below:      therapeutic exercises to develop strength, endurance, ROM, flexibility, posture, and core stabilization for 11 minutes including:   SCIFIT Stepper level 1 x 11 minutes for CV endurance      neuromuscular re-education activities to improve: Balance, Coordination, Kinesthetic, Sense, Proprioception, and Posture for 17 minutes. The following activities were included:    Includes time on objective measures and education of scores/performance.     therapeutic activities to improve functional performance for 14 minutes, including:    Includes time on objective measures and education of scores/performance.   Includes time for HEP education.    Home Exercises Provided and Patient Education Provided     Education provided:   - home exercise program provided, POC    Written Home Exercises Provided: yes.  Exercises were reviewed and Chelsea was able to demonstrate them prior to the end of the session.  Chelsea demonstrated good  understanding of the education provided.     See EMR under Patient Instructions for exercises provided 11/9/2023.    Assessment     Chelsea tolerated today's reassessment well. She displays good progress towards established goals. Her FGA score improved indicating improved dynamic balance and currently places her outside of the elevated fall risk category. She passed all conditions of the MCTSIB indicating improved static balance with vision eliminated and narrow JEFF conditions; she displays minimal sway with conditions 2, 4, and 6 indicating vision eliminated conditions are the primary challenge for her at this time. Her VOR 1 performance is WFL indicating improved oculomotor function. She reports feeling good and motivated after exercising on the SCIFIT stepper last session; she toelrated 11 minutes on the SCIFIT stepper well this session. Endurance training for improved CV capacity remains  appropriate at this time; pt was instructed to incorporate appropriate CV training to current HEP (walking, seated elliptical, seated bike). Pt remains appropriate and motivated to continue with therapy sessions to address remaining deficits. Anticipate d/c once current plan of care (POC) is completed.       Chelsea Is progressing well towards her goals.   Pt prognosis is Good.     Pt will continue to benefit from skilled outpatient physical therapy to address the deficits listed in the problem list box on initial evaluation, provide pt/family education and to maximize pt's level of independence in the home and community environment.     Pt's spiritual, cultural and educational needs considered and pt agreeable to plan of care and goals.     Anticipated barriers to physical therapy: h/o vestibular system weakness, bilateral knee pain, neck pain/AROM restrictions    Goals:  Short Term Goals: 4 weeks   Patient to be (I) with established home exercise program. MET 12/4/23  Patient to perform VOR 1 WFL for improved gaze stabilization. MET 12/4/23  Patient to pass GST condition 2 with no additional sway for improved balance in low vision environments. Progressing   Patient to pass GST condition 6 with no more than min additional sway for improved balance in low vision environments. MET 12/4/23   PT to formally assess Functional Gait Assessment and establish appropriate goal. Met 11/9/2023     Long Term Goals: 8 weeks   Patient to be (I) with advanced home exercise program. ongoing  Patient to pass GST condition 4 to at least 18 seconds for improved balance in low vision environments.  MET 12/4/23  Patient to pass GST condition 6 with no more than slight additional sway for improved balance in low vision environments. Progressing   TBD: Functional Gait Assessment goal   9a. Pt will improve FGA score to at least 27/30 in order to demonstrate improved ambulatory balance. Progressing     Plan   Plan of care Certification:  11/6/2023 to 01/05/24.    Discontinue oculomotor exercises to HEP   ambulatory and static balance challenges, habituation tasks as needed  Continue SCIFIT stepper endurance training     Alexandra Harris, SPT    I certify that I was present in the room directing the student in service delivery and guiding them using my skilled judgment. As the co-signing therapist I have reviewed the students documentation and am responsible for the treatment, assessment, and plan.       Annamaria Ferrer, PT

## 2023-12-04 ENCOUNTER — CLINICAL SUPPORT (OUTPATIENT)
Dept: REHABILITATION | Facility: HOSPITAL | Age: 73
End: 2023-12-04
Payer: MEDICARE

## 2023-12-04 DIAGNOSIS — R42 DIZZINESS: ICD-10-CM

## 2023-12-04 DIAGNOSIS — R26.89 IMPAIRMENT OF BALANCE: Primary | ICD-10-CM

## 2023-12-04 PROCEDURE — 97110 THERAPEUTIC EXERCISES: CPT | Mod: HCNC,PO

## 2023-12-04 PROCEDURE — 97112 NEUROMUSCULAR REEDUCATION: CPT | Mod: HCNC,PO

## 2023-12-04 PROCEDURE — 97530 THERAPEUTIC ACTIVITIES: CPT | Mod: HCNC,PO

## 2023-12-07 NOTE — PROGRESS NOTES
"[facility]   Physical Therapy Treatment Note     Name: Chelsea Ford Child  Clinic Number: [id number]    Therapy Diagnosis:   Encounter Diagnoses   Name Primary?    Impairment of balance Yes    Dizziness        Physician: Elva Tripathi MD    Visit Date: 12/8/2023    Physician Orders: PT Eval and Treat   Medical Diagnosis from Referral: BPPV (benign paroxysmal positional vertigo), right  Evaluation Date: 11/6/2023  Authorization Period Expiration: 12/31/23  Plan of Care Expiration: 01/05/24  Progress Note Due: 12/06/23  Visit # / Visits authorized: 09/ 20 + eval    PTA Visit #: 0/5     Time In: 1415  Time Out: 1500  Total Billable Time: 45 minutes    Subjective     Pt reports: feels good today; reports walking for exercise yesterday, no adverse affects.    She was compliant with home exercise program.   Response to previous treatment: felt fine after last treatment session  Functional change: ongoing    Dizziness: 3/10    OBJECTIVE     Last taken 12/04/23       Treatment     Chelsea received the treatments listed below:      therapeutic exercises to develop strength, endurance, ROM, flexibility, posture, and core stabilization for 25 minutes including:   SCIFIT Stepper level 1 x 10 minutes for CV endurance    Core stabilization:  2 x10 reps each side, bird dogs, occasional CGA at hips  2 x 10 reps each side, sit up and reach with rotation, CGA/min A with last ~5 reps due to increased fatigue    neuromuscular re-education activities to improve: Balance, Coordination, Kinesthetic, Sense, Proprioception, and Posture for 10 minutes. The following activities were included:    Balance Exercises:     Foam pad:  2 x 30" stance with narrow base of support, eyes open, right<>left head movements  1 x30 stance with narrow base of support, eyes closed, right<>left head movements  2 x 30" stance with narrow base of support, eyes open, up<>down head movements  1 x 30" stance with narrow base of " support, eyes closed, up<>down head movements     X 6 laps tandem ambulation in // bar, pivot turns at end of lap, very occ touchdown support, SBA    therapeutic activities to improve functional performance for 10 minutes, including:    Functional motion tolerance:     Ambulation with head movements:  2 x 100 feet ambulation with L/R head turns  2 x 100 feet ambulation with up/down head turns, slight path deviation  2 x 100 ft looking each direction. Diagonal head turns, slight path deviation, slight increase in dizziness       Home Exercises Provided and Patient Education Provided     Education provided:   - home exercise program provided, POC    Written Home Exercises Provided: yes.  Exercises were reviewed and Chelsea was able to demonstrate them prior to the end of the session.  Chelsea demonstrated good  understanding of the education provided.     See EMR under Patient Instructions for exercises provided 11/9/2023.    Assessment     Chelsea tolerated today's session well. She endorses feeling improvement with balance and endurance in her daily life. She tolerated initiation of core stabilization exercises very well this session and will benefit from continued core stabilization training throughout the rest of her current POC. She displays improved tolerance with ambulation with head turns; no reports of dizziness with only slight listing to the right noted. She toelrated progression of balance exercises to include vision eliminated conditions; these conditions are appropriately challenging at this time. Pt remains appropriate and motivated to continue with therapy sessions to address remaining deficits. Anticipate d/c once current plan of care (POC) is completed.       Chelsea Is progressing well towards her goals.   Pt prognosis is Good.     Pt will continue to benefit from skilled outpatient physical therapy to address the deficits listed in the problem list box on initial evaluation, provide pt/family education  and to maximize pt's level of independence in the home and community environment.     Pt's spiritual, cultural and educational needs considered and pt agreeable to plan of care and goals.     Anticipated barriers to physical therapy: h/o vestibular system weakness, bilateral knee pain, neck pain/AROM restrictions    Goals:  Short Term Goals: 4 weeks   Patient to be (I) with established home exercise program. MET 12/4/23  Patient to perform VOR 1 WFL for improved gaze stabilization. MET 12/4/23  Patient to pass GST condition 2 with no additional sway for improved balance in low vision environments. Progressing   Patient to pass GST condition 6 with no more than min additional sway for improved balance in low vision environments. MET 12/4/23   PT to formally assess Functional Gait Assessment and establish appropriate goal. Met 11/9/2023     Long Term Goals: 8 weeks   Patient to be (I) with advanced home exercise program. ongoing  Patient to pass GST condition 4 to at least 18 seconds for improved balance in low vision environments.  MET 12/4/23  Patient to pass GST condition 6 with no more than slight additional sway for improved balance in low vision environments. Progressing   TBD: Functional Gait Assessment goal   9a. Pt will improve FGA score to at least 27/30 in order to demonstrate improved ambulatory balance. Progressing     Plan   Plan of care Certification: 11/6/2023 to 01/05/24.    ambulatory and static balance challenges, habituation tasks as needed  Continue SCIFIT stepper endurance training   Continue core stabilization training   Emphasis on vision eliminated balance training     Alexandra Harris, SPT    I certify that I was present in the room directing the student in service delivery and guiding them using my skilled judgment. As the co-signing therapist I have reviewed the students documentation and am responsible for the treatment, assessment, and plan.       Annamaria Ferrer, PT

## 2023-12-08 ENCOUNTER — CLINICAL SUPPORT (OUTPATIENT)
Dept: REHABILITATION | Facility: HOSPITAL | Age: 73
End: 2023-12-08
Payer: MEDICARE

## 2023-12-08 DIAGNOSIS — R42 DIZZINESS: ICD-10-CM

## 2023-12-08 DIAGNOSIS — R26.89 IMPAIRMENT OF BALANCE: Primary | ICD-10-CM

## 2023-12-08 PROCEDURE — 97110 THERAPEUTIC EXERCISES: CPT | Mod: HCNC,PO

## 2023-12-08 PROCEDURE — 97530 THERAPEUTIC ACTIVITIES: CPT | Mod: HCNC,PO

## 2023-12-08 PROCEDURE — 97112 NEUROMUSCULAR REEDUCATION: CPT | Mod: HCNC,PO

## 2023-12-08 NOTE — PROGRESS NOTES
"[facility]   Physical Therapy Progress and Treatment Note     Name: Chelsea Ford Child  Clinic Number: [id number]    Therapy Diagnosis:   Encounter Diagnoses   Name Primary?    Impairment of balance Yes    Dizziness        Physician: Elva Tripathi MD    Visit Date: 12/11/2023    Physician Orders: PT Eval and Treat   Medical Diagnosis from Referral: BPPV (benign paroxysmal positional vertigo), right  Evaluation Date: 11/6/2023  Authorization Period Expiration: 12/31/23  Plan of Care Expiration: 01/05/24  Progress Note Due: 12/06/23  Visit # / Visits authorized: 09/ 20 + eval    PTA Visit #: 0/5     Time In: 8:45  Time Out: 9:30  Total Billable Time: 45 minutes    Subjective     Pt reports: doing fine this morning. No new complaints.       She was compliant with home exercise program.   Response to previous treatment: felt fine after last treatment session  Functional change: ongoing    Dizziness: 3/10    OBJECTIVE     Last taken on 12/04/23.      Treatment     Chelsea received the treatments listed below:      therapeutic exercises to develop strength, endurance, ROM, flexibility, posture, and core stabilization for 10 minutes including:   SCIFIT Stepper level 1 x 10 minutes for CV endurance      neuromuscular re-education activities to improve: Balance, Coordination, Kinesthetic, Sense, Proprioception, and Posture for 20 minutes. The following activities were included:     Balance Exercises:     Foam pad: CGA, no upper extremity support  2 x 30" stance with narrow base of support, eyes closed, right<>left head movements  2 x 30" stance with narrow base of support, eyes closed, up<>down head movements  2 x 30" stance with eyes closed, narrow base of support  2 x 10 reps, each lower extremity single leg stance with alternate lower extremity tapping 2 large cones in front, no upper extremity support     X 4 laps tandem ambulation along ballet, no upper extremity support SBA  X 4 laps " "marching in // bars with 3" second hold, SBA    X 30" each lower extremity in front, tandem stance with eyes open, CGA, no upper extremity support  X 30" each lower extremity in front, tandem stance with eyes closed, CGA, no upper extremity support     2 x 30" each lower extremity single leg stance with alternate lower extremity on BOSU, soft side down, eyes closed, CGA, no upper extremity support     therapeutic activities to improve functional performance for 15 minutes, including:     Functional motion tolerance:     Ambulation with head movements:  2 x 100 feet ambulation with L/R head turns  2 x 100 feet ambulation with up/down head turns, slight path deviation  2 x 100 ft looking each direction. Diagonal head turns, slight path deviation, slight increase in dizziness        Home Exercises Provided and Patient Education Provided     Education provided:   - home exercise program provided, POC    Written Home Exercises Provided: yes.  Exercises were reviewed and Chelsea was able to demonstrate them prior to the end of the session.  Chelsea demonstrated good  understanding of the education provided.     See EMR under Patient Instructions for exercises provided 11/9/2023.    Assessment     Chelsea tolerated therapy session well this morning. Continued to focus on activity tolerance, balance, and motion tolerance. Increased resistance on recumbent stepper to 1.5 to progress endurance. Improved performance with ambulation with head movements- plan to progress next session. Balance activities remain appropriately challenging. No significant dizziness elicited throughout session. Continue plan of care (POC) to further address mobility deficits.     Chelsea Is progressing well towards her goals.   Pt prognosis is Good.     Pt will continue to benefit from skilled outpatient physical therapy to address the deficits listed in the problem list box on initial evaluation, provide pt/family education and to maximize pt's level of " independence in the home and community environment.     Pt's spiritual, cultural and educational needs considered and pt agreeable to plan of care and goals.     Anticipated barriers to physical therapy: h/o vestibular system weakness, bilateral knee pain, neck pain/AROM restrictions    Goals:  Short Term Goals: 4 weeks   Patient to be (I) with established home exercise program. MET 12/4/23  Patient to perform VOR 1 WFL for improved gaze stabilization. MET 12/4/23  Patient to pass GST condition 2 with no additional sway for improved balance in low vision environments. Progressing   Patient to pass GST condition 6 with no more than min additional sway for improved balance in low vision environments. MET 12/4/23   PT to formally assess Functional Gait Assessment and establish appropriate goal. Met 11/9/2023     Long Term Goals: 8 weeks   Patient to be (I) with advanced home exercise program. ongoing  Patient to pass GST condition 4 to at least 18 seconds for improved balance in low vision environments.  MET 12/4/23  Patient to pass GST condition 6 with no more than slight additional sway for improved balance in low vision environments. Progressing   TBD: Functional Gait Assessment goal   9a. Pt will improve FGA score to at least 27/30 in order to demonstrate improved ambulatory balance. Progressing     Plan   Plan of care Certification: 11/6/2023 to 01/05/24.    Progress ambulation with head movements to laps in gym next session.     Ambulatory and static balance challenges, habituation tasks as needed  Continue SCIFIT stepper endurance training       Annamaria Ferrer, PT

## 2023-12-11 ENCOUNTER — CLINICAL SUPPORT (OUTPATIENT)
Dept: REHABILITATION | Facility: HOSPITAL | Age: 73
End: 2023-12-11
Payer: MEDICARE

## 2023-12-11 DIAGNOSIS — R26.89 IMPAIRMENT OF BALANCE: Primary | ICD-10-CM

## 2023-12-11 DIAGNOSIS — R42 DIZZINESS: ICD-10-CM

## 2023-12-11 PROCEDURE — 97110 THERAPEUTIC EXERCISES: CPT | Mod: HCNC,PO

## 2023-12-11 PROCEDURE — 97530 THERAPEUTIC ACTIVITIES: CPT | Mod: HCNC,PO

## 2023-12-11 PROCEDURE — 97112 NEUROMUSCULAR REEDUCATION: CPT | Mod: HCNC,PO

## 2023-12-13 ENCOUNTER — CLINICAL SUPPORT (OUTPATIENT)
Dept: REHABILITATION | Facility: HOSPITAL | Age: 73
End: 2023-12-13
Payer: MEDICARE

## 2023-12-13 DIAGNOSIS — R26.89 IMPAIRMENT OF BALANCE: Primary | ICD-10-CM

## 2023-12-13 DIAGNOSIS — R42 DIZZINESS: ICD-10-CM

## 2023-12-13 PROCEDURE — 97110 THERAPEUTIC EXERCISES: CPT | Mod: HCNC,PO

## 2023-12-13 PROCEDURE — 97530 THERAPEUTIC ACTIVITIES: CPT | Mod: HCNC,PO

## 2023-12-13 PROCEDURE — 97112 NEUROMUSCULAR REEDUCATION: CPT | Mod: HCNC,PO

## 2023-12-13 NOTE — PROGRESS NOTES
" [facility]   Physical Therapy Progress and Treatment Note     Name: Chelsea Ford Child  Clinic Number: [id number]    Therapy Diagnosis:   Encounter Diagnoses   Name Primary?    Impairment of balance Yes    Dizziness      Physician: Elva Tripathi MD    Visit Date: 12/13/2023    Physician Orders: PT Eval and Treat   Medical Diagnosis from Referral: BPPV (benign paroxysmal positional vertigo), right  Evaluation Date: 11/6/2023  Authorization Period Expiration: 12/31/23  Plan of Care Expiration: 01/05/24  Progress Note Due: 12/06/23  Visit # / Visits authorized: 11/20 + eval    PTA Visit #: 0/5     Time In: 1029  Time Out: 1115  Total Billable Time: 46 minutes    Subjective     Pt reports: She is doing good this morning. States that she is having some pain in her knees this morning that is unrelated to her therapy; potentially related to the weather    She was compliant with home exercise program.   Response to previous treatment: felt fine after last treatment session  Functional change: ongoing    Dizziness: 2/10    OBJECTIVE     Last taken on 12/04/23.      Treatment     Chelsea received the treatments listed below:      therapeutic exercises to develop strength, endurance, ROM, flexibility, posture, and core stabilization for 10 minutes including:    SCIFIT Stepper level 1 x 10 minutes for CV endurance      neuromuscular re-education activities to improve: Balance, Coordination, Kinesthetic, Sense, Proprioception, and Posture for 20 minutes. The following activities were included:     Balance Exercises:     Foam pad: CGA, no upper extremity support  2 x 30" stance with narrow base of support, eyes closed, right<>left head movements  2 x 30" stance with narrow base of support, eyes closed, up<>down head movements  2 x 30" stance with eyes closed, narrow base of support  2 x 10 reps, each lower extremity single leg stance with alternate lower extremity tapping 2 large cones in " "front, no upper extremity support     X 4 laps tandem ambulation along ballet, no upper extremity support SBA  X 4 laps marching in // bars with 3" second hold, SBA    2 x 30" each lower extremity in front, tandem stance with eyes closed, CGA, no upper extremity support     2 x 30" each lower extremity single leg stance with alternate lower extremity on BOSU, soft side down, eyes closed, CGA, no upper extremity support     therapeutic activities to improve functional performance for 15 minutes, including:     Functional motion tolerance:     Ambulation around gym with head movements:  X 2 laps ambulation with L/R head turns  X 2 laps ambulation with up/down head turns, slight path deviation  X 2 laps looking each direction. Diagonal head turns, slight path deviation, slight increase in dizziness        Home Exercises Provided and Patient Education Provided     Education provided:   - home exercise program provided, POC    Written Home Exercises Provided: yes.  Exercises were reviewed and Chelsea was able to demonstrate them prior to the end of the session.  Chelsea demonstrated good  understanding of the education provided.     See EMR under Patient Instructions for exercises provided 11/9/2023.    Assessment     Chelsea tolerated therapy session well this morning. Tolerated progression of walking with head movements to laps around gym well with no reports of increased dizziness but states, "This is definitely different," compared to walking down the hallway. She continued to perform well with balance activities with appropriate challenge noted. PT to continue per plan of care (POC) to further address mobility deficits.     Chelsea Is progressing well towards her goals.   Pt prognosis is Good.     Pt will continue to benefit from skilled outpatient physical therapy to address the deficits listed in the problem list box on initial evaluation, provide pt/family education and to maximize pt's level of independence in the " home and community environment.     Pt's spiritual, cultural and educational needs considered and pt agreeable to plan of care and goals.     Anticipated barriers to physical therapy: h/o vestibular system weakness, bilateral knee pain, neck pain/AROM restrictions    Goals:  Short Term Goals: 4 weeks   Patient to be (I) with established home exercise program. MET 12/4/23  Patient to perform VOR 1 WFL for improved gaze stabilization. MET 12/4/23  Patient to pass GST condition 2 with no additional sway for improved balance in low vision environments. Progressing   Patient to pass GST condition 6 with no more than min additional sway for improved balance in low vision environments. MET 12/4/23   PT to formally assess Functional Gait Assessment and establish appropriate goal. Met 11/9/2023     Long Term Goals: 8 weeks   Patient to be (I) with advanced home exercise program. ongoing  Patient to pass GST condition 4 to at least 18 seconds for improved balance in low vision environments.  MET 12/4/23  Patient to pass GST condition 6 with no more than slight additional sway for improved balance in low vision environments. Progressing   TBD: Functional Gait Assessment goal   9a. Pt will improve FGA score to at least 27/30 in order to demonstrate improved ambulatory balance. Progressing     Plan   Plan of care Certification: 11/6/2023 to 01/05/24.    Progress ambulation with head movements to laps in gym next session.     Ambulatory and static balance challenges, habituation tasks as needed    Continue SCIFIT stepper endurance training       Summer Tomlin, PT

## 2023-12-18 ENCOUNTER — CLINICAL SUPPORT (OUTPATIENT)
Dept: REHABILITATION | Facility: HOSPITAL | Age: 73
End: 2023-12-18
Payer: MEDICARE

## 2023-12-18 DIAGNOSIS — R26.89 IMPAIRMENT OF BALANCE: Primary | ICD-10-CM

## 2023-12-18 DIAGNOSIS — R42 DIZZINESS: ICD-10-CM

## 2023-12-18 PROCEDURE — 97530 THERAPEUTIC ACTIVITIES: CPT | Mod: HCNC,PO,CQ

## 2023-12-18 PROCEDURE — 97112 NEUROMUSCULAR REEDUCATION: CPT | Mod: HCNC,PO,CQ

## 2023-12-18 PROCEDURE — 97110 THERAPEUTIC EXERCISES: CPT | Mod: HCNC,PO,CQ

## 2023-12-18 NOTE — PROGRESS NOTES
" [facility]   Physical Therapy Progress and Treatment Note     Name: Chelsea Ford Child  Clinic Number: [id number]    Therapy Diagnosis:   Encounter Diagnoses   Name Primary?    Impairment of balance Yes    Dizziness      Physician: Elva Tripathi MD    Visit Date: 12/18/2023    Physician Orders: PT Eval and Treat   Medical Diagnosis from Referral: BPPV (benign paroxysmal positional vertigo), right  Evaluation Date: 11/6/2023  Authorization Period Expiration: 12/31/23  Plan of Care Expiration: 01/05/24  Progress Note Due: 12/06/23  Visit # / Visits authorized: 12/20 + eval    PTA Visit #: 1/5     Time In: 0845  Time Out: 0930  Total Billable Time: 45 minutes    Subjective     Pt reports: I feel slightly light headed sometimes but overall much better.  States that she is having some pain in her knees this morning that is unrelated to her therapy; potentially related to the weather    She was compliant with home exercise program.   Response to previous treatment: felt fine after last treatment session  Functional change: ongoing    Dizziness: 2/10    OBJECTIVE     Last taken on 12/04/23.      Treatment     Chelsea received the treatments listed below:      therapeutic exercises to develop strength, endurance, ROM, flexibility, posture, and core stabilization for 10 minutes including:    SCIFIT Stepper level 2 x 10 minutes for CV endurance      neuromuscular re-education activities to improve: Balance, Coordination, Kinesthetic, Sense, Proprioception, and Posture for 20 minutes. The following activities were included:     Balance Exercises:     Foam pad: CGA, no upper extremity support   2 x 30" stance with narrow base of support, eyes closed, right<>left head movements  2 x 30" stance with narrow base of support, eyes closed, up<>down head movements  2 x 30" stance with eyes closed, narrow base of support  2 x 10 reps, each lower extremity single leg stance with alternate lower " "extremity tapping 2 large cones in front, no upper extremity support     X 4 laps tandem ambulation along ballet, no upper extremity support SBA  X 4 laps marching in // bars with 3" second hold, SBA    2 x 30" each lower extremity in front, tandem stance with eyes closed, CGA, no upper extremity support     2 x 30" each lower extremity single leg stance with alternate lower extremity on BOSU, soft side down, eyes closed, CGA, no upper extremity support NP out of time     therapeutic activities to improve functional performance for 15 minutes, including:     Functional motion tolerance:     Ambulation around gym with head movements:  X 2 laps ambulation with L/R head turns  X 2 laps ambulation with up/down head turns, slight path deviation  X 2 laps looking each direction. Diagonal head turns, slight path deviation, slight increase in dizziness        Home Exercises Provided and Patient Education Provided     Education provided:   - home exercise program provided, POC    Written Home Exercises Provided: yes.  Exercises were reviewed and Chelsea was able to demonstrate them prior to the end of the session.  Chelsea demonstrated good  understanding of the education provided.     See EMR under Patient Instructions for exercises provided 11/9/2023.    Assessment     Chelsea tolerated therapy session well this morning. Tolerated progression of walking with head movements to laps around gym well with no reports of increased dizziness. She needed cueing to perform all head turns correctly. She ws not scanning with her eyes but instead kendrick her eyes fixed and turning head.  "This is definitely different," compared to walking down the hallway. She continued to perform well with balance activities with appropriate challenge noted. PT to continue per plan of care (POC) to further address mobility deficits.     Chelsea Is progressing well towards her goals.   Pt prognosis is Good.     Pt will continue to benefit from skilled " outpatient physical therapy to address the deficits listed in the problem list box on initial evaluation, provide pt/family education and to maximize pt's level of independence in the home and community environment.     Pt's spiritual, cultural and educational needs considered and pt agreeable to plan of care and goals.     Anticipated barriers to physical therapy: h/o vestibular system weakness, bilateral knee pain, neck pain/AROM restrictions    Goals:  Short Term Goals: 4 weeks   Patient to be (I) with established home exercise program. MET 12/4/23  Patient to perform VOR 1 WFL for improved gaze stabilization. MET 12/4/23  Patient to pass GST condition 2 with no additional sway for improved balance in low vision environments. Progressing   Patient to pass GST condition 6 with no more than min additional sway for improved balance in low vision environments. MET 12/4/23   PT to formally assess Functional Gait Assessment and establish appropriate goal. Met 11/9/2023     Long Term Goals: 8 weeks   Patient to be (I) with advanced home exercise program. ongoing  Patient to pass GST condition 4 to at least 18 seconds for improved balance in low vision environments.  MET 12/4/23  Patient to pass GST condition 6 with no more than slight additional sway for improved balance in low vision environments. Progressing   TBD: Functional Gait Assessment goal   9a. Pt will improve FGA score to at least 27/30 in order to demonstrate improved ambulatory balance. Progressing     Plan   Plan of care Certification: 11/6/2023 to 01/05/24.    Progress ambulation with head movements to laps in gym next session.     Ambulatory and static balance challenges, habituation tasks as needed    Continue SCIFIT stepper endurance training       Romulo Howard, PTA

## 2023-12-20 ENCOUNTER — CLINICAL SUPPORT (OUTPATIENT)
Dept: REHABILITATION | Facility: HOSPITAL | Age: 73
End: 2023-12-20
Payer: MEDICARE

## 2023-12-20 DIAGNOSIS — R26.89 IMPAIRMENT OF BALANCE: Primary | ICD-10-CM

## 2023-12-20 DIAGNOSIS — R42 DIZZINESS: ICD-10-CM

## 2023-12-20 PROCEDURE — 97110 THERAPEUTIC EXERCISES: CPT | Mod: HCNC,PO

## 2023-12-20 PROCEDURE — 97530 THERAPEUTIC ACTIVITIES: CPT | Mod: HCNC,PO

## 2023-12-20 PROCEDURE — 97112 NEUROMUSCULAR REEDUCATION: CPT | Mod: HCNC,PO

## 2023-12-20 NOTE — PROGRESS NOTES
"  [facility]   Physical Therapy Progress and Treatment Note     Name: Chelsea Ford Child  Clinic Number: [id number]    Therapy Diagnosis:   Encounter Diagnoses   Name Primary?    Impairment of balance Yes    Dizziness        Physician: Elva Tripathi MD    Visit Date: 12/20/2023    Physician Orders: PT Eval and Treat   Medical Diagnosis from Referral: BPPV (benign paroxysmal positional vertigo), right  Evaluation Date: 11/6/2023  Authorization Period Expiration: 12/31/23  Plan of Care Expiration: 01/05/24  Progress Note Due: 12/06/23  Visit # / Visits authorized: 13/20 + eval    PTA Visit #: 0/5     Time In: 1430  Time Out: 1310  Total Billable Time: 40 minutes    Subjective     Pt reports: She is really tired today because she just cleaned her friends home for the last 4 hours. Otherwise she is doing well.    She was compliant with home exercise program.   Response to previous treatment: felt fine after last treatment session  Functional change: ongoing    Dizziness: 2/10    OBJECTIVE     Last taken on 12/04/23.      Treatment     Chelsea received the treatments listed below:      therapeutic exercises to develop strength, endurance, ROM, flexibility, posture, and core stabilization for 10 minutes including:    SCIFIT Stepper level 2 x 10 minutes for CV endurance    neuromuscular re-education activities to improve: Balance, Coordination, Kinesthetic, Sense, Proprioception, and Posture for 15 minutes. The following activities were included:     Balance Exercises:     Foam pad: CGA, no upper extremity support   3 x 30" stance with narrow base of support, eyes closed, right<>left head movements  3 x 30" stance with narrow base of support, eyes closed, up<>down head movements  2 x 10 reps, each lower extremity single leg stance with alternate lower extremity tapping 2 large cones in front, no upper extremity support     X 4 laps tandem ambulation along ballet, no upper extremity support " "SBA  X 4 laps marching in // bars with 3" second hold, SBA       therapeutic activities to improve functional performance for 15 minutes, including:     Functional motion tolerance:     Ambulation around gym with head movements:  X 2 laps ambulation with L/R head turns  X 2 laps ambulation with up/down head turns, slight path deviation  X 2 laps looking each direction. Diagonal head turns, slight path deviation, slight increase in dizziness        10 minutes heat:   PT provided moist heat back to back while on stepper today due to complaints of back pain from cleaning    Home Exercises Provided and Patient Education Provided     Education provided:   - home exercise program provided, POC    Written Home Exercises Provided: yes.  Exercises were reviewed and Chelsea was able to demonstrate them prior to the end of the session.  Chelsea demonstrated good  understanding of the education provided.     See EMR under Patient Instructions for exercises provided 11/9/2023.    Assessment     Chelsea tolerated therapy session well this afternoon despite being tired from cleaning a friends home today. We weren't able to get through quite as much as in previous sessions due to this sluggishness but all that was performed, she was able to complete without external assistance. Overall she continues to be motivated and appropriate for skilled PT services.      Chelsea Is progressing well towards her goals.   Pt prognosis is Good.     Pt will continue to benefit from skilled outpatient physical therapy to address the deficits listed in the problem list box on initial evaluation, provide pt/family education and to maximize pt's level of independence in the home and community environment.     Pt's spiritual, cultural and educational needs considered and pt agreeable to plan of care and goals.     Anticipated barriers to physical therapy: h/o vestibular system weakness, bilateral knee pain, neck pain/AROM restrictions    Goals:  Short Term " Goals: 4 weeks   Patient to be (I) with established home exercise program. MET 12/4/23  Patient to perform VOR 1 WFL for improved gaze stabilization. MET 12/4/23  Patient to pass GST condition 2 with no additional sway for improved balance in low vision environments. Progressing   Patient to pass GST condition 6 with no more than min additional sway for improved balance in low vision environments. MET 12/4/23   PT to formally assess Functional Gait Assessment and establish appropriate goal. Met 11/9/2023     Long Term Goals: 8 weeks   Patient to be (I) with advanced home exercise program. ongoing  Patient to pass GST condition 4 to at least 18 seconds for improved balance in low vision environments.  MET 12/4/23  Patient to pass GST condition 6 with no more than slight additional sway for improved balance in low vision environments. Progressing   TBD: Functional Gait Assessment goal   9a. Pt will improve FGA score to at least 27/30 in order to demonstrate improved ambulatory balance. Progressing     Plan   Plan of care Certification: 11/6/2023 to 01/05/24.    Progress ambulation with head movements to laps in gym next session.     Ambulatory and static balance challenges, habituation tasks as needed    Continue SCIFIT stepper endurance training     Rene Baker, PT

## 2023-12-26 ENCOUNTER — CLINICAL SUPPORT (OUTPATIENT)
Dept: REHABILITATION | Facility: HOSPITAL | Age: 73
End: 2023-12-26
Payer: MEDICARE

## 2023-12-26 DIAGNOSIS — R42 DIZZINESS: ICD-10-CM

## 2023-12-26 DIAGNOSIS — R26.89 IMPAIRMENT OF BALANCE: Primary | ICD-10-CM

## 2023-12-26 PROCEDURE — 97112 NEUROMUSCULAR REEDUCATION: CPT | Mod: HCNC,PO

## 2023-12-26 PROCEDURE — 97530 THERAPEUTIC ACTIVITIES: CPT | Mod: HCNC,PO

## 2023-12-26 PROCEDURE — 97110 THERAPEUTIC EXERCISES: CPT | Mod: HCNC,PO

## 2023-12-26 NOTE — PROGRESS NOTES
" [facility]   Physical Therapy Progress and Treatment Note     Name: Chelsea Ford Child  Clinic Number: [id number]    Therapy Diagnosis:   Encounter Diagnoses   Name Primary?    Impairment of balance Yes    Dizziness        Physician: Elva Tripathi MD    Visit Date: 12/26/2023    Physician Orders: PT Eval and Treat   Medical Diagnosis from Referral: BPPV (benign paroxysmal positional vertigo), right  Evaluation Date: 11/6/2023  Authorization Period Expiration: 12/31/23  Plan of Care Expiration: 01/05/24  Progress Note Due: 12/06/23  Visit # / Visits authorized: 14/20 + eval    PTA Visit #: 0/5     Time In: 930  Time Out: 1015  Total Billable Time: 45 minutes    Subjective     Pt reports: She is doing well today. No pain or anything new to report. Heel to toe walking continues to be most inconsistent. She did have some sinus issues over the weekend that she thinks always increases her dizziness.    She was compliant with home exercise program.   Response to previous treatment: felt fine after last treatment session  Functional change: ongoing    Dizziness: 2/10    OBJECTIVE     Last taken on 12/04/23.      Treatment     Chelsea received the treatments listed below:      therapeutic exercises to develop strength, endurance, ROM, flexibility, posture, and core stabilization for 13 minutes including:    SCIFIT Stepper level 2.5 x 10 minutes for CV endurance, hills sprint setting    neuromuscular re-education activities to improve: Balance, Coordination, Kinesthetic, Sense, Proprioception, and Posture for 10 minutes. The following activities were included:     Balance Exercises:  X 6 laps tandem ambulation along ballet, no upper extremity support SBA  X 6 laps marching in // bars with 3" second hold, SBA  X4 laps stepping while tapping ea LE to large cone, 6 cones, SBA     therapeutic activities to improve functional performance for 22 minutes, including:     Functional motion " tolerance:     Ambulation around gym with head movements:  X 2 laps ambulation with L/R head turns  X 2 laps ambulation with up/down head turns, slight path deviation  X 2 laps looking each direction. Diagonal head turns, slight path deviation, slight increase in dizziness        3 minutes ambulation on Gait Better:   0/5 obstacles, 1.1mph, B UE support    Home Exercises Provided and Patient Education Provided     Education provided:   - home exercise program provided, POC    Written Home Exercises Provided: yes.  Exercises were reviewed and Chelsea was able to demonstrate them prior to the end of the session.  Chelsea demonstrated good  understanding of the education provided.     See EMR under Patient Instructions for exercises provided 11/9/2023.    Assessment     Chelsea tolerated therapy session well this morning. PT reminded patient that she will discharge next session, pt agreeable. PT assisted patient in problem solving how she can recreate some of the activities performed in therapy at home. PT also had pt attempt gaitbetter for first time today, pt struggles to negotiate obstacles but enjoyed this activity. Overall she continues to be motivated and appropriate for skilled PT services.      Chelsea Is progressing well towards her goals.   Pt prognosis is Good.     Pt will continue to benefit from skilled outpatient physical therapy to address the deficits listed in the problem list box on initial evaluation, provide pt/family education and to maximize pt's level of independence in the home and community environment.     Pt's spiritual, cultural and educational needs considered and pt agreeable to plan of care and goals.     Anticipated barriers to physical therapy: h/o vestibular system weakness, bilateral knee pain, neck pain/AROM restrictions    Goals:  Short Term Goals: 4 weeks   Patient to be (I) with established home exercise program. MET 12/4/23  Patient to perform VOR 1 WFL for improved gaze  stabilization. MET 12/4/23  Patient to pass GST condition 2 with no additional sway for improved balance in low vision environments. Progressing   Patient to pass GST condition 6 with no more than min additional sway for improved balance in low vision environments. MET 12/4/23   PT to formally assess Functional Gait Assessment and establish appropriate goal. Met 11/9/2023     Long Term Goals: 8 weeks   Patient to be (I) with advanced home exercise program. ongoing  Patient to pass GST condition 4 to at least 18 seconds for improved balance in low vision environments.  MET 12/4/23  Patient to pass GST condition 6 with no more than slight additional sway for improved balance in low vision environments. Progressing   TBD: Functional Gait Assessment goal   9a. Pt will improve FGA score to at least 27/30 in order to demonstrate improved ambulatory balance. Progressing     Plan   Plan of care Certification: 11/6/2023 to 01/05/24.    Progress ambulation with head movements to laps in gym next session.     Ambulatory and static balance challenges, habituation tasks as needed    Continue SCIFIT stepper endurance training     Rene Baker, PT

## 2023-12-28 ENCOUNTER — CLINICAL SUPPORT (OUTPATIENT)
Dept: REHABILITATION | Facility: HOSPITAL | Age: 73
End: 2023-12-28
Payer: MEDICARE

## 2023-12-28 DIAGNOSIS — R42 DIZZINESS: ICD-10-CM

## 2023-12-28 DIAGNOSIS — R26.89 IMPAIRMENT OF BALANCE: Primary | ICD-10-CM

## 2023-12-28 PROCEDURE — 97112 NEUROMUSCULAR REEDUCATION: CPT | Mod: HCNC,PO

## 2023-12-28 NOTE — PROGRESS NOTES
" [facility]   Physical Therapy Progress and Treatment Note     Name: Chelsea Ford Child  Clinic Number: [id number]    Therapy Diagnosis:   Encounter Diagnoses   Name Primary?    Impairment of balance Yes    Dizziness        Physician: Elva Tripathi MD    Visit Date: 12/28/2023    Physician Orders: PT Eval and Treat   Medical Diagnosis from Referral: BPPV (benign paroxysmal positional vertigo), right  Evaluation Date: 11/6/2023  Authorization Period Expiration: 12/31/23  Plan of Care Expiration: 01/05/24  Progress Note Due: 12/06/23  Visit # / Visits authorized: 15/20 + eval    PTA Visit #: 0/5     Time In: 14:15  Time Out: 14:45  Total Billable Time: 30 minutes    Subjective     Pt reports: She is doing well today. No pain or anything new to report.     She was compliant with home exercise program.   Response to previous treatment: felt fine after last treatment session  Functional change: ongoing    Dizziness: 0/10    OBJECTIVE      SOL SENSORY TESTING:  (P= Pass, F= Fail; note any sway; hold each position for 30")  Condition 1: (firm surface/feet together/eyes open) P  Condition 2: (firm surface/feet together/eyes closed) P  Condition 3: (firm surface/feet in tandem/eyes open) P, each lower extremity in front  Condition 4: (firm surface/feet in tandem/eyes closed) P c/ each lower extremity in front  Condition 5: (soft surface/feet together/eyes open) P  Condition 6: (soft surface/feet together/eyes closed) P, slight sway  Condition 7: (Fukuda step test), measure distance varied from center starting position, > 30 deg deviation to either side indicates hypofunction of biased side NT this date     Functional Gait Assessment:   1. Gait on level surface =  3  2. Change in Gait Speed = 3  3. Gait with horizontal head turns  = 3  4. Gait with vertical head turns = 3  5. Gait with pivot turns = 3  6. Step over obstacle = 3  7. Gait with Narrow JEFF = 3  8. Gait with eyes closed = 3  9. " Ambulating Backwards = 3  10. Steps = 3    Score 30/30       Treatment     Chelsea received the treatments listed below:      therapeutic exercises to develop strength, endurance, ROM, flexibility, posture, and core stabilization for 00 minutes including:  NP      neuromuscular re-education activities to improve: Balance, Coordination, Kinesthetic, Sense, Proprioception, and Posture for 30 minutes. The following activities were included:  Time above includes time spent on objective testing. See above.     Home Exercises Provided and Patient Education Provided     Education provided:   - home exercise program provided, POC    Written Home Exercises Provided: yes.  Exercises were reviewed and Chelsea was able to demonstrate them prior to the end of the session.  Chelsea demonstrated good  understanding of the education provided.     See EMR under Patient Instructions for exercises provided 11/9/2023.  PHYSICAL THERAPY DISCHARGE SUMMARY   Total Visits:16  Missed Visits:0  Cancelled Visits: 0    Status Towards Goals Met: Chelsea demonstrates great progress with therapy, meeting 5/5 STGs and 4/4 LTGs. Improvements noted with gaze stabilization, motion tolerance, and overall balance. VOR 1 WFL with no visual slippage. Performance on GST improved, with less sway noted on all vision eliminated conditions compared to prior trials. Functional Gait Assessment score improved by 7 points since onset of therapy, which is considered a significant change. Current performance on balance tests places patient out of elevated fall risk category. Patient remains compliant with updated home exercise program and able to verbalize exercises correctly. Patient is appropriate for d/c to maintain therapy gains on independent home basis.     Goals:  Short Term Goals: 4 weeks   Patient to be (I) with established home exercise program. MET 12/4/23  Patient to perform VOR 1 WFL for improved gaze stabilization. MET 12/4/23  Patient to pass GST  condition 2 with no additional sway for improved balance in low vision environments. MET 12/28/23   Patient to pass GST condition 6 with no more than min additional sway for improved balance in low vision environments. MET 12/4/23   PT to formally assess Functional Gait Assessment and establish appropriate goal. Met 11/9/2023     Long Term Goals: 8 weeks   Patient to be (I) with advanced home exercise program. MET 12/28/23  Patient to pass GST condition 4 to at least 18 seconds for improved balance in low vision environments.  MET 12/4/23  Patient to pass GST condition 6 with no more than slight additional sway for improved balance in low vision environments. MET 12/28/23  TBD: Functional Gait Assessment goal   9a. Pt will improve FGA score to at least 27/30 in order to demonstrate improved ambulatory balance. MET 12/28/23    Goals Not achieved and why:   Patient has met all established goals.     Discharge reason : Met goals and Patient has maximized benefit from PT at this time    PLAN   This patient is discharged from Outpatient Physical Therapy Services.     Annamaria Ferrer, PT  12/28/2023

## 2024-01-02 ENCOUNTER — OFFICE VISIT (OUTPATIENT)
Dept: CARDIOLOGY | Facility: CLINIC | Age: 74
End: 2024-01-02
Payer: MEDICARE

## 2024-01-02 VITALS
HEIGHT: 66 IN | DIASTOLIC BLOOD PRESSURE: 62 MMHG | WEIGHT: 119.69 LBS | BODY MASS INDEX: 19.24 KG/M2 | HEART RATE: 60 BPM | SYSTOLIC BLOOD PRESSURE: 130 MMHG

## 2024-01-02 DIAGNOSIS — I10 BENIGN ESSENTIAL HYPERTENSION: ICD-10-CM

## 2024-01-02 DIAGNOSIS — I50.30 HEART FAILURE WITH PRESERVED EJECTION FRACTION, UNSPECIFIED HF CHRONICITY: Primary | ICD-10-CM

## 2024-01-02 DIAGNOSIS — E87.1 HYPONATREMIA: ICD-10-CM

## 2024-01-02 DIAGNOSIS — I87.2 VENOUS INSUFFICIENCY: ICD-10-CM

## 2024-01-02 DIAGNOSIS — I27.20 PULMONARY HYPERTENSION: ICD-10-CM

## 2024-01-02 PROCEDURE — 1159F MED LIST DOCD IN RCRD: CPT | Mod: HCNC,CPTII,S$GLB, | Performed by: PHYSICIAN ASSISTANT

## 2024-01-02 PROCEDURE — 99214 OFFICE O/P EST MOD 30 MIN: CPT | Mod: HCNC,S$GLB,, | Performed by: PHYSICIAN ASSISTANT

## 2024-01-02 PROCEDURE — 1101F PT FALLS ASSESS-DOCD LE1/YR: CPT | Mod: HCNC,CPTII,S$GLB, | Performed by: PHYSICIAN ASSISTANT

## 2024-01-02 PROCEDURE — 3008F BODY MASS INDEX DOCD: CPT | Mod: HCNC,CPTII,S$GLB, | Performed by: PHYSICIAN ASSISTANT

## 2024-01-02 PROCEDURE — 3075F SYST BP GE 130 - 139MM HG: CPT | Mod: HCNC,CPTII,S$GLB, | Performed by: PHYSICIAN ASSISTANT

## 2024-01-02 PROCEDURE — 99999 PR PBB SHADOW E&M-EST. PATIENT-LVL IV: CPT | Mod: PBBFAC,HCNC,, | Performed by: PHYSICIAN ASSISTANT

## 2024-01-02 PROCEDURE — 3078F DIAST BP <80 MM HG: CPT | Mod: HCNC,CPTII,S$GLB, | Performed by: PHYSICIAN ASSISTANT

## 2024-01-02 PROCEDURE — 1125F AMNT PAIN NOTED PAIN PRSNT: CPT | Mod: HCNC,CPTII,S$GLB, | Performed by: PHYSICIAN ASSISTANT

## 2024-01-02 PROCEDURE — 3288F FALL RISK ASSESSMENT DOCD: CPT | Mod: HCNC,CPTII,S$GLB, | Performed by: PHYSICIAN ASSISTANT

## 2024-01-02 NOTE — PROGRESS NOTES
Cardiology Clinic Note  Reason for Visit: F/u diastolic dysfunction     HPI:     Problem List and HPI:   Hypertension   Pulmonary hypertension  Diastolic dysfunction   Tobacco use quit in       Chelsea Ford Child is a 73 y.o. F, who initially saw Dr. Lackey  with complaint of shortness of breath and history of pulmonary hypertension.  She has begun taking furosemide 20 mg once daily.  She has noticed a considerable improvement in her degree of shortness of breath.  She also has chronic swelling related to venous insufficiency of her left lower extremity which has been improved since beginning furosemide.  Her BNP was found to be elevated at 182.  After taking furosemide for at least 2 weeks BMP showed her sodium decreased at 130, otherwise normal.  She was prescribed hydralazine to use on an as-needed basis for hypertension.  She has not needed to use this medication at home. Her shortness of breath is non-exertional. She denies chest pain/pressure/tightness/discomfort, dyspnea on regular exertion, orthopnea, PND, sustained palpitations, syncope or claudication symptoms.         ROS:    Pertinent ROS included in HPI and below.  PMH:     Past Medical History:   Diagnosis Date    Amblyopia of left eye     BCC (basal cell carcinoma)     Cataract     Exotropia of left eye     Hypertension     Osteoporosis     Palpitations 2017    Vertigo 2017     Past Surgical History:   Procedure Laterality Date     SECTION      COLONOSCOPY N/A 2023    Procedure: COLONOSCOPY;  Surgeon: Serafin Murrell MD;  Location: [address]);  Service: Endoscopy;  Laterality: N/A;  Referred MACEY Sommers PA-C, peg prep, portal-ml   pre-call confirmed; needs Prep instructions sent to portal and Prep sent to pharmacy; MB   prep instr. resent to portal; prep resent to pharmacy on -st    COSMETIC SURGERY      Basal cell carcinoma    EYE SURGERY      Lazy eye    Mohs      BCC    STRABISMUS  SURGERY Left      Allergies:     Review of patient's allergies indicates:   Allergen Reactions    Omnicef [cefdinir] Diarrhea    Adhesive Dermatitis    Nickel sutures [surgical stainless steel]      Rash with nickel     Medications:     Current Outpatient Medications on File Prior to Visit   Medication Sig Dispense Refill    acetaminophen (TYLENOL) 325 MG tablet Take 325 mg by mouth every 6 (six) hours as needed for Pain.      aspirin (ECOTRIN) 81 MG EC tablet Take 81 mg by mouth.      CALCIUM ORAL Take 1 tablet by mouth once daily.      carvediloL (COREG) 25 MG tablet Take 1 tablet (25 mg total) by mouth 2 (two) times daily with meals. 180 tablet 3    diclofenac sodium (VOLTAREN) 1 % Gel Apply 2 g topically 2 (two) times daily. 100 g 2    estradioL (ESTRACE) 0.01 % (0.1 mg/gram) vaginal cream Rub dime sized amount of cream to the urethra nightly 42.5 g 2    furosemide (LASIX) 20 MG tablet Take 1 tablet (20 mg total) by mouth once daily. 30 tablet 6    hydrALAZINE (APRESOLINE) 25 MG tablet Take 1 tablet (25 mg total) by mouth daily as needed (for SBP >180). 30 tablet 3    iron 18 mg Tab Take by mouth. 1/2 Tablet      L. acidophilus-B. animalis-D2 1 billion cell- 20 mcg Chew Take 1 tablet by mouth once daily.      loratadine 10 mg Cap Take 1 tablet by mouth once daily.      losartan (COZAAR) 50 MG tablet Take 2 tablets (100 mg total) by mouth once daily. 180 tablet 3    vitamin D (VITAMIN D3) 1000 units Tab Take 2,000 Units by mouth once daily.      [DISCONTINUED] cyanocobalamin (VITAMIN B-12) 1000 MCG tablet Take 100 mcg by mouth once daily.      [DISCONTINUED] enzymes,digestive (DIGESTIVE ENZYMES ORAL) Take 1 tablet by mouth 3 (three) times daily.       No current facility-administered medications on file prior to visit.     Social History:     Social History     Tobacco Use    Smoking status: Former     Current packs/day: 0.00     Average packs/day: 1 pack/day for 6.0 years (6.0 ttl pk-yrs)     Types: Cigarettes  "    Start date: 1978     Quit date: 1984     Years since quittin.0    Smokeless tobacco: Never   Substance Use Topics    Alcohol use: No     Family History:     Family History   Problem Relation Age of Onset    Breast cancer Mother     Hypertension Mother     Osteoporosis Mother     Cancer Mother     Hypertension Father     COPD Father     Stroke Father 72    Cancer Maternal Aunt         skin cancer bcc    Cancer Maternal Uncle         skin cancer bcc    No Known Problems Son     Colon cancer Neg Hx     Ovarian cancer Neg Hx     Cataracts Neg Hx     Glaucoma Neg Hx     Macular degeneration Neg Hx      Physical Exam:   /62   Pulse 60   Ht 5' 6" (1.676 m)   Wt 54.3 kg (119 lb 11.4 oz)   LMP  (LMP Unknown)   BMI 19.32 kg/m²      Physical Exam  Musculoskeletal:      Right lower leg: No edema.      Left lower le+ Edema present.          Labs:     Blood Tests:  Lab Results   Component Value Date     (H) 2023     (L) 2023    K 4.6 2023    CL 95 2023    CO2 26 2023    BUN 14 2023    CREATININE 1.1 2023     2023    HGBA1C 5.6 2017    MG 2.1 2023    AST 19 2023    ALT 10 2023    ALBUMIN 3.9 2023    PROT 6.8 2023    BILITOT 0.7 2023    WBC 4.45 2023    HGB 11.1 (L) 2023    HCT 35.4 (L) 2023    MCV 99 (H) 2023     (L) 2023    TSH 3.008 2022       Lab Results   Component Value Date    CHOL 214 (H) 2023    HDL 80 (H) 2023    TRIG 44 2023       Lab Results   Component Value Date    LDLCALC 125.2 2023         Imaging:     Echocardiogram  TTE 4/10/2023  The left ventricle is normal in size with normal systolic function.  The estimated ejection fraction is 65%.  Normal left ventricular diastolic function.  The estimated PA systolic pressure is 39 mmHg.  Normal right ventricular size with normal right ventricular systolic " function.  Normal central venous pressure (3 mmHg).  Mild left atrial enlargement.  Mild right atrial enlargement.  Mild mitral regurgitation.  Mild tricuspid regurgitation.    TTE 2021  The left ventricle is normal in size with normal systolic function. The estimated ejection fraction is 65%.  Normal right ventricular size with normal right ventricular systolic function.  Grade II left ventricular diastolic dysfunction.  The estimated PA systolic pressure is 43 mmHg. There is pulmonary hypertension.  Intermediate central venous pressure (8 mmHg).    Stress testing  None    Cath Lab  None    Other  None    [id number]  Sinus bradycardia     Assessment:     1. Heart failure with preserved ejection fraction, unspecified HF chronicity    2. Pulmonary hypertension    3. Benign essential hypertension    4. Venous insufficiency    5. Hyponatremia        Plan:     Heart failure with preserved ejection fraction, unspecified HF chronicity  -     empagliflozin (JARDIANCE) 10 mg tablet; Take 1 tablet (10 mg total) by mouth once daily.  Dispense: 90 tablet; Refill: 3  -     Basic metabolic panel; Future; Expected date: 2024  -     B-TYPE NATRIURETIC PEPTIDE; Future; Expected date: 2024  Reduce furosemide to 20 mg 3-4 days per week   Begin jardiance, labs in 2 months   Encouraged cardiac conditioning     Pulmonary hypertension  Monitor, consider repeat echo after 2024    Benign essential hypertension  Stable, continue current medications   Recommend low sodium diet     Venous insufficiency  Recommend limiting dietary sodium to < 2 g per day  Elevate legs when possible   Compression stockings during the day as tolerable, especially when traveling      Hyponatremia  Reduce furosemide as above      Signed:  Kate Mejía PA-C  Cardiology     2024 8:43 AM    Follow-up:     Future Appointments   Date Time Provider Department Center   2024  8:40 AM [facility] MAMMO [place]   2024 11:00  Elva Diaz MD [facility] [address] PCW   3/5/2024  8:30 AM LAB, [facility] [place]   4/23/2024 10:30 AM Anthony Lackey MD [facility] CARDIO [place]

## 2024-01-03 ENCOUNTER — PATIENT MESSAGE (OUTPATIENT)
Dept: CARDIOLOGY | Facility: CLINIC | Age: 74
End: 2024-01-03
Payer: MEDICARE

## 2024-01-03 DIAGNOSIS — I10 PRIMARY HYPERTENSION: ICD-10-CM

## 2024-01-03 DIAGNOSIS — I50.30 HEART FAILURE WITH PRESERVED EJECTION FRACTION, UNSPECIFIED HF CHRONICITY: Primary | ICD-10-CM

## 2024-01-03 DIAGNOSIS — I10 BENIGN ESSENTIAL HYPERTENSION: ICD-10-CM

## 2024-01-03 NOTE — TELEPHONE ENCOUNTER
Spoke w. pt. She is unable to afford the $47 / month for Jardiance. She is interested in a referral to Pharmacy Patient Assistance Department. Teaching done on assistance and how furosemide does not replace Jardiance. She verbalized understanding. Referral sent.

## 2024-01-05 ENCOUNTER — HOSPITAL ENCOUNTER (OUTPATIENT)
Dept: RADIOLOGY | Facility: HOSPITAL | Age: 74
Discharge: HOME | End: 2024-01-05
Attending: INTERNAL MEDICINE
Payer: MEDICARE

## 2024-01-05 VITALS — BODY MASS INDEX: 19.13 KG/M2 | WEIGHT: 119 LBS | HEIGHT: 66 IN

## 2024-01-05 DIAGNOSIS — Z12.31 ENCOUNTER FOR SCREENING MAMMOGRAM FOR BREAST CANCER: ICD-10-CM

## 2024-01-05 PROCEDURE — 77063 BREAST TOMOSYNTHESIS BI: CPT | Mod: 26,,, | Performed by: INTERNAL MEDICINE

## 2024-01-05 PROCEDURE — 77067 SCR MAMMO BI INCL CAD: CPT | Mod: 26,,, | Performed by: INTERNAL MEDICINE

## 2024-01-05 PROCEDURE — 77067 SCR MAMMO BI INCL CAD: CPT | Mod: TC

## 2024-01-09 ENCOUNTER — PATIENT MESSAGE (OUTPATIENT)
Dept: ADMINISTRATIVE | Facility: OTHER | Age: 74
End: 2024-01-09
Payer: MEDICARE

## 2024-01-23 ENCOUNTER — TELEPHONE (OUTPATIENT)
Dept: PHARMACY | Facility: CLINIC | Age: 74
End: 2024-01-23
Payer: MEDICARE

## 2024-01-23 NOTE — TELEPHONE ENCOUNTER
I have reached out to Chelsea Cavazos to inform her of the [email or web address] application process for Jardiance and whats required to apply.  Chelsea Cavazos did not answer. I left a voicemail and mailed a letter introducing her to the pharmacy patient assistance program. I will follow up in 5 business days.

## 2024-01-24 ENCOUNTER — PATIENT MESSAGE (OUTPATIENT)
Dept: INTERNAL MEDICINE | Facility: CLINIC | Age: 74
End: 2024-01-24
Payer: MEDICARE

## 2024-01-26 ENCOUNTER — HOSPITAL ENCOUNTER (OUTPATIENT)
Dept: RADIOLOGY | Facility: HOSPITAL | Age: 74
Discharge: HOME | End: 2024-01-26
Attending: INTERNAL MEDICINE
Payer: MEDICARE

## 2024-01-26 ENCOUNTER — LAB VISIT (OUTPATIENT)
Dept: LAB | Facility: HOSPITAL | Age: 74
End: 2024-01-26
Attending: INTERNAL MEDICINE
Payer: MEDICARE

## 2024-01-26 ENCOUNTER — OFFICE VISIT (OUTPATIENT)
Dept: INTERNAL MEDICINE | Facility: CLINIC | Age: 74
End: 2024-01-26
Payer: MEDICARE

## 2024-01-26 VITALS
HEART RATE: 67 BPM | OXYGEN SATURATION: 96 % | SYSTOLIC BLOOD PRESSURE: 131 MMHG | WEIGHT: 121.25 LBS | BODY MASS INDEX: 19.49 KG/M2 | HEIGHT: 66 IN | DIASTOLIC BLOOD PRESSURE: 72 MMHG

## 2024-01-26 DIAGNOSIS — M25.561 CHRONIC PAIN OF BOTH KNEES: Primary | ICD-10-CM

## 2024-01-26 DIAGNOSIS — G89.29 CHRONIC PAIN OF BOTH KNEES: Primary | ICD-10-CM

## 2024-01-26 DIAGNOSIS — M25.561 CHRONIC PAIN OF BOTH KNEES: ICD-10-CM

## 2024-01-26 DIAGNOSIS — M25.562 CHRONIC PAIN OF BOTH KNEES: Primary | ICD-10-CM

## 2024-01-26 DIAGNOSIS — D53.9 NUTRITIONAL ANEMIA, UNSPECIFIED: ICD-10-CM

## 2024-01-26 DIAGNOSIS — D53.9 MACROCYTIC ANEMIA: ICD-10-CM

## 2024-01-26 DIAGNOSIS — R68.89 COLD INTOLERANCE: ICD-10-CM

## 2024-01-26 DIAGNOSIS — G89.29 CHRONIC PAIN OF BOTH KNEES: ICD-10-CM

## 2024-01-26 DIAGNOSIS — M25.562 CHRONIC PAIN OF BOTH KNEES: ICD-10-CM

## 2024-01-26 DIAGNOSIS — R79.89 SERUM CREATININE RAISED: Primary | ICD-10-CM

## 2024-01-26 LAB
ALBUMIN SERPL BCP-MCNC: 4.1 G/DL (ref 3.5–5.2)
ALP SERPL-CCNC: 109 U/L (ref 55–135)
ALT SERPL W/O P-5'-P-CCNC: 12 U/L (ref 10–44)
ANION GAP SERPL CALC-SCNC: 10 MMOL/L (ref 8–16)
AST SERPL-CCNC: 20 U/L (ref 10–40)
BASOPHILS # BLD AUTO: 0.04 K/UL (ref 0–0.2)
BASOPHILS NFR BLD: 0.8 % (ref 0–1.9)
BILIRUB SERPL-MCNC: 0.4 MG/DL (ref 0.1–1)
BUN SERPL-MCNC: 21 MG/DL (ref 8–23)
CALCIUM SERPL-MCNC: 10.7 MG/DL (ref 8.7–10.5)
CHLORIDE SERPL-SCNC: 102 MMOL/L (ref 95–110)
CO2 SERPL-SCNC: 26 MMOL/L (ref 23–29)
CREAT SERPL-MCNC: 1.2 MG/DL (ref 0.5–1.4)
DIFFERENTIAL METHOD BLD: ABNORMAL
EOSINOPHIL # BLD AUTO: 0.1 K/UL (ref 0–0.5)
EOSINOPHIL NFR BLD: 1.2 % (ref 0–8)
ERYTHROCYTE [DISTWIDTH] IN BLOOD BY AUTOMATED COUNT: 12.3 % (ref 11.5–14.5)
EST. GFR  (NO RACE VARIABLE): 47.2 ML/MIN/1.73 M^2
FERRITIN SERPL-MCNC: 210 NG/ML (ref 20–300)
GLUCOSE SERPL-MCNC: 91 MG/DL (ref 70–110)
HCT VFR BLD AUTO: 35.7 % (ref 37–48.5)
HGB BLD-MCNC: 11.7 G/DL (ref 12–16)
IMM GRANULOCYTES # BLD AUTO: 0.02 K/UL (ref 0–0.04)
IMM GRANULOCYTES NFR BLD AUTO: 0.4 % (ref 0–0.5)
LYMPHOCYTES # BLD AUTO: 1.5 K/UL (ref 1–4.8)
LYMPHOCYTES NFR BLD: 29.2 % (ref 18–48)
MCH RBC QN AUTO: 32.7 PG (ref 27–31)
MCHC RBC AUTO-ENTMCNC: 32.8 G/DL (ref 32–36)
MCV RBC AUTO: 100 FL (ref 82–98)
MONOCYTES # BLD AUTO: 0.5 K/UL (ref 0.3–1)
MONOCYTES NFR BLD: 9.9 % (ref 4–15)
NEUTROPHILS # BLD AUTO: 2.9 K/UL (ref 1.8–7.7)
NEUTROPHILS NFR BLD: 58.5 % (ref 38–73)
NRBC BLD-RTO: 0 /100 WBC
PLATELET # BLD AUTO: 165 K/UL (ref 150–450)
PMV BLD AUTO: 10.7 FL (ref 9.2–12.9)
POTASSIUM SERPL-SCNC: 4.7 MMOL/L (ref 3.5–5.1)
PROT SERPL-MCNC: 7.5 G/DL (ref 6–8.4)
RBC # BLD AUTO: 3.58 M/UL (ref 4–5.4)
SODIUM SERPL-SCNC: 138 MMOL/L (ref 136–145)
TSH SERPL DL<=0.005 MIU/L-ACNC: 2.33 UIU/ML (ref 0.4–4)
WBC # BLD AUTO: 4.96 K/UL (ref 3.9–12.7)

## 2024-01-26 PROCEDURE — 3075F SYST BP GE 130 - 139MM HG: CPT | Mod: HCNC,CPTII,S$GLB, | Performed by: INTERNAL MEDICINE

## 2024-01-26 PROCEDURE — 3288F FALL RISK ASSESSMENT DOCD: CPT | Mod: HCNC,CPTII,S$GLB, | Performed by: INTERNAL MEDICINE

## 2024-01-26 PROCEDURE — 80053 COMPREHEN METABOLIC PANEL: CPT | Mod: HCNC | Performed by: INTERNAL MEDICINE

## 2024-01-26 PROCEDURE — 1160F RVW MEDS BY RX/DR IN RCRD: CPT | Mod: HCNC,CPTII,S$GLB, | Performed by: INTERNAL MEDICINE

## 2024-01-26 PROCEDURE — 84443 ASSAY THYROID STIM HORMONE: CPT | Mod: HCNC | Performed by: INTERNAL MEDICINE

## 2024-01-26 PROCEDURE — 82728 ASSAY OF FERRITIN: CPT | Mod: HCNC | Performed by: INTERNAL MEDICINE

## 2024-01-26 PROCEDURE — 36415 COLL VENOUS BLD VENIPUNCTURE: CPT | Mod: HCNC | Performed by: INTERNAL MEDICINE

## 2024-01-26 PROCEDURE — 99214 OFFICE O/P EST MOD 30 MIN: CPT | Mod: HCNC,S$GLB,, | Performed by: INTERNAL MEDICINE

## 2024-01-26 PROCEDURE — 99999 PR PBB SHADOW E&M-EST. PATIENT-LVL V: CPT | Mod: PBBFAC,HCNC,, | Performed by: INTERNAL MEDICINE

## 2024-01-26 PROCEDURE — 1159F MED LIST DOCD IN RCRD: CPT | Mod: HCNC,CPTII,S$GLB, | Performed by: INTERNAL MEDICINE

## 2024-01-26 PROCEDURE — 73562 X-RAY EXAM OF KNEE 3: CPT | Mod: 26,50,HCNC, | Performed by: RADIOLOGY

## 2024-01-26 PROCEDURE — 1101F PT FALLS ASSESS-DOCD LE1/YR: CPT | Mod: HCNC,CPTII,S$GLB, | Performed by: INTERNAL MEDICINE

## 2024-01-26 PROCEDURE — 1125F AMNT PAIN NOTED PAIN PRSNT: CPT | Mod: HCNC,CPTII,S$GLB, | Performed by: INTERNAL MEDICINE

## 2024-01-26 PROCEDURE — 4010F ACE/ARB THERAPY RXD/TAKEN: CPT | Mod: HCNC,CPTII,S$GLB, | Performed by: INTERNAL MEDICINE

## 2024-01-26 PROCEDURE — 73562 X-RAY EXAM OF KNEE 3: CPT | Mod: TC,50,HCNC

## 2024-01-26 PROCEDURE — 85025 COMPLETE CBC W/AUTO DIFF WBC: CPT | Mod: HCNC | Performed by: INTERNAL MEDICINE

## 2024-01-26 PROCEDURE — 3078F DIAST BP <80 MM HG: CPT | Mod: HCNC,CPTII,S$GLB, | Performed by: INTERNAL MEDICINE

## 2024-01-26 RX ORDER — INFLUENZA A VIRUS A/VICTORIA/4897/2022 IVR-238 (H1N1) ANTIGEN (FORMALDEHYDE INACTIVATED), INFLUENZA A VIRUS A/DARWIN/6/2021 IVR-227 (H3N2) ANTIGEN (FORMALDEHYDE INACTIVATED), INFLUENZA B VIRUS B/AUSTRIA/1359417/2021 BVR-26 ANTIGEN (FORMALDEHYDE INACTIVATED), INFLUENZA B VIRUS B/PHUKET/3073/2013 BVR-1B ANTIGEN (FORMALDEHYDE INACTIVATED) 15; 15; 15; 15 UG/.5ML; UG/.5ML; UG/.5ML; UG/.5ML
INJECTION, SUSPENSION INTRAMUSCULAR
COMMUNITY
Start: 2023-10-23 | End: 2024-04-15

## 2024-01-26 NOTE — PROGRESS NOTES
"Subjective:       Patient ID: Chelsea Ford Child is a 74 y.o. female.    Chief Complaint: Knee Pain    HPI  74 y.o. female here for evaluation of knee pain.    OA left knee last seen by ortho in 2022; tac injections done 5/9/22 and 10/14/22.    Tylenol as needed, Voltaren gel for pain, ice for swelling, elevation when possible. She did PT in December and is doing HEP.   X-rays 10/2022 with DJD, mild narrowing of medical compartment.     Always cold for last 6 months. Prior to that always felt hot.    Digestive issues. Gurgling, gas, bloating, constipation. Did bland diet and slowly got better. She is taking digestive enzymes - OTC whole foods. Her colonoscopy was normal.   Review of Systems   Constitutional:  Negative for fever.   Respiratory:  Negative for shortness of breath.    Cardiovascular:  Negative for chest pain.   Musculoskeletal: Negative.    Skin: Negative.        Objective:   /72 (BP Location: Left arm, Patient Position: Sitting, BP Method: Medium (Manual))   Pulse 67   Ht 5' 6" (1.676 m)   Wt 55 kg (121 lb 4.1 oz)   LMP  (LMP Unknown)   SpO2 96%   BMI 19.57 kg/m²      Physical Exam  Constitutional:       General: She is not in acute distress.     Appearance: She is well-developed. She is not diaphoretic.   HENT:      Head: Normocephalic and atraumatic.   Cardiovascular:      Rate and Rhythm: Normal rate and regular rhythm.   Pulmonary:      Effort: Pulmonary effort is normal. No respiratory distress.      Breath sounds: No wheezing or rales.   Musculoskeletal:      Comments: Mild crepitus bilateral knees, slight swelling appreciated, negative anterior and posterior drawer sign bilaterally, strength grossly intact, gait normal   Skin:     General: Skin is warm and dry.   Neurological:      Mental Status: She is alert and oriented to person, place, and time.   Psychiatric:         Behavior: Behavior normal.         Assessment:       1. Chronic pain of both knees    2. Cold intolerance    3. " Nutritional anemia, unspecified        Plan:       1. Chronic pain of both knees  -     X-ray Knee Ortho Bilateral; Future; Expected date: 01/26/2024  -     Ambulatory referral to Orthopedics; Future; Expected date: 02/02/2024    2. Cold intolerance  -     CBC Auto Differential; Future; Expected date: 01/26/2024  -     Ferritin; Future; Expected date: 01/26/2024  -     TSH; Future; Expected date: 01/26/2024  -     COMPREHENSIVE METABOLIC PANEL; Future; Expected date: 01/26/2024    3. Nutritional anemia, unspecified  -     CBC Auto Differential; Future; Expected date: 01/26/2024  -     Ferritin; Future; Expected date: 01/26/2024           You are up to date for your primary preventive health care, and there are no reminders at this time.     Rsv vaccine recommended  Shingrx discussed  Defers covid vaccine

## 2024-01-30 ENCOUNTER — PATIENT MESSAGE (OUTPATIENT)
Dept: INTERNAL MEDICINE | Facility: CLINIC | Age: 74
End: 2024-01-30
Payer: MEDICARE

## 2024-01-30 ENCOUNTER — OFFICE VISIT (OUTPATIENT)
Dept: ORTHOPEDICS | Facility: CLINIC | Age: 74
End: 2024-01-30
Payer: MEDICARE

## 2024-01-30 VITALS — BODY MASS INDEX: 19.68 KG/M2 | WEIGHT: 122.44 LBS | HEIGHT: 66 IN

## 2024-01-30 DIAGNOSIS — G89.29 CHRONIC PAIN OF BOTH KNEES: ICD-10-CM

## 2024-01-30 DIAGNOSIS — M25.562 CHRONIC PAIN OF BOTH KNEES: ICD-10-CM

## 2024-01-30 DIAGNOSIS — M17.0 PRIMARY OSTEOARTHRITIS OF BOTH KNEES: Primary | ICD-10-CM

## 2024-01-30 DIAGNOSIS — M25.561 CHRONIC PAIN OF BOTH KNEES: ICD-10-CM

## 2024-01-30 PROCEDURE — 4010F ACE/ARB THERAPY RXD/TAKEN: CPT | Mod: HCNC,CPTII,S$GLB, | Performed by: PHYSICIAN ASSISTANT

## 2024-01-30 PROCEDURE — 99214 OFFICE O/P EST MOD 30 MIN: CPT | Mod: HCNC,25,S$GLB, | Performed by: PHYSICIAN ASSISTANT

## 2024-01-30 PROCEDURE — 1160F RVW MEDS BY RX/DR IN RCRD: CPT | Mod: HCNC,CPTII,S$GLB, | Performed by: PHYSICIAN ASSISTANT

## 2024-01-30 PROCEDURE — 99999 PR PBB SHADOW E&M-EST. PATIENT-LVL III: CPT | Mod: PBBFAC,HCNC,, | Performed by: PHYSICIAN ASSISTANT

## 2024-01-30 PROCEDURE — 1159F MED LIST DOCD IN RCRD: CPT | Mod: HCNC,CPTII,S$GLB, | Performed by: PHYSICIAN ASSISTANT

## 2024-01-30 PROCEDURE — 1125F AMNT PAIN NOTED PAIN PRSNT: CPT | Mod: HCNC,CPTII,S$GLB, | Performed by: PHYSICIAN ASSISTANT

## 2024-01-30 PROCEDURE — 20610 DRAIN/INJ JOINT/BURSA W/O US: CPT | Mod: 50,HCNC,S$GLB, | Performed by: PHYSICIAN ASSISTANT

## 2024-01-30 PROCEDURE — 1101F PT FALLS ASSESS-DOCD LE1/YR: CPT | Mod: HCNC,CPTII,S$GLB, | Performed by: PHYSICIAN ASSISTANT

## 2024-01-30 PROCEDURE — 3288F FALL RISK ASSESSMENT DOCD: CPT | Mod: HCNC,CPTII,S$GLB, | Performed by: PHYSICIAN ASSISTANT

## 2024-01-30 RX ORDER — TRIAMCINOLONE ACETONIDE 40 MG/ML
40 INJECTION, SUSPENSION INTRA-ARTICULAR; INTRAMUSCULAR
Status: DISCONTINUED | OUTPATIENT
Start: 2024-01-30 | End: 2024-01-30 | Stop reason: HOSPADM

## 2024-01-30 RX ORDER — LIDOCAINE HYDROCHLORIDE 10 MG/ML
2 INJECTION INFILTRATION; PERINEURAL
Status: DISCONTINUED | OUTPATIENT
Start: 2024-01-30 | End: 2024-01-30 | Stop reason: HOSPADM

## 2024-01-30 RX ADMIN — TRIAMCINOLONE ACETONIDE 40 MG: 40 INJECTION, SUSPENSION INTRA-ARTICULAR; INTRAMUSCULAR at 08:01

## 2024-01-30 RX ADMIN — LIDOCAINE HYDROCHLORIDE 2 ML: 10 INJECTION INFILTRATION; PERINEURAL at 08:01

## 2024-01-30 NOTE — PROCEDURES
Large Joint Aspiration/Injection: bilateral knee    Date/Time: 1/30/2024 8:45 AM    Performed by: Brigette Morillo PA-C  Authorized by: Brigette Morillo PA-C    Consent Done?:  Yes (Verbal)  Indications:  Pain  Timeout: prior to procedure the correct patient, procedure, and site was verified    Prep: patient was prepped and draped in usual sterile fashion    Local anesthetic:  Topical anesthetic    Details:  Needle Size:  22 G  Approach:  Anterolateral  Location:  Knee  Laterality:  Bilateral  Site:  Bilateral knee  Medications (Right):  40 mg triamcinolone acetonide 40 mg/mL; 2 mL LIDOcaine HCL 10 mg/ml (1%) 10 mg/mL (1 %)  Medications (Left):  40 mg triamcinolone acetonide 40 mg/mL; 2 mL LIDOcaine HCL 10 mg/ml (1%) 10 mg/mL (1 %)  Patient tolerance:  Patient tolerated the procedure well with no immediate complications

## 2024-01-30 NOTE — PROGRESS NOTES
SUBJECTIVE:     CC: bilateral knee pain      History of Present Illness:  Chelsea Ford Child is a 74 y.o. year old female here with complaints of constant bilateral knee pain which started years ago.  There is not a history of trauma.  The pain is located in the global aspect of the knee.  The pain is described as achy.  Aggravating factors include standing and walking.  She is not having pain at night that disrupts sleep.  Previous treatments include acetaminophen, rest, and CSI (last done in 2022) which have provided good relief.  There is not a history of previous injury or surgery to the knee.  The patient does not use an assistive device.    Review of patient's allergies indicates:   Allergen Reactions    Omnicef [cefdinir] Diarrhea    Adhesive Dermatitis    Nickel sutures [surgical stainless steel]      Rash with nickel         Current Outpatient Medications   Medication Sig Dispense Refill    acetaminophen (TYLENOL) 325 MG tablet Take 325 mg by mouth every 6 (six) hours as needed for Pain.      aspirin (ECOTRIN) 81 MG EC tablet Take 81 mg by mouth.      CALCIUM ORAL Take 1 tablet by mouth once daily.      carvediloL (COREG) 25 MG tablet Take 1 tablet (25 mg total) by mouth 2 (two) times daily with meals. 180 tablet 3    diclofenac sodium (VOLTAREN) 1 % Gel Apply 2 g topically 2 (two) times daily. 100 g 2    empagliflozin (JARDIANCE) 10 mg tablet Take 1 tablet (10 mg total) by mouth once daily. 90 tablet 3    estradioL (ESTRACE) 0.01 % (0.1 mg/gram) vaginal cream Rub dime sized amount of cream to the urethra nightly 42.5 g 2    FLUAD QUAD 2023-24,65Y UP,,PF, 60 mcg (15 mcg x 4)/0.5 mL Syrg       furosemide (LASIX) 20 MG tablet Take 1 tablet (20 mg total) by mouth once daily. 30 tablet 6    hydrALAZINE (APRESOLINE) 25 MG tablet Take 1 tablet (25 mg total) by mouth daily as needed (for SBP >180). 30 tablet 3    iron 18 mg Tab Take by mouth. 1/2 Tablet      L. acidophilus-B. animalis-D2 1 billion cell- 20 mcg  "Chew Take 1 tablet by mouth once daily.      loratadine 10 mg Cap Take 1 tablet by mouth once daily.      losartan (COZAAR) 50 MG tablet Take 2 tablets (100 mg total) by mouth once daily. 180 tablet 3    vitamin D (VITAMIN D3) 1000 units Tab Take 2,000 Units by mouth once daily.       No current facility-administered medications for this visit.       Past Medical History:   Diagnosis Date    Amblyopia of left eye     BCC (basal cell carcinoma)     Cataract     Exotropia of left eye     Hypertension     Osteoporosis     Palpitations 2017    Vertigo 2017       Past Surgical History:   Procedure Laterality Date     SECTION      COLONOSCOPY N/A 2023    Procedure: COLONOSCOPY;  Surgeon: Serafin Murrell MD;  Location: [facility] ([address] [place] [address]);  Service: Endoscopy;  Laterality: N/A;  Referred MACEY Sommers PA-C, peg prep, portal-ml   pre-call confirmed; needs Prep instructions sent to portal and Prep sent to pharmacy; MB   prep instr. resent to portal; prep resent to pharmacy on     COSMETIC SURGERY      Basal cell carcinoma    EYE SURGERY      Lazy eye    Mohs      BCC    STRABISMUS SURGERY Left          Review of Systems:  ROS:  Constitutional: no fever or chills  Eyes: no visual changes  ENT: no nasal congestion or sore throat  Respiratory: no cough or shortness of breath  Musculoskeletal: no arthralgias or myalgias      OBJECTIVE:     PHYSICAL EXAM:  Height: 5' 6" (167.6 cm) Weight: 55.6 kg (122 lb 7.5 oz)   General: Well developed, well nourished, in no acute distress.  Neurological: Mood & affect are normal.  HEENT: NCAT, sclera nonicteric   Lungs: Respirations are equal and unlabored.   CV: 2+ bilateral upper and lower extremity pulses.   Skin: Intact throughout with no rashes, erythema, or lesions  Extremities: No LE edema, no erythema or warmth of the skin in either lower extremity.    bilateral Knee Exam:  Knee Range of Motion: 0-130   Effusion: none  Condition of skin: " intact  Location of tenderness: Medial joint line   Strength: 5 of 5 quadriceps strength and 5 of 5 hamstring strength  Stability:  stable to testing  Varus /Valgus stress:  normal    Bilateral Hip Examination:  full painless range of motion, without tenderness    IMAGING:    X-rays of the bilateral knee, personally reviewed by me, demonstrate severe degenerative changes with joint space narrowing, osteophyte formation and subchondral sclerosis.  No fracture or dislocation.       ASSESSMENT     1. Primary osteoarthritis of both knees    2. Chronic pain of both knees        PLAN:     We discussed with the patient at length all the different treatment options available for the knee including NSAIDS, acetaminophen, rest, ice, knee strengthening exercise, steroid injections for temporary relief, Viscosupplementation, and knee arthroplasty.     - Bilateral knee CSI performed today  - Rest, ice, activity modification and continue HEP  - She is not interested in knee replacement at this time  - Follow up if symptoms worsen or fail to improve

## 2024-02-20 ENCOUNTER — PATIENT MESSAGE (OUTPATIENT)
Dept: REHABILITATION | Facility: HOSPITAL | Age: 74
End: 2024-02-20
Payer: MEDICARE

## 2024-02-20 ENCOUNTER — PATIENT MESSAGE (OUTPATIENT)
Dept: INTERNAL MEDICINE | Facility: CLINIC | Age: 74
End: 2024-02-20
Payer: MEDICARE

## 2024-02-20 DIAGNOSIS — H81.10 BENIGN PAROXYSMAL POSITIONAL VERTIGO, UNSPECIFIED LATERALITY: Primary | ICD-10-CM

## 2024-02-21 ENCOUNTER — PATIENT MESSAGE (OUTPATIENT)
Dept: REHABILITATION | Facility: HOSPITAL | Age: 74
End: 2024-02-21
Payer: MEDICARE

## 2024-03-05 ENCOUNTER — LAB VISIT (OUTPATIENT)
Dept: LAB | Facility: HOSPITAL | Age: 74
End: 2024-03-05
Attending: PHYSICIAN ASSISTANT
Payer: MEDICARE

## 2024-03-05 DIAGNOSIS — I50.30 HEART FAILURE WITH PRESERVED EJECTION FRACTION, UNSPECIFIED HF CHRONICITY: ICD-10-CM

## 2024-03-05 LAB
ANION GAP SERPL CALC-SCNC: 7 MMOL/L (ref 8–16)
BNP SERPL-MCNC: 72 PG/ML (ref 0–99)
BUN SERPL-MCNC: 15 MG/DL (ref 8–23)
CALCIUM SERPL-MCNC: 10.6 MG/DL (ref 8.7–10.5)
CHLORIDE SERPL-SCNC: 98 MMOL/L (ref 95–110)
CO2 SERPL-SCNC: 26 MMOL/L (ref 23–29)
CREAT SERPL-MCNC: 1 MG/DL (ref 0.5–1.4)
EST. GFR  (NO RACE VARIABLE): 58.8 ML/MIN/1.73 M^2
GLUCOSE SERPL-MCNC: 118 MG/DL (ref 70–110)
POTASSIUM SERPL-SCNC: 4.4 MMOL/L (ref 3.5–5.1)
SODIUM SERPL-SCNC: 131 MMOL/L (ref 136–145)

## 2024-03-05 PROCEDURE — 36415 COLL VENOUS BLD VENIPUNCTURE: CPT | Performed by: PHYSICIAN ASSISTANT

## 2024-03-05 PROCEDURE — 80048 BASIC METABOLIC PNL TOTAL CA: CPT | Mod: HCNC | Performed by: PHYSICIAN ASSISTANT

## 2024-03-05 PROCEDURE — 83880 ASSAY OF NATRIURETIC PEPTIDE: CPT | Mod: HCNC | Performed by: PHYSICIAN ASSISTANT

## 2024-03-07 ENCOUNTER — TELEPHONE (OUTPATIENT)
Dept: CARDIOLOGY | Facility: CLINIC | Age: 74
End: 2024-03-07
Payer: MEDICARE

## 2024-03-07 DIAGNOSIS — E87.1 HYPONATREMIA: Primary | ICD-10-CM

## 2024-03-07 NOTE — TELEPHONE ENCOUNTER
----- Message from Kate Mejía PA-C sent at 3/6/2024  5:00 PM CST -----  Please call the patient and let her know that her blood work continues to show low potassium.  Please confirm that she is currently taking furosemide 20 mg every other day and that she is also taking Jardiance 10 mg once daily.  Furosemide is a possible reason that her sodium is low, not Jardiance.  I will follow-up with her again once we confirm what she is currently taking.    Thanks, Kate

## 2024-03-11 ENCOUNTER — CLINICAL SUPPORT (OUTPATIENT)
Dept: REHABILITATION | Facility: HOSPITAL | Age: 74
End: 2024-03-11
Attending: INTERNAL MEDICINE
Payer: MEDICARE

## 2024-03-11 DIAGNOSIS — R42 DIZZINESS: Primary | ICD-10-CM

## 2024-03-11 DIAGNOSIS — H81.10 BENIGN PAROXYSMAL POSITIONAL VERTIGO, UNSPECIFIED LATERALITY: ICD-10-CM

## 2024-03-11 PROBLEM — R26.89 IMPAIRMENT OF BALANCE: Status: RESOLVED | Noted: 2023-11-06 | Resolved: 2024-03-11

## 2024-03-11 PROBLEM — H81.11 BPPV (BENIGN PAROXYSMAL POSITIONAL VERTIGO), RIGHT: Status: RESOLVED | Noted: 2020-02-21 | Resolved: 2024-03-11

## 2024-03-11 PROCEDURE — 97161 PT EVAL LOW COMPLEX 20 MIN: CPT | Mod: HCNC,PO

## 2024-03-11 NOTE — PLAN OF CARE
[facility]  Physical Therapy Neurological Rehabilitation Initial Evaluation    Name: Chelsea Ford Child  Clinic Number: [id number]    Therapy Diagnosis:   Encounter Diagnoses   Name Primary?    Benign paroxysmal positional vertigo, unspecified laterality     Dizziness Yes     Physician: Elva Tripathi MD    Physician Orders: PT Eval and Treat   Medical Diagnosis from Referral: Benign paroxysmal positional vertigo, unspecified laterality   Evaluation Date: 3/11/2024  Authorization Period Expiration: 02/20/25  Plan of Care Expiration: 05/10/24  Visit # / Visits authorized: 01/ 01    Time In: 08:05  Time Out: 08:45  Total Billable Time: 40 minutes    Precautions: Standard, h/o cancer    Subjective   Date of onset: ~1 month ago    History of current condition - Chelsea reports: ~1 month ago that she had gotten up that morning and she was really dizzy. She had to lean on the wall to hold onto walk. Denies illness, head trauma, aural fullness. Looking back, the day or 2 before, she was noticing some light symptoms with head movements. She was compliant with home exercise program from prior episode of vestibular therapy. Went online and looked up the Epley maneuver-- did this multiple times per day which helped. Performed with left ear down.  Then symptoms came back and she started doing a Semont maneuver multiple times per day which also helped. No spinning sensation, but continues to feel lightheaded. Has not seen ENT in a few years. Balance seems OK, but endorses some unsteadiness with sudden turns. See PCP next week.     History of Current Symptoms   Triggers: head movements continue to trigger lightheadedness, particularly over head activities   Alleviating Factors: let's symptoms pass   Description of symptoms: light headedness   Onset:~1 month   Frequency: variable   Duration: seconds   Positional changes:  None currently    Limitations due to symptoms:more cautious with quick head  movements    History of migraines: No formal diagnosis on file, but endorses that she may have had a migraine ~30 years ago   Blood Pressure: started new medication this year for HTN  History of Heart Condition: h/o palpitations      Medical History:   Past Medical History:   Diagnosis Date    Amblyopia of left eye     BCC (basal cell carcinoma)     Cataract     Exotropia of left eye     Hypertension     Osteoporosis     Palpitations 2017    Vertigo 2017       Surgical History:   Chelsea Ford Child  has a past surgical history that includes Mohs;  section; Strabismus surgery (Left); Eye surgery (195); Cosmetic surgery (); and Colonoscopy (N/A, 2023).    Medications:   Chelsea has a current medication list which includes the following prescription(s): acetaminophen, aspirin, calcium, carvedilol, diclofenac sodium, empagliflozin, estradiol, fluad quad -(65y up)(pf), furosemide, hydralazine, iron, l. acidophilus-b. animalis-d2, loratadine, losartan, and vitamin d.    Allergies:   Review of patient's allergies indicates:   Allergen Reactions    Omnicef [cefdinir] Diarrhea    Adhesive Dermatitis    Nickel sutures [surgical stainless steel]      Rash with nickel        Imaging, imaging in EPIC reviewed prior to evaluation  VNG: none recently  Audiogram: none recently    Prior Therapy: vestibular PT in fall   Social History: lives with roommate  Falls: 0   DME: none  Home Environment: SSH, 1 LORI   Exercise Routine / History: vestibular HEP   Family Present at time of Eval: none present   Occupation: retired  Prior Level of Function: (I) with functional mobility/ADL, driving, active with her Christianity  Current Level of Function: (I) with functional mobility/ADL, driving, active with her Christianity    Pain:  Denies pain at this time    Patient's goals: to address lightheadedness with head movements    Objective   - Follows commands: 100% of time   - Speech: no deficits       Mental status:  alert, oriented to person, place, and time, normal mood, behavior, speech, dress, motor activity, and thought processes  Appearance: Casually dressed  Behavior:  calm and cooperative  Attention Span and Concentration:  Normal    Posture Alignment in sitting/standing:   Head: forward head   Scapulae: rounded shoulders   Trunk: WFL   Pelvis: NT   Legs: WFL     Sensation: Light Touch: Intact          Proprioception: NT, Kinesthesia NT    Auditory: potential decrease in hearing on left side         Visual/Oculomotor Screen: denies changes, h/o left eye surgery in childhood  Ocular range of motion: WFL  Spontaneous nystagmus (fixation present): none noted  Gaze-evoked nystagmus (fixation present): none noted  Tracking/Smooth Pursuits:WFL, occ jerkiness of eyes, but no dizziness  Saccades: WFL, slight delayed speed, but no visual slippage and no dizziness  Convergence: WFL, <5 cm, slight delay in left eye movement  VOR cancellation: WFL, no saccadic intrusions, no dizziness    Acuity:wears glasses, but endorses that she needs updated eye exam  Visual field: Intact  VCR: NT    VOR 1: Impaired: no visual slippage, (+) lightheadedness  Head Thrust Test: (-) bilaterally  DVA: NT      ROM:   CERVICAL SPINE  Flexion: no dizziness  Extension: (+) lightheadedness when moving from extension back to midline  L rotation: no dizziness, R rotation: no dizziness  Are concurrent symptoms present with any of these movements: yes, see above       Modified VAS (Vertebral Artery Screen), in sitting (rotation, then extension):  R: (-)  L: (-)       SOL SENSORY TESTING: to be assessed at first follow up session  Functional Gait Assessment: to be assessed at first follow up session      Gait Assessment:  - AD used: none   - Assistance: (I)  - Distance: community distances    Endurance Deficit: none noted    POSITIONAL CANAL TESTING  Looking for nystagmus (slow phase followed by quick phase to the affected side for BPPV)    Supported John  Hallpike (posterior / CL anterior)   Right : (-) vertigo, (-) dizziness, (-) nystagmus; (+) lightheadedness upon sitting up from test position   Left: (-) vertigo, (-) dizziness, (-) nystagmus; (+) lightheadedness upon sitting up from test position  Horizontal Canals- performed in side lying   Right: (-) vertigo, (-) dizziness, (-) nystagmus; (+) lightheadedness upon sitting up from test position   Left: (-) vertigo, (-) dizziness, (-) nystagmus; (+) lightheadedness upon sitting up from test position  Treatment Performed: not indicated this date      CMS Impairment/Limitation/Restriction for FOTO for Vestibular Survey    Therapist reviewed FOTO scores for Chelsea Ford Child on 3/11/2024.   FOTO documents entered into [employer] - see Media section.    Limitation Score: 55%         TREATMENT   No treatment provided this date. Entire time spent on evaluation.     Home Exercises and Patient Education Provided    Education provided:   - plan of care (POC), scheduling    Written Home Exercises Provided: continue currently established vestibular home exercise program. Can add Burden Daroff exercises (that she has been doing as well) for motion tolerance.     Assessment   Chelsea is a 74 y.o. female referred to outpatient Physical Therapy with a medical diagnosis of Benign paroxysmal positional vertigo, unspecified laterality . Patient presents with chief complaints of persistent lightheadedness triggered with head movements following what appears to be potential BPPV episode that started ~1 month ago. During oculomotor testing, slight deficit noted with VOR 1 only. No visual slippage noted but patient endorses concordant dizziness symptoms. Concordant dizziness elicited when moving from cervical extension back to midline with cervical AROM screen. VAS test (-) bilaterally. During CRTs, patient (-) for vertigo, dizziness, and nystagmus in both John Lazo pike test positions and both side lying horizontal test positions, indicating  (-) BPPV. Patient endorses concordant dizziness upon sitting up from each of these test positions, indicating decreased motion tolerance with positional changes. At this time, patient's presentation appears consistent with potential incompletely compensated peripheral vestibulopathy following potential left sided BPPV episode. PT recommended that patient follow up with ENT due to frequent recurrence of symptoms. PT also instructed patient to continue home exercise program from prior episode of vestibular therapy, and educated her on proper performance of exercises she found online for motion tolerance. Patient is appropriate for continued PT intervention to further address gaze stabilization, motion tolerance, and balance deficits. Plan to formally assess balance next session.     Patient prognosis is Good.   Patient will benefit from skilled outpatient Physical Therapy to address the deficits stated above and in the chart below, provide patient/family education, and to maximize patient's level of independence.     Plan of care discussed with patient: Yes  Patient's spiritual, cultural and educational needs considered and patient is agreeable to the plan of care and goals as stated below:     Anticipated Barriers for therapy: co-morbidities, recurrent symptoms    Anticipated Barriers for therapy: h/o vestibular system weakness, bilateral knee pain, neck pain/AROM restrictions     Medical Necessity is demonstrated by the following  History  Co-morbidities and personal factors that may impact the plan of care Co-morbidities:   H/o left eye surgery, h/o vestibular system weakness, knee pain, hypertension, heart palpitations, chronic pulmonary heart disease, h/o basal cell carcinoma, osteoporosis     Personal Factors:   no deficits       high   Examination  Body Structures and Functions, activity limitations and participation restrictions that may impact the plan of care Body Regions:   head  lower extremities  trunk      Body Systems:    gross coordinated movement  balance  gait  transfers  transitions  motor control  motor learning  Vestibular function     Participation Restrictions:   None noted     Activity limitations:   Learning and applying knowledge  no deficits     General Tasks and Commands  no deficits     Communication  no deficits     Mobility  walking     Self care  no deficits     Domestic Life  no deficits     Interactions/Relationships  no deficits     Life Areas  no deficits     Community and Social Life  community life  recreation and leisure             low   Clinical Presentation stable and uncomplicated low   Decision Making/ Complexity Score: low        Goals:  Short Term Goals =  Long Term Goals: 8 weeks   Patient to be (I) with established home exercise program.   Patient to perform VOR 1 WFL at 100 bpm for improved gaze stabilization.   PT to formally assess GST and establish appropriate goals.   PT formally assess Functional Gait Assessment and establish appropriate goals.   Patient to perform supine <> sit transition with no significant dizziness for improved motion tolerance.       Plan   Plan of care Certification: 3/11/2024 to 05/10/24.    Outpatient Physical Therapy 1 times weekly for 8 weeks to include the following interventions: Gait Training, Manual Therapy, Moist Heat/ Ice, Neuromuscular Re-ed, Orthotic Management and Training, Patient Education, Self Care, Therapeutic Activities, Therapeutic Exercise, and Canalith Repositioning Procedures.     Annamaria Ferrer, PT

## 2024-03-14 ENCOUNTER — CLINICAL SUPPORT (OUTPATIENT)
Dept: REHABILITATION | Facility: HOSPITAL | Age: 74
End: 2024-03-14
Attending: INTERNAL MEDICINE
Payer: MEDICARE

## 2024-03-14 DIAGNOSIS — R42 DIZZINESS: Primary | ICD-10-CM

## 2024-03-14 PROCEDURE — 97530 THERAPEUTIC ACTIVITIES: CPT | Mod: HCNC,PO

## 2024-03-14 PROCEDURE — 97112 NEUROMUSCULAR REEDUCATION: CPT | Mod: HCNC,PO

## 2024-03-14 NOTE — PROGRESS NOTES
"[facility]   Physical Therapy Treatment Note      Name: Chelsea Ford Child  Clinic Number: [id number]    Therapy Diagnosis:   Encounter Diagnosis   Name Primary?    Dizziness Yes     Physician: Elva Tripathi MD    Visit Date: 3/14/2024    Physician Orders: PT Eval and Treat   Medical Diagnosis from Referral: Benign paroxysmal positional vertigo, unspecified laterality   Evaluation Date: 3/11/2024  Authorization Period Expiration: 02/20/25  Plan of Care Expiration: 05/10/24  Visit # / Visits authorized: 02/ 01- further authorization pending     Time In: 08:00  Time Out: 08:44  Total Billable Time: 44 minutes     Precautions: Standard, h/o cancer    PTA Visit #: 0/5       Subjective     Patient reports: that she has continued to perform Epley and Semont for motion tolerance which appears to be helping. Dizziness seems improved today compared to earlier this week. 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     Home exercise program reviewed this date.   Response to previous treatment: no adverse effects  Functional change: ongoing    Pain: 0/10  Location: NA    Objective      SOL SENSORY TESTING:  (P= Pass, F= Fail; note any sway; hold each position for 30")  Condition 1: (firm surface/feet together/eyes open) P  Condition 2: (firm surface/feet together/eyes closed) P  Condition 3: (firm surface/feet in tandem/eyes open) P, each lower extremity in front  Condition 4: (firm surface/feet in tandem/eyes closed) P c/ each LE in front, min sway  Condition 5: (soft surface/feet together/eyes open) P  Condition 6: (soft surface/feet together/eyes closed) P c/ slight sway  Condition 7: (Fakuda step test), measure distance varied from center starting position NT     Functional Gait Assessment:   1. Gait on level surface =  3  2. Change in Gait Speed = 3  3. Gait with horizontal head turns  = 3, very slight dizziness  4. Gait with vertical head turns = " "3  5. Gait with pivot turns = 3  6. Step over obstacle = 3  7. Gait with Narrow JEFF = 2  8. Gait with eyes closed = 2  9. Ambulating Backwards = 3  10. Steps = 3    Score 28/30       Treatment     Chelsea received the treatments listed below:      therapeutic exercises to develop strength, endurance, ROM, flexibility, posture, and core stabilization for 00 minutes including:  NP    neuromuscular re-education activities to improve: Balance, Coordination, and Sense for 24 minutes. The following activities were included:  Time above includes time spent on objective testing.     3 x 30" VOR 1 horizontal at self selected speed    therapeutic activities to improve functional performance for 20  minutes, including:  Time above includes time spent on objective testing.     Functional motion tolerance:  Hallway ambulation:  2 x 100 feet right<>left head movements  2 x 100 feet RUQ <> LLQ diagonal head movements  2 x 100 feet LUQ <> RLQ diagonal head movements    Patient Education and Home Exercises       Education provided:   - POC    Written Home Exercises Provided: continue prior HEP    Assessment     Chelsea tolerated therapy session well this morning. Formally assessed balance with GST and Functional Gait Assessment. Good baseline performance on both of these tests with patient able to pass all conditions of GST. Current Functional Gait Assessment score places patient out of elevated fall risk category. Remainder of session spent focusing on motion tolerance, gaze stabilization, and home exercise program discussion. Continue plan of care (POC) to further progress remaining vestibular deficits.     Chelsea Is progressing well towards her goals.   Patient prognosis is Good.     Patient will continue to benefit from skilled outpatient physical therapy to address the deficits listed in the problem list box on initial evaluation, provide pt/family education and to maximize pt's level of independence in the home and community " environment.     Patient's spiritual, cultural and educational needs considered and pt agreeable to plan of care and goals.     Anticipated Barriers for therapy: h/o vestibular system weakness, bilateral knee pain, neck pain/AROM restrictions     Goals:  Short Term Goals =  Long Term Goals: 8 weeks   Patient to be (I) with established home exercise program. Ongoing  Patient to perform VOR 1 WFL at 100 bpm for improved gaze stabilization. Ongoing  PT to formally assess GST and establish appropriate goals. MET 0314/24  PT formally assess Functional Gait Assessment and establish appropriate goals. MET 03/14/24  Patient to perform supine <> sit transition with no significant dizziness for improved motion tolerance. Ongoing    Plan     Progress gaze stabilization, motion tolerance (ambulation with head movements and reaching over head) as indicated.     Annamaria Ferrer, PT

## 2024-03-19 ENCOUNTER — CLINICAL SUPPORT (OUTPATIENT)
Dept: REHABILITATION | Facility: HOSPITAL | Age: 74
End: 2024-03-19
Payer: MEDICARE

## 2024-03-19 DIAGNOSIS — R42 DIZZINESS: Primary | ICD-10-CM

## 2024-03-19 PROCEDURE — 97112 NEUROMUSCULAR REEDUCATION: CPT | Mod: HCNC,PO

## 2024-03-19 PROCEDURE — 97530 THERAPEUTIC ACTIVITIES: CPT | Mod: HCNC,PO

## 2024-03-19 NOTE — PROGRESS NOTES
"[facility]   Physical Therapy Treatment Note      Name: Chelsea Ford Child  Clinic Number: [id number]    Therapy Diagnosis:   Encounter Diagnosis   Name Primary?    Dizziness Yes       Physician: Elva Tripathi MD    Visit Date: 3/19/2024    Physician Orders: PT Eval and Treat   Medical Diagnosis from Referral: Benign paroxysmal positional vertigo, unspecified laterality   Evaluation Date: 3/11/2024  Authorization Period Expiration: 02/20/25  Plan of Care Expiration: 05/10/24  Visit # / Visits authorized: 02/ 10 + eval     Time In: 16:15  Time Out: 17:00  Total Billable Time: 45 minutes     Precautions: Standard, h/o cancer    PTA Visit #: 0/5     Subjective     Patient reports: that she [facility]. Dizziness has been doing better over the past few days.     She remains compliant with home exercise program.   Response to previous treatment: no adverse effects  Functional change: ongoing    Pain: 0/10  Location: NA    Objective      Last taken on 03/14/24.     Treatment     Chelsea received the treatments listed below:      therapeutic exercises to develop strength, endurance, ROM, flexibility, posture, and core stabilization for 00 minutes including:  NP    neuromuscular re-education activities to improve: Balance, Coordination, and Sense for 15 minutes. The following activities were included:    3 x 30" VOR 1 horizontal at self selected speed- NP since she did as part of home exercise program this morning    X 4 laps along ballet bar, tandem ambulation, SBA, no upper extremity support    2 x 30" each lower extremity single leg stance with alternate lower extremity on BOSU with eyes closed, CGA, no upper extremity support    BOSU: soft side up  1 x 30" stance with eyes open, CGA  2 x 30" stance eyes closed, CGA    therapeutic activities to improve functional performance for 30 minutes, including:    Functional motion tolerance:  Laps in neuro " gym:  2 x 100 feet right<>left head movements  2 x 100 feet RUQ <> LLQ diagonal head movements  2 x 100 feet LUQ <> RLQ diagonal head movements    Standing cone transfer:  X 6 cones, ground on left side <> over head shelf on right side; returns them to ground; no dizziness  X 6 cones, ground on right side <> over head shelf on left side; returns them to ground; no dizziness    X 2 laps, weaving through 5 large cones, bending down to tap each cone with hand, no dizziness    2 x 100 feet ambulation in hallway with self vertical ball toss/catch with rubber ball, no dizziness    Patient Education and Home Exercises       Education provided:   - POC    Written Home Exercises Provided: continue prior HEP    Assessment     Chelsea tolerated therapy session well this afternoon. Increased focus on motion tolerance training. No significant dizziness elicited with motion tolerance activities performed today.  Continue plan of care (POC) to further progress remaining vestibular deficits.     Chelsea Is progressing well towards her goals.   Patient prognosis is Good.     Patient will continue to benefit from skilled outpatient physical therapy to address the deficits listed in the problem list box on initial evaluation, provide pt/family education and to maximize pt's level of independence in the home and community environment.     Patient's spiritual, cultural and educational needs considered and pt agreeable to plan of care and goals.     Anticipated Barriers for therapy: h/o vestibular system weakness, bilateral knee pain, neck pain/AROM restrictions     Goals:  Short Term Goals =  Long Term Goals: 8 weeks   Patient to be (I) with established home exercise program. Ongoing  Patient to perform VOR 1 WFL at 100 bpm for improved gaze stabilization. Ongoing  PT to formally assess GST and establish appropriate goals. MET 0314/24  PT formally assess Functional Gait Assessment and establish appropriate goals. MET 03/14/24  Patient to  perform supine <> sit transition with no significant dizziness for improved motion tolerance. Ongoing    Plan     Progress gaze stabilization, motion tolerance (ambulation with head movements and reaching over head) as indicated.     Annamaria Ferrer, PT

## 2024-03-22 ENCOUNTER — LAB VISIT (OUTPATIENT)
Dept: LAB | Facility: HOSPITAL | Age: 74
End: 2024-03-22
Attending: PHYSICIAN ASSISTANT
Payer: MEDICARE

## 2024-03-22 DIAGNOSIS — E87.1 HYPONATREMIA: ICD-10-CM

## 2024-03-22 LAB
ANION GAP SERPL CALC-SCNC: 8 MMOL/L (ref 8–16)
BUN SERPL-MCNC: 12 MG/DL (ref 8–23)
CALCIUM SERPL-MCNC: 10 MG/DL (ref 8.7–10.5)
CHLORIDE SERPL-SCNC: 98 MMOL/L (ref 95–110)
CO2 SERPL-SCNC: 25 MMOL/L (ref 23–29)
CREAT SERPL-MCNC: 0.9 MG/DL (ref 0.5–1.4)
EST. GFR  (NO RACE VARIABLE): >60 ML/MIN/1.73 M^2
GLUCOSE SERPL-MCNC: 98 MG/DL (ref 70–110)
POTASSIUM SERPL-SCNC: 4.5 MMOL/L (ref 3.5–5.1)
SODIUM SERPL-SCNC: 131 MMOL/L (ref 136–145)

## 2024-03-22 PROCEDURE — 36415 COLL VENOUS BLD VENIPUNCTURE: CPT | Performed by: PHYSICIAN ASSISTANT

## 2024-03-22 PROCEDURE — 80048 BASIC METABOLIC PNL TOTAL CA: CPT | Mod: HCNC | Performed by: PHYSICIAN ASSISTANT

## 2024-03-26 ENCOUNTER — CLINICAL SUPPORT (OUTPATIENT)
Dept: REHABILITATION | Facility: HOSPITAL | Age: 74
End: 2024-03-26
Payer: MEDICARE

## 2024-03-26 DIAGNOSIS — R42 DIZZINESS: Primary | ICD-10-CM

## 2024-03-26 PROCEDURE — 97530 THERAPEUTIC ACTIVITIES: CPT | Mod: HCNC,PO

## 2024-03-26 PROCEDURE — 97112 NEUROMUSCULAR REEDUCATION: CPT | Mod: HCNC,PO

## 2024-03-26 PROCEDURE — 97110 THERAPEUTIC EXERCISES: CPT | Mod: HCNC,PO

## 2024-03-26 NOTE — PROGRESS NOTES
"[facility]   Physical Therapy Treatment Note      Name: Chelsea Ford Child  Clinic Number: [id number]    Therapy Diagnosis:   Encounter Diagnosis   Name Primary?    Dizziness Yes         Physician: Elva Tripathi MD    Visit Date: 3/26/2024    Physician Orders: PT Eval and Treat   Medical Diagnosis from Referral: Benign paroxysmal positional vertigo, unspecified laterality   Evaluation Date: 3/11/2024  Authorization Period Expiration: 02/20/25  Plan of Care Expiration: 05/10/24  Visit # / Visits authorized: 03/ 10 + eval     Time In: 16:15  Time Out: 17:00  Total Billable Time: 45 minutes     Precautions: Standard, h/o cancer    PTA Visit #: 0/5     Subjective     Patient reports: that this past Sunday, she was standing at the front of [employer] talking with friends, and she felt spinning sensation for a few seconds. She was also putting something in the garbage later that day, she also felt another episode of spinning. Another time, she was reading, and extended her neck when felt spinning sensation. She see cardiology at the end of April. She follows up with PCP next week.     She remains compliant with home exercise program.   Response to previous treatment: no adverse effects  Functional change: ongoing    Pain: 0/10  Location: NA    Objective      Last taken on 03/14/24.     Treatment     Chelsea received the treatments listed below:      therapeutic exercises to develop strength, endurance, ROM, flexibility, posture, and core stabilization for 08 minutes including:    X 8 min, Sci Fit recumbent stepper L2 for cardiovascular endurance- no dizziness    neuromuscular re-education activities to improve: Balance, Coordination, and Sense for 22 minutes. The following activities were included:    3 x 30" VOR 1 horizontal at self selected speed- NP since she did as part of home exercise program this morning    X 4 laps along ballet bar, tandem ambulation, SBA, no upper extremity " "support    2 x 30" each lower extremity in front, tandem stance with eyes closed, SBA, intermittent touchdown support    2 x 30" each lower extremity single leg stance with alternate lower extremity on BOSU with eyes closed, CGA, no upper extremity support    BOSU: soft side up  1 x 30" stance with eyes open, CGA  3 x 30" stance eyes closed, CGA    therapeutic activities to improve functional performance for 15 minutes, including:    Functional motion tolerance:  Laps in neuro gym:  2 x 100 feet right<>left head movements  2 x 100 feet RUQ <> LLQ diagonal head movements  2 x 100 feet LUQ <> RLQ diagonal head movements    Seated:   2 X 10 reps, midline <> cervical extension- no increase in dizziness    Time spent discussing recent symptoms of dizziness and PT's recommendations for patient to follow up with cardiology and ENT regarding recent symptoms, because lack of vestibular triggers for some of the incidents would warrant further medical assessment.     Patient Education and Home Exercises       Education provided:   - POC    Written Home Exercises Provided: continue prior HEP    Assessment     Chelsea tolerated therapy session well this afternoon. Time spent discussing recent dizziness episodes and PT recommendations for patient to follow up with medical team since some episodes do not appear triggered by vestibular causes. Cardiovascular endurance activity performed to see if any dizziness related to physical exertion. No significant dizziness elicited during therapy session this afternoon.   Continue plan of care (POC) to further progress remaining vestibular deficits.     Chelsea Is progressing well towards her goals.   Patient prognosis is Good.     Patient will continue to benefit from skilled outpatient physical therapy to address the deficits listed in the problem list box on initial evaluation, provide pt/family education and to maximize pt's level of independence in the home and community environment. "     Patient's spiritual, cultural and educational needs considered and pt agreeable to plan of care and goals.     Anticipated Barriers for therapy: h/o vestibular system weakness, bilateral knee pain, neck pain/AROM restrictions     Goals:  Short Term Goals =  Long Term Goals: 8 weeks   Patient to be (I) with established home exercise program. Ongoing  Patient to perform VOR 1 WFL at 100 bpm for improved gaze stabilization. Ongoing  PT to formally assess GST and establish appropriate goals. MET 0314/24  PT formally assess Functional Gait Assessment and establish appropriate goals. MET 03/14/24  Patient to perform supine <> sit transition with no significant dizziness for improved motion tolerance. Ongoing    Plan     Progress gaze stabilization, motion tolerance (ambulation with head movements and reaching over head) as indicated.     Annamaria Ferrer, PT

## 2024-03-26 NOTE — PROGRESS NOTES
"[facility]   Physical Therapy Treatment Note      Name: Chelsea Ford Child  Clinic Number: [id number]    Therapy Diagnosis:   No diagnosis found.      Physician: Elva Tripathi MD    Visit Date: 3/26/2024    Physician Orders: PT Eval and Treat   Medical Diagnosis from Referral: Benign paroxysmal positional vertigo, unspecified laterality   Evaluation Date: 3/11/2024  Authorization Period Expiration: 02/20/25  Plan of Care Expiration: 05/10/24  Visit # / Visits authorized: 03/ 10 + eval     Time In: 16:15  Time Out: 17:00  Total Billable Time: 45 minutes     Precautions: Standard, h/o cancer    PTA Visit #: 0/5     Subjective     Patient reports: that she had a [facility]. Dizziness has been doing better over the past few days.     She remains compliant with home exercise program.   Response to previous treatment: no adverse effects  Functional change: ongoing    Pain: 0/10  Location: NA    Objective      Last taken on 03/14/24.     Treatment     Chelsea received the treatments listed below:      therapeutic exercises to develop strength, endurance, ROM, flexibility, posture, and core stabilization for 00 minutes including:  NP    neuromuscular re-education activities to improve: Balance, Coordination, and Sense for 15 minutes. The following activities were included:    3 x 30" VOR 1 horizontal at self selected speed- NP since she did as part of home exercise program this morning    X 4 laps along ballet bar, tandem ambulation, SBA, no upper extremity support    2 x 30" each lower extremity single leg stance with alternate lower extremity on BOSU with eyes closed, CGA, no upper extremity support    BOSU: soft side up  1 x 30" stance with eyes open, CGA  2 x 30" stance eyes closed, CGA    therapeutic activities to improve functional performance for 30 minutes, including:    Functional motion tolerance:  Laps in neuro gym:  2 x 100 feet right<>left head " movements  2 x 100 feet RUQ <> LLQ diagonal head movements  2 x 100 feet LUQ <> RLQ diagonal head movements    Standing cone transfer:  X 6 cones, ground on left side <> over head shelf on right side; returns them to ground; no dizziness  X 6 cones, ground on right side <> over head shelf on left side; returns them to ground; no dizziness    X 2 laps, weaving through 5 large cones, bending down to tap each cone with hand, no dizziness    2 x 100 feet ambulation in hallway with self vertical ball toss/catch with rubber ball, no dizziness    Patient Education and Home Exercises       Education provided:   - POC    Written Home Exercises Provided: continue prior HEP    Assessment     Chelsea tolerated therapy session well this afternoon. Increased focus on motion tolerance training. No significant dizziness elicited with motion tolerance activities performed today.  Continue plan of care (POC) to further progress remaining vestibular deficits.     Chelsea Is progressing well towards her goals.   Patient prognosis is Good.     Patient will continue to benefit from skilled outpatient physical therapy to address the deficits listed in the problem list box on initial evaluation, provide pt/family education and to maximize pt's level of independence in the home and community environment.     Patient's spiritual, cultural and educational needs considered and pt agreeable to plan of care and goals.     Anticipated Barriers for therapy: h/o vestibular system weakness, bilateral knee pain, neck pain/AROM restrictions     Goals:  Short Term Goals =  Long Term Goals: 8 weeks   Patient to be (I) with established home exercise program. Ongoing  Patient to perform VOR 1 WFL at 100 bpm for improved gaze stabilization. Ongoing  PT to formally assess GST and establish appropriate goals. MET 0314/24  PT formally assess Functional Gait Assessment and establish appropriate goals. MET 03/14/24  Patient to perform supine <> sit transition  with no significant dizziness for improved motion tolerance. Ongoing    Plan     Progress gaze stabilization, motion tolerance (ambulation with head movements and reaching over head) as indicated.     Annamaria Ferrer, PT

## 2024-04-01 NOTE — PROGRESS NOTES
"[facility]   Physical Therapy Treatment Note      Name: Chelsea Ford Child  Clinic Number: [id number]    Therapy Diagnosis:   Encounter Diagnosis   Name Primary?    Dizziness Yes       Physician: Elva Tripathi MD    Visit Date: 4/2/2024    Physician Orders: PT Eval and Treat   Medical Diagnosis from Referral: Benign paroxysmal positional vertigo, unspecified laterality   Evaluation Date: 3/11/2024  Authorization Period Expiration: 02/20/25  Plan of Care Expiration: 05/10/24  Visit # / Visits authorized: 04/ 10 + eval     Time In: 14:30  Time Out: 15:15  Total Billable Time: 45 minutes     Precautions: Standard, h/o cancer    PTA Visit #: 0/5     Subjective     Patient reports: that she is feeling better today than she has in weeks. Still having some lightheadedness when extending her neck, but it is not severe today.  Appears to potentially coincide with improved sinuses today as well.     She remains compliant with home exercise program.   Response to previous treatment: no adverse effects  Functional change: ongoing    Pain: 0/10  Location: NA    Objective      Last taken on 03/14/24.     Treatment     Chelsea received the treatments listed below:      therapeutic exercises to develop strength, endurance, ROM, flexibility, posture, and core stabilization for 08 minutes including:    X 8 min, Sci Fit recumbent stepper L2 for cardiovascular endurance- no dizziness    neuromuscular re-education activities to improve: Balance, Coordination, and Sense for 22 minutes. The following activities were included:    Ambulation in hallway:  2 x 100 feet horizontal VOR 1  2 x 100 feet vertical VOR 1    X 3 laps along ballet bar, forward <>backward tandem ambulation, SBA, no upper extremity support    2 x 30" each lower extremity in front, tandem stance with eyes closed, CGA, no upper extremity support    2 x 10 reps, each lower extremity single leg stance on fitter board with alternate lower " "extremity tapping 2 large cones in front, no upper extremity support, CGA    BOSU: soft side up  1 x 30" stance with eyes open, CGA  3 x 30" stance eyes closed, CGA    therapeutic activities to improve functional performance for 15 minutes, including:    Functional motion tolerance:  Laps in neuro gym:  2 x 100 feet right<>left head movements  2 x 100 feet up <> down head movements  2 x 100 feet RUQ <> LLQ diagonal head movements  2 x 100 feet LUQ <> RLQ diagonal head movements    Patient Education and Home Exercises       Education provided:   - POC    Written Home Exercises Provided: continue prior HEP    Assessment     Chelsea tolerated therapy session well this afternoon. Improved lightheadedness symptoms at baseline today which is encouraging compared to prior session. No dizziness elicited with hallway ambulation activities, but min sway noted with ambulation with head movements and walking VOR. Improved performance noted with balance activities today. Plan to continue to monitor lightheadedness symptoms. Continue plan of care (POC) to further progress remaining vestibular deficits.     Chelsea Is progressing well towards her goals.   Patient prognosis is Good.     Patient will continue to benefit from skilled outpatient physical therapy to address the deficits listed in the problem list box on initial evaluation, provide pt/family education and to maximize pt's level of independence in the home and community environment.     Patient's spiritual, cultural and educational needs considered and pt agreeable to plan of care and goals.     Anticipated Barriers for therapy: h/o vestibular system weakness, bilateral knee pain, neck pain/AROM restrictions     Goals:  Short Term Goals =  Long Term Goals: 8 weeks   Patient to be (I) with established home exercise program. Ongoing  Patient to perform VOR 1 WFL at 100 bpm for improved gaze stabilization. Ongoing  PT to formally assess GST and establish appropriate goals. " MET 0314/24  PT formally assess Functional Gait Assessment and establish appropriate goals. MET 03/14/24  Patient to perform supine <> sit transition with no significant dizziness for improved motion tolerance. Ongoing    Plan     Progress gaze stabilization, motion tolerance (ambulation with head movements and reaching over head) as indicated.     Annamaria Ferrer, PT

## 2024-04-02 ENCOUNTER — CLINICAL SUPPORT (OUTPATIENT)
Dept: REHABILITATION | Facility: HOSPITAL | Age: 74
End: 2024-04-02
Payer: MEDICARE

## 2024-04-02 DIAGNOSIS — R42 DIZZINESS: Primary | ICD-10-CM

## 2024-04-02 PROCEDURE — 97110 THERAPEUTIC EXERCISES: CPT | Mod: HCNC,PO

## 2024-04-02 PROCEDURE — 97112 NEUROMUSCULAR REEDUCATION: CPT | Mod: HCNC,PO

## 2024-04-02 PROCEDURE — 97530 THERAPEUTIC ACTIVITIES: CPT | Mod: HCNC,PO

## 2024-04-09 ENCOUNTER — CLINICAL SUPPORT (OUTPATIENT)
Dept: REHABILITATION | Facility: HOSPITAL | Age: 74
End: 2024-04-09
Payer: MEDICARE

## 2024-04-09 DIAGNOSIS — R42 DIZZINESS: Primary | ICD-10-CM

## 2024-04-09 PROCEDURE — 97530 THERAPEUTIC ACTIVITIES: CPT | Mod: HCNC,PO

## 2024-04-09 PROCEDURE — 97112 NEUROMUSCULAR REEDUCATION: CPT | Mod: HCNC,PO

## 2024-04-09 PROCEDURE — 97110 THERAPEUTIC EXERCISES: CPT | Mod: HCNC,PO

## 2024-04-09 NOTE — PROGRESS NOTES
"[facility]   Physical Therapy Treatment Note      Name: Chelsea Ford Child  Clinic Number: [id number]    Therapy Diagnosis:   Encounter Diagnosis   Name Primary?    Dizziness Yes         Physician: Elva Tripathi MD    Visit Date: 4/9/2024    Physician Orders: PT Eval and Treat   Medical Diagnosis from Referral: Benign paroxysmal positional vertigo, unspecified laterality   Evaluation Date: 3/11/2024  Authorization Period Expiration: 02/20/25  Plan of Care Expiration: 05/10/24  Visit # / Visits authorized: 05/ 10 + eval     Time In: 13:00  Time Out: 13:45  Total Billable Time: 45 minutes     Precautions: Standard, h/o cancer    PTA Visit #: 0/5     Subjective     Patient reports:that she has not had any significant dizziness episodes since prior session. She has been trying to focus on moving her neck more gently to help relieve tightness which also appears to help.     She remains compliant with home exercise program.   Response to previous treatment: no adverse effects  Functional change: ongoing    Pain: 0/10  Location: NA    Objective      Last taken on 03/14/24.     Treatment     Chelsea received the treatments listed below:      therapeutic exercises to develop strength, endurance, ROM, flexibility, posture, and core stabilization for 08 minutes including:    X 8 min, Sci Fit recumbent stepper L3 for cardiovascular endurance- no dizziness    neuromuscular re-education activities to improve: Balance, Coordination, and Sense for 22 minutes. The following activities were included:    Ambulation in hallway:  2 x 100 feet horizontal VOR 1  2 x 100 feet vertical VOR 1    X 3 laps along ballet bar, forward <>backward tandem ambulation, SBA, no upper extremity support    2 x 30" each lower extremity in front, tandem stance with eyes closed, CGA, no upper extremity support    BOSU: soft side up  1 x 30" stance with eyes open, CGA  3 x 30" stance eyes closed, CGA    therapeutic " activities to improve functional performance for 15 minutes, including:    Functional motion tolerance:  Laps in neuro gym:  2 x 100 feet right<>left head movements  2 x 100 feet up <> down head movements  2 x 100 feet RUQ <> LLQ diagonal head movements  2 x 100 feet LUQ <> RLQ diagonal head movements    Patient Education and Home Exercises       Education provided:   - POC    Written Home Exercises Provided: continue prior HEP    Assessment     Chelsea tolerated therapy session well this afternoon. Improved lightheadedness symptoms at baseline today which is encouraging compared to prior session. No dizziness elicited with hallway ambulation activities, but min sway noted with ambulation with head movements and walking VOR. Improved performance noted with balance activities today. Plan to continue to monitor lightheadedness symptoms. Continue plan of care (POC) to further progress remaining vestibular deficits.     Chelsea Is progressing well towards her goals.   Patient prognosis is Good.     Patient will continue to benefit from skilled outpatient physical therapy to address the deficits listed in the problem list box on initial evaluation, provide pt/family education and to maximize pt's level of independence in the home and community environment.     Patient's spiritual, cultural and educational needs considered and pt agreeable to plan of care and goals.     Anticipated Barriers for therapy: h/o vestibular system weakness, bilateral knee pain, neck pain/AROM restrictions     Goals:  Short Term Goals =  Long Term Goals: 8 weeks   Patient to be (I) with established home exercise program. Ongoing  Patient to perform VOR 1 WFL at 100 bpm for improved gaze stabilization. Ongoing  PT to formally assess GST and establish appropriate goals. MET 0314/24  PT formally assess Functional Gait Assessment and establish appropriate goals. MET 03/14/24  Patient to perform supine <> sit transition with no significant dizziness  for improved motion tolerance. Ongoing    Plan     Progress gaze stabilization, motion tolerance (ambulation with head movements and reaching over head) as indicated.     Annamaria Ferrer, PT

## 2024-04-10 ENCOUNTER — PATIENT MESSAGE (OUTPATIENT)
Dept: INTERNAL MEDICINE | Facility: CLINIC | Age: 74
End: 2024-04-10
Payer: MEDICARE

## 2024-04-15 ENCOUNTER — OFFICE VISIT (OUTPATIENT)
Dept: INTERNAL MEDICINE | Facility: CLINIC | Age: 74
End: 2024-04-15
Payer: MEDICARE

## 2024-04-15 VITALS
BODY MASS INDEX: 18.32 KG/M2 | OXYGEN SATURATION: 99 % | SYSTOLIC BLOOD PRESSURE: 112 MMHG | WEIGHT: 114 LBS | DIASTOLIC BLOOD PRESSURE: 70 MMHG | HEART RATE: 56 BPM | HEIGHT: 66 IN

## 2024-04-15 DIAGNOSIS — I27.20 PULMONARY HYPERTENSION: ICD-10-CM

## 2024-04-15 DIAGNOSIS — H81.10 BENIGN PAROXYSMAL POSITIONAL VERTIGO, UNSPECIFIED LATERALITY: ICD-10-CM

## 2024-04-15 DIAGNOSIS — I50.32 CHRONIC HEART FAILURE WITH PRESERVED EJECTION FRACTION: ICD-10-CM

## 2024-04-15 DIAGNOSIS — I10 HYPERTENSION, ESSENTIAL: Primary | ICD-10-CM

## 2024-04-15 PROCEDURE — 1101F PT FALLS ASSESS-DOCD LE1/YR: CPT | Mod: CPTII,S$GLB,, | Performed by: PHYSICIAN ASSISTANT

## 2024-04-15 PROCEDURE — 99999 PR PBB SHADOW E&M-EST. PATIENT-LVL V: CPT | Mod: PBBFAC,,, | Performed by: PHYSICIAN ASSISTANT

## 2024-04-15 PROCEDURE — 4010F ACE/ARB THERAPY RXD/TAKEN: CPT | Mod: CPTII,S$GLB,, | Performed by: PHYSICIAN ASSISTANT

## 2024-04-15 PROCEDURE — 3074F SYST BP LT 130 MM HG: CPT | Mod: CPTII,S$GLB,, | Performed by: PHYSICIAN ASSISTANT

## 2024-04-15 PROCEDURE — 1160F RVW MEDS BY RX/DR IN RCRD: CPT | Mod: CPTII,S$GLB,, | Performed by: PHYSICIAN ASSISTANT

## 2024-04-15 PROCEDURE — 1159F MED LIST DOCD IN RCRD: CPT | Mod: CPTII,S$GLB,, | Performed by: PHYSICIAN ASSISTANT

## 2024-04-15 PROCEDURE — 99214 OFFICE O/P EST MOD 30 MIN: CPT | Mod: S$GLB,,, | Performed by: PHYSICIAN ASSISTANT

## 2024-04-15 PROCEDURE — 3288F FALL RISK ASSESSMENT DOCD: CPT | Mod: CPTII,S$GLB,, | Performed by: PHYSICIAN ASSISTANT

## 2024-04-15 PROCEDURE — 3078F DIAST BP <80 MM HG: CPT | Mod: CPTII,S$GLB,, | Performed by: PHYSICIAN ASSISTANT

## 2024-04-15 PROCEDURE — 1126F AMNT PAIN NOTED NONE PRSNT: CPT | Mod: CPTII,S$GLB,, | Performed by: PHYSICIAN ASSISTANT

## 2024-04-15 RX ORDER — LOSARTAN POTASSIUM 25 MG/1
25 TABLET ORAL DAILY
Qty: 90 TABLET | Refills: 3 | Status: SHIPPED | OUTPATIENT
Start: 2024-04-15 | End: 2025-04-15

## 2024-04-15 NOTE — PROGRESS NOTES
Subjective:       Patient ID: Chelsea Ford Child is a 74 y.o. female.        Chief Complaint: Follow-up (6 month)    Chelsea Cavazos is an established patient of Elva Tripathi MD here today for 6 month f/u visit.    Feeling of imbalance, especially with looking up or turning over in bed  Vestibular rehab helping, PT rec that she see ENT  Dx with BPPV but feels getting recurrent sx  Doing home exercises    Taking two 50 mg tablets losartan but only 4 times/week as other days BP is too low, <110 systolic    HTN -   Followed by digital hypertension    Losartan 50 mg 2 nightly - d/c by digital hypertension 4/8 but she did not d/c as nervous to, starting checking BP and if <110 systolic she would hold at night, taking 2 tablets 3-4 times/week  Coreg 25 mg BID   Lasix 20 mg M/W/F     H/o PAC's     Osteoporosis -   Last DEXA 11/2019  Declines repeat DEXA     Pulmonary HTN, HFpEF -   Jardiance 10 mg daily  Lasix M/W/F    PFT 6/2022 normal spirometry, DLCO dec due to hemoglobin  CT chest tiny pulmonary nodules largest 4 mm     Anemia   H/o thrombocytopenia      Chronic knee pain               Review of Systems   Constitutional:  Negative for chills, diaphoresis, fatigue and fever.   HENT:  Negative for congestion and sore throat.    Eyes:  Negative for visual disturbance.   Respiratory:  Negative for cough, chest tightness and shortness of breath.    Cardiovascular:  Negative for chest pain, palpitations and leg swelling.   Gastrointestinal:  Negative for abdominal pain, blood in stool, constipation, diarrhea, nausea and vomiting.   Genitourinary:  Negative for dysuria, frequency, hematuria and urgency.   Musculoskeletal:  Negative for arthralgias and back pain.   Skin:  Negative for rash.   Neurological:  Negative for dizziness, syncope, weakness and headaches.   Psychiatric/Behavioral:  Negative for dysphoric mood and sleep disturbance. The patient is not nervous/anxious.        Objective:      Physical  Exam  Vitals and nursing note reviewed.   Constitutional:       Appearance: Normal appearance. She is well-developed.   HENT:      Head: Normocephalic.      Right Ear: External ear normal.      Left Ear: External ear normal.   Eyes:      Pupils: Pupils are equal, round, and reactive to light.   Cardiovascular:      Rate and Rhythm: Normal rate and regular rhythm.      Heart sounds: Normal heart sounds. No murmur heard.     No friction rub. No gallop.   Pulmonary:      Effort: Pulmonary effort is normal. No respiratory distress.      Breath sounds: Normal breath sounds.   Abdominal:      Palpations: Abdomen is soft.      Tenderness: There is no abdominal tenderness.   Skin:     General: Skin is warm and dry.   Neurological:      Mental Status: She is alert.         Assessment:       1. Hypertension, essential    2. Benign paroxysmal positional vertigo, unspecified laterality    3. Pulmonary hypertension    4. Chronic heart failure with preserved ejection fraction        Plan:       Chelsea was seen today for follow-up.    Diagnoses and all orders for this visit:    Hypertension, essential - will REDUCE losartan to 25 mg daily, previously on 100 mg daily with frequent low readings at night, continue monitoring through digital hypertension  -     losartan (COZAAR) 25 MG tablet; Take 1 tablet (25 mg total) by mouth once daily.    Benign paroxysmal positional vertigo, unspecified laterality  -     Ambulatory referral/consult to ENT; Future    Pulmonary hypertension   Chronic heart failure with preserved ejection fraction       -     Followed by cardiology    F/u with Dr. Tripathi in 6 months, all labs will be due at that time  RSV vaccine today    Pt has been given instructions populated from patient instructions database and has verbalized understanding of the after visit summary and information contained wherein.    Follow up with a primary care provider. May go to ER for acute shortness of breath, lightheadedness,  "fever, or any other emergent complaints or changes in condition.    "This note will be shared with the patient"    Future Appointments   Date Time Provider Department Center   4/16/2024 10:20 AM Cony Waters MD [facility] DERM [place]   4/19/2024  8:00 AM Annamaria Ferrer, PT [facility]   4/23/2024 10:30 AM Anthony Lackey MD [facility] CARDIO [place]   4/26/2024  8:00 AM Annamaria Ferrer, PT [facility]   5/2/2024  8:00 AM Annamaria Ferrer, PT [facility]   5/6/2024  8:00 AM Annamaria Ferrer, PT [facility]   7/11/2024  9:30 AM Alexa Pardo AU.D [facility] AUDIO [facility]   7/11/2024 10:45 AM Steven Brasher MD [facility] ENT [facility]   7/16/2024  2:30 PM Elva Tripathi MD [facility] PCW                 "

## 2024-04-15 NOTE — PATIENT INSTRUCTIONS
Reduce losartan to 25 mg nightly since blood pressure has been running low  Monitor through digital hypertension

## 2024-04-16 ENCOUNTER — OFFICE VISIT (OUTPATIENT)
Dept: DERMATOLOGY | Facility: CLINIC | Age: 74
End: 2024-04-16
Payer: MEDICARE

## 2024-04-16 VITALS — WEIGHT: 113 LBS | BODY MASS INDEX: 18.24 KG/M2

## 2024-04-16 DIAGNOSIS — B08.1 BATEMAN'S DISEASE: ICD-10-CM

## 2024-04-16 DIAGNOSIS — D22.9 NEVUS OF MULTIPLE SITES: ICD-10-CM

## 2024-04-16 DIAGNOSIS — Z85.828 HISTORY OF SKIN CANCER: ICD-10-CM

## 2024-04-16 DIAGNOSIS — L82.1 SEBORRHEIC KERATOSES: Primary | ICD-10-CM

## 2024-04-16 DIAGNOSIS — L81.4 LENTIGINES: ICD-10-CM

## 2024-04-16 PROCEDURE — 1160F RVW MEDS BY RX/DR IN RCRD: CPT | Mod: CPTII,S$GLB,, | Performed by: DERMATOLOGY

## 2024-04-16 PROCEDURE — 1126F AMNT PAIN NOTED NONE PRSNT: CPT | Mod: CPTII,S$GLB,, | Performed by: DERMATOLOGY

## 2024-04-16 PROCEDURE — 1159F MED LIST DOCD IN RCRD: CPT | Mod: CPTII,S$GLB,, | Performed by: DERMATOLOGY

## 2024-04-16 PROCEDURE — 3288F FALL RISK ASSESSMENT DOCD: CPT | Mod: CPTII,S$GLB,, | Performed by: DERMATOLOGY

## 2024-04-16 PROCEDURE — 1101F PT FALLS ASSESS-DOCD LE1/YR: CPT | Mod: CPTII,S$GLB,, | Performed by: DERMATOLOGY

## 2024-04-16 PROCEDURE — 99999 PR PBB SHADOW E&M-EST. PATIENT-LVL III: CPT | Mod: PBBFAC,,, | Performed by: DERMATOLOGY

## 2024-04-16 PROCEDURE — 99213 OFFICE O/P EST LOW 20 MIN: CPT | Mod: S$GLB,,, | Performed by: DERMATOLOGY

## 2024-04-16 PROCEDURE — 4010F ACE/ARB THERAPY RXD/TAKEN: CPT | Mod: CPTII,S$GLB,, | Performed by: DERMATOLOGY

## 2024-04-16 NOTE — PROGRESS NOTES
Subjective:      Patient ID:  Chelsea Ford Child is a 74 y.o. female who presents for   Chief Complaint   Patient presents with    Follow-up     UBSE     Follow-up - Follow-up  Affected locations: face, left arm, right arm, chest, torso, back and abdomen  Signs / symptoms: asymptomatic    Review of Systems   Constitutional:  Positive for night sweats. Negative for fever, chills, weight loss, weight gain, fatigue and malaise.   Skin:  Positive for daily sunscreen use and activity-related sunscreen use. Negative for wears hat.   Hematologic/Lymphatic: Bruises/bleeds easily.       Objective:   Physical Exam   Constitutional: She appears well-developed and well-nourished. No distress.   Neurological: She is alert and oriented to person, place, and time. She is not disoriented.   Psychiatric: She has a normal mood and affect.   Skin:   Areas Examined (abnormalities noted in diagram):   Head / Face Inspection Performed  Neck Inspection Performed  Chest / Axilla Inspection Performed  Abdomen Inspection Performed  Back Inspection Performed  RUE Inspected  LUE Inspection Performed                 Diagram Legend     Erythematous scaling macule/papule c/w actinic keratosis       Vascular papule c/w angioma      Pigmented verrucoid papule/plaque c/w seborrheic keratosis      Yellow umbilicated papule c/w sebaceous hyperplasia      Irregularly shaped tan macule c/w lentigo     1-2 mm smooth white papules consistent with Milia      Movable subcutaneous cyst with punctum c/w epidermal inclusion cyst      Subcutaneous movable cyst c/w pilar cyst      Firm pink to brown papule c/w dermatofibroma      Pedunculated fleshy papule(s) c/w skin tag(s)      Evenly pigmented macule c/w junctional nevus     Mildly variegated pigmented, slightly irregular-bordered macule c/w mildly atypical nevus      Flesh colored to evenly pigmented papule c/w intradermal nevus       Pink pearly papule/plaque c/w basal cell carcinoma      Erythematous  "hyperkeratotic cursted plaque c/w SCC      Surgical scar with no sign of skin cancer recurrence      Open and closed comedones      Inflammatory papules and pustules      Verrucoid papule consistent consistent with wart     Erythematous eczematous patches and plaques     Dystrophic onycholytic nail with subungual debris c/w onychomycosis     Umbilicated papule    Erythematous-base heme-crusted tan verrucoid plaque consistent with inflamed seborrheic keratosis     Erythematous Silvery Scaling Plaque c/w Psoriasis     See annotation      Assessment / Plan:          Seborrheic keratoses  Seborrheic keratosis scattered, told benign no treatment needed.  Brochure provided.        Lentigines  The "ABCD" rules to observe pigmented lesions were reviewed.      Michel's disease  Try dermend    Nevus of multiple sites  The "ABCD" rules to observe pigmented lesions were reviewed.      [facility] of skin [facility]  [facility]  Area(s) of previous NMSC evaluated with no signs of recurrence.    Upper body skin examination performed today including at least 6 points as noted in physical examination. No lesions suspicious for malignancy noted.    Recommend daily sun protection/avoidance and use of at least SPF 30, broad spectrum sunscreen (OTC drug).                Follow up in about 6 months (around 10/16/2024).  "

## 2024-04-17 DIAGNOSIS — I10 ESSENTIAL HYPERTENSION: ICD-10-CM

## 2024-04-17 RX ORDER — CARVEDILOL 25 MG/1
25 TABLET ORAL 2 TIMES DAILY WITH MEALS
Qty: 180 TABLET | Refills: 3 | Status: SHIPPED | OUTPATIENT
Start: 2024-04-17

## 2024-04-17 NOTE — TELEPHONE ENCOUNTER
Refill Decision Note   Chelsea Child  is requesting a refill authorization.  Brief Assessment and Rationale for Refill:  Approve     Medication Therapy Plan:        Comments:     Note composed:4:24 PM 04/17/2024

## 2024-04-17 NOTE — TELEPHONE ENCOUNTER
No care due was identified.  [facility] Embedded Care Due Messages. Reference number: [id number].   4/17/2024 8:42:05 AM CDT

## 2024-04-18 NOTE — PROGRESS NOTES
"[facility]   Physical Therapy Treatment Note      Name: Chelsea Ford Child  Clinic Number: [id number]    Therapy Diagnosis:   Encounter Diagnosis   Name Primary?    Dizziness Yes           Physician: Elva Tripathi MD    Visit Date: 4/19/2024    Physician Orders: PT Eval and Treat   Medical Diagnosis from Referral: Benign paroxysmal positional vertigo, unspecified laterality   Evaluation Date: 3/11/2024  Authorization Period Expiration: 02/20/25  Plan of Care Expiration: 05/10/24  Visit # / Visits authorized: 06/ 10 + eval     Time In: 08:00  Time Out: 08:44  Total Billable Time: 44 minutes     Precautions: Standard, h/o cancer    PTA Visit #: 0/5     Subjective     Patient reports: that she continues to have occasional lightheadedness. She was able to work 4 hours this past Wednesday, cleaning a house and going up and down stairs without an issue.     She remains compliant with home exercise program.   Response to previous treatment: no adverse effects  Functional change: ongoing    Pain: 0/10  Location: NA    Objective      VOR 1: 3 x 30" horizontal at 100 bpm- no visual slippage, but aware of lightheadedness feeling which improved with each trial    SOL SENSORY TESTING:  (P= Pass, F= Fail; note any sway; hold each position for 30")  Condition 1: (firm surface/feet together/eyes open) P  Condition 2: (firm surface/feet together/eyes closed) P  Condition 3: (firm surface/feet in tandem/eyes open) P, each lower extremity in front  Condition 4: (firm surface/feet in tandem/eyes closed) P c/ each LE in front, min sway  Condition 5: (soft surface/feet together/eyes open) P  Condition 6: (soft surface/feet together/eyes closed) P c/ slight sway  Condition 7: (Fakuda step test), measure distance varied from center starting position NT      Functional Gait Assessment:   1. Gait on level surface =  3  2. Change in Gait Speed = 3  3. Gait with horizontal head turns  = 3, " lightheadedness  4. Gait with vertical head turns = 3  5. Gait with pivot turns = 2  6. Step over obstacle = 3  7. Gait with Narrow JEFF = 3  8. Gait with eyes closed = 2  9. Ambulating Backwards = 3  10. Steps = 3     Score: 28/30  Evaluation: 28/30     FOTO completed.     Treatment     Chelsea received the treatments listed below:      therapeutic exercises to develop strength, endurance, ROM, flexibility, posture, and core stabilization for 00 minutes including:  NP    neuromuscular re-education activities to improve: Balance, Coordination, and Sense for 24 minutes. The following activities were included:  Time above includes time spent on objective testing.       therapeutic activities to improve functional performance for 20 minutes, including:  Time above includes time spent on objective testing.     Functional motion tolerance:  Laps in neuro gym:  2 x 100 feet right<>left head movements  2 x 100 feet up <> down head movements  2 x 100 feet RUQ <> LLQ diagonal head movements  2 x 100 feet LUQ <> RLQ diagonal head movements    Patient Education and Home Exercises       Education provided:   - POC    Written Home Exercises Provided: continue prior HEP    Assessment     Patient reassessed for plan of care (POC) update. Reassessment period: 03/14/24 to 04/18/24. Patient demonstrates good progress with therapy, but continues to endorse fluctuating lightheadedness. Symptoms are not always present, and no specific triggers identified; patient feels like symptoms could be attributed to sinuses. Quality of VOR 1 WFL, with no visual slippage; however patient reports awareness of lightheaded feeling initially at 100 bpm. Performance on GST remains consistent with prior trial with increased sway only noted on vision eliminated tandem stance and vision eliminated stance on foam pad. Performance on Functional Gait Assessment also remains consistent with prior trial with current score WFL; most difficulty noted on quick  turning and vision eliminated balance with lightheadedness reported during ambulation with right<>left head movements. Uncertain if remaining lightheadedness symptoms are attributed to peripheral vestibular hypofunction due to fluctuation of symptoms. Patient is scheduled to see ENT in July.  Continue plan of care (POC) to further progress remaining vestibular deficits. Plan to d/c once plan of care (POC) is completed.     Chelsea Is progressing well towards her goals.   Patient prognosis is Good.     Patient will continue to benefit from skilled outpatient physical therapy to address the deficits listed in the problem list box on initial evaluation, provide pt/family education and to maximize pt's level of independence in the home and community environment.     Patient's spiritual, cultural and educational needs considered and pt agreeable to plan of care and goals.     Anticipated Barriers for therapy: h/o vestibular system weakness, bilateral knee pain, neck pain/AROM restrictions     Goals:  Short Term Goals =  Long Term Goals: 8 weeks   Patient to be (I) with established home exercise program. MET 04/19/24  Patient to perform VOR 1 WFL at 100 bpm for improved gaze stabilization. Progressing  PT to formally assess GST and establish appropriate goals. MET 03/14/24  PT formally assess Functional Gait Assessment and establish appropriate goals. MET 03/14/24  Patient to perform supine <> sit transition with no significant dizziness for improved motion tolerance. Progressing-- occasional lightheadedness    Plan   Plan to d/c once plan of care (POC) is completed.   Progress gaze stabilization, motion tolerance (ambulation with head movements and reaching over head) as indicated.     Annamaria Ferrer, PT

## 2024-04-19 ENCOUNTER — CLINICAL SUPPORT (OUTPATIENT)
Dept: REHABILITATION | Facility: HOSPITAL | Age: 74
End: 2024-04-19
Payer: MEDICARE

## 2024-04-19 DIAGNOSIS — R42 DIZZINESS: Primary | ICD-10-CM

## 2024-04-19 PROCEDURE — 97530 THERAPEUTIC ACTIVITIES: CPT | Mod: PO

## 2024-04-19 PROCEDURE — 97112 NEUROMUSCULAR REEDUCATION: CPT | Mod: PO

## 2024-04-23 ENCOUNTER — OFFICE VISIT (OUTPATIENT)
Dept: CARDIOLOGY | Facility: CLINIC | Age: 74
End: 2024-04-23
Payer: MEDICARE

## 2024-04-23 VITALS
BODY MASS INDEX: 17.91 KG/M2 | DIASTOLIC BLOOD PRESSURE: 64 MMHG | WEIGHT: 111.44 LBS | SYSTOLIC BLOOD PRESSURE: 112 MMHG | HEIGHT: 66 IN | HEART RATE: 60 BPM

## 2024-04-23 DIAGNOSIS — I27.20 PULMONARY HYPERTENSION: Primary | ICD-10-CM

## 2024-04-23 DIAGNOSIS — E87.1 HYPONATREMIA: ICD-10-CM

## 2024-04-23 DIAGNOSIS — I50.30 HEART FAILURE WITH PRESERVED EJECTION FRACTION, UNSPECIFIED HF CHRONICITY: ICD-10-CM

## 2024-04-23 PROCEDURE — 1101F PT FALLS ASSESS-DOCD LE1/YR: CPT | Mod: CPTII,S$GLB,, | Performed by: INTERNAL MEDICINE

## 2024-04-23 PROCEDURE — 99999 PR PBB SHADOW E&M-EST. PATIENT-LVL IV: CPT | Mod: PBBFAC,,, | Performed by: INTERNAL MEDICINE

## 2024-04-23 PROCEDURE — 1159F MED LIST DOCD IN RCRD: CPT | Mod: CPTII,S$GLB,, | Performed by: INTERNAL MEDICINE

## 2024-04-23 PROCEDURE — 1126F AMNT PAIN NOTED NONE PRSNT: CPT | Mod: CPTII,S$GLB,, | Performed by: INTERNAL MEDICINE

## 2024-04-23 PROCEDURE — 99214 OFFICE O/P EST MOD 30 MIN: CPT | Mod: S$GLB,,, | Performed by: INTERNAL MEDICINE

## 2024-04-23 PROCEDURE — 3288F FALL RISK ASSESSMENT DOCD: CPT | Mod: CPTII,S$GLB,, | Performed by: INTERNAL MEDICINE

## 2024-04-23 PROCEDURE — 1160F RVW MEDS BY RX/DR IN RCRD: CPT | Mod: CPTII,S$GLB,, | Performed by: INTERNAL MEDICINE

## 2024-04-23 PROCEDURE — 3078F DIAST BP <80 MM HG: CPT | Mod: CPTII,S$GLB,, | Performed by: INTERNAL MEDICINE

## 2024-04-23 PROCEDURE — 3074F SYST BP LT 130 MM HG: CPT | Mod: CPTII,S$GLB,, | Performed by: INTERNAL MEDICINE

## 2024-04-23 PROCEDURE — 4010F ACE/ARB THERAPY RXD/TAKEN: CPT | Mod: CPTII,S$GLB,, | Performed by: INTERNAL MEDICINE

## 2024-04-23 NOTE — PROGRESS NOTES
Subjective:   Chief Complaint:  Chelsea Ford Child is a 74 y.o. female who presents for follow-up of Hypertension and Congestive Heart Failure      Problem List and HPI:   Hypertension   Pulmonary hypertension  HFpEF  Tobacco use quit in 1984    She was taking furosemide 5-7 days a week but developed hyponatremia.  Serum sodium reduced from a baseline of 135-138 to 131.  BNP was 180 but on repeat was < 100.  She now takes furosemide 3 days a week.  She has occasional mild left lower extremity edema usually on weekends.  She does not report shortness of breath with exertion.  She does not report orthopnea or PND.        Review of patient's allergies indicates:   Allergen Reactions    Omnicef [cefdinir] Diarrhea    Adhesive Dermatitis    Nickel sutures [surgical stainless steel]      Rash with nickel        Current Outpatient Medications   Medication Sig Dispense Refill    acetaminophen (TYLENOL) 325 MG tablet Take 325 mg by mouth every 6 (six) hours as needed for Pain.      aspirin (ECOTRIN) 81 MG EC tablet Take 81 mg by mouth once daily.      CALCIUM ORAL Take 1 tablet by mouth once daily.      carvediloL (COREG) 25 MG tablet Take 1 tablet (25 mg total) by mouth 2 (two) times daily with meals. 180 tablet 3    diclofenac sodium (VOLTAREN) 1 % Gel Apply 2 g topically 2 (two) times daily. 100 g 2    empagliflozin (JARDIANCE) 10 mg tablet Take 1 tablet (10 mg total) by mouth once daily. 90 tablet 3    enzymes,digestive (DIGESTIVE ENZYMES ORAL) Take 1 tablet by mouth 3 (three) times daily.      estradioL (ESTRACE) 0.01 % (0.1 mg/gram) vaginal cream Rub dime sized amount of cream to the urethra nightly 42.5 g 2    furosemide (LASIX) 20 MG tablet Take 1 tablet (20 mg total) by mouth once daily. (Patient taking differently: Take 20 mg by mouth every Mon, Wed, Fri.) 30 tablet 6    hydrALAZINE (APRESOLINE) 25 MG tablet Take 1 tablet (25 mg total) by mouth daily as needed (for SBP >180). 30 tablet 3    iron 18 mg Tab Take 1  "tablet by mouth once daily.      loratadine 10 mg Cap Take 1 tablet by mouth once daily.      losartan (COZAAR) 25 MG tablet Take 1 tablet (25 mg total) by mouth once daily. 90 tablet 3    vitamin D (VITAMIN D3) 1000 units Tab Take 2,000 Units by mouth once daily.       No current facility-administered medications for this visit.       Social history:  Chelsea Ford Child  reports that she quit smoking about 40 years ago. Her smoking use included cigarettes. She started smoking about 46 years ago. She has a 6 pack-year smoking history. She has never used smokeless tobacco. She reports that she does not drink alcohol and does not use drugs.      Objective:   /64   Pulse 60   Ht 5' 6" (1.676 m)   Wt 50.5 kg (111 lb 7.1 oz)   LMP  (LMP Unknown)   BMI 17.99 kg/m²    Physical Exam  Constitutional:       Appearance: She is well-developed.      Comments:      HENT:      Head: Normocephalic.   Neck:      Vascular: No carotid bruit or JVD.   Cardiovascular:      Rate and Rhythm: Normal rate and regular rhythm.      Pulses:           Radial pulses are 2+ on the right side and 2+ on the left side.        Posterior tibial pulses are 2+ on the right side and 2+ on the left side.      Heart sounds: S1 normal and S2 normal. No murmur heard.     No gallop.   Pulmonary:      Effort: Pulmonary effort is normal.      Breath sounds: No wheezing or rales.   Chest:      Chest wall: There is no dullness to percussion.   Abdominal:      General: There is no abdominal bruit.      Palpations: Abdomen is soft. There is no hepatomegaly or splenomegaly.      Tenderness: There is no abdominal tenderness.   Musculoskeletal:      Comments: Left leg appears slightly larger than the right leg.  There is no pitting edema on either side.   Skin:     General: Skin is warm and dry.      Findings: No bruising or rash.      Nails: There is no clubbing.   Neurological:      Mental Status: She is alert and oriented to person, place, and time.      " Gait: Gait normal.   Psychiatric:         Speech: Speech normal.         Behavior: Behavior normal.         Judgment: Judgment normal.         Lipids:  Recent Labs   Lab 07/21/23  1037   LDL Cholesterol 125.2   HDL 80 H   Cholesterol 214 H      Renal:  Recent Labs   Lab 03/22/24  0825   Creatinine 0.9   Potassium 4.5   CO2 25   BUN 12     Liver:  Recent Labs   Lab 01/26/24  1026   AST 20   ALT 12     CBC:  Lab Results   Component Value Date    WBC 4.96 01/26/2024    HGB 11.7 (L) 01/26/2024    HCT 35.7 (L) 01/26/2024     (H) 01/26/2024     01/26/2024           Results for orders placed during the hospital encounter of 04/10/23    Echo    Interpretation Summary  · The left ventricle is normal in size with normal systolic function.  · The estimated ejection fraction is 65%.  · Normal left ventricular diastolic function.  · The estimated PA systolic pressure is 39 mmHg.  · Normal right ventricular size with normal right ventricular systolic function.  · Normal central venous pressure (3 mmHg).  · Mild left atrial enlargement.  · Mild right atrial enlargement.  · Mild mitral regurgitation.  · Mild tricuspid regurgitation.       POCUS:  Right atrium appears enlarged.  Right ventricle appears normal in size.  There is tricuspid regurgitation.  IVC was small and collapsed easily        Assessment and Plan:       ICD-10-CM ICD-9-CM   1. Pulmonary hypertension  I27.20 416.8   2. Heart failure with preserved ejection fraction, unspecified HF chronicity  I50.30 428.9   3. Hyponatremia  E87.1 276.1      She has mild pulmonary hypertension.  She has not smoked in nearly 40 years.  There is no evidence of right ventricular strain or significant enlargement on focus.  Will repeat echo for PA pressure in 6 months.  Continue low-dose furosemide 3 days a week for pulmonary hypertension due to left ventricular diastolic dysfunction.  We discussed that more frequent furosemide may worsen the hyponatremia.  Blood pressure  is well controlled.  Continue same meds.    Orders placed during this encounter:     Pulmonary hypertension  -     Echo; Future; Expected date: 10/23/2024    Heart failure with preserved ejection fraction, unspecified HF chronicity  -     Echo; Future; Expected date: 10/23/2024    Hyponatremia  -     Comprehensive Metabolic Panel; Future; Expected date: 10/23/2024         Follow up in about 6 months (around 10/23/2024) for CATRACHITO.

## 2024-04-26 ENCOUNTER — CLINICAL SUPPORT (OUTPATIENT)
Dept: REHABILITATION | Facility: HOSPITAL | Age: 74
End: 2024-04-26
Payer: MEDICARE

## 2024-04-26 DIAGNOSIS — R42 DIZZINESS: Primary | ICD-10-CM

## 2024-04-26 PROCEDURE — 97112 NEUROMUSCULAR REEDUCATION: CPT | Mod: PO

## 2024-04-26 PROCEDURE — 97530 THERAPEUTIC ACTIVITIES: CPT | Mod: PO

## 2024-04-26 NOTE — PROGRESS NOTES
"[facility]   Physical Therapy Treatment Note      Name: Chelsea Ford Child  Clinic Number: [id number]    Therapy Diagnosis:   Encounter Diagnosis   Name Primary?    Dizziness Yes           Physician: Elva Tripathi MD    Visit Date: 4/26/2024    Physician Orders: PT Eval and Treat   Medical Diagnosis from Referral: Benign paroxysmal positional vertigo, unspecified laterality   Evaluation Date: 3/11/2024  Authorization Period Expiration: 02/20/25  Plan of Care Expiration: 05/10/24  Visit # / Visits authorized: 07/ 10 + eval     Time In: 08:00  Time Out: 08:42  Total Billable Time: 42 minutes     Precautions: Standard, h/o cancer    PTA Visit #: 0/5     Subjective     Patient reports: that she felt very wiped out yesterday and was not sure why. She rested yesterday and felt better. But again this morning she is feeling very tired. Continues to monitor her BP on and off and can have occasional low BP at night. Does admit that when she feels bad, she does not want to check her vitals and be more concerned so she did not check her BP yesterday.  Also endorses feeling very congested and has been using saline spray. Has not noticed significant change in dizziness-- still has symptoms with quick turning.     She remains compliant with home exercise program.   Response to previous treatment: no adverse effects  Functional change: ongoing    Pain: 0/10  Location: NA    Objective      Last taken on 04/19/24.     Treatment     Chelsea received the treatments listed below:      therapeutic exercises to develop strength, endurance, ROM, flexibility, posture, and core stabilization for 00 minutes including:  NP    neuromuscular re-education activities to improve: Balance, Coordination, and Sense for 19 minutes. The following activities were included:    VOR 1: x target  3 x 30" at self selected speed, no visual slippage    Foam pad:  3 x 30" stance with narrow base of support, eyes closed    2 x " "30" each lower extremity in front, tandem stance with eyes closed, CGA, no upper extremity support    2 x 30" each lower extremity single leg stance with alternate lower extremity on mini BOSU, soft side down, eyes closed, CGA, no upper extremity support    therapeutic activities to improve functional performance for 23 minutes, including:    Functional motion tolerance:  Laps in neuro gym:  2 x 100 feet right<>left head movements  2 x 100 feet up <> down head movements  2 x 100 feet RUQ <> LLQ diagonal head movements  2 x 100 feet LUQ <> RLQ diagonal head movements    Hallway ambulation with quick turning and stopping in place at random times  X 4 laps in hallway    Patient Education and Home Exercises       Education provided:   - POC    Written Home Exercises Provided: continue prior HEP    Assessment     Chelsea tolerated therapy session well this morning but reports with increased fatigue over the past few days. Increased deliberate rest breaks provided to prevent excess fatigue. Quick turning at random appropriately challenging for balance. Good performance with vision eliminated balance. Patient endorses improvement in energy levels post intervention. Continue plan of care (POC) to progress remaining vestibular deficits.     Chelsea Is progressing well towards her goals.   Patient prognosis is Good.     Patient will continue to benefit from skilled outpatient physical therapy to address the deficits listed in the problem list box on initial evaluation, provide pt/family education and to maximize pt's level of independence in the home and community environment.     Patient's spiritual, cultural and educational needs considered and pt agreeable to plan of care and goals.     Anticipated Barriers for therapy: h/o vestibular system weakness, bilateral knee pain, neck pain/AROM restrictions     Goals:  Short Term Goals =  Long Term Goals: 8 weeks   Patient to be (I) with established home exercise program. MET " 04/19/24  Patient to perform VOR 1 WFL at 100 bpm for improved gaze stabilization. Progressing  PT to formally assess GST and establish appropriate goals. MET 03/14/24  PT formally assess Functional Gait Assessment and establish appropriate goals. MET 03/14/24  Patient to perform supine <> sit transition with no significant dizziness for improved motion tolerance. Progressing-- occasional lightheadedness    Plan   Plan to d/c once plan of care (POC) is completed.   Progress gaze stabilization, motion tolerance (ambulation with head movements and reaching over head) as indicated.     Annamaria Ferrer, PT

## 2024-05-02 ENCOUNTER — CLINICAL SUPPORT (OUTPATIENT)
Dept: REHABILITATION | Facility: HOSPITAL | Age: 74
End: 2024-05-02
Payer: MEDICARE

## 2024-05-02 DIAGNOSIS — R42 DIZZINESS: Primary | ICD-10-CM

## 2024-05-02 PROCEDURE — 97112 NEUROMUSCULAR REEDUCATION: CPT | Mod: PO

## 2024-05-02 PROCEDURE — 97530 THERAPEUTIC ACTIVITIES: CPT | Mod: PO

## 2024-05-02 NOTE — PROGRESS NOTES
"[facility]   Physical Therapy Treatment Note      Name: Chelsea Ford Child  Clinic Number: [id number]    Therapy Diagnosis:   Encounter Diagnosis   Name Primary?    Dizziness Yes             Physician: Elva Tripathi MD    Visit Date: 5/2/2024    Physician Orders: PT Eval and Treat   Medical Diagnosis from Referral: Benign paroxysmal positional vertigo, unspecified laterality   Evaluation Date: 3/11/2024  Authorization Period Expiration: 12/31/24  Plan of Care Expiration: 05/10/24  Visit # / Visits authorized: 08/ 10 + eval     Time In: 0802   Time Out: 0845   Total Billable Time: 43  minutes     Precautions: Standard, h/o cancer    PTA Visit #: 0/5     Subjective     Patient reports: she feels tired from cleaning her friend's home for about 4 hours yesterday. She almost cancelled her visit this morning. Feels her dizziness is a lot better.   She remains compliant with home exercise program.   Response to previous treatment: no adverse effects  Functional change: ongoing    Pain: 0/10  Location: N/A    Objective      Last taken on 04/19/24.     Treatment     Chelsea received the treatments listed below:      therapeutic exercises to develop strength, endurance, ROM, flexibility, posture, and core stabilization for 00 minutes including:  NP    neuromuscular re-education activities to improve: Balance, Coordination, and Sense for 19 minutes. The following activities were included:    VOR 1: x target  3 x 30" at self selected speed, no visual slippage    Inside parallel bars:    Foam pad:  3 x 30" stance with narrow base of support, eyes closed, CGA/ SBA ~ min to occasional mod sway    2 x 30" each lower extremity in front, tandem stance with eyes closed, CGA to occasional min A, no upper extremity support    2 x 30" each lower extremity single leg stance with alternate lower extremity on mini BOSU, soft side down, eyes closed, SBA, no upper extremity support~ one loss of balance to " each side, requiring CGA from PT    therapeutic activities to improve functional performance for 24 minutes, including:    Functional motion tolerance:  Laps in neuro gym:  2 x 100 feet right<>left head movements  2 x 100 feet up <> down head movements  2 x 100 feet RUQ <> LLQ diagonal head movements  2 x 100 feet LUQ <> RLQ diagonal head movements    Hallway ambulation with quick turning and stopping in place at random times  X 4 laps in hallway x 100 feet each    1 trial of forward/backward walking in hallway while tossing soccer ball with letters back and forth with PT tech, CGA  1 trial of forward/backward walking in hallway while self-tossing soccer ball with letters, CGA    X 5 trials of eyes closed forward ambulation x 35 feet, CGA~ pt deviates to R side on 4 of 5 trials; deviates L on other trial    Patient Education and Home Exercises       Education provided:   - POC    Written Home Exercises Provided: continue prior HEP    Assessment     Chelsea tolerated therapy session well this morning despite feeling a bit fatigued from working at her 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 She feels her dizziness has improved and she had no complaints of dizziness throughout today's session. PT kept pt's treatment interventions fairly consistent with the previous session. Pt demonstrated slight improvement when performing eyes closed split stance balance with foot on Bosu. She appeared most challenged by eyes closed tandem stance. Good performance noted with quick turns in hallway while walking and with tossing ball while walking forward/backward. Continue plan of care (POC) to progress remaining vestibular deficits.     Chelsea Is progressing well towards her goals.   Patient prognosis is Good.     Patient will continue to benefit from skilled outpatient physical therapy to address the deficits listed in the problem list box on initial evaluation, provide pt/family education and to maximize pt's level of independence in the home  and community environment.     Patient's spiritual, cultural and educational needs considered and pt agreeable to plan of care and goals.     Anticipated Barriers for therapy: h/o vestibular system weakness, bilateral knee pain, neck pain/AROM restrictions     Goals:  Short Term Goals =  Long Term Goals: 8 weeks   Patient to be (I) with established home exercise program. MET 04/19/24  Patient to perform VOR 1 WFL at 100 bpm for improved gaze stabilization. Progressing  PT to formally assess GST and establish appropriate goals. MET 03/14/24  PT formally assess Functional Gait Assessment and establish appropriate goals. MET 03/14/24  Patient to perform supine <> sit transition with no significant dizziness for improved motion tolerance. Progressing-- occasional lightheadedness    Plan   Plan to d/c once plan of care (POC) is completed.   Progress gaze stabilization, motion tolerance (ambulation with head movements and reaching over head) as indicated.     Ricardo Norris, PT  5/2/2024

## 2024-05-06 ENCOUNTER — PATIENT MESSAGE (OUTPATIENT)
Dept: REHABILITATION | Facility: HOSPITAL | Age: 74
End: 2024-05-06
Payer: MEDICARE

## 2024-05-07 ENCOUNTER — DOCUMENTATION ONLY (OUTPATIENT)
Dept: REHABILITATION | Facility: HOSPITAL | Age: 74
End: 2024-05-07
Payer: MEDICARE

## 2024-05-07 DIAGNOSIS — R42 DIZZINESS: Primary | ICD-10-CM

## 2024-05-07 NOTE — PROGRESS NOTES
OUTPATIENT PHYSICAL THERAPY DISCHARGE SUMMARY     Name: Chelsea Ford Child  Clinic Number: [id number]    Diagnosis:   Encounter Diagnosis   Name Primary?    Dizziness Yes     Physician: Elva Tripathi MD   Treatment Orders: PT Eval and Treat  Past Medical History:   Diagnosis Date    Amblyopia of left eye     BCC (basal cell carcinoma)     Cataract     Exotropia of left eye     Hypertension     Osteoporosis     Palpitations 9/26/2017    Vertigo 9/26/2017       Initial visit: 03/11/24  Date of Last visit: 05/02/24  Date of Discharge Note:  05/07/24  Total Visits Received: 9  Missed Visits: 1 cancelled appointment  ASSESSMENT   Status Towards Goals Met:   Patient initially requested referral from PCP for BPPV due to new onset lightheadedness and dizziness. Canalith repositioning testing (-) for BPPV upon evaluation. Throughout therapy plan of care (POC), lightheadedness symptoms continued to fluctuate in intensity with no clear triggers; however, increased sinuses appeared to make symptoms more noticeable. PT recommended that patient schedule follow up with ENT since symptoms appear different from her prior vestibular issues. Patient cancelled her last scheduled follow up appointment which was to be her d/c appointment due to increased sinus congestion, lightheadedness, and fatigue. Patient communicated with therapist via patient portal regarding missed appointment. It was decided that last scheduled appointment would not be rescheduled since patient is compliant with home exercise program and is scheduled to see ENT next month.       Discharge reason : Patient has maximized benefit from PT at this time    PLAN   This patient is discharged from Outpatient Physical Therapy Services.     Annamaria Ferrer, PT  05/07/2024

## 2024-06-10 ENCOUNTER — PATIENT MESSAGE (OUTPATIENT)
Dept: INTERNAL MEDICINE | Facility: CLINIC | Age: 74
End: 2024-06-10
Payer: MEDICARE

## 2024-06-18 DIAGNOSIS — R06.02 SHORT OF BREATH ON EXERTION: ICD-10-CM

## 2024-06-18 RX ORDER — FUROSEMIDE 20 MG/1
20 TABLET ORAL
Qty: 30 TABLET | Refills: 6 | Status: SHIPPED | OUTPATIENT
Start: 2024-06-18

## 2024-06-25 ENCOUNTER — OFFICE VISIT (OUTPATIENT)
Dept: OPTOMETRY | Facility: CLINIC | Age: 74
End: 2024-06-25
Payer: MEDICARE

## 2024-06-25 DIAGNOSIS — H52.03 HYPEROPIA WITH PRESBYOPIA OF BOTH EYES: ICD-10-CM

## 2024-06-25 DIAGNOSIS — H25.13 NUCLEAR SCLEROSIS OF BOTH EYES: ICD-10-CM

## 2024-06-25 DIAGNOSIS — H52.4 HYPEROPIA WITH PRESBYOPIA OF BOTH EYES: ICD-10-CM

## 2024-06-25 DIAGNOSIS — H53.002 AMBLYOPIA OF LEFT EYE: ICD-10-CM

## 2024-06-25 DIAGNOSIS — Z01.00 EYE EXAM, ROUTINE: Primary | ICD-10-CM

## 2024-06-25 DIAGNOSIS — H04.123 DRY EYE SYNDROME OF BOTH EYES: ICD-10-CM

## 2024-06-25 PROCEDURE — 1101F PT FALLS ASSESS-DOCD LE1/YR: CPT | Mod: HCNC,CPTII,S$GLB, | Performed by: OPTOMETRIST

## 2024-06-25 PROCEDURE — 4010F ACE/ARB THERAPY RXD/TAKEN: CPT | Mod: HCNC,CPTII,S$GLB, | Performed by: OPTOMETRIST

## 2024-06-25 PROCEDURE — 1159F MED LIST DOCD IN RCRD: CPT | Mod: HCNC,CPTII,S$GLB, | Performed by: OPTOMETRIST

## 2024-06-25 PROCEDURE — 1125F AMNT PAIN NOTED PAIN PRSNT: CPT | Mod: HCNC,CPTII,S$GLB, | Performed by: OPTOMETRIST

## 2024-06-25 PROCEDURE — 92015 DETERMINE REFRACTIVE STATE: CPT | Mod: HCNC,S$GLB,, | Performed by: OPTOMETRIST

## 2024-06-25 PROCEDURE — 99999 PR PBB SHADOW E&M-EST. PATIENT-LVL III: CPT | Mod: PBBFAC,HCNC,, | Performed by: OPTOMETRIST

## 2024-06-25 PROCEDURE — 1160F RVW MEDS BY RX/DR IN RCRD: CPT | Mod: HCNC,CPTII,S$GLB, | Performed by: OPTOMETRIST

## 2024-06-25 PROCEDURE — 3288F FALL RISK ASSESSMENT DOCD: CPT | Mod: HCNC,CPTII,S$GLB, | Performed by: OPTOMETRIST

## 2024-06-25 PROCEDURE — 92014 COMPRE OPH EXAM EST PT 1/>: CPT | Mod: HCNC,S$GLB,, | Performed by: OPTOMETRIST

## 2024-06-25 RX ORDER — CETIRIZINE HYDROCHLORIDE 10 MG/1
10 TABLET ORAL DAILY
COMMUNITY

## 2024-06-25 RX ORDER — VITAMIN B COMPLEX
1 CAPSULE ORAL DAILY
COMMUNITY

## 2024-06-25 NOTE — PROGRESS NOTES
HPI    DONALD: 9/14/2022  Last DFE: 9/14/2022  Chief complaint (CC): 73 yo F here for annual eye exam. Pt states that she   has noticed that vision is not as acute as it once was. She feels that   night vision is worse.  No c/o halos around lights or trouble with glare.    C/o dry eyes, uses Blink a few  times a day. More during allergy season.   H/o amblyopia OS and strabismus surgery OS at age 8.  Glasses? Progressive lens glasses rx from refraction on 9/14/2022  Contacts? no  H/o eye surgery, injections or laser: strabismus surgery   H/o eye injury: FB in OS several years ago.  Seen 2021 with Dr. Cottrell   Known eye conditions? Amblyopia OS  Family h/o eye conditions? None  Eye gtts? Blink       (-) Flashes (-)  Floaters (-) Mucous   (+)  Tearing (+) Itching (+) Burning   (-) Headaches (-) Eye Pain/discomfort (+) Irritation   (-)  Redness (-) Double vision (+) Blurry vision +swelling around the eyes   at times ? Allergy related    Diabetic? no  A1c? Lab Results       Component                Value               Date                       HGBA1C                   5.6                 08/03/2017              +HTN- med controlled.   -thyroid      Last edited by Parker Walsh, OD on 6/25/2024  4:07 PM.            Assessment /Plan     For exam results, see Encounter Report.        Eye exam, routine   - Eyemed exam     Dry eye syndrome of both eyes   - Recommend artificial tears. 1 drop 2-4x per day. Chronicity of disease and treatment discussed.    Nuclear sclerosis of both eyes   - Not visually significant. Monitor.    Amblyopia of left eye  Hyperopia with presbyopia of both eyes   - New Spectacle Rx given today.   - RTC 1 year for routine eye exam.

## 2024-07-01 ENCOUNTER — PATIENT MESSAGE (OUTPATIENT)
Dept: INTERNAL MEDICINE | Facility: CLINIC | Age: 74
End: 2024-07-01
Payer: MEDICARE

## 2024-07-01 ENCOUNTER — PATIENT MESSAGE (OUTPATIENT)
Dept: CARDIOLOGY | Facility: CLINIC | Age: 74
End: 2024-07-01
Payer: MEDICARE

## 2024-07-01 DIAGNOSIS — M81.0 OSTEOPOROSIS, UNSPECIFIED OSTEOPOROSIS TYPE, UNSPECIFIED PATHOLOGICAL FRACTURE PRESENCE: ICD-10-CM

## 2024-07-01 DIAGNOSIS — Z00.00 HEALTH CARE MAINTENANCE: Primary | ICD-10-CM

## 2024-07-01 DIAGNOSIS — D53.9 NUTRITIONAL ANEMIA, UNSPECIFIED: ICD-10-CM

## 2024-07-01 DIAGNOSIS — Z13.6 SCREENING FOR CARDIOVASCULAR CONDITION: ICD-10-CM

## 2024-07-01 DIAGNOSIS — D64.9 ANEMIA, UNSPECIFIED TYPE: ICD-10-CM

## 2024-07-01 DIAGNOSIS — I27.9 CHRONIC PULMONARY HEART DISEASE: ICD-10-CM

## 2024-07-01 DIAGNOSIS — I10 BENIGN ESSENTIAL HYPERTENSION: ICD-10-CM

## 2024-07-02 NOTE — TELEPHONE ENCOUNTER
Good morning pt labs orders have been cancelled and needs to be reordered      RETICULOCYTES [SHY894]  HAPTOGLOBIN [LAB89]  Lactate Dehydrogenase [LAB96]  Vitamin B12 [LAB67]  FOLATE [LAB69]  CBC Auto Differential [DMD2457]  BASIC METABOLIC PANEL [LAB15]

## 2024-07-03 ENCOUNTER — DOCUMENTATION ONLY (OUTPATIENT)
Dept: CARDIOLOGY | Facility: CLINIC | Age: 74
End: 2024-07-03
Payer: MEDICARE

## 2024-07-03 NOTE — TELEPHONE ENCOUNTER
Readings reviewed by , asked pt is she had any additional readings on 6/7/24. Pt states she does not have any other readings, only noted on this day of feeling tired.  notified, advised pt decrease carvedilol to 12.5mg in AM, 25mg in PM.Pt notified, verbalized understanding.

## 2024-07-06 ENCOUNTER — OFFICE VISIT (OUTPATIENT)
Dept: URGENT CARE | Facility: CLINIC | Age: 74
End: 2024-07-06
Payer: MEDICARE

## 2024-07-06 VITALS
DIASTOLIC BLOOD PRESSURE: 54 MMHG | OXYGEN SATURATION: 95 % | BODY MASS INDEX: 17.92 KG/M2 | TEMPERATURE: 98 F | WEIGHT: 111 LBS | HEART RATE: 80 BPM | RESPIRATION RATE: 16 BRPM | SYSTOLIC BLOOD PRESSURE: 97 MMHG

## 2024-07-06 DIAGNOSIS — J10.1 INFLUENZA B: Primary | ICD-10-CM

## 2024-07-06 LAB
CTP QC/QA: YES
CTP QC/QA: YES
POC MOLECULAR INFLUENZA A AGN: NEGATIVE
POC MOLECULAR INFLUENZA B AGN: POSITIVE
SARS-COV-2 AG RESP QL IA.RAPID: NEGATIVE

## 2024-07-06 PROCEDURE — 99213 OFFICE O/P EST LOW 20 MIN: CPT | Mod: 25,S$GLB,, | Performed by: FAMILY MEDICINE

## 2024-07-06 PROCEDURE — 87811 SARS-COV-2 COVID19 W/OPTIC: CPT | Mod: QW,S$GLB,, | Performed by: FAMILY MEDICINE

## 2024-07-06 PROCEDURE — 96372 THER/PROPH/DIAG INJ SC/IM: CPT | Mod: S$GLB,,, | Performed by: FAMILY MEDICINE

## 2024-07-06 PROCEDURE — 87502 INFLUENZA DNA AMP PROBE: CPT | Mod: QW,S$GLB,, | Performed by: FAMILY MEDICINE

## 2024-07-06 RX ORDER — ONDANSETRON 4 MG/1
4 TABLET, FILM COATED ORAL EVERY 8 HOURS PRN
Qty: 12 TABLET | Refills: 0 | Status: SHIPPED | OUTPATIENT
Start: 2024-07-06 | End: 2025-07-06

## 2024-07-06 RX ORDER — FLUTICASONE PROPIONATE 50 MCG
1 SPRAY, SUSPENSION (ML) NASAL DAILY
Qty: 9.9 ML | Refills: 0 | Status: SHIPPED | OUTPATIENT
Start: 2024-07-06

## 2024-07-06 RX ORDER — ONDANSETRON 2 MG/ML
4 INJECTION INTRAMUSCULAR; INTRAVENOUS
Status: COMPLETED | OUTPATIENT
Start: 2024-07-07 | End: 2024-07-06

## 2024-07-06 RX ADMIN — ONDANSETRON 4 MG: 2 INJECTION INTRAMUSCULAR; INTRAVENOUS at 10:07

## 2024-07-06 NOTE — PROGRESS NOTES
Subjective:      Patient ID: Chelsea Ford Child is a 74 y.o. female.    Vitals:  weight is 50.3 kg (111 lb). Her oral temperature is 98 °F (36.7 °C). Her blood pressure is 97/54 (abnormal) and her pulse is 80. Her respiration is 16 and oxygen saturation is 95%.     Chief Complaint: Sinus Problem    This is a 74 y.o. female who presents today with a chief complaint of fever (highest temp = 100.4; last time of fever last night), nasal congestion, sinus px and pressure, post nasal drainage, hoarse voice, decreased appetite and occasional headache (frontal) x 6 days. Pt also presents with diarrhea, minor bloating and abd px post eating figs x 5 days. Pt has had 2 episodes of watery diarrhea in past 24 hours. No ear px, ear congestion, nausea, vomiting, cough or sore throat     Home tx: lomotil (for diarrhea), tylenol (fever)    PPMH: HTN; seasonal allergies    Sinus Problem  This is a new problem. The current episode started in the past 7 days. The problem is unchanged. The maximum temperature recorded prior to her arrival was 100.4 - 100.9 F. Her pain is at a severity of 1/10. The pain is mild. Associated symptoms include congestion, headaches, a hoarse voice and sinus pressure. Pertinent negatives include no coughing, ear pain or sore throat.       HENT:  Positive for congestion and sinus pressure. Negative for ear pain and sore throat.    Respiratory:  Negative for cough.    Neurological:  Positive for headaches.      Objective:     Physical Exam   Constitutional: She appears ill. No distress. normal  HENT:   Head: Normocephalic and atraumatic.   Nose: Congestion present.   Mouth/Throat: Mucous membranes are moist. Posterior oropharyngeal erythema present.   Eyes: Pupils are equal, round, and reactive to light. Extraocular movement intact   Neck: Neck supple.   Cardiovascular: Normal rate and regular rhythm.   Pulmonary/Chest: Effort normal and breath sounds normal.   Abdominal: Normal appearance and bowel sounds are  normal. Soft.   Neurological: She is alert.   Nursing note and vitals reviewed.    Results for orders placed or performed in visit on 07/06/24   SARS Coronavirus 2 Antigen, POCT Manual Read   Result Value Ref Range    SARS Coronavirus 2 Antigen Negative Negative     Acceptable Yes    POCT Influenza A/B MOLECULAR   Result Value Ref Range    POC Molecular Influenza A Ag Negative Negative    POC Molecular Influenza B Ag Positive (A) Negative     Acceptable Yes       Assessment:     1. Influenza B      Too late for tamiflu given duration of illness. Counseled on symptom relief and ER precautions  Plan:       Influenza B  -     SARS Coronavirus 2 Antigen, POCT Manual Read  -     POCT Influenza A/B MOLECULAR  -     ondansetron injection 4 mg  -     fluticasone propionate (FLONASE) 50 mcg/actuation nasal spray; 1 spray (50 mcg total) by Each Nostril route once daily.  Dispense: 9.9 mL; Refill: 0  -     ondansetron (ZOFRAN) 4 MG tablet; Take 1 tablet (4 mg total) by mouth every 8 (eight) hours as needed for Nausea.  Dispense: 12 tablet; Refill: 0

## 2024-07-09 ENCOUNTER — PATIENT MESSAGE (OUTPATIENT)
Dept: INTERNAL MEDICINE | Facility: CLINIC | Age: 74
End: 2024-07-09
Payer: MEDICARE

## 2024-07-13 ENCOUNTER — LAB VISIT (OUTPATIENT)
Dept: LAB | Facility: HOSPITAL | Age: 74
End: 2024-07-13
Attending: INTERNAL MEDICINE
Payer: MEDICARE

## 2024-07-13 DIAGNOSIS — I10 BENIGN ESSENTIAL HYPERTENSION: ICD-10-CM

## 2024-07-13 DIAGNOSIS — D64.9 ANEMIA, UNSPECIFIED TYPE: ICD-10-CM

## 2024-07-13 DIAGNOSIS — Z13.6 SCREENING FOR CARDIOVASCULAR CONDITION: ICD-10-CM

## 2024-07-13 DIAGNOSIS — M81.0 OSTEOPOROSIS, UNSPECIFIED OSTEOPOROSIS TYPE, UNSPECIFIED PATHOLOGICAL FRACTURE PRESENCE: ICD-10-CM

## 2024-07-13 DIAGNOSIS — D53.9 NUTRITIONAL ANEMIA, UNSPECIFIED: ICD-10-CM

## 2024-07-13 LAB
25(OH)D3+25(OH)D2 SERPL-MCNC: 87 NG/ML (ref 30–96)
ALBUMIN SERPL BCP-MCNC: 3.2 G/DL (ref 3.5–5.2)
ALP SERPL-CCNC: 69 U/L (ref 55–135)
ALT SERPL W/O P-5'-P-CCNC: 19 U/L (ref 10–44)
ANION GAP SERPL CALC-SCNC: 8 MMOL/L (ref 8–16)
AST SERPL-CCNC: 28 U/L (ref 10–40)
BASOPHILS # BLD AUTO: 0.04 K/UL (ref 0–0.2)
BASOPHILS NFR BLD: 0.7 % (ref 0–1.9)
BILIRUB SERPL-MCNC: 0.4 MG/DL (ref 0.1–1)
BUN SERPL-MCNC: 13 MG/DL (ref 8–23)
CALCIUM SERPL-MCNC: 9.6 MG/DL (ref 8.7–10.5)
CHLORIDE SERPL-SCNC: 100 MMOL/L (ref 95–110)
CHOLEST SERPL-MCNC: 150 MG/DL (ref 120–199)
CHOLEST/HDLC SERPL: 3.4 {RATIO} (ref 2–5)
CO2 SERPL-SCNC: 26 MMOL/L (ref 23–29)
CREAT SERPL-MCNC: 0.9 MG/DL (ref 0.5–1.4)
DIFFERENTIAL METHOD BLD: ABNORMAL
EOSINOPHIL # BLD AUTO: 0 K/UL (ref 0–0.5)
EOSINOPHIL NFR BLD: 0.6 % (ref 0–8)
ERYTHROCYTE [DISTWIDTH] IN BLOOD BY AUTOMATED COUNT: 11.7 % (ref 11.5–14.5)
EST. GFR  (NO RACE VARIABLE): >60 ML/MIN/1.73 M^2
FERRITIN SERPL-MCNC: 298 NG/ML (ref 20–300)
GLUCOSE SERPL-MCNC: 108 MG/DL (ref 70–110)
HCT VFR BLD AUTO: 36.1 % (ref 37–48.5)
HDLC SERPL-MCNC: 44 MG/DL (ref 40–75)
HDLC SERPL: 29.3 % (ref 20–50)
HGB BLD-MCNC: 11.8 G/DL (ref 12–16)
IMM GRANULOCYTES # BLD AUTO: 0.07 K/UL (ref 0–0.04)
IMM GRANULOCYTES NFR BLD AUTO: 1.3 % (ref 0–0.5)
LDLC SERPL CALC-MCNC: 93 MG/DL (ref 63–159)
LYMPHOCYTES # BLD AUTO: 1.6 K/UL (ref 1–4.8)
LYMPHOCYTES NFR BLD: 29.6 % (ref 18–48)
MCH RBC QN AUTO: 31.7 PG (ref 27–31)
MCHC RBC AUTO-ENTMCNC: 32.7 G/DL (ref 32–36)
MCV RBC AUTO: 97 FL (ref 82–98)
MONOCYTES # BLD AUTO: 0.6 K/UL (ref 0.3–1)
MONOCYTES NFR BLD: 11.1 % (ref 4–15)
NEUTROPHILS # BLD AUTO: 3.1 K/UL (ref 1.8–7.7)
NEUTROPHILS NFR BLD: 56.7 % (ref 38–73)
NONHDLC SERPL-MCNC: 106 MG/DL
NRBC BLD-RTO: 0 /100 WBC
PLATELET # BLD AUTO: 250 K/UL (ref 150–450)
PMV BLD AUTO: 10 FL (ref 9.2–12.9)
POTASSIUM SERPL-SCNC: 3.5 MMOL/L (ref 3.5–5.1)
PROT SERPL-MCNC: 6.3 G/DL (ref 6–8.4)
RBC # BLD AUTO: 3.72 M/UL (ref 4–5.4)
SODIUM SERPL-SCNC: 134 MMOL/L (ref 136–145)
TRIGL SERPL-MCNC: 65 MG/DL (ref 30–150)
TSH SERPL DL<=0.005 MIU/L-ACNC: 2.07 UIU/ML (ref 0.4–4)
WBC # BLD AUTO: 5.4 K/UL (ref 3.9–12.7)

## 2024-07-13 PROCEDURE — 85025 COMPLETE CBC W/AUTO DIFF WBC: CPT | Mod: HCNC | Performed by: INTERNAL MEDICINE

## 2024-07-13 PROCEDURE — 84443 ASSAY THYROID STIM HORMONE: CPT | Mod: HCNC | Performed by: INTERNAL MEDICINE

## 2024-07-13 PROCEDURE — 80053 COMPREHEN METABOLIC PANEL: CPT | Mod: HCNC | Performed by: INTERNAL MEDICINE

## 2024-07-13 PROCEDURE — 36415 COLL VENOUS BLD VENIPUNCTURE: CPT | Mod: HCNC | Performed by: INTERNAL MEDICINE

## 2024-07-13 PROCEDURE — 82306 VITAMIN D 25 HYDROXY: CPT | Mod: HCNC | Performed by: INTERNAL MEDICINE

## 2024-07-13 PROCEDURE — 82728 ASSAY OF FERRITIN: CPT | Mod: HCNC | Performed by: INTERNAL MEDICINE

## 2024-07-13 PROCEDURE — 80061 LIPID PANEL: CPT | Mod: HCNC | Performed by: INTERNAL MEDICINE

## 2024-07-16 ENCOUNTER — OFFICE VISIT (OUTPATIENT)
Dept: INTERNAL MEDICINE | Facility: CLINIC | Age: 74
End: 2024-07-16
Payer: MEDICARE

## 2024-07-16 VITALS
HEART RATE: 76 BPM | OXYGEN SATURATION: 98 % | SYSTOLIC BLOOD PRESSURE: 100 MMHG | DIASTOLIC BLOOD PRESSURE: 60 MMHG | WEIGHT: 106.69 LBS | HEIGHT: 66 IN | BODY MASS INDEX: 17.14 KG/M2

## 2024-07-16 DIAGNOSIS — I10 ESSENTIAL HYPERTENSION: ICD-10-CM

## 2024-07-16 DIAGNOSIS — D69.2 SENILE PURPURA: ICD-10-CM

## 2024-07-16 DIAGNOSIS — R19.7 DIARRHEA, UNSPECIFIED TYPE: ICD-10-CM

## 2024-07-16 DIAGNOSIS — I27.20 PULMONARY HYPERTENSION: ICD-10-CM

## 2024-07-16 DIAGNOSIS — I50.32 CHRONIC HEART FAILURE WITH PRESERVED EJECTION FRACTION: ICD-10-CM

## 2024-07-16 DIAGNOSIS — I10 BENIGN ESSENTIAL HYPERTENSION: Primary | ICD-10-CM

## 2024-07-16 DIAGNOSIS — I27.9 CHRONIC PULMONARY HEART DISEASE: ICD-10-CM

## 2024-07-16 DIAGNOSIS — L98.9 SKIN LESION OF SCALP: ICD-10-CM

## 2024-07-16 PROCEDURE — 1101F PT FALLS ASSESS-DOCD LE1/YR: CPT | Mod: HCNC,CPTII,S$GLB, | Performed by: INTERNAL MEDICINE

## 2024-07-16 PROCEDURE — 99999 PR PBB SHADOW E&M-EST. PATIENT-LVL V: CPT | Mod: PBBFAC,HCNC,, | Performed by: INTERNAL MEDICINE

## 2024-07-16 PROCEDURE — 3288F FALL RISK ASSESSMENT DOCD: CPT | Mod: HCNC,CPTII,S$GLB, | Performed by: INTERNAL MEDICINE

## 2024-07-16 PROCEDURE — 1125F AMNT PAIN NOTED PAIN PRSNT: CPT | Mod: HCNC,CPTII,S$GLB, | Performed by: INTERNAL MEDICINE

## 2024-07-16 PROCEDURE — 1160F RVW MEDS BY RX/DR IN RCRD: CPT | Mod: HCNC,CPTII,S$GLB, | Performed by: INTERNAL MEDICINE

## 2024-07-16 PROCEDURE — 1159F MED LIST DOCD IN RCRD: CPT | Mod: HCNC,CPTII,S$GLB, | Performed by: INTERNAL MEDICINE

## 2024-07-16 PROCEDURE — 3078F DIAST BP <80 MM HG: CPT | Mod: HCNC,CPTII,S$GLB, | Performed by: INTERNAL MEDICINE

## 2024-07-16 PROCEDURE — 99214 OFFICE O/P EST MOD 30 MIN: CPT | Mod: HCNC,S$GLB,, | Performed by: INTERNAL MEDICINE

## 2024-07-16 PROCEDURE — 3074F SYST BP LT 130 MM HG: CPT | Mod: HCNC,CPTII,S$GLB, | Performed by: INTERNAL MEDICINE

## 2024-07-16 PROCEDURE — 4010F ACE/ARB THERAPY RXD/TAKEN: CPT | Mod: HCNC,CPTII,S$GLB, | Performed by: INTERNAL MEDICINE

## 2024-07-16 RX ORDER — CARVEDILOL 25 MG/1
TABLET ORAL
Start: 2024-07-16 | End: 2024-07-16 | Stop reason: SDUPTHER

## 2024-07-16 RX ORDER — CARVEDILOL 25 MG/1
TABLET ORAL
Qty: 135 TABLET | Refills: 3 | Status: SHIPPED | OUTPATIENT
Start: 2024-07-16

## 2024-07-16 NOTE — PROGRESS NOTES
"Subjective:       Patient ID: Chelsea Ford Child is a 74 y.o. female.    Chief Complaint: Annual Exam    HPI  Chelsea Ford Child is a 74 y.o. female here for routine follow up of the following chronic issues:    Primary osteoarthritis both knees  Bilateral CSI done on 1/30/24    HTN  Reduced at last visit in April to losartan 25mg daily  Carvedilol 25mg bid - decreased to 12.5mg am and 25mg pm due to decreased energy and low BP  Hydralazine 25mg daily PRN SBP > 180    Pulmonary HTN; HFpEF  Furosemide 3 days/week (daily caused hyponatremia)    Anemia  Hgb 11.8 and stable    Recent influenza  Started on 7/1. Having loose stool since then.  She was supposed to go on a trip 4 days ago but canclled.   She was having about 50/50 solid and loose BM and about 1-2 times per day. Her stool was starting to get normal but started being abnormal again yesterday. She had one loose BM yesterday then she took lomotil. She had not taken lomotil since 7/6 when UC MD told her not to take it.  She did eat some figs from the garden that may have triggered episode.     Her colonoscopy in 2023 showed diverticulosis, one 2mm hyperplastic polyp.    She is taking probiotic and digestive enzymes. Following bland diet.   Has been having intermittent episodes of stomach pain and diarrhea predating these GI issues.     Review of Systems   Constitutional:  Negative for fever.   Respiratory:  Negative for shortness of breath.    Cardiovascular:  Negative for chest pain.   Musculoskeletal: Negative.    Skin: Negative.        Objective:   /60 (BP Location: Left arm, Patient Position: Sitting, BP Method: Medium (Manual))   Pulse 76   Ht 5' 6" (1.676 m)   Wt 48.4 kg (106 lb 11.2 oz)   LMP  (LMP Unknown)   SpO2 98%   BMI 17.22 kg/m²      Physical Exam  Constitutional:       General: She is not in acute distress.     Appearance: She is well-developed. She is not diaphoretic.   HENT:      Head: Normocephalic and atraumatic.   Cardiovascular: "      Rate and Rhythm: Normal rate and regular rhythm.   Pulmonary:      Effort: Pulmonary effort is normal. No respiratory distress.      Breath sounds: No wheezing or rales.   Skin:     General: Skin is warm and dry.   Neurological:      Mental Status: She is alert and oriented to person, place, and time.   Psychiatric:         Behavior: Behavior normal.           1cm round raised lesion with central scab vs hyperpigmentation - see photo    Assessment:       1. Benign essential hypertension    2. Pulmonary hypertension    3. Chronic heart failure with preserved ejection fraction    4. Chronic pulmonary heart disease    5. Diarrhea, unspecified type    6. Skin lesion of scalp    7. Senile purpura    8. Essential hypertension        Plan:       Benign essential hypertension  -     Comprehensive Metabolic Panel; Future; Expected date: 01/16/2025  -     TSH; Future; Expected date: 01/16/2025  -     CBC Auto Differential; Future; Expected date: 01/16/2025  -     carvediloL (COREG) 25 MG tablet; Take 0.5 tablets (12.5 mg total) by mouth once daily AND 1 tablet (25 mg total) every evening.  Dispense: 135 tablet; Refill: 3    Pulmonary hypertension  -     Comprehensive Metabolic Panel; Future; Expected date: 01/16/2025  -     TSH; Future; Expected date: 01/16/2025  -     CBC Auto Differential; Future; Expected date: 01/16/2025    Chronic heart failure with preserved ejection fraction  -     Comprehensive Metabolic Panel; Future; Expected date: 01/16/2025  -     TSH; Future; Expected date: 01/16/2025  -     CBC Auto Differential; Future; Expected date: 01/16/2025  -     carvediloL (COREG) 25 MG tablet; Take 0.5 tablets (12.5 mg total) by mouth once daily AND 1 tablet (25 mg total) every evening.  Dispense: 135 tablet; Refill: 3    Chronic pulmonary heart disease  -     Comprehensive Metabolic Panel; Future; Expected date: 01/16/2025  -     TSH; Future; Expected date: 01/16/2025  -     CBC Auto Differential; Future; Expected  date: 01/16/2025    Diarrhea, recetn diarrhea seems to be viral but she is having recurrent bouts.   Deuel diet, probiotics, avoid lomotil  -     Ambulatory referral/consult to Gastroenterology; Future; Expected date: 07/23/2024    Skin lesion of scalp; pt has personal hx of skin cancer  -     Ambulatory referral/consult to Dermatology;   scheduled tomorrow w Dr. Olsen    Senile purpura  - stable and controlled  - continue current regimen    Essential hypertension  -     Discontinue: carvediloL (COREG) 25 MG tablet; Take 0.5 tablets (12.5 mg total) by mouth once daily AND 1 tablet (25 mg total) every evening. 12.5mg in AM< 25mg in PM.          You are up to date for your primary preventive health care, and there are no reminders at this time.     Labs 6 mo, visit me or Lexis after  Derm  GI

## 2024-07-17 ENCOUNTER — OFFICE VISIT (OUTPATIENT)
Dept: DERMATOLOGY | Facility: CLINIC | Age: 74
End: 2024-07-17
Payer: MEDICARE

## 2024-07-17 DIAGNOSIS — D48.5 NEOPLASM OF UNCERTAIN BEHAVIOR OF SKIN: ICD-10-CM

## 2024-07-17 PROCEDURE — 11102 TANGNTL BX SKIN SINGLE LES: CPT | Mod: HCNC,S$GLB,, | Performed by: DERMATOLOGY

## 2024-07-17 PROCEDURE — 99499 UNLISTED E&M SERVICE: CPT | Mod: HCNC,S$GLB,, | Performed by: DERMATOLOGY

## 2024-07-17 PROCEDURE — 99999 PR PBB SHADOW E&M-EST. PATIENT-LVL IV: CPT | Mod: PBBFAC,HCNC,, | Performed by: DERMATOLOGY

## 2024-07-17 PROCEDURE — 88305 TISSUE EXAM BY PATHOLOGIST: CPT | Mod: HCNC | Performed by: PATHOLOGY

## 2024-07-17 NOTE — PROGRESS NOTES
Subjective:      Patient ID:  Chelsea Ford Child is a 74 y.o. female who presents for   Chief Complaint   Patient presents with    Lesion     Scalp     The patient presents for lesion on scalp. Noticed about 9 weeks ago after hair appontment. States occ itching, flakiness and tenderness. Using Aquaphor.    H/o nmsc     Prior mohs surgery on nose    Review of Systems   Skin:  Positive for daily sunscreen use and activity-related sunscreen use. Negative for wears hat.   Hematologic/Lymphatic: Bruises/bleeds easily.     Objective:   Physical Exam   Constitutional: She appears well-developed and well-nourished. No distress.   Neurological: She is alert and oriented to person, place, and time. She is not disoriented.   Psychiatric: She has a normal mood and affect.   Skin:   Areas Examined (abnormalities noted in diagram):   Scalp / Hair Palpated and Inspected          Diagram Legend     Erythematous scaling macule/papule c/w actinic keratosis       Vascular papule c/w angioma      Pigmented verrucoid papule/plaque c/w seborrheic keratosis      Yellow umbilicated papule c/w sebaceous hyperplasia      Irregularly shaped tan macule c/w lentigo     1-2 mm smooth white papules consistent with Milia      Movable subcutaneous cyst with punctum c/w epidermal inclusion cyst      Subcutaneous movable cyst c/w pilar cyst      Firm pink to brown papule c/w dermatofibroma      Pedunculated fleshy papule(s) c/w skin tag(s)      Evenly pigmented macule c/w junctional nevus     Mildly variegated pigmented, slightly irregular-bordered macule c/w mildly atypical nevus      Flesh colored to evenly pigmented papule c/w intradermal nevus       Pink pearly papule/plaque c/w basal cell carcinoma      Erythematous hyperkeratotic cursted plaque c/w SCC      Surgical scar with no sign of skin cancer recurrence      Open and closed comedones      Inflammatory papules and pustules      Verrucoid papule consistent consistent with wart      Erythematous eczematous patches and plaques     Dystrophic onycholytic nail with subungual debris c/w onychomycosis     Umbilicated papule    Erythematous-base heme-crusted tan verrucoid plaque consistent with inflamed seborrheic keratosis     Erythematous Silvery Scaling Plaque c/w Psoriasis     See annotation      Assessment / Plan:        Skin lesion of scalp  -     Ambulatory referral/consult to Dermatology    PROCEDURE NOTE: SHAVE BIOPSY    The diagnosis and management options and risk, benefits and alternatives were discussed with the patient. All questions were answered. Verbal consent was obtained.    Site: scalp  Indication: clinically suspicious lesion; rule out malignancy  Prep: Alcohol  Anesthesia: 1% lidocaine plain, less than 3 mL  Shave biopsy performed  Hemostasis with application of aluminum chloride and electrodesiccation  Dressed with petrolatum and bandaid  Specimen placed in formalin to be submitted to pathology    Routine care instructions given  Followup: per path  If biopsy positive for malignancy, refer to Dr. Chance for Mohs surgery consultation.

## 2024-07-18 ENCOUNTER — PATIENT MESSAGE (OUTPATIENT)
Dept: INTERNAL MEDICINE | Facility: CLINIC | Age: 74
End: 2024-07-18
Payer: MEDICARE

## 2024-07-18 NOTE — LETTER
July 18, 2024    Chelsea Ford Child  [address] [place] [address]             [facility]  [address]  [place] [address]  Phone: [phone]  Fax: [phone] To Whom It May Concern:    Chelsea Cavazos was seen by me on 7/16/2024. She is unable to travel at this time due to an acute illness.     Sincerely,          Elva Tripathi MD

## 2024-07-18 NOTE — TELEPHONE ENCOUNTER
Pt requesting a letter for her travel insurance stating she is unable to go on her trip because of an illness, she has diarrhea.

## 2024-07-22 LAB
FINAL PATHOLOGIC DIAGNOSIS: NORMAL
GROSS: NORMAL
Lab: NORMAL
MICROSCOPIC EXAM: NORMAL

## 2024-07-25 ENCOUNTER — PATIENT MESSAGE (OUTPATIENT)
Dept: DERMATOLOGY | Facility: CLINIC | Age: 74
End: 2024-07-25
Payer: MEDICARE

## 2024-07-30 ENCOUNTER — PROCEDURE VISIT (OUTPATIENT)
Dept: DERMATOLOGY | Facility: CLINIC | Age: 74
End: 2024-07-30
Payer: MEDICARE

## 2024-07-30 VITALS
DIASTOLIC BLOOD PRESSURE: 71 MMHG | WEIGHT: 106.69 LBS | HEIGHT: 66 IN | HEART RATE: 54 BPM | BODY MASS INDEX: 17.14 KG/M2 | SYSTOLIC BLOOD PRESSURE: 155 MMHG

## 2024-07-30 DIAGNOSIS — C44.42 SQUAMOUS CELL CARCINOMA OF SCALP: Primary | ICD-10-CM

## 2024-07-30 PROCEDURE — 99499 UNLISTED E&M SERVICE: CPT | Mod: HCNC,S$GLB,, | Performed by: DERMATOLOGY

## 2024-07-30 PROCEDURE — 17312 MOHS ADDL STAGE: CPT | Mod: HCNC,S$GLB,, | Performed by: DERMATOLOGY

## 2024-07-30 PROCEDURE — 17311 MOHS 1 STAGE H/N/HF/G: CPT | Mod: HCNC,51,S$GLB, | Performed by: DERMATOLOGY

## 2024-07-30 PROCEDURE — 13121 CMPLX RPR S/A/L 2.6-7.5 CM: CPT | Mod: HCNC,S$GLB,, | Performed by: DERMATOLOGY

## 2024-07-30 RX ORDER — HYDROCODONE BITARTRATE AND ACETAMINOPHEN 5; 325 MG/1; MG/1
1 TABLET ORAL EVERY 6 HOURS PRN
Qty: 10 TABLET | Refills: 0 | Status: SHIPPED | OUTPATIENT
Start: 2024-07-30

## 2024-07-30 NOTE — PROGRESS NOTES
PROCEDURE: Mohs' Micrographic Surgery    INDICATION: Biopsy-proven skin cancer of cosmetically and functionally important areas, including head, neck, genital, hand, foot, or areas known for having difficulty in healing, such as the lower anterior legs. Tumors with aggressive clinical behavior (rapidly growing, greater than 1 cm in diameter). Tumor with ill-defined borders.    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  Tumor Type: Tumor seen. Well-differentiated squamous cell carcinoma: Proliferation of squamous cells exhibiting atypia and infiltrating within the dermis.   Depth of Invasion: epidermis and dermis  Perineural Invasion: No    HISTOLOGY OF SPECIMENS IN SUBSEQUENT STAGES:  Tumor Type:  No tumor seen.    STAGES OF MOHS' SURGERY PERFORMED: 2    TUMOR-FREE PLANE ACHIEVED: Yes    HEMOSTASIS: electrocoagulation     SPECIMENS: 5 (3 in stage A and 2 in stage B)    LOCATION: scalp (right frontal). Location verified with Dr. Olsen's clinical photograph. Patient also verified location by looking at photo taken prior to procedure.     INITIAL LESION SIZE: 1.3 x 1.5 cm    FINAL DEFECT SIZE: 2.0 x 2.4 cm    WOUND REPAIR/DISPOSITION: The patient tolerated Mohs' Micrographic Surgery for a squamous cell carcinoma very well. When the tumor was completely removed, a repair of the surgical defect was undertaken.        PROCEDURE: Complex Linear  "Repair    INDICATION: Status post Mohs' Micrographic Surgery for squamous cell carcinoma.    CASE NUMBER:     SURGEON: Panfilo Chance MD    ASSISTANTS: Lilia Hernandez PA-C and Hu Pizarro    ANESTHETIC: 3 cc 1% Lidocaine with Epinephrine 1:100,000    SURGICAL PREP: Hibiclens, prepped by Hu Pizarro    LOCATION: scalp (right frontal)    DEFECT SIZE: 2.0 x 2.4 cm    WOUND REPAIR/DISPOSITION:  After the patient's carcinoma had been completely removed with Mohs' Micrographic Surgery, a repair of the surgical defect was undertaken. The patient was returned to the operating suite where the area of right frontal scalp was prepped, draped, and anesthetized in the usual sterile fashion. The wound was widely undermined in all directions. The wound was undermined to a distance at least the maximum width of the defect as measured perpendicular to the closure line along at least one entire edge of the defect, in this case 3 cm. Then, electrocoagulation was used to obtain meticulous hemostasis. 3-0 Vicryl and 4-0 Vicryl buried vertical mattress sutures were placed into the subcutaneous and dermal plane to close the wound and fredrick the cutaneous wound edge. Bilateral dog ears were identified and were removed by a standard Burow's triangle technique. The cutaneous wound edges were closed using interrupted 3-0 Prolene suture.    The patient tolerated the procedure well.    The area was cleaned and dressed appropriately and the patient was given wound care instructions, as well as appointment for follow-up evaluation and suture removal in 14 days. Patient was placed on Norco 5-325 mg prn postop pain.    LENGTH OF REPAIR: 4.5 cm    Vitals:    07/30/24 0734 07/30/24 1017   BP:  (!) 155/71   BP Location:  Right arm   Patient Position:  Sitting   BP Method:  Medium (Automatic)   Pulse:  (!) 54   Weight: 48.4 kg (106 lb 11.2 oz)    Height: 5' 6" (1.676 m)          "

## 2024-08-13 ENCOUNTER — OFFICE VISIT (OUTPATIENT)
Dept: DERMATOLOGY | Facility: CLINIC | Age: 74
End: 2024-08-13
Payer: MEDICARE

## 2024-08-13 DIAGNOSIS — Z09 POSTOP CHECK: Primary | ICD-10-CM

## 2024-08-13 PROCEDURE — 1126F AMNT PAIN NOTED NONE PRSNT: CPT | Mod: HCNC,CPTII,S$GLB, | Performed by: DERMATOLOGY

## 2024-08-13 PROCEDURE — 3288F FALL RISK ASSESSMENT DOCD: CPT | Mod: HCNC,CPTII,S$GLB, | Performed by: DERMATOLOGY

## 2024-08-13 PROCEDURE — 1101F PT FALLS ASSESS-DOCD LE1/YR: CPT | Mod: HCNC,CPTII,S$GLB, | Performed by: DERMATOLOGY

## 2024-08-13 PROCEDURE — 4010F ACE/ARB THERAPY RXD/TAKEN: CPT | Mod: HCNC,CPTII,S$GLB, | Performed by: DERMATOLOGY

## 2024-08-13 PROCEDURE — 1159F MED LIST DOCD IN RCRD: CPT | Mod: HCNC,CPTII,S$GLB, | Performed by: DERMATOLOGY

## 2024-08-13 PROCEDURE — 99999 PR PBB SHADOW E&M-EST. PATIENT-LVL III: CPT | Mod: PBBFAC,HCNC,, | Performed by: DERMATOLOGY

## 2024-08-13 PROCEDURE — 99024 POSTOP FOLLOW-UP VISIT: CPT | Mod: HCNC,S$GLB,, | Performed by: DERMATOLOGY

## 2024-08-13 NOTE — PROGRESS NOTES
74 y.o. female patient is here for suture removal following Mohs' surgery.    Patient reports no problems.    WOUND PE:  The scalp sutures intact. Wound healing well. Good skin edges. No signs or symptoms of infection.    IMPRESSION:  Healing operative site from Mohs' surgery SCC scalp s/p Mohs with CLC, postop day #14.    PLAN:  Sutures removed today by  Winnie Reyna MA . Steri-strips applied.  Continue wound care.  Keep moist with Aquaphor.    RTC:  In 1 month.

## 2024-08-20 ENCOUNTER — PATIENT MESSAGE (OUTPATIENT)
Dept: INTERNAL MEDICINE | Facility: CLINIC | Age: 74
End: 2024-08-20
Payer: MEDICARE

## 2024-08-21 ENCOUNTER — PATIENT MESSAGE (OUTPATIENT)
Dept: ORTHOPEDICS | Facility: CLINIC | Age: 74
End: 2024-08-21
Payer: MEDICARE

## 2024-08-24 ENCOUNTER — PATIENT MESSAGE (OUTPATIENT)
Dept: ADMINISTRATIVE | Facility: OTHER | Age: 74
End: 2024-08-24
Payer: MEDICARE

## 2024-09-03 ENCOUNTER — OFFICE VISIT (OUTPATIENT)
Dept: ORTHOPEDICS | Facility: CLINIC | Age: 74
End: 2024-09-03
Payer: MEDICARE

## 2024-09-03 DIAGNOSIS — M17.0 PRIMARY OSTEOARTHRITIS OF BOTH KNEES: Primary | ICD-10-CM

## 2024-09-03 PROCEDURE — 99999 PR PBB SHADOW E&M-EST. PATIENT-LVL III: CPT | Mod: PBBFAC,HCNC,, | Performed by: PHYSICIAN ASSISTANT

## 2024-09-03 PROCEDURE — 99499 UNLISTED E&M SERVICE: CPT | Mod: HCNC,S$GLB,, | Performed by: PHYSICIAN ASSISTANT

## 2024-09-03 PROCEDURE — 20610 DRAIN/INJ JOINT/BURSA W/O US: CPT | Mod: 50,HCNC,S$GLB, | Performed by: PHYSICIAN ASSISTANT

## 2024-09-03 RX ORDER — TRIAMCINOLONE ACETONIDE 40 MG/ML
40 INJECTION, SUSPENSION INTRA-ARTICULAR; INTRAMUSCULAR
Status: DISCONTINUED | OUTPATIENT
Start: 2024-09-03 | End: 2024-09-03 | Stop reason: HOSPADM

## 2024-09-03 RX ORDER — LIDOCAINE HYDROCHLORIDE 10 MG/ML
2 INJECTION, SOLUTION INFILTRATION; PERINEURAL
Status: DISCONTINUED | OUTPATIENT
Start: 2024-09-03 | End: 2024-09-03 | Stop reason: HOSPADM

## 2024-09-03 RX ADMIN — TRIAMCINOLONE ACETONIDE 40 MG: 40 INJECTION, SUSPENSION INTRA-ARTICULAR; INTRAMUSCULAR at 02:09

## 2024-09-03 RX ADMIN — LIDOCAINE HYDROCHLORIDE 2 ML: 10 INJECTION, SOLUTION INFILTRATION; PERINEURAL at 02:09

## 2024-09-03 NOTE — PROGRESS NOTES
Patient ID: Chelsae Ford Child is a 74 y.o. female.    HISTORY:  Chelsea Ford Child is a 74 y.o. female who returns to me today for follow up of bilateral knee pain.  She was last seen by me 1/30/2024.  She had injections at that time with very good relief. Today she is doing well, but notes her pain has gradually returned over the past couple of months.       PMH/PSH/FamHx/SocHx:    Unchanged from prior visit.    ROS:  Constitution: Negative for chills, fever and weakness.   Respiratory: Negative for cough and shortness of breath.   Musculoskeletal: Positive for bilateral knee  Psychiatric/Behavioral: The patient is not nervous/anxious.       PHYSICAL EXAM:   Bilateral knee  Skin intact  No warmth or erythema  No TTP   Stable to testing  ROM 0-130      ASSESSMENT/PLAN:    Diagnoses and all orders for this visit:    Primary osteoarthritis of both knees  -     Large Joint Aspiration/Injection: bilateral knee      - Repeat bilateral knee CSI  - Will see how long these provide relief- can consider HA injections next  - Follow up if symptoms worsen or fail to improve

## 2024-09-03 NOTE — PROCEDURES
Large Joint Aspiration/Injection: bilateral knee    Date/Time: 9/3/2024 2:30 PM    Performed by: Brigette Morillo PA-C  Authorized by: Brigette Morillo PA-C    Consent Done?:  Yes (Verbal)  Indications:  Pain  Timeout: prior to procedure the correct patient, procedure, and site was verified    Prep: patient was prepped and draped in usual sterile fashion    Local anesthetic:  Topical anesthetic    Details:  Needle Size:  22 G  Approach:  Anterolateral  Location:  Knee  Laterality:  Bilateral  Site:  Bilateral knee  Medications (Right):  40 mg triamcinolone acetonide 40 mg/mL; 2 mL LIDOcaine HCL 10 mg/ml (1%) 10 mg/mL (1 %)  Medications (Left):  40 mg triamcinolone acetonide 40 mg/mL; 2 mL LIDOcaine HCL 10 mg/ml (1%) 10 mg/mL (1 %)  Patient tolerance:  Patient tolerated the procedure well with no immediate complications

## 2024-09-10 ENCOUNTER — PATIENT MESSAGE (OUTPATIENT)
Dept: DERMATOLOGY | Facility: CLINIC | Age: 74
End: 2024-09-10

## 2024-09-10 ENCOUNTER — OFFICE VISIT (OUTPATIENT)
Dept: DERMATOLOGY | Facility: CLINIC | Age: 74
End: 2024-09-10
Payer: MEDICARE

## 2024-09-10 DIAGNOSIS — C44.42 SQUAMOUS CELL CARCINOMA OF SCALP: Primary | ICD-10-CM

## 2024-09-10 PROCEDURE — 1159F MED LIST DOCD IN RCRD: CPT | Mod: HCNC,CPTII,S$GLB, | Performed by: DERMATOLOGY

## 2024-09-10 PROCEDURE — 1101F PT FALLS ASSESS-DOCD LE1/YR: CPT | Mod: HCNC,CPTII,S$GLB, | Performed by: DERMATOLOGY

## 2024-09-10 PROCEDURE — 99999 PR PBB SHADOW E&M-EST. PATIENT-LVL III: CPT | Mod: PBBFAC,HCNC,, | Performed by: DERMATOLOGY

## 2024-09-10 PROCEDURE — 3288F FALL RISK ASSESSMENT DOCD: CPT | Mod: HCNC,CPTII,S$GLB, | Performed by: DERMATOLOGY

## 2024-09-10 PROCEDURE — 99212 OFFICE O/P EST SF 10 MIN: CPT | Mod: HCNC,S$GLB,, | Performed by: DERMATOLOGY

## 2024-09-10 PROCEDURE — 4010F ACE/ARB THERAPY RXD/TAKEN: CPT | Mod: HCNC,CPTII,S$GLB, | Performed by: DERMATOLOGY

## 2024-09-10 PROCEDURE — 1126F AMNT PAIN NOTED NONE PRSNT: CPT | Mod: HCNC,CPTII,S$GLB, | Performed by: DERMATOLOGY

## 2024-09-10 NOTE — PROGRESS NOTES
74 y.o. female patient is here for wound check after surgery.    Patient reports no problems.    WOUND PE:  The scalp incision is well healed. Mild firmness and erythema of scar. No nodularity. (+) crusting in center.    IMPRESSION:  Healing operative site from Mohs' surgery SCC scalp s/p Mohs with CLC, postop week #6    PLAN:  Manual debridement of scab today.   Continue wound care.   Spitting suture removed.  Scab removed.   Wound rebandaged today.   Do not let scab reform.   Keep moist and covered daily.     RTC:  In 1 month.

## 2024-09-18 ENCOUNTER — PATIENT MESSAGE (OUTPATIENT)
Dept: CARDIOLOGY | Facility: CLINIC | Age: 74
End: 2024-09-18
Payer: MEDICARE

## 2024-10-09 ENCOUNTER — OFFICE VISIT (OUTPATIENT)
Dept: DERMATOLOGY | Facility: CLINIC | Age: 74
End: 2024-10-09
Payer: MEDICARE

## 2024-10-09 DIAGNOSIS — C44.42 SQUAMOUS CELL CARCINOMA OF SCALP: Primary | ICD-10-CM

## 2024-10-09 PROCEDURE — 1101F PT FALLS ASSESS-DOCD LE1/YR: CPT | Mod: HCNC,CPTII,S$GLB, | Performed by: DERMATOLOGY

## 2024-10-09 PROCEDURE — 1160F RVW MEDS BY RX/DR IN RCRD: CPT | Mod: HCNC,CPTII,S$GLB, | Performed by: DERMATOLOGY

## 2024-10-09 PROCEDURE — 99999 PR PBB SHADOW E&M-EST. PATIENT-LVL III: CPT | Mod: PBBFAC,HCNC,, | Performed by: DERMATOLOGY

## 2024-10-09 PROCEDURE — 4010F ACE/ARB THERAPY RXD/TAKEN: CPT | Mod: HCNC,CPTII,S$GLB, | Performed by: DERMATOLOGY

## 2024-10-09 PROCEDURE — 1159F MED LIST DOCD IN RCRD: CPT | Mod: HCNC,CPTII,S$GLB, | Performed by: DERMATOLOGY

## 2024-10-09 PROCEDURE — 99212 OFFICE O/P EST SF 10 MIN: CPT | Mod: HCNC,S$GLB,, | Performed by: DERMATOLOGY

## 2024-10-09 PROCEDURE — 3288F FALL RISK ASSESSMENT DOCD: CPT | Mod: HCNC,CPTII,S$GLB, | Performed by: DERMATOLOGY

## 2024-10-09 NOTE — PROGRESS NOTES
74 y.o. female patient is here for wound check after surgery.    Patient reports no problems.    WOUND PE:  The scalp incision is well healed. Mild firmness and erythema of scar. No nodularity.  Good re-epithelialization.  (+) loss of peripheral hairs      IMPRESSION:  Healing operative site from Mohs' surgery SCC scalp s/p Mohs with CLC, postop week #10, healing appropriately.    PLAN:  Continue wound care with just soap and water, aquaphor for 2 more weeks.  No need to cover as long as keeping moist.  Disc telogen effluvium - hair cycle will normalize in 6 months.   Daily SPF.  Regular skin checks.  Call if any issues arise.     RTC:  In 3-6 months with  Grabiel Olsen MD  for skin check or sooner if new concern arises.

## 2024-10-15 ENCOUNTER — OFFICE VISIT (OUTPATIENT)
Dept: DERMATOLOGY | Facility: CLINIC | Age: 74
End: 2024-10-15
Payer: MEDICARE

## 2024-10-15 DIAGNOSIS — L82.1 SEBORRHEIC KERATOSES: ICD-10-CM

## 2024-10-15 DIAGNOSIS — Z85.828 HISTORY OF NONMELANOMA SKIN CANCER: ICD-10-CM

## 2024-10-15 DIAGNOSIS — L81.4 LENTIGINES: ICD-10-CM

## 2024-10-15 DIAGNOSIS — D69.2 SOLAR PURPURA: Primary | ICD-10-CM

## 2024-10-15 DIAGNOSIS — D22.9 MULTIPLE BENIGN NEVI: ICD-10-CM

## 2024-10-15 PROCEDURE — 99213 OFFICE O/P EST LOW 20 MIN: CPT | Mod: S$GLB,,, | Performed by: STUDENT IN AN ORGANIZED HEALTH CARE EDUCATION/TRAINING PROGRAM

## 2024-10-15 PROCEDURE — 4010F ACE/ARB THERAPY RXD/TAKEN: CPT | Mod: CPTII,S$GLB,, | Performed by: STUDENT IN AN ORGANIZED HEALTH CARE EDUCATION/TRAINING PROGRAM

## 2024-10-15 PROCEDURE — 1160F RVW MEDS BY RX/DR IN RCRD: CPT | Mod: CPTII,S$GLB,, | Performed by: STUDENT IN AN ORGANIZED HEALTH CARE EDUCATION/TRAINING PROGRAM

## 2024-10-15 PROCEDURE — 1159F MED LIST DOCD IN RCRD: CPT | Mod: CPTII,S$GLB,, | Performed by: STUDENT IN AN ORGANIZED HEALTH CARE EDUCATION/TRAINING PROGRAM

## 2024-10-15 PROCEDURE — 3288F FALL RISK ASSESSMENT DOCD: CPT | Mod: CPTII,S$GLB,, | Performed by: STUDENT IN AN ORGANIZED HEALTH CARE EDUCATION/TRAINING PROGRAM

## 2024-10-15 PROCEDURE — 1125F AMNT PAIN NOTED PAIN PRSNT: CPT | Mod: CPTII,S$GLB,, | Performed by: STUDENT IN AN ORGANIZED HEALTH CARE EDUCATION/TRAINING PROGRAM

## 2024-10-15 PROCEDURE — 99999 PR PBB SHADOW E&M-EST. PATIENT-LVL III: CPT | Mod: PBBFAC,,, | Performed by: STUDENT IN AN ORGANIZED HEALTH CARE EDUCATION/TRAINING PROGRAM

## 2024-10-15 PROCEDURE — 1101F PT FALLS ASSESS-DOCD LE1/YR: CPT | Mod: CPTII,S$GLB,, | Performed by: STUDENT IN AN ORGANIZED HEALTH CARE EDUCATION/TRAINING PROGRAM

## 2024-10-15 NOTE — PROGRESS NOTES
Subjective:      Patient ID:  Chelsea Ford Child is a 74 y.o. female who presents for   Chief Complaint   Patient presents with    Skin Check     TBSE     Chelsea Ford Child is a 74 y.o. female who presents for: FBSE screening exam for skin cancer.    Last office visit 7/17/24 with Dr. Olsen for bx of SCC on scalp.    The patient has the following lesions of concern:  Location: patches on arms  Duration: years, increasing in severity  Symptoms: bruising, raw, bleeds, pt would like explanation of this  Relieving factors/Previous treatments: none    Patient with new area of concern:   Location: spot on back  Previous treatments: none     Pertinent history:  History of blistering sunburns: No  History of tanning bed use: No  Family history of melanoma: No  Personal history of mole removal: Yes  Personal history of skin cancer: Yes - SCC of R frontal scalp s/p Mohs 7/30/24  BCC of left nose (medial ala) s/p Mohs 6/2/21  Hx of multiple BCCs excised outside of [facility]         Review of Systems   Skin:  Positive for daily sunscreen use and activity-related sunscreen use. Negative for recent sunburn.   Hematologic/Lymphatic: Bruises/bleeds easily (Aspirin).       Objective:   Physical Exam   Constitutional: She appears well-developed and well-nourished. No distress.   Neurological: She is alert and oriented to person, place, and time. She is not disoriented.   Psychiatric: She has a normal mood and affect.   Skin:   Areas Examined (abnormalities noted in diagram):   Scalp / Hair Palpated and Inspected  Head / Face Inspection Performed  Neck Inspection Performed  Chest / Axilla Inspection Performed  Abdomen Inspection Performed  Genitals / Buttocks / Groin Inspection Performed  Back Inspection Performed  RUE Inspected  LUE Inspection Performed  RLE Inspected  LLE Inspection Performed  Nails and Digits Inspection Performed                 Diagram Legend     Erythematous scaling macule/papule c/w actinic keratosis        Vascular papule c/w angioma      Pigmented verrucoid papule/plaque c/w seborrheic keratosis      Yellow umbilicated papule c/w sebaceous hyperplasia      Irregularly shaped tan macule c/w lentigo     1-2 mm smooth white papules consistent with Milia      Movable subcutaneous cyst with punctum c/w epidermal inclusion cyst      Subcutaneous movable cyst c/w pilar cyst      Firm pink to brown papule c/w dermatofibroma      Pedunculated fleshy papule(s) c/w skin tag(s)      Evenly pigmented macule c/w junctional nevus     Mildly variegated pigmented, slightly irregular-bordered macule c/w mildly atypical nevus      Flesh colored to evenly pigmented papule c/w intradermal nevus       Pink pearly papule/plaque c/w basal cell carcinoma      Erythematous hyperkeratotic cursted plaque c/w SCC      Surgical scar with no sign of skin cancer recurrence      Open and closed comedones      Inflammatory papules and pustules      Verrucoid papule consistent consistent with wart     Erythematous eczematous patches and plaques     Dystrophic onycholytic nail with subungual debris c/w onychomycosis     Umbilicated papule    Erythematous-base heme-crusted tan verrucoid plaque consistent with inflamed seborrheic keratosis     Erythematous Silvery Scaling Plaque c/w Psoriasis     See annotation      Assessment / Plan:      Solar purpura  UV and age-related loss of collagen leading to easy bleeding/brusing  Pt asks about collagen supplements- I think topical retinol more effective to build skin collagen such as Gold Bond retinol body lotion  Arnica gel BID recommended    Lentigines  This is a benign hyperpigmented sun induced lesion. Recommend daily sun protection/avoidance and use of at least SPF 30, broad spectrum sunscreen (OTC drug) will reduce the number of new lesions. Treatment of these benign lesions are considered cosmetic.     Seborrheic keratoses  These are benign inherited growths without a malignant potential. Reassurance  given to patient. No treatment is necessary.      Multiple benign nevi  Reassurance given to patient. No treatment is necessary.   Treatment of benign, asymptomatic lesions may be considered cosmetic.     History of nonmelanoma skin cancer  Area of previous nonmelanoma examined. Site well healed with no signs of recurrence.    Total body skin examination performed today including at least 12 points as noted in physical examination. No lesions suspicious for malignancy noted.    Recommend daily sun protection/avoidance, use of at least SPF 30, broad spectrum sunscreen (OTC drug), skin self examinations, and routine physician surveillance to optimize early detection     RTC 6 mos, sooner prn

## 2024-10-16 ENCOUNTER — TELEPHONE (OUTPATIENT)
Dept: CARDIOLOGY | Facility: CLINIC | Age: 74
End: 2024-10-16
Payer: MEDICARE

## 2024-10-16 NOTE — TELEPHONE ENCOUNTER
Called pt to reschedule appointments recently cancelled by pt. Pt states she is not ready to reschedule appointments at this time. Pt states she will call back to schedule after the new year.

## 2024-10-25 ENCOUNTER — PATIENT MESSAGE (OUTPATIENT)
Dept: INTERNAL MEDICINE | Facility: CLINIC | Age: 74
End: 2024-10-25
Payer: MEDICARE

## 2024-10-25 NOTE — TELEPHONE ENCOUNTER
Lov 7/16/24  Pt would like to make sure it is ok for her to take CoQ10 with her other medications/medical conditions before buying

## 2024-10-30 NOTE — PLAN OF CARE
Glasses in belonging bag   Subjective   History of Present Illness  Patient is a 16-year-old male with significant past medical history positive for acid reflux, autism presenting to the ER complaints of periumbilical abdominal pain and diarrhea starting yesterday and worsening. Pt c/o abdominal pain since 8pm last night, with vomiting that started at 0100am, has also had diarrhea X4 episodes. Denies known sick contacts or recent foreign travel.  Patient denies any contact with turtles.  Patient denies any exposure to body water.    History provided by:  Patient   used: No        Review of Systems   Constitutional: Negative.  Negative for fever.   HENT: Negative.     Respiratory: Negative.     Cardiovascular: Negative.  Negative for chest pain.   Gastrointestinal:  Positive for abdominal pain, diarrhea and vomiting.   Endocrine: Negative.    Genitourinary: Negative.  Negative for dysuria.   Skin: Negative.    Neurological: Negative.    Psychiatric/Behavioral: Negative.     All other systems reviewed and are negative.      Past Medical History:   Diagnosis Date    Autism     Concussion     Heart murmur     Iron deficiency     Reflux esophagitis     Sensory integration dysfunction        No Known Allergies    Past Surgical History:   Procedure Laterality Date    TYMPANOSTOMY TUBE PLACEMENT         Family History   Problem Relation Age of Onset    Cancer Maternal Grandmother     Hypertension Maternal Grandmother     Drug abuse Maternal Grandmother     Diabetes Maternal Grandfather     Hypertension Maternal Grandfather     Drug abuse Maternal Grandfather     Hypertension Mother     Diabetes Mother        Social History     Socioeconomic History    Marital status: Single   Tobacco Use    Smoking status: Never    Smokeless tobacco: Never   Vaping Use    Vaping status: Never Used   Substance and Sexual Activity    Alcohol use: No    Drug use: Never    Sexual activity: Defer           Objective   Physical Exam  Vitals and nursing  note reviewed.   Constitutional:       General: He is not in acute distress.     Appearance: He is well-developed. He is not diaphoretic.   HENT:      Head: Normocephalic and atraumatic.      Right Ear: External ear normal.      Left Ear: External ear normal.      Nose: Nose normal.   Eyes:      Conjunctiva/sclera: Conjunctivae normal.      Pupils: Pupils are equal, round, and reactive to light.   Neck:      Vascular: No JVD.      Trachea: No tracheal deviation.   Cardiovascular:      Rate and Rhythm: Normal rate and regular rhythm.      Heart sounds: Normal heart sounds. No murmur heard.  Pulmonary:      Effort: Pulmonary effort is normal. No respiratory distress.      Breath sounds: Normal breath sounds. No wheezing.   Abdominal:      General: Bowel sounds are normal.      Palpations: Abdomen is soft.      Tenderness: There is no abdominal tenderness in the periumbilical area.   Musculoskeletal:         General: No deformity. Normal range of motion.      Cervical back: Normal range of motion and neck supple.   Skin:     General: Skin is warm and dry.      Coloration: Skin is not pale.      Findings: No erythema or rash.   Neurological:      Mental Status: He is alert and oriented to person, place, and time.      Cranial Nerves: No cranial nerve deficit.   Psychiatric:         Behavior: Behavior normal.         Thought Content: Thought content normal.         Procedures       Results for orders placed or performed during the hospital encounter of 10/30/24   Comprehensive Metabolic Panel    Collection Time: 10/30/24  9:18 AM    Specimen: Arm, Left; Blood   Result Value Ref Range    Glucose 124 (H) 65 - 99 mg/dL    BUN 21 (H) 5 - 18 mg/dL    Creatinine 1.23 0.76 - 1.27 mg/dL    Sodium 138 136 - 145 mmol/L    Potassium 4.7 3.5 - 5.2 mmol/L    Chloride 102 98 - 107 mmol/L    CO2 20.3 (L) 22.0 - 29.0 mmol/L    Calcium 10.4 (H) 8.4 - 10.2 mg/dL    Total Protein 9.2 (H) 6.0 - 8.0 g/dL    Albumin 5.0 (H) 3.2 - 4.5 g/dL     ALT (SGPT) 16 8 - 36 U/L    AST (SGOT) 27 13 - 38 U/L    Alkaline Phosphatase 174 (H) 61 - 146 U/L    Total Bilirubin 0.8 0.0 - 1.0 mg/dL    Globulin 4.2 gm/dL    A/G Ratio 1.2 g/dL    BUN/Creatinine Ratio 17.1 7.0 - 25.0    Anion Gap 15.7 (H) 5.0 - 15.0 mmol/L    eGFR     Lipase    Collection Time: 10/30/24  9:18 AM    Specimen: Arm, Left; Blood   Result Value Ref Range    Lipase 27 13 - 60 U/L   CBC Auto Differential    Collection Time: 10/30/24  9:18 AM    Specimen: Arm, Left; Blood   Result Value Ref Range    WBC 12.32 (H) 3.40 - 10.80 10*3/mm3    RBC 5.93 (H) 4.14 - 5.80 10*6/mm3    Hemoglobin 17.0 13.0 - 17.7 g/dL    Hematocrit 51.9 (H) 37.5 - 51.0 %    MCV 87.5 79.0 - 97.0 fL    MCH 28.7 26.6 - 33.0 pg    MCHC 32.8 31.5 - 35.7 g/dL    RDW 12.8 12.3 - 15.4 %    RDW-SD 41.4 37.0 - 54.0 fl    MPV 10.5 6.0 - 12.0 fL    Platelets 264 140 - 450 10*3/mm3    Neutrophil % 91.9 (H) 42.7 - 76.0 %    Lymphocyte % 2.8 (L) 19.6 - 45.3 %    Monocyte % 4.6 (L) 5.0 - 12.0 %    Eosinophil % 0.2 (L) 0.3 - 6.2 %    Basophil % 0.2 0.0 - 2.0 %    Immature Grans % 0.3 0.0 - 0.5 %    Neutrophils, Absolute 11.32 (H) 1.70 - 7.00 10*3/mm3    Lymphocytes, Absolute 0.34 (L) 0.70 - 3.10 10*3/mm3    Monocytes, Absolute 0.57 0.10 - 0.90 10*3/mm3    Eosinophils, Absolute 0.02 0.00 - 0.40 10*3/mm3    Basophils, Absolute 0.03 0.00 - 0.30 10*3/mm3    Immature Grans, Absolute 0.04 0.00 - 0.05 10*3/mm3    nRBC 0.0 0.0 - 0.2 /100 WBC   Sedimentation Rate    Collection Time: 10/30/24  9:18 AM    Specimen: Arm, Left; Blood   Result Value Ref Range    Sed Rate 14 0 - 15 mm/hr   C-reactive Protein    Collection Time: 10/30/24  9:18 AM    Specimen: Arm, Left; Blood   Result Value Ref Range    C-Reactive Protein 1.35 (H) 0.00 - 0.50 mg/dL   Green Top (Gel)    Collection Time: 10/30/24  9:18 AM   Result Value Ref Range    Extra Tube Hold for add-ons.    Lavender Top    Collection Time: 10/30/24  9:18 AM   Result Value Ref Range    Extra Tube hold for  add-on    Gold Top - SST    Collection Time: 10/30/24  9:18 AM   Result Value Ref Range    Extra Tube Hold for add-ons.    Light Blue Top    Collection Time: 10/30/24  9:18 AM   Result Value Ref Range    Extra Tube Hold for add-ons.    Urinalysis With Microscopic If Indicated (No Culture) - Urine, Clean Catch    Collection Time: 10/30/24  9:30 AM    Specimen: Urine, Clean Catch   Result Value Ref Range    Color, UA Dark Yellow (A) Yellow, Straw    Appearance, UA Clear Clear    pH, UA 5.5 5.0 - 8.0    Specific Gravity, UA >1.030 (H) 1.005 - 1.030    Glucose, UA Negative Negative    Ketones, UA 40 mg/dL (2+) (A) Negative    Bilirubin, UA Moderate (2+) (A) Negative    Blood, UA Negative Negative    Protein, UA 30 mg/dL (1+) (A) Negative    Leuk Esterase, UA Small (1+) (A) Negative    Nitrite, UA Negative Negative    Urobilinogen, UA 1.0 E.U./dL 0.2 - 1.0 E.U./dL   Urinalysis, Microscopic Only - Urine, Clean Catch    Collection Time: 10/30/24  9:30 AM    Specimen: Urine, Clean Catch   Result Value Ref Range    RBC, UA None Seen None Seen, 0-2 /HPF    WBC, UA 0-2 None Seen, 0-2 /HPF    Bacteria, UA Trace (A) None Seen /HPF    Squamous Epithelial Cells, UA 0-2 None Seen, 0-2 /HPF    Hyaline Casts, UA None Seen None Seen /LPF    Methodology Manual Light Microscopy    Lactic Acid, Plasma    Collection Time: 10/30/24 10:27 AM    Specimen: Blood   Result Value Ref Range    Lactate 1.3 0.5 - 2.0 mmol/L       CT Abdomen Pelvis With Contrast   Final Result   1.  Enteritis bowel gas pattern.   2.  No bowel obstruction.   3.  Otherwise unremarkable exam.           This report was finalized on 10/30/2024 11:51 AM by Dr. José Miguel Doty MD.               ED Course  ED Course as of 10/30/24 1234   Wed Oct 30, 2024   1025 Ketones, UA(!): 40 mg/dL (2+) [SM]   1232 CT Abdomen Pelvis With Contrast [SM]      ED Course User Index  [SM] Pearl Mendoza, APRN                                               Medical Decision  Making  Patient is a 16-year-old male with significant past medical history positive for acid reflux, autism presenting to the ER complaints of periumbilical abdominal pain and diarrhea starting yesterday and worsening. Pt c/o abdominal pain since 8pm last night, with vomiting that started at 0100am, has also had diarrhea X4 episodes. Denies known sick contacts or recent foreign travel.  Patient denies any contact with turtles.  Patient denies any exposure to body water.    MDM:    Escalation of care including admission/observation considered    - Discussions of management with other providers:  None    - Discussed/reviewed with Radiology regarding test interpretation    - Independent interpretation: Labs, CT    - Additional patient history obtained from: None and Parent    - Review of external non-ED record (if available):  Prior Inpt record, Office record, Outpt record, Prior Outpt labs, PCP record, Outside ED record, Other    - Chronic conditions affecting care: See HPI and medical Hx.    - Social Determinants of health significantly affecting care:  None        Medical Decision Making Discussion:      The patient has been given very strict return precautions to return to the emergency department should there be any acute change or worsening of their condition.  I have explained my findings and the patient has expressed understanding to me.  I explained that the work-up performed in the ED has been based on the specific complaint and concern, as the nature of emergency medicine is complaint driven and they understand that new symptoms may arise.  I have told them that, should there be any new symptoms, worsening or changing symptoms, a new work-up may be indicated that they are encouraged to return to the emergency department or promptly contact their primary care physician. We have employed a shared decision-making process as the discussion of their disposition.  The patient has been educated as to the nature of  the visit, the tests and work-up performed and the findings from today's visit. At this time, there does not appear to be any acute emergent process that necessitates admission to the hospital, however, the patient understands that this can change unexpectedly. At this time, the patient is stable for discharge home and agrees to follow-up with her primary care physician in the next 24 to 48 hours or earlier should they be able to obtain an appointment.    The patient was counseled regarding diagnostic results and treatment plan and patient has indicated understanding of these instructions.    Problems Addressed:  Enteritis: complicated acute illness or injury    Amount and/or Complexity of Data Reviewed  Labs: ordered. Decision-making details documented in ED Course.  Radiology: ordered. Decision-making details documented in ED Course.    Risk  Prescription drug management.        Final diagnoses:   Enteritis       ED Disposition  ED Disposition       ED Disposition   Discharge    Condition   Stable    Comment   --               Obdulio Burciaga, DO  [address] [place] [address]  [phone]    Schedule an appointment as soon as possible for a visit            Medication List        Changed      ondansetron ODT 4 MG disintegrating tablet  Commonly known as: ZOFRAN-ODT  Place 1 tablet on the tongue Every 8 (Eight) Hours As Needed for Vomiting or Nausea for up to 5 days.  What changed: reasons to take this               Where to Get Your Medications        These medications were sent to [facility] [id number] - [place] - [address] [facility] [address] AT [address] [place] [address] - [phone]  - [phone] FX  [address] [facility] [place] [address]      Phone: [phone]   ondansetron ODT 4 MG disintegrating tablet            Pearl Mendoza, APRN  10/30/24 0873

## 2024-11-12 NOTE — PROGRESS NOTES
Chelsea Cavazos presented for a follow-up Medicare AWV today. The following components were reviewed and updated:  Very pleasant lady.       Medical history  Family History  Social history  Allergies and Current Medications  Health Risk Assessment  Health Maintenance  Care Team    **See Completed Assessments for Annual Wellness visit with in the encounter summary    The following assessments were completed:  Depression Screening  Cognitive function Screening    Timed Get Up Test  Whisper Test      Opioid documentation:  Patient does not have a current opioid prescription.          Wt Readings from Last 3 Encounters:   11/15/24 50.1 kg (110 lb 7.2 oz)   07/30/24 48.4 kg (106 lb 11.2 oz)   07/16/24 48.4 kg (106 lb 11.2 oz)     Temp Readings from Last 3 Encounters:   07/06/24 98 °F (36.7 °C) (Oral)   06/27/23 97.2 °F (36.2 °C) (Temporal)   04/01/23 98.1 °F (36.7 °C) (Oral)     BP Readings from Last 3 Encounters:   11/15/24 (!) 140/72   07/30/24 (!) 155/71   07/16/24 100/60     Pulse Readings from Last 3 Encounters:   11/15/24 (!) 57   07/30/24 (!) 54   07/16/24 76       Physical Exam  General: Well developed, well nourished. No distress.  HEENT: Head is normocephalic, atraumatic  Eyes: Clear conjunctiva.  Neck: Supple, symmetrical neck; trachea midline.  Extremities: No LE edema. Pulses 2+ and symmetric.   Skin: Skin color, texture, turgor normal. No rashes.  Musculoskeletal: Normal gait.   Neurologic: Normal strength and tone. No focal numbness or weakness.       Diagnoses and health risks identified today and associated recommendations/orders:  1 Encounter for Medicare annual wellness exam  -     Ambulatory Referral/Consult to Enhanced Annual Wellness Visit (eAWV)    2 Vertigo  Chronic, stable. Followed by PCP    3 Pulmonary hypertension  Chronic, stable. On pharm therapy. Followed by PCP    4 Palpitations  Chronic, stable. Controlled. On Beta Blocker therapy. Followed by PCP    5 Chronic pulmonary heart  disease  Chronic, stable. Followed by PCP and Cardiology.     6 Chronic heart failure with preserved ejection fraction  Chronic, stable. On pharm therapy. Followed by PCP and Cardiology    7 Benign essential hypertension  BP Readings from Last 3 Encounters:   11/15/24 (!) 140/72   07/30/24 (!) 155/71   07/16/24 100/60   Chronic, stable. On pharm therapy. Followed by PCP and Cardiology     Vaccines:   FLU: declines     Provided Chelsea with a 5-10 year written screening schedule and personal prevention plan. Recommendations were developed using the USPSTF age appropriate recommendations. Education, counseling, and referrals were provided as needed.  After Visit Summary printed and given to patient which includes a list of additional screenings\tests needed.    Future Appointments   Date Time Provider Department Center   11/15/2024 10:00 AM Madison King FNP [facility]   1/7/2025 10:00 AM [facility]  Matt julio c [facility]   1/15/2025  1:30 PM Elva Tripathi MD [facility]          Medication List            Accurate as of November 15, 2024 10:20 AM. If you have any questions, ask your nurse or doctor.                CONTINUE taking these medications      acetaminophen 325 MG tablet  Commonly known as: TYLENOL     aspirin 81 MG EC tablet  Commonly known as: ECOTRIN     b complex vitamins capsule     CALCIUM ORAL     carvediloL 25 MG tablet  Commonly known as: COREG  Take 0.5 tablets (12.5 mg total) by mouth once daily AND 1 tablet (25 mg total) every evening.     cetirizine 10 MG tablet  Commonly known as: ZYRTEC     diclofenac sodium 1 % Gel  Commonly known as: VOLTAREN  Apply 2 g topically 2 (two) times daily.     DIGESTIVE ENZYMES ORAL     estradioL 0.01 % (0.1 mg/gram) vaginal cream  Commonly known as: ESTRACE  Rub dime sized amount of cream to the urethra nightly     fluticasone propionate 50 mcg/actuation nasal spray  Commonly known as: FLONASE  1 spray (50  mcg total) by Each Nostril route once daily.     furosemide 20 MG tablet  Commonly known as: LASIX  TAKE 1 TABLET(20 MG) BY MOUTH EVERY DAY     hydrALAZINE 25 MG tablet  Commonly known as: APRESOLINE  Take 1 tablet (25 mg total) by mouth daily as needed (for SBP >180).     HYDROcodone-acetaminophen 5-325 mg per tablet  Commonly known as: NORCO  Take 1 tablet by mouth every 6 (six) hours as needed for Pain.     iron 18 mg Tab     loratadine 10 mg Cap     losartan 25 MG tablet  Commonly known as: COZAAR  Take 1 tablet (25 mg total) by mouth once daily.     vitamin D 1000 units Tab  Commonly known as: VITAMIN D3              BRIAN Jurado      I offered to discuss advanced care planning, including how to pick a person who would make decisions for you if you were unable to make them for yourself, called a health care power of , and what kind of decisions you might make such as use of life sustaining treatments such as ventilators and tube feeding when faced with a life limiting illness recorded on a living will that they will need to know. (How you want to be cared for as you near the end of your natural life)     X  Patient has advanced directives on file, which we reviewed, and they do not wish to make changes. (Friend: Ms Young)

## 2024-11-15 ENCOUNTER — OFFICE VISIT (OUTPATIENT)
Dept: INTERNAL MEDICINE | Facility: CLINIC | Age: 74
End: 2024-11-15
Payer: MEDICARE

## 2024-11-15 VITALS
BODY MASS INDEX: 17.75 KG/M2 | WEIGHT: 110.44 LBS | SYSTOLIC BLOOD PRESSURE: 140 MMHG | HEART RATE: 57 BPM | DIASTOLIC BLOOD PRESSURE: 72 MMHG | OXYGEN SATURATION: 98 % | HEIGHT: 66 IN

## 2024-11-15 DIAGNOSIS — I10 BENIGN ESSENTIAL HYPERTENSION: ICD-10-CM

## 2024-11-15 DIAGNOSIS — Z00.00 ENCOUNTER FOR PREVENTIVE HEALTH EXAMINATION: ICD-10-CM

## 2024-11-15 DIAGNOSIS — I50.32 CHRONIC HEART FAILURE WITH PRESERVED EJECTION FRACTION: ICD-10-CM

## 2024-11-15 DIAGNOSIS — N95.2 VAGINAL ATROPHY: ICD-10-CM

## 2024-11-15 DIAGNOSIS — Z00.00 ENCOUNTER FOR MEDICARE ANNUAL WELLNESS EXAM: Primary | ICD-10-CM

## 2024-11-15 DIAGNOSIS — R42 VERTIGO: ICD-10-CM

## 2024-11-15 DIAGNOSIS — R00.2 PALPITATIONS: ICD-10-CM

## 2024-11-15 DIAGNOSIS — R63.6 UNDERWEIGHT: ICD-10-CM

## 2024-11-15 DIAGNOSIS — I27.20 PULMONARY HYPERTENSION: ICD-10-CM

## 2024-11-15 DIAGNOSIS — I27.9 CHRONIC PULMONARY HEART DISEASE: ICD-10-CM

## 2024-11-15 PROCEDURE — 99999 PR PBB SHADOW E&M-EST. PATIENT-LVL V: CPT | Mod: PBBFAC,HCNC,, | Performed by: NURSE PRACTITIONER

## 2024-11-15 RX ORDER — ESTRADIOL 0.1 MG/G
CREAM VAGINAL
Qty: 42.5 G | Refills: 0 | Status: SHIPPED | OUTPATIENT
Start: 2024-11-15

## 2024-11-15 NOTE — PATIENT INSTRUCTIONS
Counseling and Referral of Other Preventative  (Italic type indicates deductible and co-insurance are waived)    Patient Name: Chelsea Cavazos  Today's Date: 11/15/2024    Health Maintenance       Date Due Completion Date    TETANUS VACCINE Never done ---    Shingles Vaccine (1 of 2) Never done ---    Influenza Vaccine (1) 09/01/2024 10/23/2023    COVID-19 Vaccine (6 - 2024-25 season) 09/01/2024 10/27/2022    DEXA Scan 11/21/2025 11/21/2023    Lipid Panel 07/13/2029 7/13/2024    Colorectal Cancer Screening 06/27/2030 6/27/2023        No orders of the defined types were placed in this encounter.    The following information is provided to all patients.  This information is to help you find resources for any of the problems found today that may be affecting your health:                  Living healthy guide: www.[facility].[place].[facility]      Understanding Diabetes: www.diabetes.org      Eating healthy: www.cdc.gov/healthyweight      [facility] home safety checklist: www.cdc.gov/steadi/patient.html      Agency on Aging: www.goea.[place]      Alcoholics anonymous (AA): www.aa.org      Physical Activity: www.ben.nih.gov/dz4gahr      Tobacco use: www.quitwithusla.org

## 2025-01-06 DIAGNOSIS — R06.02 SHORT OF BREATH ON EXERTION: ICD-10-CM

## 2025-01-06 RX ORDER — FUROSEMIDE 20 MG/1
20 TABLET ORAL
Qty: 30 TABLET | Refills: 3 | Status: SHIPPED | OUTPATIENT
Start: 2025-01-06

## 2025-01-07 ENCOUNTER — TELEPHONE (OUTPATIENT)
Dept: INTERNAL MEDICINE | Facility: CLINIC | Age: 75
End: 2025-01-07
Payer: MEDICARE

## 2025-01-07 ENCOUNTER — LAB VISIT (OUTPATIENT)
Dept: LAB | Facility: HOSPITAL | Age: 75
End: 2025-01-07
Attending: INTERNAL MEDICINE
Payer: MEDICARE

## 2025-01-07 DIAGNOSIS — I50.32 CHRONIC HEART FAILURE WITH PRESERVED EJECTION FRACTION: ICD-10-CM

## 2025-01-07 DIAGNOSIS — I27.20 PULMONARY HYPERTENSION: ICD-10-CM

## 2025-01-07 DIAGNOSIS — Z12.31 ENCOUNTER FOR SCREENING MAMMOGRAM FOR BREAST CANCER: Primary | ICD-10-CM

## 2025-01-07 DIAGNOSIS — I10 BENIGN ESSENTIAL HYPERTENSION: ICD-10-CM

## 2025-01-07 DIAGNOSIS — I27.9 CHRONIC PULMONARY HEART DISEASE: ICD-10-CM

## 2025-01-07 LAB
ALBUMIN SERPL BCP-MCNC: 3.7 G/DL (ref 3.5–5.2)
ALP SERPL-CCNC: 82 U/L (ref 40–150)
ALT SERPL W/O P-5'-P-CCNC: 12 U/L (ref 10–44)
ANION GAP SERPL CALC-SCNC: 7 MMOL/L (ref 8–16)
AST SERPL-CCNC: 19 U/L (ref 10–40)
BASOPHILS # BLD AUTO: 0.04 K/UL (ref 0–0.2)
BASOPHILS NFR BLD: 0.8 % (ref 0–1.9)
BILIRUB SERPL-MCNC: 0.4 MG/DL (ref 0.1–1)
BUN SERPL-MCNC: 15 MG/DL (ref 8–23)
CALCIUM SERPL-MCNC: 9.6 MG/DL (ref 8.7–10.5)
CHLORIDE SERPL-SCNC: 101 MMOL/L (ref 95–110)
CO2 SERPL-SCNC: 27 MMOL/L (ref 23–29)
CREAT SERPL-MCNC: 1 MG/DL (ref 0.5–1.4)
DIFFERENTIAL METHOD BLD: ABNORMAL
EOSINOPHIL # BLD AUTO: 0.1 K/UL (ref 0–0.5)
EOSINOPHIL NFR BLD: 1.2 % (ref 0–8)
ERYTHROCYTE [DISTWIDTH] IN BLOOD BY AUTOMATED COUNT: 11.7 % (ref 11.5–14.5)
EST. GFR  (NO RACE VARIABLE): 58.4 ML/MIN/1.73 M^2
GLUCOSE SERPL-MCNC: 106 MG/DL (ref 70–110)
HCT VFR BLD AUTO: 33.3 % (ref 37–48.5)
HGB BLD-MCNC: 10.6 G/DL (ref 12–16)
IMM GRANULOCYTES # BLD AUTO: 0.01 K/UL (ref 0–0.04)
IMM GRANULOCYTES NFR BLD AUTO: 0.2 % (ref 0–0.5)
LYMPHOCYTES # BLD AUTO: 1.6 K/UL (ref 1–4.8)
LYMPHOCYTES NFR BLD: 33.8 % (ref 18–48)
MCH RBC QN AUTO: 32.1 PG (ref 27–31)
MCHC RBC AUTO-ENTMCNC: 31.8 G/DL (ref 32–36)
MCV RBC AUTO: 101 FL (ref 82–98)
MONOCYTES # BLD AUTO: 0.5 K/UL (ref 0.3–1)
MONOCYTES NFR BLD: 10.9 % (ref 4–15)
NEUTROPHILS # BLD AUTO: 2.6 K/UL (ref 1.8–7.7)
NEUTROPHILS NFR BLD: 53.1 % (ref 38–73)
NRBC BLD-RTO: 0 /100 WBC
PLATELET # BLD AUTO: 169 K/UL (ref 150–450)
PMV BLD AUTO: 10.4 FL (ref 9.2–12.9)
POTASSIUM SERPL-SCNC: 4.4 MMOL/L (ref 3.5–5.1)
PROT SERPL-MCNC: 6.6 G/DL (ref 6–8.4)
RBC # BLD AUTO: 3.3 M/UL (ref 4–5.4)
SODIUM SERPL-SCNC: 135 MMOL/L (ref 136–145)
TSH SERPL DL<=0.005 MIU/L-ACNC: 3.29 UIU/ML (ref 0.4–4)
WBC # BLD AUTO: 4.85 K/UL (ref 3.9–12.7)

## 2025-01-07 PROCEDURE — 84443 ASSAY THYROID STIM HORMONE: CPT | Mod: HCNC | Performed by: INTERNAL MEDICINE

## 2025-01-07 PROCEDURE — 36415 COLL VENOUS BLD VENIPUNCTURE: CPT | Mod: HCNC | Performed by: INTERNAL MEDICINE

## 2025-01-07 PROCEDURE — 80053 COMPREHEN METABOLIC PANEL: CPT | Mod: HCNC | Performed by: INTERNAL MEDICINE

## 2025-01-07 PROCEDURE — 85025 COMPLETE CBC W/AUTO DIFF WBC: CPT | Mod: HCNC | Performed by: INTERNAL MEDICINE

## 2025-01-07 NOTE — TELEPHONE ENCOUNTER
----- Message from Kylah sent at 1/7/2025  9:57 AM CST -----  Regarding: Mammo Order  Patient received an order to schedule her Mammo, She needs orders, would you please place the orders for this.  Thanks

## 2025-01-08 ENCOUNTER — OFFICE VISIT (OUTPATIENT)
Dept: OBSTETRICS AND GYNECOLOGY | Facility: CLINIC | Age: 75
End: 2025-01-08
Payer: MEDICARE

## 2025-01-08 VITALS
BODY MASS INDEX: 18.29 KG/M2 | SYSTOLIC BLOOD PRESSURE: 143 MMHG | DIASTOLIC BLOOD PRESSURE: 67 MMHG | HEART RATE: 69 BPM | WEIGHT: 113.81 LBS | HEIGHT: 66 IN

## 2025-01-08 DIAGNOSIS — N95.2 VAGINAL ATROPHY: Primary | ICD-10-CM

## 2025-01-08 DIAGNOSIS — Z78.0 POSTMENOPAUSAL: ICD-10-CM

## 2025-01-08 PROCEDURE — 99999 PR PBB SHADOW E&M-EST. PATIENT-LVL IV: CPT | Mod: PBBFAC,HCNC,,

## 2025-01-08 RX ORDER — ESTRADIOL 0.1 MG/G
1 CREAM VAGINAL NIGHTLY
Qty: 42.5 G | Refills: 3 | Status: SHIPPED | OUTPATIENT
Start: 2025-01-08

## 2025-01-08 NOTE — PROGRESS NOTES
"CC: Labial itching    Chelsea Ford Child is a 75 y.o. female  presents for GYN problem visit.   No LMP recorded (lmp unknown). Patient is postmenopausal.  She complains of vaginal itching and burning on the labia. Has become more persistent over last few weeks. Wore pantyhose, underwear, and pants recently and that seemed to cause more irritation.  Has vaginal estrace cream prescribed by Urology, but not using regularly.   She denies hematuria, dysuria, constipation, diarrhea, fevers, chills, nausea or emesis.  She is not sexually active.  Pt feels safe at home and denies domestic violence.   Denies further issues, problems, or complaints.        BP (!) 143/67   Pulse 69   Ht 5' 6" (1.676 m)   Wt 51.6 kg (113 lb 12.8 oz)   LMP  (LMP Unknown)   BMI 18.37 kg/m²     ROS:  Constitutional: no weight loss, weight gain, fever, fatigue  Eyes:  No vision changes, glasses/contacts  ENT/Mouth: No ulcers, sinus problems, ears ringing, headache  Cardiovascular: No inability to lie flat, chest pain, exercise intolerance, swelling, heart palpitations  Respiratory: No wheezing, coughing blood, shortness of breath, or cough  Gastrointestinal: No diarrhea, bloody stool, nausea/vomiting, constipation, gas, hemorrhoids  Genitourinary: No blood in urine, painful urination, urgency of urination, frequency of urination, incomplete emptying, incontinence, abnormal bleeding, painful periods, heavy periods, vaginal discharge, vaginal odor, painful intercourse, sexual problems, bleeding after intercourse. Labial itching and burning   Musculoskeletal: No muscle weakness  Breast: No painful breasts, nipple discharge, masses, rash, ulcers   Neurological: No passing out, seizures, numbness, headache  Endocrine: No diabetes, hypothyroid, hyperthyroid, hot flashes, hair loss, abnormal hair growth, acne  Psychiatric: No depression, crying  Hematologic: No bruises, bleeding, swollen lymph nodes, anemia.    OBJECTIVE:  Physical Exam:       "         Genitourinary:    Pelvic exam was performed with patient supine.   The external female genitalia was normal.   Genitalia hair distrobution normal .   There is tenderness and lesion on the right labia. There is tenderness and lesion on the left labia.    Genitourinary Comments: Labial- taught, reddened, atrophic skin. 3mm tear on left labia, multiple small pinpoint tears near urethra. Loss of structure due to atrophy.                          ASSESSMENT:   Vaginal atrophy  -     estradioL (ESTRACE) 0.01 % (0.1 mg/gram) vaginal cream; Place 1 g vaginally every evening. Apply externally and internally. Use nightly for 2 weeks, then 2-3 times/week thereafter for maintenance.  Dispense: 42.5 g; Refill: 3    Postmenopausal  -     estradioL (ESTRACE) 0.01 % (0.1 mg/gram) vaginal cream; Place 1 g vaginally every evening. Apply externally and internally. Use nightly for 2 weeks, then 2-3 times/week thereafter for maintenance.  Dispense: 42.5 g; Refill: 3        PLAN:   - Vulvar exam performed. GSM changes noted. Instructed to resume use of vaginal estrace cream nightly for 2 weeks, then 2-3 times weekly. Use both externally and internally.   - Recommended good genital hygiene practices (I.e. using unscented body washes, avoiding douching, foods high in sugar, tight fitting clothing), drinking plenty of water, voiding after sex, & changing and showering immediately after exercise.  - Follow up in one month.     Total time spent on this encounter was 30 minutes.  This includes preparing to see the patient;  obtaining/reviewing separately obtained history;  performing a medical exam and/or evaluation;  counseling/educating the patient;  ordering medications, tests, or procedures;  referring/communicating with other healthcare professionals;  EMR documentation;  interpreting/communicating results to the patient;  and/or care coordination.    Female chaperone present for entire procedure

## 2025-01-15 ENCOUNTER — OFFICE VISIT (OUTPATIENT)
Dept: INTERNAL MEDICINE | Facility: CLINIC | Age: 75
End: 2025-01-15
Payer: MEDICARE

## 2025-01-15 VITALS
HEIGHT: 66 IN | DIASTOLIC BLOOD PRESSURE: 60 MMHG | WEIGHT: 112.63 LBS | HEART RATE: 68 BPM | SYSTOLIC BLOOD PRESSURE: 106 MMHG | BODY MASS INDEX: 18.1 KG/M2 | OXYGEN SATURATION: 100 %

## 2025-01-15 DIAGNOSIS — I27.20 PULMONARY HYPERTENSION: ICD-10-CM

## 2025-01-15 DIAGNOSIS — M17.0 PRIMARY OSTEOARTHRITIS OF BOTH KNEES: ICD-10-CM

## 2025-01-15 DIAGNOSIS — N18.31 CKD STAGE 3A, GFR 45-59 ML/MIN: ICD-10-CM

## 2025-01-15 DIAGNOSIS — I10 BENIGN ESSENTIAL HYPERTENSION: ICD-10-CM

## 2025-01-15 DIAGNOSIS — M81.0 AGE-RELATED OSTEOPOROSIS WITHOUT CURRENT PATHOLOGICAL FRACTURE: ICD-10-CM

## 2025-01-15 DIAGNOSIS — I50.32 CHRONIC HEART FAILURE WITH PRESERVED EJECTION FRACTION: ICD-10-CM

## 2025-01-15 DIAGNOSIS — D53.9 MACROCYTIC ANEMIA: Primary | ICD-10-CM

## 2025-01-15 PROCEDURE — 99214 OFFICE O/P EST MOD 30 MIN: CPT | Mod: HCNC,S$GLB,, | Performed by: INTERNAL MEDICINE

## 2025-01-15 PROCEDURE — 1101F PT FALLS ASSESS-DOCD LE1/YR: CPT | Mod: HCNC,CPTII,S$GLB, | Performed by: INTERNAL MEDICINE

## 2025-01-15 PROCEDURE — 1125F AMNT PAIN NOTED PAIN PRSNT: CPT | Mod: HCNC,CPTII,S$GLB, | Performed by: INTERNAL MEDICINE

## 2025-01-15 PROCEDURE — 1160F RVW MEDS BY RX/DR IN RCRD: CPT | Mod: HCNC,CPTII,S$GLB, | Performed by: INTERNAL MEDICINE

## 2025-01-15 PROCEDURE — 3288F FALL RISK ASSESSMENT DOCD: CPT | Mod: HCNC,CPTII,S$GLB, | Performed by: INTERNAL MEDICINE

## 2025-01-15 PROCEDURE — G2211 COMPLEX E/M VISIT ADD ON: HCPCS | Mod: HCNC,S$GLB,, | Performed by: INTERNAL MEDICINE

## 2025-01-15 PROCEDURE — 3074F SYST BP LT 130 MM HG: CPT | Mod: HCNC,CPTII,S$GLB, | Performed by: INTERNAL MEDICINE

## 2025-01-15 PROCEDURE — 99999 PR PBB SHADOW E&M-EST. PATIENT-LVL V: CPT | Mod: PBBFAC,HCNC,, | Performed by: INTERNAL MEDICINE

## 2025-01-15 PROCEDURE — 3078F DIAST BP <80 MM HG: CPT | Mod: HCNC,CPTII,S$GLB, | Performed by: INTERNAL MEDICINE

## 2025-01-15 PROCEDURE — 1159F MED LIST DOCD IN RCRD: CPT | Mod: HCNC,CPTII,S$GLB, | Performed by: INTERNAL MEDICINE

## 2025-01-15 RX ORDER — HYDRALAZINE HYDROCHLORIDE 25 MG/1
25 TABLET, FILM COATED ORAL DAILY PRN
Qty: 30 TABLET | Refills: 3 | Status: SHIPPED | OUTPATIENT
Start: 2025-01-15 | End: 2026-01-15

## 2025-01-15 RX ORDER — NAPROXEN 500 MG/1
500 TABLET ORAL 2 TIMES DAILY WITH MEALS
Qty: 10 TABLET | Refills: 0 | Status: SHIPPED | OUTPATIENT
Start: 2025-01-15

## 2025-01-15 NOTE — PROGRESS NOTES
"Subjective:       Patient ID: Chelsea Ford Child is a 75 y.o. female.    Chief Complaint: Follow-up (Knee pain /Vision changes sensitive to sun light)    HPI  75 y.o. female here for follow up of    Primary osteoarthritis both knees  Bilateral CSI done on 1/30/24; 9/3/24  Reports injection helped some but not as good as the first one. Now a little worse than pre-injection - relief down to 80% for a few weeks.   Takes tylenol on occasion.    pain is worse when she first gets up and moving.    HTN  Losartan 25mg daily  Carvedilol  decreased to 12.5mg am and 25mg pm (decreased due to decreased energy and low BP)  Hydralazine 25mg daily PRN SBP > 180     Pulmonary HTN; HFpEF  Furosemide 3 days/week (daily caused hyponatremia)   wears support hose intermittently.     Anemia  Hgb 10.6 - down from 11 previousoly     taking slow release iron daily.    Dry cough started today.  Her friend just started feeling bad that she saw 2 days ago.     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    Osteoporosis  Declines bisphosphonate or repeat dexa at this time.     Sunlight bothering her especially on new glasses with special lenses.   Review of Systems   Constitutional:  Negative for fever.   Respiratory:  Negative for shortness of breath.    Cardiovascular:  Negative for chest pain.   Musculoskeletal: Negative.    Skin: Negative.        Objective:   /60 (BP Location: Left arm, Patient Position: Sitting)   Pulse 68   Ht 5' 6" (1.676 m)   Wt 51.1 kg (112 lb 10.5 oz)   LMP  (LMP Unknown)   SpO2 100%   BMI 18.18 kg/m²      Physical Exam  Constitutional:       General: She is not in acute distress.     Appearance: She is well-developed. She is not diaphoretic.   HENT:      Head: Normocephalic and atraumatic.   Cardiovascular:      Rate and Rhythm: Normal rate and regular rhythm.   Pulmonary:      Effort: Pulmonary effort is normal. No respiratory distress.      Breath sounds: No wheezing or rales. "   Skin:     General: Skin is warm and dry.   Neurological:      Mental Status: She is alert and oriented to person, place, and time.   Psychiatric:         Behavior: Behavior normal.         Assessment:       1. Macrocytic anemia    2. Benign essential hypertension    3. Age-related osteoporosis without current pathological fracture    4. Chronic heart failure with preserved ejection fraction    5. Pulmonary hypertension    6. Primary osteoarthritis of both knees    7. CKD stage 3a, GFR 45-59 ml/min        Plan:       Macrocytic anemia  -     CBC Auto Differential; Future; Expected date: 01/15/2025  -     Vitamin B12; Future; Expected date: 01/15/2025  -     FOLATE; Future; Expected date: 01/15/2025  -     Ferritin; Future; Expected date: 01/15/2025  -     Iron and TIBC; Future; Expected date: 01/15/2025    Benign essential hypertension  -     hydrALAZINE (APRESOLINE) 25 MG tablet; Take 1 tablet (25 mg total) by mouth daily as needed (for SBP >180).  Dispense: 30 tablet; Refill: 3    Osteoporosis, unspecified osteoporosis type, unspecified pathological fracture presence  Declines bisphosphonate or repeat dexa at this time.   Discussed fall precautions    Chronic heart failure with preserved ejection fraction  Pulmonary hypertension  Euvolemic  Continue current regimen    CKD 3a  Avoid chronic nsaids (short course for severe oa knees)  Monitor    Primary osteoarthritis of both knees  -     naproxen (NAPROSYN) 500 MG tablet; Take 1 tablet (500 mg total) by mouth 2 (two) times daily with meals. For 5 days  Dispense: 10 tablet; Refill: 0    Cough - mild x 1 day  OTC coricidin, monitor          You are up to date for your primary preventive health care, and there are no reminders at this time.     Repeat labs 1 mo  Tdap, shingrx, covid recommended

## 2025-02-04 ENCOUNTER — OFFICE VISIT (OUTPATIENT)
Dept: URGENT CARE | Facility: CLINIC | Age: 75
End: 2025-02-04
Payer: MEDICARE

## 2025-02-04 VITALS
HEART RATE: 63 BPM | TEMPERATURE: 98 F | WEIGHT: 112 LBS | DIASTOLIC BLOOD PRESSURE: 77 MMHG | HEIGHT: 66 IN | RESPIRATION RATE: 18 BRPM | BODY MASS INDEX: 18 KG/M2 | OXYGEN SATURATION: 95 % | SYSTOLIC BLOOD PRESSURE: 131 MMHG

## 2025-02-04 DIAGNOSIS — R05.2 SUBACUTE COUGH: Primary | ICD-10-CM

## 2025-02-04 LAB
CTP QC/QA: YES
CTP QC/QA: YES
POC MOLECULAR INFLUENZA A AGN: NEGATIVE
POC MOLECULAR INFLUENZA B AGN: NEGATIVE
SARS CORONAVIRUS 2 ANTIGEN: NEGATIVE

## 2025-02-04 PROCEDURE — 99213 OFFICE O/P EST LOW 20 MIN: CPT | Mod: S$GLB,,, | Performed by: NURSE PRACTITIONER

## 2025-02-04 PROCEDURE — 87502 INFLUENZA DNA AMP PROBE: CPT | Mod: QW,S$GLB,, | Performed by: NURSE PRACTITIONER

## 2025-02-04 PROCEDURE — 87811 SARS-COV-2 COVID19 W/OPTIC: CPT | Mod: QW,S$GLB,, | Performed by: NURSE PRACTITIONER

## 2025-02-04 RX ORDER — PROMETHAZINE HYDROCHLORIDE AND DEXTROMETHORPHAN HYDROBROMIDE 6.25; 15 MG/5ML; MG/5ML
5 SYRUP ORAL NIGHTLY PRN
Qty: 118 ML | Refills: 0 | Status: SHIPPED | OUTPATIENT
Start: 2025-02-04

## 2025-02-04 RX ORDER — BENZONATATE 100 MG/1
100 CAPSULE ORAL EVERY 8 HOURS PRN
Qty: 30 CAPSULE | Refills: 0 | Status: SHIPPED | OUTPATIENT
Start: 2025-02-04

## 2025-02-04 NOTE — PROGRESS NOTES
"Subjective:      Patient ID: Chelsea Ford Child is a 75 y.o. female.    Vitals:  height is 5' 6" (1.676 m) and weight is 50.8 kg (112 lb). Her oral temperature is 98.2 °F (36.8 °C). Her blood pressure is 131/77 and her pulse is 63. Her respiration is 18 and oxygen saturation is 95%.     Chief Complaint: Cough    75 yr old female presents to  today with c/o cough x3 weeks, along with nasal and chest congestion, low grade fever, and ear fullness.  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   Not sleeping well at night due to cough.   Had fever early on in illness process, but not in the last week.     Cough  This is a new problem. The current episode started 1 to 4 weeks ago. The problem has been gradually worsening. The problem occurs constantly. The cough is Productive of sputum. Associated symptoms include ear congestion, nasal congestion and postnasal drip. Pertinent negatives include no chest pain, chills, ear pain, fever, headaches, heartburn, hemoptysis, myalgias, rash, rhinorrhea, sore throat, shortness of breath, sweats, weight loss or wheezing. Nothing aggravates the symptoms. She has tried OTC cough suppressant for the symptoms. The treatment provided mild relief.     Constitution: Negative for chills and fever.   HENT:  Positive for congestion and postnasal drip. Negative for ear pain and sore throat.    Cardiovascular:  Negative for chest pain.   Respiratory:  Positive for cough. Negative for bloody sputum, shortness of breath and wheezing.    Gastrointestinal:  Negative for vomiting, diarrhea and heartburn.   Musculoskeletal:  Negative for muscle ache.   Skin:  Negative for rash.   Neurological:  Negative for headaches.      Objective:     Physical Exam   Constitutional: She is oriented to person, place, and time. She appears well-developed. She is cooperative.  Non-toxic appearance. She does not appear ill. No distress.   HENT:   Head: Normocephalic.   Ears:   Right Ear: Hearing, tympanic membrane, external " ear and ear canal normal.   Left Ear: Hearing, tympanic membrane, external ear and ear canal normal.   Nose: Congestion present. No mucosal edema, rhinorrhea or nasal deformity. No epistaxis. Right sinus exhibits no maxillary sinus tenderness and no frontal sinus tenderness. Left sinus exhibits no maxillary sinus tenderness and no frontal sinus tenderness.   Mouth/Throat: Uvula is midline, oropharynx is clear and moist and mucous membranes are normal. Mucous membranes are moist. No trismus in the jaw. Normal dentition. No uvula swelling. No oropharyngeal exudate, posterior oropharyngeal edema or posterior oropharyngeal erythema. Oropharynx is clear.   Eyes: Conjunctivae and lids are normal. No scleral icterus.   Neck: Trachea normal and phonation normal. Neck supple. No edema present. No erythema present. No neck rigidity present.   Cardiovascular: Normal rate and regular rhythm.   Pulmonary/Chest: Effort normal. No respiratory distress. She has no decreased breath sounds. She has no wheezes. She has rhonchi.   Abdominal: Normal appearance.   Musculoskeletal: Normal range of motion.         General: No deformity. Normal range of motion.   Neurological: She is alert and oriented to person, place, and time. She exhibits normal muscle tone. Coordination normal.   Skin: Skin is warm, dry, intact, not diaphoretic and not pale.   Psychiatric: Her speech is normal and behavior is normal. Judgment and thought content normal.   Nursing note and vitals reviewed.      Assessment:     1. Subacute cough        Plan:   Pt declines antibiotics at this time.   CXR ordered and schedule/locations given for when and where she can get CXR     Subacute cough  -     SARS Coronavirus 2 Antigen, POCT Manual Read  -     POCT Influenza A/B MOLECULAR  -     XR CHEST PA AND LATERAL; Future; Expected date: 02/04/2025  -     benzonatate (TESSALON) 100 MG capsule; Take 1 capsule (100 mg total) by mouth every 8 (eight) hours as needed for Cough.   Dispense: 30 capsule; Refill: 0  -     promethazine-dextromethorphan (PROMETHAZINE-DM) 6.25-15 mg/5 mL Syrp; Take 5 mLs by mouth nightly as needed (cough).  Dispense: 118 mL; Refill: 0      Patient Instructions   Please go to one of the following urgent cares for your chest xray.  You do not need to be seen again--just tell the  you are there just for your chest xray; and we will call with results after:   See attached xray schedule.     [facility]   [address] [facility] [facility]    [facility]   [address] [facility]       Oral fluids  Hot tea with honey   Throat or cough lozenges  Ibuprofen or tylenol for any aches or fever   Rest  Steam from hot showers to help open upper airway  Blow nose often  Avoid circulating air (such as ceiling fans) dries your airway  Avoid drinking cold drinks (worsens cough)  Avoid strong smells which could worsen cough (perfume, lotions, smoke...)  You can also try a humidifier  Therapeutic coughing to expel mucous  Sit in upright position often  Follow up with any worsening symptoms; and seek ER care with any wheezing, retractions, or respiratory distress; lethargy; dehydration...

## 2025-02-04 NOTE — PATIENT INSTRUCTIONS
Please go to one of the following urgent cares for your chest xray.  You do not need to be seen again--just tell the  you are there just for your chest xray; and we will call with results after:   See attached xray schedule.     [facility]:   [address] [facility]    [facility]:   [address] [facility]       Oral fluids  Hot tea with honey   Throat or cough lozenges  Ibuprofen or tylenol for any aches or fever   Rest  Steam from hot showers to help open upper airway  Blow nose often  Avoid circulating air (such as ceiling fans) dries your airway  Avoid drinking cold drinks (worsens cough)  Avoid strong smells which could worsen cough (perfume, lotions, smoke...)  You can also try a humidifier  Therapeutic coughing to expel mucous  Sit in upright position often  Follow up with any worsening symptoms; and seek ER care with any wheezing, retractions, or respiratory distress; lethargy; dehydration...

## 2025-02-06 ENCOUNTER — LAB VISIT (OUTPATIENT)
Dept: LAB | Facility: HOSPITAL | Age: 75
End: 2025-02-06
Attending: INTERNAL MEDICINE
Payer: MEDICARE

## 2025-02-06 DIAGNOSIS — D53.9 MACROCYTIC ANEMIA: ICD-10-CM

## 2025-02-06 DIAGNOSIS — E87.1 HYPONATREMIA: ICD-10-CM

## 2025-02-06 LAB
ALBUMIN SERPL BCP-MCNC: 3.4 G/DL (ref 3.5–5.2)
ALP SERPL-CCNC: 84 U/L (ref 40–150)
ALT SERPL W/O P-5'-P-CCNC: 10 U/L (ref 10–44)
ANION GAP SERPL CALC-SCNC: 9 MMOL/L (ref 8–16)
AST SERPL-CCNC: 19 U/L (ref 10–40)
BASOPHILS # BLD AUTO: 0.03 K/UL (ref 0–0.2)
BASOPHILS NFR BLD: 0.6 % (ref 0–1.9)
BILIRUB SERPL-MCNC: 0.3 MG/DL (ref 0.1–1)
BUN SERPL-MCNC: 18 MG/DL (ref 8–23)
CALCIUM SERPL-MCNC: 9.7 MG/DL (ref 8.7–10.5)
CHLORIDE SERPL-SCNC: 102 MMOL/L (ref 95–110)
CO2 SERPL-SCNC: 26 MMOL/L (ref 23–29)
CREAT SERPL-MCNC: 0.9 MG/DL (ref 0.5–1.4)
DIFFERENTIAL METHOD BLD: ABNORMAL
EOSINOPHIL # BLD AUTO: 0.1 K/UL (ref 0–0.5)
EOSINOPHIL NFR BLD: 1 % (ref 0–8)
ERYTHROCYTE [DISTWIDTH] IN BLOOD BY AUTOMATED COUNT: 12 % (ref 11.5–14.5)
EST. GFR  (NO RACE VARIABLE): >60 ML/MIN/1.73 M^2
FERRITIN SERPL-MCNC: 277 NG/ML (ref 20–300)
FOLATE SERPL-MCNC: 39.4 NG/ML (ref 4–24)
GLUCOSE SERPL-MCNC: 95 MG/DL (ref 70–110)
HCT VFR BLD AUTO: 32.4 % (ref 37–48.5)
HGB BLD-MCNC: 10.5 G/DL (ref 12–16)
IMM GRANULOCYTES # BLD AUTO: 0.01 K/UL (ref 0–0.04)
IMM GRANULOCYTES NFR BLD AUTO: 0.2 % (ref 0–0.5)
IRON SERPL-MCNC: 106 UG/DL (ref 30–160)
LYMPHOCYTES # BLD AUTO: 1.5 K/UL (ref 1–4.8)
LYMPHOCYTES NFR BLD: 30.4 % (ref 18–48)
MCH RBC QN AUTO: 32 PG (ref 27–31)
MCHC RBC AUTO-ENTMCNC: 32.4 G/DL (ref 32–36)
MCV RBC AUTO: 99 FL (ref 82–98)
MONOCYTES # BLD AUTO: 0.5 K/UL (ref 0.3–1)
MONOCYTES NFR BLD: 10.2 % (ref 4–15)
NEUTROPHILS # BLD AUTO: 2.8 K/UL (ref 1.8–7.7)
NEUTROPHILS NFR BLD: 57.6 % (ref 38–73)
NRBC BLD-RTO: 0 /100 WBC
PLATELET # BLD AUTO: 231 K/UL (ref 150–450)
PMV BLD AUTO: 10 FL (ref 9.2–12.9)
POTASSIUM SERPL-SCNC: 4 MMOL/L (ref 3.5–5.1)
PROT SERPL-MCNC: 6.9 G/DL (ref 6–8.4)
RBC # BLD AUTO: 3.28 M/UL (ref 4–5.4)
SATURATED IRON: 36 % (ref 20–50)
SODIUM SERPL-SCNC: 137 MMOL/L (ref 136–145)
TOTAL IRON BINDING CAPACITY: 297 UG/DL (ref 250–450)
TRANSFERRIN SERPL-MCNC: 201 MG/DL (ref 200–375)
VIT B12 SERPL-MCNC: 527 PG/ML (ref 210–950)
WBC # BLD AUTO: 4.81 K/UL (ref 3.9–12.7)

## 2025-02-06 PROCEDURE — 80053 COMPREHEN METABOLIC PANEL: CPT | Mod: HCNC | Performed by: INTERNAL MEDICINE

## 2025-02-06 PROCEDURE — 82607 VITAMIN B-12: CPT | Mod: HCNC | Performed by: INTERNAL MEDICINE

## 2025-02-06 PROCEDURE — 82728 ASSAY OF FERRITIN: CPT | Mod: HCNC | Performed by: INTERNAL MEDICINE

## 2025-02-06 PROCEDURE — 36415 COLL VENOUS BLD VENIPUNCTURE: CPT | Mod: HCNC | Performed by: INTERNAL MEDICINE

## 2025-02-06 PROCEDURE — 85025 COMPLETE CBC W/AUTO DIFF WBC: CPT | Mod: HCNC | Performed by: INTERNAL MEDICINE

## 2025-02-06 PROCEDURE — 82746 ASSAY OF FOLIC ACID SERUM: CPT | Mod: HCNC | Performed by: INTERNAL MEDICINE

## 2025-02-06 PROCEDURE — 83540 ASSAY OF IRON: CPT | Mod: HCNC | Performed by: INTERNAL MEDICINE

## 2025-02-13 ENCOUNTER — PATIENT MESSAGE (OUTPATIENT)
Dept: ADMINISTRATIVE | Facility: OTHER | Age: 75
End: 2025-02-13
Payer: MEDICARE

## 2025-02-18 ENCOUNTER — RESULTS FOLLOW-UP (OUTPATIENT)
Dept: INTERNAL MEDICINE | Facility: CLINIC | Age: 75
End: 2025-02-18

## 2025-02-18 ENCOUNTER — HOSPITAL ENCOUNTER (OUTPATIENT)
Dept: RADIOLOGY | Facility: HOSPITAL | Age: 75
Discharge: HOME | End: 2025-02-18
Attending: INTERNAL MEDICINE
Payer: MEDICARE

## 2025-02-18 DIAGNOSIS — Z12.31 ENCOUNTER FOR SCREENING MAMMOGRAM FOR BREAST CANCER: ICD-10-CM

## 2025-02-18 PROCEDURE — 77067 SCR MAMMO BI INCL CAD: CPT | Mod: TC,HCNC

## 2025-02-28 ENCOUNTER — OFFICE VISIT (OUTPATIENT)
Dept: OBSTETRICS AND GYNECOLOGY | Facility: CLINIC | Age: 75
End: 2025-02-28
Payer: MEDICARE

## 2025-02-28 VITALS
BODY MASS INDEX: 18.13 KG/M2 | HEART RATE: 58 BPM | SYSTOLIC BLOOD PRESSURE: 128 MMHG | HEIGHT: 66 IN | WEIGHT: 112.81 LBS | DIASTOLIC BLOOD PRESSURE: 74 MMHG

## 2025-02-28 DIAGNOSIS — Z78.0 POSTMENOPAUSAL: ICD-10-CM

## 2025-02-28 DIAGNOSIS — N95.2 VAGINAL ATROPHY: Primary | ICD-10-CM

## 2025-02-28 PROCEDURE — 99999 PR PBB SHADOW E&M-EST. PATIENT-LVL III: CPT | Mod: PBBFAC,HCNC,,

## 2025-02-28 NOTE — PROGRESS NOTES
"CC: Vaginal Irritation F/U    Chelsea Ford Child is a 75 y.o. female  presents to F/U vaginal itching and burning on labia for weeks. Prescribed vaginal estrogen to use. Has noticed a significant improvement in symptoms. On maintenance dosing now.   She denies hematuria, dysuria, constipation, diarrhea, fevers, chills, nausea or emesis.  Denies further issues, problems, or complaints.      /74 (Patient Position: Sitting)   Pulse (!) 58   Ht 5' 6" (1.676 m)   Wt 51.2 kg (112 lb 12.8 oz)   LMP  (LMP Unknown)   BMI 18.21 kg/m²     ROS:  Constitutional: no weight loss, weight gain, fever, fatigue  Eyes:  No vision changes, glasses/contacts  ENT/Mouth: No ulcers, sinus problems, ears ringing, headache  Cardiovascular: No inability to lie flat, chest pain, exercise intolerance, swelling, heart palpitations  Respiratory: No wheezing, coughing blood, shortness of breath, or cough  Gastrointestinal: No diarrhea, bloody stool, nausea/vomiting, constipation, gas, hemorrhoids  Genitourinary: No blood in urine, painful urination, urgency of urination, frequency of urination, incomplete emptying, incontinence, abnormal bleeding, painful periods, heavy periods, vaginal discharge, vaginal odor, painful intercourse, sexual problems, bleeding after intercourse.   Musculoskeletal: No muscle weakness  Breast: No painful breasts, nipple discharge, masses, rash, ulcers   Neurological: No passing out, seizures, numbness, headache  Endocrine: No diabetes, hypothyroid, hyperthyroid, hot flashes, hair loss, abnormal hair growth, acne  Psychiatric: No depression, crying  Hematologic: No bruises, bleeding, swollen lymph nodes, anemia.    OBJECTIVE:  Physical Exam:   Constitutional: She is oriented to person, place, and time. She appears well-developed and well-nourished. No distress.               Genitourinary:    Inguinal canal and rectum normal.   The external female genitalia was normal.   No external genitalia lesions " identified,Genitalia hair distrobution normal .     Labial bartholins normal.There is no tenderness or lesion on the right labia. There is no tenderness or lesion on the left labia.               Neurological: She is alert and oriented to person, place, and time.    Skin: Skin is warm and dry.    Psychiatric: She has a normal mood and affect. Her behavior is normal.         ASSESSMENT:   Vaginal atrophy    Postmenopausal    PLAN:   - Continue w/ maintenance of vaginal estrogen  - Recommended good genital hygiene practices (I.e. using unscented body washes, avoiding douching, foods high in sugar, tight fitting clothing), drinking plenty of water  - RTC in 4-5 months for Annual    Female chaperone present for entire procedure

## 2025-03-26 ENCOUNTER — PATIENT MESSAGE (OUTPATIENT)
Dept: ORTHOPEDICS | Facility: CLINIC | Age: 75
End: 2025-03-26
Payer: MEDICARE

## 2025-03-26 ENCOUNTER — TELEPHONE (OUTPATIENT)
Dept: ORTHOPEDICS | Facility: CLINIC | Age: 75
End: 2025-03-26
Payer: MEDICARE

## 2025-03-27 ENCOUNTER — OFFICE VISIT (OUTPATIENT)
Dept: ORTHOPEDICS | Facility: CLINIC | Age: 75
End: 2025-03-27
Payer: MEDICARE

## 2025-03-27 ENCOUNTER — HOSPITAL ENCOUNTER (OUTPATIENT)
Dept: RADIOLOGY | Facility: HOSPITAL | Age: 75
Discharge: HOME | End: 2025-03-27
Attending: PHYSICIAN ASSISTANT
Payer: MEDICARE

## 2025-03-27 VITALS
WEIGHT: 112.88 LBS | HEART RATE: 75 BPM | RESPIRATION RATE: 18 BRPM | BODY MASS INDEX: 18.14 KG/M2 | HEIGHT: 66 IN | DIASTOLIC BLOOD PRESSURE: 74 MMHG | SYSTOLIC BLOOD PRESSURE: 153 MMHG

## 2025-03-27 DIAGNOSIS — M17.0 PRIMARY OSTEOARTHRITIS OF BOTH KNEES: ICD-10-CM

## 2025-03-27 DIAGNOSIS — M17.0 PRIMARY OSTEOARTHRITIS OF BOTH KNEES: Primary | ICD-10-CM

## 2025-03-27 PROCEDURE — 20610 DRAIN/INJ JOINT/BURSA W/O US: CPT | Mod: 50,HCNC,S$GLB, | Performed by: PHYSICIAN ASSISTANT

## 2025-03-27 PROCEDURE — 73564 X-RAY EXAM KNEE 4 OR MORE: CPT | Mod: TC,50,HCNC

## 2025-03-27 PROCEDURE — 1125F AMNT PAIN NOTED PAIN PRSNT: CPT | Mod: HCNC,CPTII,S$GLB, | Performed by: PHYSICIAN ASSISTANT

## 2025-03-27 PROCEDURE — 99214 OFFICE O/P EST MOD 30 MIN: CPT | Mod: 25,HCNC,S$GLB, | Performed by: PHYSICIAN ASSISTANT

## 2025-03-27 PROCEDURE — 3077F SYST BP >= 140 MM HG: CPT | Mod: HCNC,CPTII,S$GLB, | Performed by: PHYSICIAN ASSISTANT

## 2025-03-27 PROCEDURE — 3078F DIAST BP <80 MM HG: CPT | Mod: HCNC,CPTII,S$GLB, | Performed by: PHYSICIAN ASSISTANT

## 2025-03-27 PROCEDURE — 99999 PR PBB SHADOW E&M-EST. PATIENT-LVL IV: CPT | Mod: PBBFAC,HCNC,, | Performed by: PHYSICIAN ASSISTANT

## 2025-03-27 PROCEDURE — 1159F MED LIST DOCD IN RCRD: CPT | Mod: HCNC,CPTII,S$GLB, | Performed by: PHYSICIAN ASSISTANT

## 2025-03-27 PROCEDURE — 73564 X-RAY EXAM KNEE 4 OR MORE: CPT | Mod: 26,50,HCNC, | Performed by: RADIOLOGY

## 2025-03-27 RX ORDER — TRIAMCINOLONE ACETONIDE 40 MG/ML
40 INJECTION, SUSPENSION INTRA-ARTICULAR; INTRAMUSCULAR
Status: COMPLETED | OUTPATIENT
Start: 2025-03-27 | End: 2025-03-27

## 2025-03-27 RX ADMIN — TRIAMCINOLONE ACETONIDE 40 MG: 40 INJECTION, SUSPENSION INTRA-ARTICULAR; INTRAMUSCULAR at 05:03

## 2025-03-27 NOTE — PROGRESS NOTES
"Subjective     Patient ID: Chelsea Ford Child is a 75 y.o. female.    Chief Complaint: Pain of the Right Knee and Pain of the Left Knee      HPI    History of Present Illness    CHIEF COMPLAINT:  - Bilateral knee pain, left worse than right.    HPI:  Chelsea presents with bilateral knee pain, left worse than right, which has been significantly affecting her activities. She reports a history of arthritis in her knees, confirmed by XRs showing narrowing on the inside of both knees. She received steroid injections in her knees, with the initial one in September providing effective relief. However, subsequent injections have become less effective, with the most recent one only taking "the edge off the pain" and lasting approximately four weeks. She tried naproxen prescribed by her primary care physician, which helped improve symptoms but did not completely eliminate the pain. She took it daily instead of twice daily for about five days, noting increased swelling with its use. She expresses a desire to do more but is limited by pain, stating that she would be content with being able to walk around and perform activities with less pain.    PREVIOUS TREATMENTS:  - Steroid injections: Initial injection in September, subsequent injections less effective. Most recent injection lasted approximately 4 weeks and only provided minimal pain relief.    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         Review of Systems   Constitutional: Negative for chills and fever.   Cardiovascular:  Negative for chest pain.   Respiratory:  Negative for cough and shortness of breath.    Skin:  Negative for color change, dry skin, itching, nail changes, poor wound healing and rash.   Musculoskeletal:         Bilateral knee pain    Neurological:  Negative for dizziness.   Psychiatric/Behavioral:  Negative for altered mental status. The patient is not nervous/anxious.    All other systems reviewed and are negative.         Objective     Ortho/SPM Exam    Physical Exam   Bilateral knee  Skin intact  No warmth or erythema  No TTP   Stable to testing  ROM 0-130     Physical Exam    IMAGING:  - XR Bilateral Knees:Bones are demineralized. Significant narrowing particular involving the medial femoral tibial compartments. Chondrocalcinosis bilaterally. There may be a small effusion on the right. Mild degenerative change patellofemoral joints.            Assessment and Plan     Encounter Diagnosis   Name Primary?    Primary osteoarthritis of both knees Yes         Chelsea was seen today for pain and pain.    Diagnoses and all orders for this visit:    Primary osteoarthritis of both knees  -     X-ray Knee Ortho Bilateral with Flexion; Future  -     sodium hyaluronate (EUFLEXXA) 10 mg/mL(mw 2.4 -3.6 million) injection 20 mg  -     sodium hyaluronate (EUFLEXXA) 10 mg/mL(mw 2.4 -3.6 million) injection 20 mg  -     Prior authorization Order  -     triamcinolone acetonide injection 40 mg    Other orders  -     triamcinolone acetonide injection 40 mg        Assessment & Plan    MEDICATIONS:  - Take Aleve 250mg as needed for pain.    PROCEDURES:  - # Procedures  - Bilateral knee steroid injections administered today.  - Discussed gel  injections (Synvisc) as potential future treatment if steroid injections become less effective.  - Explained 1-time vs. 3-time gel injection protocols, including potential risks such as pseudo-effective reactions.  - Recommend ordering gel injections for approval in case steroid injections wear off quickly.    PATIENT INSTRUCTIONS:  - Started with 1 Aleve tablet (250mg) and increase to 2 if needed for pain.              This note was generated with the assistance of ambient listening technology. Verbal consent was obtained by the patient and accompanying visitor(s) for the recording of patient appointment to facilitate this note. I attest to having reviewed and edited the generated note for accuracy, though some syntax or spelling errors may persist. Please contact the author of this note for any clarification.    PROCEDURE:  I have explained the risks, benefits, and alternatives of the procedure in detail.  The patient voices understanding and all questions have been answered.  The patient agrees to proceed as planned. So after I performed a sterile prep of the skin in the normal fashion the bilateral knee is injected using a 22 gauge needle from the anterolateral approach with a combination of 4cc 1% plain lidocaine and 40 mg of kenalog.  The patient is cautioned and immediate relief of pain is secondary to the local anesthetic and will be temporary.  After the anesthetic wears off there may be a increase in pain that may last for a few hours or a few days and they should use ice to help alleviate this flair up of pain.

## 2025-04-10 ENCOUNTER — PATIENT MESSAGE (OUTPATIENT)
Dept: OBSTETRICS AND GYNECOLOGY | Facility: CLINIC | Age: 75
End: 2025-04-10
Payer: MEDICARE

## 2025-04-11 DIAGNOSIS — I10 HYPERTENSION, ESSENTIAL: ICD-10-CM

## 2025-04-14 RX ORDER — LOSARTAN POTASSIUM 25 MG/1
TABLET ORAL
Qty: 90 TABLET | Refills: 3 | Status: SHIPPED | OUTPATIENT
Start: 2025-04-14

## 2025-04-14 NOTE — TELEPHONE ENCOUNTER
No care due was identified.  [facility] Embedded Care Due Messages. Reference number: [id number].   4/14/2025 9:28:21 AM CDT

## 2025-04-14 NOTE — TELEPHONE ENCOUNTER
Refill Routing Note   Medication(s) are not appropriate for processing by [facility] for the following reason(s):        Required vitals abnormal  No active prescription written by provider  Responsible provider unclear    [facility] action(s):  Defer             Appointments  past 12m or future 3m with PCP    Date Provider   Last Visit   1/15/2025 Elva Tripathi MD   Next Visit   7/15/2025 Elva Tripathi MD   ED visits in past 90 days: 0        Note composed:10:11 AM 04/14/2025

## 2025-04-17 ENCOUNTER — OFFICE VISIT (OUTPATIENT)
Dept: DERMATOLOGY | Facility: CLINIC | Age: 75
End: 2025-04-17
Payer: MEDICARE

## 2025-04-17 DIAGNOSIS — D18.01 CHERRY ANGIOMA: ICD-10-CM

## 2025-04-17 DIAGNOSIS — Z12.83 SCREENING EXAM FOR SKIN CANCER: ICD-10-CM

## 2025-04-17 DIAGNOSIS — L81.4 LENTIGINES: ICD-10-CM

## 2025-04-17 DIAGNOSIS — L82.1 SEBORRHEIC KERATOSES: ICD-10-CM

## 2025-04-17 DIAGNOSIS — L29.9 SCALP PRURITUS: ICD-10-CM

## 2025-04-17 DIAGNOSIS — L82.0 INFLAMED SEBORRHEIC KERATOSIS: Primary | ICD-10-CM

## 2025-04-17 DIAGNOSIS — D69.2 SOLAR PURPURA: ICD-10-CM

## 2025-04-17 DIAGNOSIS — R20.2 NOTALGIA PARESTHETICA: ICD-10-CM

## 2025-04-17 DIAGNOSIS — D22.9 MULTIPLE BENIGN NEVI: ICD-10-CM

## 2025-04-17 PROCEDURE — 99999 PR PBB SHADOW E&M-EST. PATIENT-LVL III: CPT | Mod: PBBFAC,HCNC,, | Performed by: STUDENT IN AN ORGANIZED HEALTH CARE EDUCATION/TRAINING PROGRAM

## 2025-04-17 RX ORDER — MOMETASONE FUROATE 1 MG/ML
LOTION TOPICAL
Qty: 60 ML | Refills: 5 | Status: SHIPPED | OUTPATIENT
Start: 2025-04-17

## 2025-04-17 NOTE — PROGRESS NOTES
Subjective:      Patient ID:  Chelsea Ford Child is a 75 y.o. female who presents for   Chief Complaint   Patient presents with    Skin Check     tbse     Chelsea Ford Child is a 74 y.o. female who presents for: FBSE screening exam for skin cancer.      with me for TBSE     Patient with new area of concern:   Location: scalp - itchy   Previous treatments: none    Patient with new area of concern:   Location: forearms-   Previous treatments: none    Patient with new area of concern:   Location: spot on back- itchy  Previous treatments: Cerave moisturizing cream    Pertinent history:  History of blistering sunburns: No  History of tanning bed use: No  Family history of melanoma: No  Personal history of mole removal: Yes  Personal history of skin cancer: Yes - SCC of R frontal scalp s/p Mohs 7/30/24  BCC of left nose (medial ala) s/p Mohs 6/2/21  Hx of multiple BCCs excised outside of [facility]         Review of Systems   Skin:  Positive for daily sunscreen use, activity-related sunscreen use and wears hat (sometimes). Negative for recent sunburn.   Hematologic/Lymphatic: Bruises/bleeds easily (bruise and blood thinners).       Objective:   Physical Exam   Constitutional: She appears well-developed and well-nourished. No distress.   Neurological: She is alert and oriented to person, place, and time. She is not disoriented.   Psychiatric: She has a normal mood and affect.   Skin:   Areas Examined (abnormalities noted in diagram):   Scalp / Hair Palpated and Inspected  Head / Face Inspection Performed  Neck Inspection Performed  Chest / Axilla Inspection Performed  Abdomen Inspection Performed  Genitals / Buttocks / Groin Inspection Performed  Back Inspection Performed  RUE Inspected  LUE Inspection Performed  RLE Inspected  LLE Inspection Performed  Nails and Digits Inspection Performed                 Diagram Legend     Erythematous scaling macule/papule c/w actinic keratosis       Vascular papule c/w  angioma      Pigmented verrucoid papule/plaque c/w seborrheic keratosis      Yellow umbilicated papule c/w sebaceous hyperplasia      Irregularly shaped tan macule c/w lentigo     1-2 mm smooth white papules consistent with Milia      Movable subcutaneous cyst with punctum c/w epidermal inclusion cyst      Subcutaneous movable cyst c/w pilar cyst      Firm pink to brown papule c/w dermatofibroma      Pedunculated fleshy papule(s) c/w skin tag(s)      Evenly pigmented macule c/w junctional nevus     Mildly variegated pigmented, slightly irregular-bordered macule c/w mildly atypical nevus      Flesh colored to evenly pigmented papule c/w intradermal nevus       Pink pearly papule/plaque c/w basal cell carcinoma      Erythematous hyperkeratotic cursted plaque c/w SCC      Surgical scar with no sign of skin cancer recurrence      Open and closed comedones      Inflammatory papules and pustules      Verrucoid papule consistent consistent with wart     Erythematous eczematous patches and plaques     Dystrophic onycholytic nail with subungual debris c/w onychomycosis     Umbilicated papule    Erythematous-base heme-crusted tan verrucoid plaque consistent with inflamed seborrheic keratosis     Erythematous Silvery Scaling Plaque c/w Psoriasis     See annotation      Assessment / Plan:        Inflamed seborrheic keratosis  A/w pruritus  Cryosurgery procedure note:    Verbal consent from the patient is obtained including, but not limited to, risk of hypopigmentation/hyperpigmentation, scar, recurrence of lesion. Liquid nitrogen cryosurgery is applied to 1 lesions to produce a freeze injury. The patient is aware that blisters may form and is instructed on wound care with gentle cleansing and use of vaseline ointment to keep moist until healed. The patient is supplied a handout on cryosurgery and is instructed to call if lesions do not completely resolve.    Scalp pruritus  Scalp exam clear/wnl  -     mometasone (ELOCON) 0.1 %  solution; To scalp qd-bid prn itching  Dispense: 60 mL; Refill: 5    Solar purpura  UV and age-related loss of collagen --> easy bruising    Notalgia paresthetica  L back  Start pramoxine lotion (this is an anti-itch lotion that be purchased over the counter at [employer], brands include Sarna or CeraVe anti-itch) every day as often needed to relieve itching symptoms instead of scratching, can keep bottle of lotion in refrigerator and it may help relieve itch more    Seborrheic keratoses  Multiple benign nevi  Cherry angioma  Lentigines  Reassurance    History of NMSC  Examined areas of prior scars, no evidence of recurrence  - Continue skin exams at least annually, increased risk of additional skin cancers developing in the future  - I discussed the importance of sun protection with the patient. Recommend daily use of SPF 30+ physical or mineral sunscreen, apply 15 minutes before going outdoors and reapply every 2 hours. Discussed wide-brimmed hats and UPF 50+ clothing.    RTC 6 mo, sooner prn

## 2025-05-07 DIAGNOSIS — R06.02 SHORT OF BREATH ON EXERTION: ICD-10-CM

## 2025-05-07 NOTE — TELEPHONE ENCOUNTER
Refill Routing Note   Medication(s) are not appropriate for processing by [facility] for the following reason(s):        Required vitals abnormal    [facility] action(s):  Defer             Appointments  past 12m or future 3m with PCP    Date Provider   Last Visit   4/23/2024 Anthony Lackey MD   Next Visit   Visit date not found Anthony Lackey MD   ED visits in past 90 days: 0        Note composed:6:23 AM 05/07/2025

## 2025-05-08 RX ORDER — FUROSEMIDE 20 MG/1
20 TABLET ORAL
Qty: 30 TABLET | Refills: 3 | Status: SHIPPED | OUTPATIENT
Start: 2025-05-08

## 2025-06-03 ENCOUNTER — OFFICE VISIT (OUTPATIENT)
Dept: OBSTETRICS AND GYNECOLOGY | Facility: CLINIC | Age: 75
End: 2025-06-03
Payer: MEDICARE

## 2025-06-03 VITALS
HEART RATE: 59 BPM | BODY MASS INDEX: 18.32 KG/M2 | WEIGHT: 114 LBS | DIASTOLIC BLOOD PRESSURE: 71 MMHG | SYSTOLIC BLOOD PRESSURE: 124 MMHG | HEIGHT: 66 IN

## 2025-06-03 DIAGNOSIS — Z00.00 HEALTHCARE MAINTENANCE: ICD-10-CM

## 2025-06-03 DIAGNOSIS — Z78.0 POSTMENOPAUSAL: ICD-10-CM

## 2025-06-03 DIAGNOSIS — Z01.419 ENCOUNTER FOR GYNECOLOGICAL EXAMINATION (GENERAL) (ROUTINE) WITHOUT ABNORMAL FINDINGS: Primary | ICD-10-CM

## 2025-06-03 DIAGNOSIS — Z13.820 SCREENING FOR OSTEOPOROSIS: ICD-10-CM

## 2025-06-03 DIAGNOSIS — N95.8 GENITOURINARY SYNDROME OF MENOPAUSE: ICD-10-CM

## 2025-06-03 DIAGNOSIS — N95.2 VAGINAL ATROPHY: ICD-10-CM

## 2025-06-03 DIAGNOSIS — N36.2 URETHRAL CARUNCLE: ICD-10-CM

## 2025-06-03 PROCEDURE — 3288F FALL RISK ASSESSMENT DOCD: CPT | Mod: CPTII,S$GLB,,

## 2025-06-03 PROCEDURE — 1159F MED LIST DOCD IN RCRD: CPT | Mod: CPTII,S$GLB,,

## 2025-06-03 PROCEDURE — 3078F DIAST BP <80 MM HG: CPT | Mod: CPTII,S$GLB,,

## 2025-06-03 PROCEDURE — 3074F SYST BP LT 130 MM HG: CPT | Mod: CPTII,S$GLB,,

## 2025-06-03 PROCEDURE — 1160F RVW MEDS BY RX/DR IN RCRD: CPT | Mod: CPTII,S$GLB,,

## 2025-06-03 PROCEDURE — 99999 PR PBB SHADOW E&M-EST. PATIENT-LVL IV: CPT | Mod: PBBFAC,,,

## 2025-06-03 PROCEDURE — G0101 CA SCREEN;PELVIC/BREAST EXAM: HCPCS | Mod: S$GLB,,,

## 2025-06-03 PROCEDURE — 1125F AMNT PAIN NOTED PAIN PRSNT: CPT | Mod: CPTII,S$GLB,,

## 2025-06-03 PROCEDURE — 1101F PT FALLS ASSESS-DOCD LE1/YR: CPT | Mod: CPTII,S$GLB,,

## 2025-06-03 RX ORDER — ESTRADIOL 0.1 MG/G
1 CREAM VAGINAL NIGHTLY
Qty: 42.5 G | Refills: 3 | Status: SHIPPED | OUTPATIENT
Start: 2025-06-03

## 2025-07-15 ENCOUNTER — OFFICE VISIT (OUTPATIENT)
Dept: INTERNAL MEDICINE | Facility: CLINIC | Age: 75
End: 2025-07-15
Payer: MEDICARE

## 2025-07-15 ENCOUNTER — PATIENT MESSAGE (OUTPATIENT)
Dept: ORTHOPEDICS | Facility: CLINIC | Age: 75
End: 2025-07-15
Payer: MEDICARE

## 2025-07-15 ENCOUNTER — OFFICE VISIT (OUTPATIENT)
Dept: OPTOMETRY | Facility: CLINIC | Age: 75
End: 2025-07-15
Payer: MEDICARE

## 2025-07-15 VITALS
RESPIRATION RATE: 17 BRPM | HEART RATE: 68 BPM | SYSTOLIC BLOOD PRESSURE: 120 MMHG | BODY MASS INDEX: 18.84 KG/M2 | OXYGEN SATURATION: 95 % | DIASTOLIC BLOOD PRESSURE: 70 MMHG | WEIGHT: 116.75 LBS

## 2025-07-15 DIAGNOSIS — H25.043 POSTERIOR SUBCAPSULAR AGE-RELATED CATARACT OF BOTH EYES: ICD-10-CM

## 2025-07-15 DIAGNOSIS — D64.9 MILD ANEMIA: ICD-10-CM

## 2025-07-15 DIAGNOSIS — M81.0 OSTEOPOROSIS, UNSPECIFIED OSTEOPOROSIS TYPE, UNSPECIFIED PATHOLOGICAL FRACTURE PRESENCE: ICD-10-CM

## 2025-07-15 DIAGNOSIS — H43.393 VISUAL FLOATERS, BILATERAL: ICD-10-CM

## 2025-07-15 DIAGNOSIS — I10 BENIGN ESSENTIAL HYPERTENSION: ICD-10-CM

## 2025-07-15 DIAGNOSIS — I50.32 CHRONIC HEART FAILURE WITH PRESERVED EJECTION FRACTION: Primary | ICD-10-CM

## 2025-07-15 DIAGNOSIS — H52.03 HYPEROPIA WITH PRESBYOPIA OF BOTH EYES: ICD-10-CM

## 2025-07-15 DIAGNOSIS — H10.13 ALLERGIC CONJUNCTIVITIS OF BOTH EYES: Primary | ICD-10-CM

## 2025-07-15 DIAGNOSIS — H52.4 HYPEROPIA WITH PRESBYOPIA OF BOTH EYES: ICD-10-CM

## 2025-07-15 DIAGNOSIS — H53.002 AMBLYOPIA OF LEFT EYE: ICD-10-CM

## 2025-07-15 DIAGNOSIS — H04.123 DRY EYE SYNDROME OF BOTH EYES: ICD-10-CM

## 2025-07-15 DIAGNOSIS — H25.13 NUCLEAR SCLEROSIS OF BOTH EYES: ICD-10-CM

## 2025-07-15 PROCEDURE — 92015 DETERMINE REFRACTIVE STATE: CPT | Mod: HCNC,S$GLB,, | Performed by: OPTOMETRIST

## 2025-07-15 PROCEDURE — 99999 PR PBB SHADOW E&M-EST. PATIENT-LVL V: CPT | Mod: PBBFAC,HCNC,, | Performed by: INTERNAL MEDICINE

## 2025-07-15 PROCEDURE — 1125F AMNT PAIN NOTED PAIN PRSNT: CPT | Mod: CPTII,HCNC,S$GLB, | Performed by: INTERNAL MEDICINE

## 2025-07-15 PROCEDURE — 99999 PR PBB SHADOW E&M-EST. PATIENT-LVL II: CPT | Mod: PBBFAC,HCNC,, | Performed by: OPTOMETRIST

## 2025-07-15 PROCEDURE — 1101F PT FALLS ASSESS-DOCD LE1/YR: CPT | Mod: CPTII,HCNC,S$GLB, | Performed by: INTERNAL MEDICINE

## 2025-07-15 PROCEDURE — G2211 COMPLEX E/M VISIT ADD ON: HCPCS | Mod: HCNC,S$GLB,, | Performed by: INTERNAL MEDICINE

## 2025-07-15 PROCEDURE — 99214 OFFICE O/P EST MOD 30 MIN: CPT | Mod: HCNC,S$GLB,, | Performed by: INTERNAL MEDICINE

## 2025-07-15 PROCEDURE — 3288F FALL RISK ASSESSMENT DOCD: CPT | Mod: CPTII,HCNC,S$GLB, | Performed by: INTERNAL MEDICINE

## 2025-07-15 PROCEDURE — 92014 COMPRE OPH EXAM EST PT 1/>: CPT | Mod: HCNC,S$GLB,, | Performed by: OPTOMETRIST

## 2025-07-15 PROCEDURE — 1159F MED LIST DOCD IN RCRD: CPT | Mod: CPTII,HCNC,S$GLB, | Performed by: OPTOMETRIST

## 2025-07-15 PROCEDURE — 1160F RVW MEDS BY RX/DR IN RCRD: CPT | Mod: CPTII,HCNC,S$GLB, | Performed by: OPTOMETRIST

## 2025-07-15 PROCEDURE — 3074F SYST BP LT 130 MM HG: CPT | Mod: CPTII,HCNC,S$GLB, | Performed by: INTERNAL MEDICINE

## 2025-07-15 PROCEDURE — 3078F DIAST BP <80 MM HG: CPT | Mod: CPTII,HCNC,S$GLB, | Performed by: INTERNAL MEDICINE

## 2025-07-15 NOTE — PROGRESS NOTES
Subjective:       Patient ID: Chelsea Ford Child is a 75 y.o. female.    Chief Complaint: Follow-up    HPI  75 y.o. female here for follow up of    Primary osteoarthritis both knees  Bilateral CSI done on 1/30/24; 9/3/24; 3/27/25  Going to have another ALTAGRACIA soon and considering synvisc.      HTN  Losartan 25mg daily  Carvedilol 12.5mg am and 25mg pm  Hydralazine 25mg daily PRN SBP > 180     Pulmonary HTN; HFpEF  Furosemide now taking daily   wears support hose intermittently.      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    Osteoporosis  Declines bisphosphonate or repeat dexa at previous visit.   Taking calcium, weight bearing exercises and balance exercises    Review of Systems   Constitutional:  Negative for fever.   Respiratory:  Negative for shortness of breath.    Cardiovascular:  Negative for chest pain.   Musculoskeletal: Negative.    Skin: Negative.        Objective:   /70 (BP Location: Right arm, Patient Position: Sitting)   Pulse 68   Resp 17   Wt 53 kg (116 lb 11.7 oz)   LMP  (LMP Unknown)   SpO2 95%   BMI 18.84 kg/m²      Physical Exam  Constitutional:       General: She is not in acute distress.     Appearance: She is well-developed. She is not diaphoretic.   HENT:      Head: Normocephalic and atraumatic.   Cardiovascular:      Rate and Rhythm: Normal rate and regular rhythm.   Pulmonary:      Effort: Pulmonary effort is normal. No respiratory distress.      Breath sounds: No wheezing or rales.   Skin:     General: Skin is warm and dry.   Neurological:      Mental Status: She is alert and oriented to person, place, and time.   Psychiatric:         Behavior: Behavior normal.         Assessment:       1. Chronic heart failure with preserved ejection fraction    2. Benign essential hypertension    3. Osteoporosis, unspecified osteoporosis type, unspecified pathological fracture presence     4. Mild anemia        Plan:       Chronic heart failure with preserved ejection fraction  -     Comprehensive Metabolic Panel; Future; Expected date: 07/15/2025    Benign essential hypertension  -     Comprehensive Metabolic Panel; Future; Expected date: 07/15/2025    Osteoporosis, unspecified osteoporosis type, unspecified pathological fracture presence  Declines medication, continues good calcium and vit intake, exercise    Mild anemia  -     CBC Auto Differential; Future; Expected date: 07/15/2025          You are up to date for your primary preventive health care, and there are no reminders at this time.     Shingrx will do at her pharmacy  Tdap - thinks she did, will check her records at home  Labs will do later

## 2025-07-15 NOTE — PROGRESS NOTES
ESTEBAN    DONALD: [birth year]  Chief complaint (CC): Patient is here for annual eye exam today.  Patient   has noticed more trouble with near and distance.  Patient also avoids   night driving.  Patient has itchy eyes and has allergies.  Glasses? + 1 yr. old  Contacts? no  H/o eye surgery, injections or laser: strabismus surgery OS  H/o eye injury: no  Known eye conditions? Cataracts, dry eyes, amblyopia OS  Family h/o eye conditions? no  Eye gtts? Blink and lid scrubs twice week      (-) Flashes (+)  Floaters (-) Mucous   (+)  Tearing (+) Itching (-) Burning   (-) Headaches (-) Eye Pain/discomfort (-) Irritation   (-)  Redness (-) Double vision (-) Blurry vision    Diabetic? -  A1c? -      Last edited by Nella Oseguera on 7/15/2025  2:41 PM.            Assessment /Plan     For exam results, see Encounter Report.      Allergic conjunctivitis of both eyes  Recommend Zaditor or Alaway bid OU and cool compresses to help soothe itching. Patient advised to RTC if condition gets worse.     Dry eye syndrome of both eyes  Recommend iVizia, Systane Ultra or Refresh Optive BID-TID OU to aid with symptoms of dry eyes.    Nuclear sclerosis of both eyes  Posterior subcapsular age-related cataract of both eyes  Visually significant. Pt reports night glare but would like to postpone referral for surgery. Pt educated on potential for surgery. RTC 1 year.    Amblyopia of left eye  Hyperopia with presbyopia of both eyes  SRx released to patient. Patient educated on lens options. Normal ocular health. RTC 1 year for routine exam.     Visual floaters, bilateral  No e/o h/b/t 360 degrees OU. Monitor for worsening of symptoms or S/Sx of RD.

## 2025-07-17 ENCOUNTER — OFFICE VISIT (OUTPATIENT)
Dept: ORTHOPEDICS | Facility: CLINIC | Age: 75
End: 2025-07-17
Payer: MEDICARE

## 2025-07-17 VITALS
BODY MASS INDEX: 18.79 KG/M2 | HEIGHT: 66 IN | WEIGHT: 116.88 LBS | RESPIRATION RATE: 18 BRPM | HEART RATE: 72 BPM | SYSTOLIC BLOOD PRESSURE: 119 MMHG | DIASTOLIC BLOOD PRESSURE: 71 MMHG

## 2025-07-17 DIAGNOSIS — M17.0 PRIMARY OSTEOARTHRITIS OF BOTH KNEES: Primary | ICD-10-CM

## 2025-07-17 PROCEDURE — 99213 OFFICE O/P EST LOW 20 MIN: CPT | Mod: 25,HCNC,S$GLB, | Performed by: PHYSICIAN ASSISTANT

## 2025-07-17 PROCEDURE — 1159F MED LIST DOCD IN RCRD: CPT | Mod: CPTII,HCNC,S$GLB, | Performed by: PHYSICIAN ASSISTANT

## 2025-07-17 PROCEDURE — 20610 DRAIN/INJ JOINT/BURSA W/O US: CPT | Mod: 50,HCNC,S$GLB, | Performed by: PHYSICIAN ASSISTANT

## 2025-07-17 PROCEDURE — 99999 PR PBB SHADOW E&M-EST. PATIENT-LVL IV: CPT | Mod: PBBFAC,HCNC,, | Performed by: PHYSICIAN ASSISTANT

## 2025-07-17 PROCEDURE — 1125F AMNT PAIN NOTED PAIN PRSNT: CPT | Mod: CPTII,HCNC,S$GLB, | Performed by: PHYSICIAN ASSISTANT

## 2025-07-17 PROCEDURE — 3078F DIAST BP <80 MM HG: CPT | Mod: CPTII,HCNC,S$GLB, | Performed by: PHYSICIAN ASSISTANT

## 2025-07-17 PROCEDURE — 3074F SYST BP LT 130 MM HG: CPT | Mod: CPTII,HCNC,S$GLB, | Performed by: PHYSICIAN ASSISTANT

## 2025-07-17 RX ADMIN — TRIAMCINOLONE ACETONIDE 40 MG: 40 INJECTION, SUSPENSION INTRA-ARTICULAR; INTRAMUSCULAR at 07:07

## 2025-07-29 RX ORDER — TRIAMCINOLONE ACETONIDE 40 MG/ML
40 INJECTION, SUSPENSION INTRA-ARTICULAR; INTRAMUSCULAR
Status: COMPLETED | OUTPATIENT
Start: 2025-07-29 | End: 2025-07-17

## 2025-07-29 NOTE — PROGRESS NOTES
"Subjective     Patient ID: Chelsea Ford Child is a 75 y.o. female.    Chief Complaint: Injections (LEFT KNEE)      HPI    History of Present Illness    CHIEF COMPLAINT:  - Bilateral knee pain, left worse than right.    HPI:  Chelsea presents with bilateral knee pain, left worse than right, which has been significantly affecting her activities. She reports a history of arthritis in her knees, confirmed by XRs showing narrowing on the inside of both knees. She received steroid injections in her knees, with the last one being done 03/2025. However, subsequent injections have become less effective, with the most recent one only taking "the edge off the pain" . She tried naproxen prescribed by her primary care physician, which helped improve symptoms but did not completely eliminate the pain. She took it daily instead of twice daily for about five days, noting increased swelling with its use. She expresses a desire to do more but is limited by pain, stating that she would be content with being able to walk around and perform activities with less pain.     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         Review of Systems   Constitutional: Negative for " chills and fever.   Cardiovascular:  Negative for chest pain.   Respiratory:  Negative for cough and shortness of breath.    Skin:  Negative for color change, dry skin, itching, nail changes, poor wound healing and rash.   Musculoskeletal:         Bilateral knee pain    Neurological:  Negative for dizziness.   Psychiatric/Behavioral:  Negative for altered mental status. The patient is not nervous/anxious.    All other systems reviewed and are negative.         Objective     Ortho/SPM Exam    Physical Exam   Bilateral knee  Skin intact  No warmth or erythema  No TTP   Stable to testing  ROM 0-130     Physical Exam    IMAGING:  - XR Bilateral Knees:BBones are demineralized. Significant narrowing particular involving the medial femoral tibial compartments. Chondrocalcinosis bilaterally. There may be a small effusion on the right. Mild degenerative change patellofemoral joints.            Assessment and Plan     Encounter Diagnosis   Name Primary?    Primary osteoarthritis of both knees Yes     Discussed options with the patient including lifestyle modifications, oral medication, injection, and surgical consultation. At this time she would like to repeat injection to her knees. She is to return to clinic as needed.         PROCEDURE:  I have explained the risks, benefits, and alternatives of the procedure in detail.  The patient voices understanding and all questions have been answered.  The patient agrees to proceed as planned. So after I performed a sterile prep of the skin in the normal fashion the bilateral knee is injected using a 22 gauge needle from the anterolateral approach with a combination of 4cc 1% plain lidocaine and 40 mg of kenalog.  The patient is cautioned and immediate relief of pain is secondary to the local anesthetic and will be temporary.  After the anesthetic wears off there may be a increase in pain that may last for a few hours or a few days and they should use ice to help alleviate this flair  up of pain.                This note was generated with the assistance of ambient listening technology. Verbal consent was obtained by the patient and accompanying visitor(s) for the recording of patient appointment to facilitate this note. I attest to having reviewed and edited the generated note for accuracy, though some syntax or spelling errors may persist. Please contact the author of this note for any clarification.    PROCEDURE:  I have explained the risks, benefits, and alternatives of the procedure in detail.  The patient voices understanding and all questions have been answered.  The patient agrees to proceed as planned. So after I performed a sterile prep of the skin in the normal fashion the bilateral knee is injected using a 22 gauge needle from the anterolateral approach with a combination of 4cc 1% plain lidocaine and 40 mg of kenalog.  The patient is cautioned and immediate relief of pain is secondary to the local anesthetic and will be temporary.  After the anesthetic wears off there may be a increase in pain that may last for a few hours or a few days and they should use ice to help alleviate this flair up of pain.

## 2025-08-18 ENCOUNTER — PATIENT MESSAGE (OUTPATIENT)
Dept: DERMATOLOGY | Facility: CLINIC | Age: 75
End: 2025-08-18
Payer: MEDICARE

## 2025-08-21 ENCOUNTER — PATIENT MESSAGE (OUTPATIENT)
Dept: INTERNAL MEDICINE | Facility: CLINIC | Age: 75
End: 2025-08-21
Payer: MEDICARE

## 2025-08-21 DIAGNOSIS — I10 BENIGN ESSENTIAL HYPERTENSION: ICD-10-CM

## 2025-08-21 DIAGNOSIS — I50.32 CHRONIC HEART FAILURE WITH PRESERVED EJECTION FRACTION: ICD-10-CM

## 2025-08-21 RX ORDER — CARVEDILOL 25 MG/1
TABLET ORAL
Qty: 135 TABLET | Refills: 3 | Status: SHIPPED | OUTPATIENT
Start: 2025-08-21

## 2025-08-21 RX ORDER — CARVEDILOL 25 MG/1
TABLET ORAL
Qty: 135 TABLET | Refills: 3 | OUTPATIENT
Start: 2025-08-21